# Patient Record
Sex: FEMALE | Race: BLACK OR AFRICAN AMERICAN | NOT HISPANIC OR LATINO | Employment: UNEMPLOYED | URBAN - METROPOLITAN AREA
[De-identification: names, ages, dates, MRNs, and addresses within clinical notes are randomized per-mention and may not be internally consistent; named-entity substitution may affect disease eponyms.]

---

## 2017-01-20 ENCOUNTER — HISTORICAL (OUTPATIENT)
Dept: INTERNAL MEDICINE | Facility: CLINIC | Age: 52
End: 2017-01-20

## 2017-01-27 ENCOUNTER — HISTORICAL (OUTPATIENT)
Dept: INTERNAL MEDICINE | Facility: CLINIC | Age: 52
End: 2017-01-27

## 2017-05-16 ENCOUNTER — HISTORICAL (OUTPATIENT)
Dept: WOUND CARE | Facility: HOSPITAL | Age: 52
End: 2017-05-16

## 2022-04-10 ENCOUNTER — HISTORICAL (OUTPATIENT)
Dept: ADMINISTRATIVE | Facility: HOSPITAL | Age: 57
End: 2022-04-10

## 2022-04-27 VITALS
HEIGHT: 65 IN | WEIGHT: 160.94 LBS | DIASTOLIC BLOOD PRESSURE: 87 MMHG | SYSTOLIC BLOOD PRESSURE: 135 MMHG | BODY MASS INDEX: 26.81 KG/M2

## 2022-08-25 DIAGNOSIS — M25.569 KNEE PAIN, UNSPECIFIED CHRONICITY, UNSPECIFIED LATERALITY: Primary | ICD-10-CM

## 2022-10-27 ENCOUNTER — OFFICE VISIT (OUTPATIENT)
Dept: ORTHOPEDICS | Facility: CLINIC | Age: 57
End: 2022-10-27
Payer: MEDICAID

## 2022-10-27 ENCOUNTER — HOSPITAL ENCOUNTER (OUTPATIENT)
Dept: RADIOLOGY | Facility: HOSPITAL | Age: 57
Discharge: HOME | End: 2022-10-27
Attending: NURSE PRACTITIONER
Payer: MEDICAID

## 2022-10-27 VITALS
RESPIRATION RATE: 20 BRPM | WEIGHT: 202 LBS | BODY MASS INDEX: 33.66 KG/M2 | HEIGHT: 65 IN | OXYGEN SATURATION: 100 % | SYSTOLIC BLOOD PRESSURE: 143 MMHG | DIASTOLIC BLOOD PRESSURE: 82 MMHG | HEART RATE: 91 BPM

## 2022-10-27 DIAGNOSIS — M17.0 PRIMARY OSTEOARTHRITIS OF BOTH KNEES: Primary | ICD-10-CM

## 2022-10-27 DIAGNOSIS — M25.562 PAIN IN BOTH KNEES, UNSPECIFIED CHRONICITY: ICD-10-CM

## 2022-10-27 DIAGNOSIS — M25.561 PAIN IN BOTH KNEES, UNSPECIFIED CHRONICITY: ICD-10-CM

## 2022-10-27 PROCEDURE — 99214 OFFICE O/P EST MOD 30 MIN: CPT | Mod: PBBFAC | Performed by: NURSE PRACTITIONER

## 2022-10-27 PROCEDURE — 3077F SYST BP >= 140 MM HG: CPT | Mod: CPTII,,, | Performed by: NURSE PRACTITIONER

## 2022-10-27 PROCEDURE — 1160F RVW MEDS BY RX/DR IN RCRD: CPT | Mod: CPTII,,, | Performed by: NURSE PRACTITIONER

## 2022-10-27 PROCEDURE — 3079F PR MOST RECENT DIASTOLIC BLOOD PRESSURE 80-89 MM HG: ICD-10-PCS | Mod: CPTII,,, | Performed by: NURSE PRACTITIONER

## 2022-10-27 PROCEDURE — 1159F PR MEDICATION LIST DOCUMENTED IN MEDICAL RECORD: ICD-10-PCS | Mod: CPTII,,, | Performed by: NURSE PRACTITIONER

## 2022-10-27 PROCEDURE — 99215 PR OFFICE/OUTPT VISIT, EST, LEVL V, 40-54 MIN: ICD-10-PCS | Mod: S$PBB,,, | Performed by: NURSE PRACTITIONER

## 2022-10-27 PROCEDURE — 99215 OFFICE O/P EST HI 40 MIN: CPT | Mod: S$PBB,,, | Performed by: NURSE PRACTITIONER

## 2022-10-27 PROCEDURE — 4010F ACE/ARB THERAPY RXD/TAKEN: CPT | Mod: CPTII,,, | Performed by: NURSE PRACTITIONER

## 2022-10-27 PROCEDURE — 3077F PR MOST RECENT SYSTOLIC BLOOD PRESSURE >= 140 MM HG: ICD-10-PCS | Mod: CPTII,,, | Performed by: NURSE PRACTITIONER

## 2022-10-27 PROCEDURE — 4010F PR ACE/ARB THEARPY RXD/TAKEN: ICD-10-PCS | Mod: CPTII,,, | Performed by: NURSE PRACTITIONER

## 2022-10-27 PROCEDURE — 3079F DIAST BP 80-89 MM HG: CPT | Mod: CPTII,,, | Performed by: NURSE PRACTITIONER

## 2022-10-27 PROCEDURE — 73564 X-RAY EXAM KNEE 4 OR MORE: CPT | Mod: TC,LT

## 2022-10-27 PROCEDURE — 73564 X-RAY EXAM KNEE 4 OR MORE: CPT | Mod: TC,RT

## 2022-10-27 PROCEDURE — 1159F MED LIST DOCD IN RCRD: CPT | Mod: CPTII,,, | Performed by: NURSE PRACTITIONER

## 2022-10-27 PROCEDURE — 1160F PR REVIEW ALL MEDS BY PRESCRIBER/CLIN PHARMACIST DOCUMENTED: ICD-10-PCS | Mod: CPTII,,, | Performed by: NURSE PRACTITIONER

## 2022-10-27 RX ORDER — FERROUS GLUCONATE 324(38)MG
TABLET ORAL
COMMUNITY
Start: 2022-05-04

## 2022-10-27 RX ORDER — HYDROCHLOROTHIAZIDE 12.5 MG/1
12.5 TABLET ORAL DAILY
COMMUNITY
Start: 2022-05-02 | End: 2023-09-26

## 2022-10-27 RX ORDER — VALSARTAN 160 MG/1
160 TABLET ORAL DAILY
COMMUNITY
Start: 2022-09-07 | End: 2023-09-26

## 2022-10-27 RX ORDER — MELOXICAM 15 MG/1
TABLET ORAL
COMMUNITY
Start: 2022-08-15

## 2022-10-27 RX ORDER — ERGOCALCIFEROL 1.25 MG/1
50000 CAPSULE ORAL
COMMUNITY
Start: 2022-09-05

## 2022-10-27 RX ORDER — GABAPENTIN 300 MG/1
300 CAPSULE ORAL 2 TIMES DAILY
COMMUNITY
Start: 2022-08-15

## 2022-10-27 RX ORDER — DICLOFENAC SODIUM 10 MG/G
GEL TOPICAL
COMMUNITY
Start: 2022-07-01

## 2022-10-27 RX ORDER — MEDROXYPROGESTERONE ACETATE 10 MG/1
10 TABLET ORAL DAILY
COMMUNITY
Start: 2022-09-05 | End: 2023-09-26

## 2022-10-27 NOTE — PROGRESS NOTES
"  Subjective:       Existing pt, new to me .  Patient ID: Eli Bangura is a 57 y.o. female. Non-smoker. 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    Seen [facility] ortho for same DX since n/a.  Seen in 2017 for carpal tunnel w/ Keshawn Jones NP.    Chief Complaint: Pain of the Left Knee and Pain of the Right Knee    HPI:    Bilateral L > R aching medial knee pain.   Injury: no known injury  Onset: several years ago fluctuates   Modifying Factors: worse with activity, improves with rest, stiffness after immobilization, and improves with less than 30 minutes activity  Associated Symptoms: crepitus, decreased ROM, swelling, and "giving out"   Activity: sedentary with light activity and pain mildly interferes with ADLs   Previous Treatments:  RX NSAIDs without symptom relief  PMH:  HX breast cancer 2002  Family History: + OA    Note: New referral for bilateral knee pain with no conservative treatments.     Current Outpatient Medications:     diclofenac sodium (VOLTAREN) 1 % Gel, APPLY 4 GRAMS TOPICALLY TO AFFECTED AREA THREE TO FOUR TIMES A DAY AS NEEDED FOR PAIN, Disp: , Rfl:     ergocalciferol (ERGOCALCIFEROL) 50,000 unit Cap, Take 50,000 Units by mouth every 7 days., Disp: , Rfl:     ferrous gluconate (FERGON) 324 MG tablet, TAKE 1 TABLET BY MOUTH ONCE DAILY FOR IRON, Disp: , Rfl:     gabapentin (NEURONTIN) 300 MG capsule, Take 300 mg by mouth 2 (two) times daily., Disp: , Rfl:     hydroCHLOROthiazide (HYDRODIURIL) 12.5 MG Tab, Take 12.5 mg by mouth once daily., Disp: , Rfl:     medroxyPROGESTERone (PROVERA) 10 MG tablet, Take 10 mg by mouth once daily. For 30 days, Disp: , Rfl:     meloxicam (MOBIC) 15 MG tablet, TAKE 1 TABLET BY MOUTH ONCE DAILY (STOP IBUPROFEN), Disp: , Rfl:     valsartan (DIOVAN) 160 MG tablet, Take 160 mg by mouth once daily., Disp: , Rfl:   Review of patient's allergies indicates:  No Known Allergies  No results found for: HGBA1C   Body mass index is 33.61 kg/m².   Vitals:    " "10/27/22 0901   BP: (!) 143/82   Pulse: 91   Resp: 20   SpO2: 100%   Weight: 91.6 kg (202 lb)   Height: 5' 5" (1.651 m)   PainSc:   2      Review of Systems:  A ten-point review of systems was performed and is negative, except as mentioned above   Objective:   MSK Bilateral Knee  General:  No apparent distress, no pain indicators, obesity   Inspection: limping gait, mild effusion, full weight bearing, no erythema, no contusion, mild quad deconditioning, varus deformity   Palpation: BILATERAL knees tenderness with palpation at medial joint line, peripatellar soft tissue, non-tender patellar tendon, tibial plateau  ROM:    Active Flexion / Extension (0-140):  0 / 119 painful (R)  1 / 116 painful (L)  Strength:   Flexion  4 / 5 (R)   4 / 5 (L)  Extension  4 / 5 (R)   4 / 5 (L)  Special Testing:  IT Band Testing  Dyan's Test:    + (R)  + (L)  Effusion Testing  Ballotable Effusion:   -- (R)  -- (L)  Fluid Wave:    -- (R)  -- (L)  Patellar Testing  Patellar Grind:    + (R)  + (L)  Patellar Facet Pain:   + (R)  + (L)  Apprehension:    -- (R)  -- (L)  Meniscal Testing  Elina's test:     + (R)  + (L)  Thessaly's Test:      Not tested (R)  not tested (L)  Ligament Testing  ACL Anterior Drawer:   -- (R) -- (L)  PCL Posterior Drawer:   -- (R) -- (L)  LCL Varus Test:    -- (R) -- (L)  MCL Valgus Test:   -- (R) -- (L)  Hip Exam normal  Ankle Exam normal  Neurovascular: Intact to light touch  Neuro/ Psych: Awake, alert, oriented, normal mood and affect  Lymphatic: No LAD  Skin/ Soft Tissue: no rash, skin intact  Assessment:       Imaging: X-ray 4 views of left and right knee ordered, reviewed and independently interpreted by me. Discussed with patient. Noted no acute findings, DJD noted, awaiting radiologist findings    EMR Review: completed with noted Referral documentation reviewed    1. Primary osteoarthritis of both knees    2. BMI 33.0-33.9,adult      Plan:       Orders Placed This Encounter    X-Ray Knee Complete 4 Or " More Views Right    X-Ray Knee Complete 4 or More Views Left     Ongoing education about DX and treatment recommendations including conservative treatments of daily HEP with TheraBand, BMI reduction goal 5-10% of body weight (160# overall goal), muscle strengthening with use of stationary bike (RPM set at 80 or > with slow progression to goal of 40 minutes 3-4 times per week as tolerated), adequate vit D/C, glucosamine 1500 mg/day and daily acetaminophen 1000 mg 3 times/ day if able to tolerate.    Treatment plan: Moderate knee arthritis Bilateral L > R Recommend starting initial conservative treatments including: RX topical NSAID  and HEP with TheraBand > 6 weeks.  If inadequate at f/u will consider formal RX PT, instructed to contact insurance for provider.   Procedure: CSI v. VS injections discussed as treatment options in future if conservative treatments fail.   RX Medications: continue medications as RX per PCP  RTC: PRN if symptoms worsen or return  NOTE:  Patient currently having minimal pain and would like to try HEP and will call in future if desires further treatments.     Shilpa JAQUEZ    Timed Billing Note  Total Time Spent with Patient: 40 minutes .  Visit Start Time: 0900  10 minutes spent prior to visit reviewing EMR, prior labs and x-rays.  20 minutes spent in visit with patient completing exam, obtaining history, educating on DX and treatment plan.  10 minutes spent after visit completing EMR documentation.   Visit End Time: 0940

## 2023-07-18 ENCOUNTER — TELEPHONE (OUTPATIENT)
Dept: ENDOCRINOLOGY | Facility: CLINIC | Age: 58
End: 2023-07-18
Payer: MEDICAID

## 2023-07-20 ENCOUNTER — TELEPHONE (OUTPATIENT)
Dept: ENDOCRINOLOGY | Facility: CLINIC | Age: 58
End: 2023-07-20
Payer: MEDICAID

## 2023-08-15 DIAGNOSIS — E06.9 THYROIDITIS: Primary | ICD-10-CM

## 2023-09-26 ENCOUNTER — LAB VISIT (OUTPATIENT)
Dept: LAB | Facility: HOSPITAL | Age: 58
End: 2023-09-26
Attending: STUDENT IN AN ORGANIZED HEALTH CARE EDUCATION/TRAINING PROGRAM
Payer: MEDICAID

## 2023-09-26 ENCOUNTER — OFFICE VISIT (OUTPATIENT)
Dept: ENDOCRINOLOGY | Facility: CLINIC | Age: 58
End: 2023-09-26
Payer: MEDICAID

## 2023-09-26 VITALS
DIASTOLIC BLOOD PRESSURE: 80 MMHG | SYSTOLIC BLOOD PRESSURE: 142 MMHG | WEIGHT: 185.19 LBS | HEART RATE: 77 BPM | BODY MASS INDEX: 30.85 KG/M2 | HEIGHT: 65 IN | RESPIRATION RATE: 18 BRPM

## 2023-09-26 DIAGNOSIS — E05.90 THYROTOXICOSIS WITHOUT THYROID STORM, UNSPECIFIED THYROTOXICOSIS TYPE: ICD-10-CM

## 2023-09-26 DIAGNOSIS — E05.00 GRAVES DISEASE: Primary | ICD-10-CM

## 2023-09-26 LAB
ESTIMATED AVG GLUCOSE: 97 MG/DL (ref 68–131)
HBA1C MFR BLD: 5 % (ref 4–5.6)
T4 FREE SERPL-MCNC: 0.63 NG/DL (ref 0.71–1.51)
TSH SERPL DL<=0.005 MIU/L-ACNC: 10.4 UIU/ML (ref 0.4–4)

## 2023-09-26 PROCEDURE — 99999 PR PBB SHADOW E&M-EST. PATIENT-LVL III: ICD-10-PCS | Mod: PBBFAC,,, | Performed by: STUDENT IN AN ORGANIZED HEALTH CARE EDUCATION/TRAINING PROGRAM

## 2023-09-26 PROCEDURE — 83520 IMMUNOASSAY QUANT NOS NONAB: CPT | Performed by: STUDENT IN AN ORGANIZED HEALTH CARE EDUCATION/TRAINING PROGRAM

## 2023-09-26 PROCEDURE — 3079F PR MOST RECENT DIASTOLIC BLOOD PRESSURE 80-89 MM HG: ICD-10-PCS | Mod: CPTII,,, | Performed by: STUDENT IN AN ORGANIZED HEALTH CARE EDUCATION/TRAINING PROGRAM

## 2023-09-26 PROCEDURE — 84445 ASSAY OF TSI GLOBULIN: CPT | Performed by: STUDENT IN AN ORGANIZED HEALTH CARE EDUCATION/TRAINING PROGRAM

## 2023-09-26 PROCEDURE — 99204 OFFICE O/P NEW MOD 45 MIN: CPT | Mod: S$PBB,,, | Performed by: STUDENT IN AN ORGANIZED HEALTH CARE EDUCATION/TRAINING PROGRAM

## 2023-09-26 PROCEDURE — 3008F BODY MASS INDEX DOCD: CPT | Mod: CPTII,,, | Performed by: STUDENT IN AN ORGANIZED HEALTH CARE EDUCATION/TRAINING PROGRAM

## 2023-09-26 PROCEDURE — 1159F PR MEDICATION LIST DOCUMENTED IN MEDICAL RECORD: ICD-10-PCS | Mod: CPTII,,, | Performed by: STUDENT IN AN ORGANIZED HEALTH CARE EDUCATION/TRAINING PROGRAM

## 2023-09-26 PROCEDURE — 83036 HEMOGLOBIN GLYCOSYLATED A1C: CPT | Performed by: STUDENT IN AN ORGANIZED HEALTH CARE EDUCATION/TRAINING PROGRAM

## 2023-09-26 PROCEDURE — 3077F PR MOST RECENT SYSTOLIC BLOOD PRESSURE >= 140 MM HG: ICD-10-PCS | Mod: CPTII,,, | Performed by: STUDENT IN AN ORGANIZED HEALTH CARE EDUCATION/TRAINING PROGRAM

## 2023-09-26 PROCEDURE — 3008F PR BODY MASS INDEX (BMI) DOCUMENTED: ICD-10-PCS | Mod: CPTII,,, | Performed by: STUDENT IN AN ORGANIZED HEALTH CARE EDUCATION/TRAINING PROGRAM

## 2023-09-26 PROCEDURE — 84480 ASSAY TRIIODOTHYRONINE (T3): CPT | Performed by: STUDENT IN AN ORGANIZED HEALTH CARE EDUCATION/TRAINING PROGRAM

## 2023-09-26 PROCEDURE — 99999 PR PBB SHADOW E&M-EST. PATIENT-LVL III: CPT | Mod: PBBFAC,,, | Performed by: STUDENT IN AN ORGANIZED HEALTH CARE EDUCATION/TRAINING PROGRAM

## 2023-09-26 PROCEDURE — 4010F ACE/ARB THERAPY RXD/TAKEN: CPT | Mod: CPTII,,, | Performed by: STUDENT IN AN ORGANIZED HEALTH CARE EDUCATION/TRAINING PROGRAM

## 2023-09-26 PROCEDURE — 99204 PR OFFICE/OUTPT VISIT, NEW, LEVL IV, 45-59 MIN: ICD-10-PCS | Mod: S$PBB,,, | Performed by: STUDENT IN AN ORGANIZED HEALTH CARE EDUCATION/TRAINING PROGRAM

## 2023-09-26 PROCEDURE — 36415 COLL VENOUS BLD VENIPUNCTURE: CPT | Performed by: STUDENT IN AN ORGANIZED HEALTH CARE EDUCATION/TRAINING PROGRAM

## 2023-09-26 PROCEDURE — 3079F DIAST BP 80-89 MM HG: CPT | Mod: CPTII,,, | Performed by: STUDENT IN AN ORGANIZED HEALTH CARE EDUCATION/TRAINING PROGRAM

## 2023-09-26 PROCEDURE — 1159F MED LIST DOCD IN RCRD: CPT | Mod: CPTII,,, | Performed by: STUDENT IN AN ORGANIZED HEALTH CARE EDUCATION/TRAINING PROGRAM

## 2023-09-26 PROCEDURE — 99213 OFFICE O/P EST LOW 20 MIN: CPT | Mod: PBBFAC,PN | Performed by: STUDENT IN AN ORGANIZED HEALTH CARE EDUCATION/TRAINING PROGRAM

## 2023-09-26 PROCEDURE — 3077F SYST BP >= 140 MM HG: CPT | Mod: CPTII,,, | Performed by: STUDENT IN AN ORGANIZED HEALTH CARE EDUCATION/TRAINING PROGRAM

## 2023-09-26 PROCEDURE — 84443 ASSAY THYROID STIM HORMONE: CPT | Performed by: STUDENT IN AN ORGANIZED HEALTH CARE EDUCATION/TRAINING PROGRAM

## 2023-09-26 PROCEDURE — 4010F PR ACE/ARB THEARPY RXD/TAKEN: ICD-10-PCS | Mod: CPTII,,, | Performed by: STUDENT IN AN ORGANIZED HEALTH CARE EDUCATION/TRAINING PROGRAM

## 2023-09-26 PROCEDURE — 84439 ASSAY OF FREE THYROXINE: CPT | Performed by: STUDENT IN AN ORGANIZED HEALTH CARE EDUCATION/TRAINING PROGRAM

## 2023-09-26 RX ORDER — METHIMAZOLE 5 MG/1
5 TABLET ORAL 3 TIMES DAILY
COMMUNITY
Start: 2023-09-06 | End: 2023-09-27 | Stop reason: SDUPTHER

## 2023-09-26 RX ORDER — ATENOLOL 50 MG/1
50 TABLET ORAL EVERY MORNING
COMMUNITY
Start: 2023-09-05

## 2023-09-26 NOTE — PROGRESS NOTES
"Subjective:      Patient ID: Eli Bangura is a 58 y.o. female.    Chief Complaint:  Thyrotoxicosis    History of Present Illness  This is a 58 y.o. female. with a past medical history of HTN here for evaluation of thyrotoxicosis.    Thyrotoxicosis  Diagnosed in July 2023  Currently on methimazole 5 mg TID      Lab Results   Component Value Date    TSH <0.003 (L) 07/13/2023 07/11/23 11:47 07/13/23 05:24   TSH <0.003 (L) <0.003 (L)   T4, Free 4.66 (H) 2.59 (H)       Biotin use: No  Amiodarone use: No    Weight:   Wt Readings from Last 6 Encounters:   09/26/23 84 kg (185 lb 3 oz)   10/27/22 91.6 kg (202 lb)   06/14/17 73 kg (160 lb 15 oz)       Energy level: Decreased  Appetite: Normal  Heat intolerance: Hot flashes  Bowel movements: Regular  Tremor: Sometimes  Maintenance insomnia +  Palpitations: Yes, better  Nervousness/mood changes: Yes    LE edema in both legs    LMP age 55      Exposure to iodine/contrast: 07/11/23 for CT neck with contraste    Eye symptoms:   Some pain - not often  Dryness    Denied neck enlargement  Some hoarseness, better  Some dysphagia, better      Thyroid US (07/12/23)  Findings:  Right lower lobe: 5.4 cm   Left lower lobe: 6.2 cm   Isthmus: 3 mm.   Recommend diffusely heterogeneous and hyperemic. Potential 6 mm mid pole right thyroid nodule with spongiform components. No additional convincing thyroid nodule.    Review of Systems  As above    Social and family history reviewed  Current medications and allergies reviewed    Objective:   BP (!) 142/80 (BP Location: Right arm, Patient Position: Sitting, BP Method: Medium (Manual))   Pulse 77   Resp 18   Ht 5' 5" (1.651 m)   Wt 84 kg (185 lb 3 oz)   BMI 30.82 kg/m²   Physical Exam  Alert, oriented  Mild thyromegaly    BP Readings from Last 1 Encounters:   09/26/23 (!) 142/80      Wt Readings from Last 1 Encounters:   09/26/23 0915 84 kg (185 lb 3 oz)     Body mass index is 30.82 kg/m².    Lab Review:   No results found for: " ""HGBA1C"  No results found for: "CHOL", "HDL", "LDLCALC", "TRIG", "CHOLHDL"  Lab Results   Component Value Date     07/12/2023    K 3.2 (L) 07/12/2023     (H) 07/12/2023    CO2 23 07/12/2023    BUN 19 07/12/2023    CREATININE 0.63 07/12/2023    CALCIUM 9.2 07/12/2023    ANIONGAP 8 07/12/2023    ESTGFRAFRICA 104 07/12/2023    TSH <0.003 (L) 07/13/2023       All pertinent labs reviewed    Assessment and Plan     Graves disease  Suspicion is high for Graves based on TFTs and thyroid US  Will confirm with antibodies  Will recheck TFTs and adjust methimazole as needed  Will try to dose methimazole daily as she has been missing doses    Thyrotoxicosis without thyroid storm  See Graves    BMI 30.0-30.9,adult  Optimize thyroid function as above      Follow-up in 6 months    Roland Ross MD  Endocrinology    "

## 2023-09-26 NOTE — ASSESSMENT & PLAN NOTE
Suspicion is high for Graves based on TFTs and thyroid US  Will confirm with antibodies  Will recheck TFTs and adjust methimazole as needed  Will try to dose methimazole daily as she has been missing doses

## 2023-09-27 ENCOUNTER — TELEPHONE (OUTPATIENT)
Dept: ENDOCRINOLOGY | Facility: CLINIC | Age: 58
End: 2023-09-27
Payer: MEDICAID

## 2023-09-27 DIAGNOSIS — E05.00 GRAVES DISEASE: Primary | ICD-10-CM

## 2023-09-27 LAB — T3 SERPL-MCNC: 91 NG/DL (ref 60–180)

## 2023-09-27 RX ORDER — METHIMAZOLE 5 MG/1
5 TABLET ORAL 3 TIMES DAILY
Qty: 30 TABLET | Refills: 11 | Status: SHIPPED | OUTPATIENT
Start: 2023-09-27 | End: 2023-10-26 | Stop reason: SDUPTHER

## 2023-09-27 NOTE — TELEPHONE ENCOUNTER
Patient notified of results, verbalized understanding.          ----- Message from Roland Ross MD sent at 9/27/2023 11:42 AM CDT -----  Please inform that I reviewed results.    Thyroid levels indicate she is getting too much methimazole at the time. Please reduce the dose to only 5 mg taken once daily only.     Recheck levels in 4 weeks.

## 2023-09-28 LAB — TSH RECEP AB SER-ACNC: 4.05 IU/L (ref 0–1.75)

## 2023-09-29 LAB — TSI SER-ACNC: 1.82 IU/L

## 2023-10-25 ENCOUNTER — LAB VISIT (OUTPATIENT)
Dept: LAB | Facility: HOSPITAL | Age: 58
End: 2023-10-25
Attending: STUDENT IN AN ORGANIZED HEALTH CARE EDUCATION/TRAINING PROGRAM
Payer: MEDICAID

## 2023-10-25 DIAGNOSIS — E05.00 GRAVES DISEASE: ICD-10-CM

## 2023-10-25 LAB
T4 FREE SERPL-MCNC: 1.01 NG/DL (ref 0.7–1.48)
TSH SERPL-ACNC: 1.54 UIU/ML (ref 0.35–4.94)

## 2023-10-25 PROCEDURE — 36415 COLL VENOUS BLD VENIPUNCTURE: CPT

## 2023-10-25 PROCEDURE — 84480 ASSAY TRIIODOTHYRONINE (T3): CPT

## 2023-10-25 PROCEDURE — 84439 ASSAY OF FREE THYROXINE: CPT

## 2023-10-25 PROCEDURE — 84443 ASSAY THYROID STIM HORMONE: CPT

## 2023-10-26 ENCOUNTER — TELEPHONE (OUTPATIENT)
Dept: ENDOCRINOLOGY | Facility: CLINIC | Age: 58
End: 2023-10-26
Payer: MEDICAID

## 2023-10-26 DIAGNOSIS — E05.00 GRAVES DISEASE: ICD-10-CM

## 2023-10-26 LAB — T3 SERPL IA-MCNC: 113 NG/DL (ref 80–200)

## 2023-10-26 RX ORDER — METHIMAZOLE 5 MG/1
5 TABLET ORAL DAILY
Qty: 30 TABLET | Refills: 11 | Status: SHIPPED | OUTPATIENT
Start: 2023-10-26

## 2023-10-26 NOTE — TELEPHONE ENCOUNTER
Patient notified of results, verbalized understanding.        ----- Message from Roland Ross MD sent at 10/26/2023  6:47 AM CDT -----  Please inform that I reviewed results.    Thyroid levels look good, continue methimazole 5 mg once a day.    Labs in 3 months

## 2024-01-26 ENCOUNTER — LAB VISIT (OUTPATIENT)
Dept: LAB | Facility: HOSPITAL | Age: 59
End: 2024-01-26
Attending: STUDENT IN AN ORGANIZED HEALTH CARE EDUCATION/TRAINING PROGRAM
Payer: MEDICAID

## 2024-01-26 DIAGNOSIS — E05.00 GRAVES DISEASE: ICD-10-CM

## 2024-01-26 LAB
T4 FREE SERPL-MCNC: 0.98 NG/DL (ref 0.7–1.48)
TSH SERPL-ACNC: 2.22 UIU/ML (ref 0.35–4.94)

## 2024-01-26 PROCEDURE — 84439 ASSAY OF FREE THYROXINE: CPT

## 2024-01-26 PROCEDURE — 84443 ASSAY THYROID STIM HORMONE: CPT

## 2024-01-26 PROCEDURE — 84480 ASSAY TRIIODOTHYRONINE (T3): CPT

## 2024-01-26 PROCEDURE — 36415 COLL VENOUS BLD VENIPUNCTURE: CPT

## 2024-01-26 PROCEDURE — 83520 IMMUNOASSAY QUANT NOS NONAB: CPT

## 2024-01-27 LAB
T3 SERPL IA-MCNC: 113 NG/DL (ref 80–200)
TSH RECEP AB SER-ACNC: 1.13 IU/L (ref 0–1.75)

## 2024-01-29 DIAGNOSIS — E05.00 GRAVES DISEASE: Primary | ICD-10-CM

## 2024-01-30 ENCOUNTER — TELEPHONE (OUTPATIENT)
Dept: ENDOCRINOLOGY | Facility: CLINIC | Age: 59
End: 2024-01-30
Payer: MEDICAID

## 2024-01-30 NOTE — TELEPHONE ENCOUNTER
Left message on voicemail to contact office.         ----- Message from Roland Ross MD sent at 1/29/2024  5:01 PM CST -----  Please inform that I reviewed results. Thyroid levels all in range. Continue methimazole 5 mg daily.    Labs 2-3 days before next appointment.    Thanks

## 2024-05-17 ENCOUNTER — LAB VISIT (OUTPATIENT)
Dept: LAB | Facility: HOSPITAL | Age: 59
End: 2024-05-17
Attending: STUDENT IN AN ORGANIZED HEALTH CARE EDUCATION/TRAINING PROGRAM
Payer: MEDICAID

## 2024-05-17 DIAGNOSIS — E05.00 GRAVES DISEASE: ICD-10-CM

## 2024-05-17 LAB
T3 SERPL-MCNC: 90.08 NG/DL (ref 60–180)
T4 FREE SERPL-MCNC: 0.97 NG/DL (ref 0.7–1.48)
TSH SERPL-ACNC: 1.48 UIU/ML (ref 0.35–4.94)

## 2024-05-17 PROCEDURE — 84443 ASSAY THYROID STIM HORMONE: CPT

## 2024-05-17 PROCEDURE — 84480 ASSAY TRIIODOTHYRONINE (T3): CPT

## 2024-05-17 PROCEDURE — 36415 COLL VENOUS BLD VENIPUNCTURE: CPT

## 2024-05-17 PROCEDURE — 83520 IMMUNOASSAY QUANT NOS NONAB: CPT

## 2024-05-17 PROCEDURE — 84439 ASSAY OF FREE THYROXINE: CPT

## 2024-05-18 LAB — TSH RECEP AB SER-ACNC: 1.17 IU/L (ref 0–1.75)

## 2024-05-21 ENCOUNTER — TELEPHONE (OUTPATIENT)
Dept: ENDOCRINOLOGY | Facility: CLINIC | Age: 59
End: 2024-05-21

## 2024-05-21 ENCOUNTER — OFFICE VISIT (OUTPATIENT)
Dept: ENDOCRINOLOGY | Facility: CLINIC | Age: 59
End: 2024-05-21
Payer: MEDICAID

## 2024-05-21 VITALS — HEART RATE: 100 BPM | RESPIRATION RATE: 18 BRPM | WEIGHT: 201 LBS | HEIGHT: 65 IN | BODY MASS INDEX: 33.49 KG/M2

## 2024-05-21 DIAGNOSIS — R22.1 SUBCUTANEOUS NODULE OF NECK: Primary | ICD-10-CM

## 2024-05-21 DIAGNOSIS — E05.00 GRAVES DISEASE: ICD-10-CM

## 2024-05-21 PROBLEM — E05.90 THYROTOXICOSIS WITHOUT THYROID STORM: Status: RESOLVED | Noted: 2023-09-26 | Resolved: 2024-05-21

## 2024-05-21 PROCEDURE — 99999 PR PBB SHADOW E&M-EST. PATIENT-LVL III: CPT | Mod: PBBFAC,,, | Performed by: STUDENT IN AN ORGANIZED HEALTH CARE EDUCATION/TRAINING PROGRAM

## 2024-05-21 PROCEDURE — 1159F MED LIST DOCD IN RCRD: CPT | Mod: CPTII,,, | Performed by: STUDENT IN AN ORGANIZED HEALTH CARE EDUCATION/TRAINING PROGRAM

## 2024-05-21 PROCEDURE — 99214 OFFICE O/P EST MOD 30 MIN: CPT | Mod: S$PBB,,, | Performed by: STUDENT IN AN ORGANIZED HEALTH CARE EDUCATION/TRAINING PROGRAM

## 2024-05-21 PROCEDURE — 99213 OFFICE O/P EST LOW 20 MIN: CPT | Mod: PBBFAC | Performed by: STUDENT IN AN ORGANIZED HEALTH CARE EDUCATION/TRAINING PROGRAM

## 2024-05-21 PROCEDURE — G2211 COMPLEX E/M VISIT ADD ON: HCPCS | Mod: S$PBB,,, | Performed by: STUDENT IN AN ORGANIZED HEALTH CARE EDUCATION/TRAINING PROGRAM

## 2024-05-21 PROCEDURE — 1160F RVW MEDS BY RX/DR IN RCRD: CPT | Mod: CPTII,,, | Performed by: STUDENT IN AN ORGANIZED HEALTH CARE EDUCATION/TRAINING PROGRAM

## 2024-05-21 PROCEDURE — 3008F BODY MASS INDEX DOCD: CPT | Mod: CPTII,,, | Performed by: STUDENT IN AN ORGANIZED HEALTH CARE EDUCATION/TRAINING PROGRAM

## 2024-05-21 RX ORDER — VALSARTAN AND HYDROCHLOROTHIAZIDE 160; 12.5 MG/1; MG/1
1 TABLET, FILM COATED ORAL
COMMUNITY
Start: 2024-05-15

## 2024-05-21 RX ORDER — PREGABALIN 50 MG/1
50 CAPSULE ORAL 3 TIMES DAILY
COMMUNITY
Start: 2024-05-07

## 2024-05-21 NOTE — PROGRESS NOTES
"Subjective:      Patient ID: Eli Bangura is a 59 y.o. female.    Chief Complaint:  Graves disease    History of Present Illness  This is a 59 y.o. female. with a past medical history of HTN, Graves here for follow up    Graves disease  Diagnosed in July 2023  Currently on methimazole 5 mg daily      Lab Results   Component Value Date    TSH 1.476 05/17/2024          Latest Reference Range & Units 07/11/23 11:47 07/13/23 05:24 09/26/23 10:29 10/25/23 09:24 01/26/24 10:08 05/17/24 09:51   TSH 0.350 - 4.940 uIU/mL <0.003 (L) <0.003 (L) 10.400 (H) 1.538 2.216 1.476   T3, Total 60 - 180 ng/dL   91 113 113 90   Free T4 0.70 - 1.48 ng/dL 4.66 (H)  2.59 (H)  0.63 (L) 1.01 0.98 0.97   Thyrotropin Receptor Ab 0.00 - 1.75 IU/L   4.05 (H)  1.13 1.17   Thyroid-Stim IG Quantitative <0.10 IU/L   1.82 (H)            Biotin use: No  Amiodarone use: No    Weight:   Wt Readings from Last 6 Encounters:   05/21/24 91.2 kg (201 lb)   09/26/23 84 kg (185 lb 3 oz)   10/27/22 91.6 kg (202 lb)   06/14/17 73 kg (160 lb 15 oz)       Energy level: Decreased  Appetite: Normal  Heat intolerance: Hot flashes  Bowel movements: Regular  Tremor: Sometimes  Maintenance insomnia +  Palpitations: Yes, better  Nervousness/mood changes: Yes    LE edema in both legs    LMP age 55      Exposure to iodine/contrast: 07/11/23 for CT neck with contrast    Eye symptoms:   Some pain - not often  Dryness    Denied neck enlargement  Some hoarseness, better  Some dysphagia, better      Thyroid US (07/12/23)  Findings:  Right lower lobe: 5.4 cm   Left lower lobe: 6.2 cm   Isthmus: 3 mm.   Recommend diffusely heterogeneous and hyperemic. Potential 6 mm mid pole right thyroid nodule with spongiform components. No additional convincing thyroid nodule.    Review of Systems  As above    Social and family history reviewed  Current medications and allergies reviewed    Objective:   BP (P) 128/73   Pulse 100   Resp 18   Ht 5' 5" (1.651 m)   Wt 91.2 kg (201 lb)   BMI " "33.45 kg/m²   Physical Exam  Alert, oriented  No thyromegaly  She has a 2-3 cm R hard, non mobile supraclavicular nodule which is suspicious for a pathological lymph node    BP Readings from Last 1 Encounters:   05/21/24 (P) 128/73      Wt Readings from Last 1 Encounters:   05/21/24 1001 91.2 kg (201 lb)     Body mass index is 33.45 kg/m².    Lab Review:   Lab Results   Component Value Date    HGBA1C 5.0 09/26/2023     No results found for: "CHOL", "HDL", "LDLCALC", "TRIG", "CHOLHDL"  Lab Results   Component Value Date     07/12/2023    K 3.2 (L) 07/12/2023     (H) 07/12/2023    CO2 23 07/12/2023    BUN 19 07/12/2023    CREATININE 0.63 07/12/2023    CALCIUM 9.2 07/12/2023    ANIONGAP 8 07/12/2023    ESTGFRAFRICA 104 07/12/2023    TSH 1.476 05/17/2024       All pertinent labs reviewed    Assessment and Plan     Graves disease  Graves confirmed with TRAb/TSI  She has responded well to methimazole  TFTs at goal, TRAb negative now  Will plan to complete 18 months of therapy before attempting to taper off    Plan  - Recheck TSH, FT4, T3, TRAb in 4 and 8 months  - Continue methimazole 5 mg daily    F/u 8 months    BMI 33.0-33.9,adult  Monitor weight while on methimazole  She had some expected weight gain after normalizing TFTs    Subcutaneous nodule of neck  She has a 2-3 cm R hard, non mobile supraclavicular nodule which is suspicious for a pathological lymph node  Will perform a neck soft tissue US - further work up/plan depending on findings        Roland Ross MD  Endocrinology      "

## 2024-05-21 NOTE — TELEPHONE ENCOUNTER
----- Message from Gwendolyn Interiano sent at 2024  1:30 PM CDT -----  Contact: PATIENT  Eli Bangura  MRN: [id number]  : 1965  PCP: Britney, Primary Doctor  Home Phone      [phone]  Work Phone      Not on file.  Mobile          [phone]      MESSAGE: Patient has an appointment scheduled for 25 at 8:00 but is needing something later that day.        Phone: [phone]

## 2024-05-21 NOTE — ASSESSMENT & PLAN NOTE
Graves confirmed with TRAb/TSI  She has responded well to methimazole  TFTs at goal, TRAb negative now  Will plan to complete 18 months of therapy before attempting to taper off    Plan  - Recheck TSH, FT4, T3, TRAb in 4 and 8 months  - Continue methimazole 5 mg daily    F/u 8 months

## 2024-05-21 NOTE — ASSESSMENT & PLAN NOTE
She has a 2-3 cm R hard, non mobile supraclavicular nodule which is suspicious for a pathological lymph node  Will perform a neck soft tissue US - further work up/plan depending on findings

## 2024-05-30 ENCOUNTER — HOSPITAL ENCOUNTER (OUTPATIENT)
Dept: RADIOLOGY | Facility: HOSPITAL | Age: 59
Discharge: HOME | End: 2024-05-30
Attending: STUDENT IN AN ORGANIZED HEALTH CARE EDUCATION/TRAINING PROGRAM
Payer: MEDICAID

## 2024-05-30 DIAGNOSIS — R22.1 SUBCUTANEOUS NODULE OF NECK: ICD-10-CM

## 2024-05-30 PROCEDURE — 76536 US EXAM OF HEAD AND NECK: CPT | Mod: TC

## 2024-05-31 DIAGNOSIS — R22.1 SUBCUTANEOUS NODULE OF NECK: Primary | ICD-10-CM

## 2024-06-17 DIAGNOSIS — E05.00 GRAVES DISEASE: ICD-10-CM

## 2024-06-17 RX ORDER — METHIMAZOLE 5 MG/1
5 TABLET ORAL DAILY
Qty: 30 TABLET | Refills: 11 | Status: SHIPPED | OUTPATIENT
Start: 2024-06-17

## 2024-06-27 ENCOUNTER — OFFICE VISIT (OUTPATIENT)
Dept: SURGERY | Facility: CLINIC | Age: 59
End: 2024-06-27
Payer: MEDICAID

## 2024-06-27 VITALS
BODY MASS INDEX: 34.2 KG/M2 | OXYGEN SATURATION: 100 % | WEIGHT: 205.25 LBS | HEIGHT: 65 IN | SYSTOLIC BLOOD PRESSURE: 142 MMHG | DIASTOLIC BLOOD PRESSURE: 80 MMHG | HEART RATE: 87 BPM

## 2024-06-27 DIAGNOSIS — C50.911 RECURRENT MALIGNANT NEOPLASM OF RIGHT BREAST: ICD-10-CM

## 2024-06-27 DIAGNOSIS — R22.1 SUBCUTANEOUS NODULE OF NECK: Primary | ICD-10-CM

## 2024-06-27 PROCEDURE — 3079F DIAST BP 80-89 MM HG: CPT | Mod: CPTII,,, | Performed by: STUDENT IN AN ORGANIZED HEALTH CARE EDUCATION/TRAINING PROGRAM

## 2024-06-27 PROCEDURE — 99205 OFFICE O/P NEW HI 60 MIN: CPT | Mod: 25,S$PBB,, | Performed by: STUDENT IN AN ORGANIZED HEALTH CARE EDUCATION/TRAINING PROGRAM

## 2024-06-27 PROCEDURE — 21550 BIOPSY OF NECK/CHEST: CPT | Mod: S$PBB,,, | Performed by: STUDENT IN AN ORGANIZED HEALTH CARE EDUCATION/TRAINING PROGRAM

## 2024-06-27 PROCEDURE — 99999 PR PBB SHADOW E&M-EST. PATIENT-LVL III: CPT | Mod: PBBFAC,,, | Performed by: STUDENT IN AN ORGANIZED HEALTH CARE EDUCATION/TRAINING PROGRAM

## 2024-06-27 PROCEDURE — 3077F SYST BP >= 140 MM HG: CPT | Mod: CPTII,,, | Performed by: STUDENT IN AN ORGANIZED HEALTH CARE EDUCATION/TRAINING PROGRAM

## 2024-06-27 PROCEDURE — 1159F MED LIST DOCD IN RCRD: CPT | Mod: CPTII,,, | Performed by: STUDENT IN AN ORGANIZED HEALTH CARE EDUCATION/TRAINING PROGRAM

## 2024-06-27 PROCEDURE — 3008F BODY MASS INDEX DOCD: CPT | Mod: CPTII,,, | Performed by: STUDENT IN AN ORGANIZED HEALTH CARE EDUCATION/TRAINING PROGRAM

## 2024-06-27 PROCEDURE — 99213 OFFICE O/P EST LOW 20 MIN: CPT | Mod: PBBFAC | Performed by: STUDENT IN AN ORGANIZED HEALTH CARE EDUCATION/TRAINING PROGRAM

## 2024-06-27 RX ORDER — DULOXETIN HYDROCHLORIDE 30 MG/1
30 CAPSULE, DELAYED RELEASE ORAL DAILY
COMMUNITY

## 2024-07-07 NOTE — PROCEDURES
[facility]   General Surgery Department  Procedure Note     SUMMARY     Date of Procedure: 6/27/2024    Procedure: Procedure(s) (LRB):  Core needle biopsy of right supraclavicular mass    Surgeon(s) and Role:  Rere Jernigan MD     Pre-Operative Diagnosis:  Supraclavicular mass, right    Post-Operative Diagnosis: Same         Anesthesia:  Local infiltration    Findings:  For core needle biopsies of supraclavicular mass obtained with 14 gauge biopsy gun    Indications for the Procedure:   This is a 59 y.o. female with history of right breast cancer s/p BCT who presents with firm 6 x 6 cm exophytic right subclavicular mass.    Procedure in Detail:   The risks and benefits of the procedure were discussed with the patient aInformed consent was obtained from the patient. A time out was called.The patient was positioned supine.       After antiseptic preparation, the skin puncture site was draped and infiltrated with 1% lidocaine without epinephrine. A 14-gauge spring loaded automated core biopsy needle was advanced to the target. 4 tissue cores were obtained through the target.  Hemostasis was achieved were appropriate and sterile dressing applied.     Hemostasis was achieved and appropriate post-procedural dressing was applied. Blood loss was minimal.  The patient tolerated the procedure well, and there was no evidence of immediate complication. The patient was given verbal and written post procedural instructions prior to release from the department        Impression:      Ultrasound-guided biopsy of 4 right supraclavicular mass was performed without clip placement.  Patient will be contacted with pathology results.    Complications: No    Estimated Blood Loss (EBL):  Minimal             Specimens:    Right supraclavicular mass             Condition: Discharged to home in stable condition        Rere Jernigan MD  General Surgery   [phone]

## 2024-07-07 NOTE — H&P
[facility]  General Surgery Clinic H&P      Consult:  Right supraclavicular mass  Consulting Service:  Dr. Ross   Chief Complaint:  Nodule    HPI: Pt is a 59 y.o. female with history of right breast cancer s/p BCT in 2002 who presents with painless large supraclavicular mass.  First noticed it 4 months ago and has steadily incised past month.  Last mammogram 1 year ago and new [facility].  Also reports progressive skin changes of the right breast not associated with lumpectomy incision.  Receptor status unknown.    PMH:   Past Medical History:   Diagnosis Date    Anemia     Arthritis     Breast cancer     Graves disease     Hypertension     Hyperthyroidism      PSH:   Past Surgical History:   Procedure Laterality Date    BREAST LUMPECTOMY       Meds: See medication list;  No anticoagulation  ALL: Patient has no known allergies.  FHX: non contributory   SOC:   Social History     Socioeconomic History    Marital status: Single   Tobacco Use    Smoking status: Never    Smokeless tobacco: Never   Substance and Sexual Activity    Alcohol use: Never    Drug use: Never     Social Determinants of Health     Financial Resource Strain: Patient Declined (7/11/2023)    [facility] [employer] [facility], [employer] [facility] and [facility]ates    Overall Financial Resource Strain (CARDIA)     Difficulty of Paying Living Expenses: Patient declined   Food Insecurity: Patient Declined (7/11/2023)    Received from [employer] [facility] and [facility], [employer] [facility] and [facility]    Hunger Vital Sign     Worried About Running Out of Food in the Last Year: Patient declined     Ran Out of Food in the Last Year: Patient declined   Transportation Needs: Patient Declined (7/11/2023)    Received from [employer]  [facility], [facility]    PRAPARE - Transportation     Lack of Transportation (Medical): Patient declined     Lack of Transportation (Non-Medical): Patient declined   Stress: Patient Declined (7/11/2023)    [facility], [facility]    [facility] [place] [facility] - Occupational Stress Questionnaire     Feeling of Stress : Patient declined   Housing Stability: Unknown (7/11/2023)    [facility], [facility]    Housing Stability Vital Sign     Unable to Pay for Housing in the Last Year: Patient refused     Number of Places Lived in the Last Year: 1     ROS: Review of Systems   Constitutional:  Negative for chills, fever, malaise/fatigue and weight loss.   Respiratory:  Negative for cough.    Cardiovascular:  Negative for chest pain.   Gastrointestinal:  Negative for abdominal pain, blood in stool, constipation, diarrhea, heartburn, melena, nausea and vomiting.   Skin:         Right supraclavicular mass   All other systems reviewed and are negative.      Physical Exam:  BP (!) 142/80   Pulse 87   Ht 5' 5" (1.651 m)   Wt 93.1 kg (205 lb 4 oz)   SpO2 100%   BMI 34.16 kg/m²   Physical Exam  Vitals reviewed.   Constitutional:       General: She is not in acute distress.     Appearance: She is obese. She is not ill-appearing or toxic-appearing.   HENT:      Head:      Comments: Large exophytic right supraclavicular nodule.  Non mobile and fixed.  Overlying skin erythematous without ulceration  Cardiovascular:      Rate and Rhythm: Normal rate and " regular rhythm.   Pulmonary:      Effort: Pulmonary effort is normal. No respiratory distress.   Abdominal:      General: There is no distension.      Palpations: Abdomen is soft.      Tenderness: There is no abdominal tenderness. There is no guarding or rebound.   Musculoskeletal:      Right lower leg: No edema.      Left lower leg: No edema.   Skin:     General: Skin is warm and dry.      Capillary Refill: Capillary refill takes less than 2 seconds.      Comments: Right - upper outer quadrant lumpectomy incision.  Lower inner quadrant periareolar ecchymosis with subcutaneous firmness yet no distinctive mass palpated.  Slight nipple inversion.  No drainage appreciated.  No palpable axillary adenopathy  Left - no palpable masses, skin changes, nipple discharge or inversion or axillary lymphadenopathy           Neurological:      Mental Status: She is alert. Mental status is at baseline.       Wt Readings from Last 2 Encounters:   06/27/24 93.1 kg (205 lb 4 oz)   05/21/24 91.2 kg (201 lb)     Imaging:  US ST Head/Neck:  Impression:     Pathologic lymphadenopathy at the base of the neck on the right.  Findings highly suspicious for malignancy particularly in this patient with a known history of prior right breast cancer.  Dominant mass lesion corresponds with palpable complaint.  Recommend biopsy/tissue diagnosis.    A/P: Pt is a 59 y.o. female with history of right breast cancer who presents with right supraclavicular nodule  -mass highly suspicious for metastatic focus  -core needle biopsy performed in clinic today  -requested records from [facility] from previous cancer treatment  -repeat mammogram bilateral   -will call patient once prelim results available    Rere Jernigan MD  [phone]

## 2024-07-09 DIAGNOSIS — C50.911 RECURRENT MALIGNANT NEOPLASM OF RIGHT BREAST: ICD-10-CM

## 2024-07-09 DIAGNOSIS — C50.919 TRIPLE NEGATIVE BREAST CANCER: Primary | ICD-10-CM

## 2024-07-12 ENCOUNTER — TELEPHONE (OUTPATIENT)
Dept: HEMATOLOGY/ONCOLOGY | Facility: CLINIC | Age: 59
End: 2024-07-12
Payer: MEDICAID

## 2024-07-12 NOTE — TELEPHONE ENCOUNTER
I contacted pt to be seen [facility] in [place] for recurrent breast cancer. Pt states she would rather be seen in [place].    [facility] referral assigned to [facility] to be scheduled.    JACQUELIN [place]

## 2024-08-04 PROBLEM — R59.0 LAD (LYMPHADENOPATHY) OF RIGHT CERVICAL REGION: Status: ACTIVE | Noted: 2024-08-04

## 2024-08-04 PROBLEM — C50.919 TRIPLE NEGATIVE BREAST CARCINOMA: Status: ACTIVE | Noted: 2024-08-04

## 2024-08-04 PROBLEM — Z17.421 TRIPLE NEGATIVE BREAST CARCINOMA: Status: ACTIVE | Noted: 2024-08-04

## 2024-08-04 PROBLEM — C50.911: Status: ACTIVE | Noted: 2024-08-04

## 2024-08-04 PROBLEM — C77.0: Status: ACTIVE | Noted: 2024-08-04

## 2024-08-04 PROBLEM — C50.911 RECURRENT BREAST CANCER, RIGHT: Status: ACTIVE | Noted: 2024-08-04

## 2024-08-04 PROBLEM — Z85.3 HISTORY OF RIGHT BREAST CANCER: Status: ACTIVE | Noted: 2024-08-04

## 2024-08-04 PROBLEM — Z17.1 TRIPLE NEGATIVE BREAST CARCINOMA: Status: ACTIVE | Noted: 2024-08-04

## 2024-08-04 PROBLEM — Z98.890 HISTORY OF LUMPECTOMY OF RIGHT BREAST: Status: ACTIVE | Noted: 2024-08-04

## 2024-08-04 NOTE — PROGRESS NOTES
History:  Past Medical History:   Diagnosis Date    Anemia     Arthritis     Breast cancer     Graves disease     Hypertension     Hyperthyroidism       Past Surgical History:   Procedure Laterality Date    BREAST LUMPECTOMY        Social History     Socioeconomic History    Marital status: Single   Tobacco Use    Smoking status: Never    Smokeless tobacco: Never   Substance and Sexual Activity    Alcohol use: Never    Drug use: Never     Social Determinants of Health     Financial Resource Strain: Patient Declined (7/11/2023)    [facility] [place] [facility], [facility] Its [facility] and [facility]    Overall Financial Resource Strain (CARDIA)     Difficulty of Paying Living Expenses: Patient declined   Food Insecurity: Patient Declined (7/11/2023)    [facility], [facility]    Hunger Vital Sign     Worried About Running Out of Food in the Last Year: Patient declined     Ran Out of Food in the Last Year: Patient declined   Transportation Needs: Patient Declined (7/11/2023)    Received [facility] [place] [facility], [facility]    PRAPARE - Transportation     Lack of Transportation (Medical): Patient declined     Lack of Transportation (Non-Medical): Patient declined   Stress: Patient Declined (7/11/2023)    Received from [facility] Its [facility] and Affiliates, [facility]    [employer] [place] [employer] - Occupational Stress Questionnaire     Feeling of Stress : Patient declined    Housing Stability: Unknown (7/11/2023)    [facility] from [facility] Its [facility]idi[facility] and Affiliates, [facility] and Its Subsidiaries and Affiliates    Housing Stability Vital Sign     Unable to Pay for Housing in the Last Year: Patient refused     Number of Places Lived in the Last Year: 1      No family history on file.     Reason for Follow-up:  -history of right breast cancer, ER negative, CT, HER2 positive, T2 N1 M0, S/P neoadjuvant chemotherapy, lumpectomy, axillary lymph dissection, adjuvant radiotherapy (completed 10/24/2022); diagnosed 2002  -local and regional recurrence, triple negative, diagnosed on biopsy of supraclavicular lymph node 06/27/2024; on mammogram, right breast mass; supraclavicular lymphadenopathy; right cervical lymphadenopathy  -history of Graves disease    History of Present Illness:   Fatigue and Pain (Patient stated pain in the neck are to breast area on right side. Patient also stated swelling in left breat and arm this pass weekend.)        Oncologic/Hematologic History:  Oncology History    No history exists.   Past medical history: Anemia; arthritis; breast cancer; Graves disease (diagnosed 2023; started on methimazole by endocrinologist in [place], in 2024); hypertension; peripheral neuropathy  -07/06/2023:  Venous Doppler bilateral lower extremities (swelling):  No DVT  -07/12/2023: TTE:  LVEF 55-60%  -08/01/2023:  Limited abdominal ultrasound (elevated liver enzymes):  6.6 cm cyst within the liver near the gallbladder; cholelithiasis with minimal gallbladder wall thickening  -12/12/2014:  Pelvic ultrasound: Markedly enlarged uterus with multiple large fibroids; endometrial stripe is thickened, 1.4 cm  Procedure/surgical history: Breast lumpectomy   Social history:  .  Lives in [place].  No children.  Never wanted to have children.  Never became pregnant.  No history of tobacco,  alcohol, or illicit drug abuse.  Does not work.  Family history:  Sister experienced hyperthyroidism, treated with radioiodine.  She does not know much about her family history but says that there are cancers in folks on both parents sides of family.  A female cousin on mother's side of family experienced breast cancer in her 30s and  from it.  Health maintenance:  Primary care provider in [facility].  Last mammogram 2023 at [facility].  No screening colonoscopy ever.  Menstrual/Ob gyn history:  Menarche at age 13.  Last menstrual cycle 2023.  Never became pregnant.  Never wanted to become pregnant.  Took birth control pills for 2 years several years ago.  No hormone replacement therapy after menopause.  ====================================      59-year-old lady, referred by Rere Jernigan MD, surgery, with recurrent breast cancer.      07/10/2024:  Office note: Rere Jernigan MD (surgery):  Pt is a 59 y.o. female with history of right breast cancer s/p BCT in  who presents with painless large supraclavicular mass.    First noticed it 4 months ago and has steadily increased in size past month.  Last mammogram 1 year ago and new [place].  Also reports progressive skin changes of the right breast not associated with lumpectomy incision.  Receptor status unknown.       Investigations reviewed:  -no old records available   -according to her, she was diagnosed with right breast cancer in 2022  -mammogram showed a large lobulated mass, 3.7 x 2.7 cm  -biopsy:  Infiltrative ductal carcinoma, possibly invasive lobular  -right breast mass was palpable in the upper outer quadrant; axillary lymphadenopathy was noted (3-4 palpable lymph nodes right axilla)  -T2 N1 M0 IDC right breast (she had palpable axillary lymph nodes in the right and a large palpable mass in the right breast)  -ER negative; MT negative; HER2 positive  -S/P neoadjuvant chemotherapy (according to her, she  received 4 cycles of neoadjuvant chemotherapy with Adriamycin/Taxotere every 3 weeks apart x4 cycles)  -followed by lumpectomy and axillary dissection  -no residual tumor post chemotherapy; 17 axillary lymph nodes negative for tumor  -S/P adjuvant radiotherapy, 6600 cGy/35 fractions, completed 10/24/2022  -07/28/2011:  DEXA scan:  BMD normal  -12/19/2022:  Screening mammogram (comparison: 11/08/2019, etc.):  BI-RADS: 2-benign  -07/06/2023:  Echocardiogram:  LVEF 60%  -11/27/2023:  Bilateral diagnostic mammogram and limited ultrasound right breast (comparison: 12/19/2022, 12/16/2001, etc.) (history of right breast cancer with worsening right breast pain and thickening) large elongated heterogeneous mass with irregular borders outer aspect of the right breast (extending from the nipple outward towards the lateral chest wall at the 8-9 o'clock position, 5.8 x 3 x 1.8 cm although margins are difficult to accurately define), highly suspicious for recurrent malignancy; there is a separate elongated hypoechoic lesion in the upper outer quadrant right breast 11 o'clock position, 5 cm from nipple, 3 x 2.6 x 0.7 cm, near the site of previous surgery), perhaps benign scar tissue related to previous lumpectomy:  BI-RADS: 5-highly suggestive of malignancy  -07/10/2024:  Surgery Office note:  Painless large supraclavicular mass, noted 4 months ago, steadily enlarging; also reported progressive skin changes right breast not associated with lumpectomy incision; on physical exam, large exophytic right supraclavicular nodule, nonmobile and fixed, overlying skin erythematous without ulceration; right upper outer quadrant lumpectomy incision; right lower inner quadrant periareolar ecchymosis with subcutaneous firmness without distinct mass; slight nipple inversion; no drainage; no palpable axillary lymphadenopathy  -05/30/2024:  Ultrasound soft tissues of the head and neck (lymphadenopathy): Pathologic lymphadenopathy (4.5 x 3.7 x 3.2  cm) at the base of the neck on the right; multiple smaller morphologically abnormal cervical chain lymph nodes on the right which demonstrate heterogeneous echogenicity similar to the dominant mass. Findings highly suspicious for malignancy particularly in this patient with a known history of prior right breast cancer. Dominant mass lesion corresponds with palpable complaint. Recommend biopsy/tissue diagnosis.   -06/27/2024:  Right supraclavicular mass, core needle biopsy (firm 6 x 6 cm exophytic right supraclavicular mass): Metastatic carcinoma in fibroadipose tissue, moderately to poorly-differentiated, consistent with breast origin (metastatic breast carcinoma in fibroadipose tissue  -ER negative (0%); OH negative (0%); HER2 negative (0); Ki-67 high proliferation  (93.6%)    08/08/2024:   Pleasant, healthy-appearing lady who presents for initial medical oncology consultation.  In no acute discomfort.    Says that right supra clavicular mass started April 2024; it has been enlarging and fluctuating in size.  It is painful.  7/10 severity pain.  Also, pain was right breast area.  Right breast is enlarging.  There is a small area of ulceration in the right breast.  No bleeding or discharge.  She denies fevers or chills.  Has been taking Lyrica meloxicam without the relief of pain.  We will start Norco.  Some fatigue.  However, ECOG 1.  Pain from neck down to breast area, especially at night.  No unusual headaches, focal neurological symptoms, vision impairment, gait impairment, hemoptysis, fevers, chills, any bleeding or discharge from right breast superficial ulceration, abdominal pain, nausea, vomiting, GI bleeding, etc..  No bone pains.  Appetite is fair.      Interval History:  [No matching plan found]   [No matching plan found]       Medications:  Current Outpatient Medications on File Prior to Visit   Medication Sig Dispense Refill    diclofenac sodium (VOLTAREN) 1 % Gel APPLY 4 GRAMS TOPICALLY TO AFFECTED  AREA THREE TO FOUR TIMES A DAY AS NEEDED FOR PAIN      ergocalciferol (ERGOCALCIFEROL) 50,000 unit Cap Take 50,000 Units by mouth every 7 days.      ferrous gluconate (FERGON) 324 MG tablet TAKE 1 TABLET BY MOUTH ONCE DAILY FOR IRON      meloxicam (MOBIC) 15 MG tablet TAKE 1 TABLET BY MOUTH ONCE DAILY (STOP IBUPROFEN)      methIMAzole (TAPAZOLE) 5 MG Tab Take 1 tablet (5 mg total) by mouth once daily. 30 tablet 11    pregabalin (LYRICA) 50 MG capsule Take 50 mg by mouth 3 (three) times daily.      valsartan-hydrochlorothiazide (DIOVAN-HCT) 160-12.5 mg per tablet Take 1 tablet by mouth.      DULoxetine (CYMBALTA) 30 MG capsule Take 30 mg by mouth once daily. (Patient not taking: Reported on 8/8/2024)       No current facility-administered medications on file prior to visit.       Review of Systems:   All systems reviewed and found to be negative except for the symptoms detailed above    Physical Examination:   VITAL SIGNS:   Vitals:    08/08/24 1326   BP: 132/77   Pulse: 86   Temp: 98.1 °F (36.7 °C)     GENERAL:  In no apparent distress.    HEAD:  No signs of head trauma.  EYES:  Pupils are equal.  Extraocular motions intact.    EARS:  Hearing grossly intact.  MOUTH:  Oropharynx is normal.   NECK:  No adenopathy, no JVD.     CHEST:  Chest with clear breath sounds bilaterally.  No wheezes, rales, rhonchi.    CARDIAC:  Regular rate and rhythm.  S1 and S2, without murmurs, gallops, rubs.  VASCULAR:  No Edema.  Peripheral pulses normal and equal in all extremities.  ABDOMEN:  Soft, without detectable tenderness.  No sign of distention.  No   rebound or guarding, and no masses palpated.   Bowel Sounds normal.  MUSCULOSKELETAL:  Good range of motion of all major joints. Extremities without clubbing, cyanosis or edema.    NEUROLOGIC EXAM:  Alert and oriented x 3.  No focal sensory or strength deficits.   Speech normal.  Follows commands.  PSYCHIATRIC:  Mood normal.  -08/08/2024: Breast examination was performed with a  verbal consent, in the presence of Jani Heck LPN; entire right breast is involved with tumor; entire right breast is indurated with tumor, with conglomeration of nodules around the central area; a small superficial ulceration is noted along the inferior medial aspect of right areolar area; a large slightly tender 6 supraclavicular masses noted; diffuse edema is noted in the right supraclavicular area and right breast area as well as infraclavicular area; left breast is normal    No results found for this or any previous visit.  No results found for this or any previous visit.    Assessment:  Problem List Items Addressed This Visit          Oncology    Recurrent breast cancer, right - Primary    Triple negative breast carcinoma    Neoplasm of right breast, regional lymph node staging category N3c: metastasis in ipsilateral supraclavicular lymph node    History of right breast cancer    History of lumpectomy of right breast       Endocrine    Graves disease       Other    LAD (lymphadenopathy) of right cervical region     Other Visit Diagnoses       Triple negative breast cancer        Recurrent malignant neoplasm of right breast              History of right breast IDC, ER negative, MD negative, HER2 positive::  -mammogram: 3.7 x 2.7 cm right breast mass  -pathology: Infiltrative ductal carcinoma, possibly invasive lobular  -ER negative, MD negative, HER2 positive   -T2 N1 M0; palpable axillary lymphadenopathy; large palpable mass right breast)  -S/P neoadjuvant chemotherapy  -followed by lumpectomy and axillary lymph node dissection   -no residual tumor; 17 lymph nodes negative   -S/P adjuvant radiotherapy, 6600 cGy/35 fractions, completed 10/24/ 2002  >>>  -screening mammogram 12/19/2022: BI-RADS: 2-benign  >>>  Regional and local recurrence, triple negative:  -echocardiogram 07/06/2023: LVEF 60%  -mammogram and ultrasound 11/27/2023:  Right breast mass 5.8 x 3 x 1.8 cm: BI-RADS: 5  -surgery  consultation/follow-up 07/10/2024: Painless large supraclavicular mass, noted 4 months ago, steadily enlarging; progressive skin changes right breast not associated with lumpectomy incision, large exophytic right supraclavicular nodule/lymphadenopathy) nonmobile and fixed; overlying skin erythematous without ulceration), right lower inner quadrant periareolar ecchymosis and subcutaneous firmness without distinct mass; no palpable axillary lymphadenopathy  -ultrasound neck 05/30/2024:  Pathologic lymphadenopathy 4.5 x 3.7 x 3.2 cm at the base of the neck of the right; multiple smaller morphologically abnormal cervical chain lymph nodes on the right  -core needle biopsy right supraglottic mass 06/27/2024:  Firm, 6 x 6 cm: Moderately to poorly-differentiated metastatic breast carcinoma  -ER negative (0%); IA negative (0%); HER2 negative (0); Ki-67 high proliferation (93.6%)  -08/08/2024: Breast examination was performed with a verbal consent, in the presence of Jani Heck LPN; entire right breast is involved with tumor; entire right breast is indurated with tumor, with conglomeration of nodules around the central area; a small superficial ulceration is noted along the inferior medial aspect of right areolar area; a large slightly tender 6 supraclavicular masses noted; diffuse edema is noted in the right supraclavicular area and right breast area as well as infraclavicular area; left breast is normal      Graves' disease:   -diagnosed 07/2023  -07/11/2023: CT soft tissues of the neck with contrast) dysphagia, acute thyrotoxicosis, goiter): Mild diffuse enlargement of the thyroid gland without displacement or mass effect of the aerodigestive tract; no suspicious cervical lymphadenopathy  -07/12/2023: Ultrasound thyroid:  Hoarseness, thyrotoxicosis:  Heterogeneous hyperemic thyroid typical of thyroiditis; small 6 mm mid pole right thyroid nodule is not suspicious and does not warrant surveillance per TI-RADS  criteria  -confirmed with TSH receptor antibodies (TRAb/TSI, i.e., thyroid-stimulating immunoglobulins)  -as of 05/21/2024, on methimazole 5 mg daily; has responded well  -endocrinologist: Roland Ross MD   -05/21/2024:  Endocrinologist plan:  Plan to complete 18 months of therapy before attempting to taper of methimazole         Plan:  Check CBC and CMP   FSH and estradiol level  Genetic testing ASAP  Stage with contrast-enhanced CT scans of C/A/P/soft tissues of the neck and whole-body nuclear medicine bone scan  -FDG PET-CT  Brain MRI with and without contrast  NGS testing on recent biopsy of supraclavicular lymph node  Liquid biopsy  Echocardiogram  Bilateral breast MRI with contrast  Refer to surgery for MediPort placement  Refer to surgery for them to take a look at the patient enter document before we start her on neoadjuvant chemotherapy  Refer to wound care for management of breast wound   Start Norco 5 mg every 4 hours PRN for pain  Follow-up visit with me in 2 weeks, with results of requested testing   ------------------------------------------------------    -no old records available   -according to her, she was diagnosed with right breast cancer in January 2022  -mammogram showed a large lobulated mass, 3.7 x 2.7 cm  -biopsy:  Infiltrative ductal carcinoma, possibly invasive lobular  -right breast mass was palpable in the upper outer quadrant; axillary lymphadenopathy was noted (3-4 palpable lymph nodes right axilla)  -T2 N1 M0 IDC right breast (she had palpable axillary lymph nodes in the right and a large palpable mass in the right breast)  -ER negative; NH negative; HER2 positive  -S/P neoadjuvant chemotherapy (according to her, she received 4 cycles of neoadjuvant chemotherapy with Adriamycin/Taxotere every 3 weeks apart x4 cycles)  -followed by lumpectomy and axillary dissection  -no residual tumor post chemotherapy; 17 axillary lymph nodes negative for tumor  -S/P adjuvant radiotherapy,  6600 cGy/35 fractions, completed 10/24/2022  >>>  -now, triple negative regional and local recurrence with 4.5 cm right supraclavicular lymph node and 5.8 cm right breast mass right lower inner quadrant with periareolar ecchymosis and subcutaneous firmness without distinct mass on physical examination    -check CBC and CMP  -check FSH and estradiol level to assess ovarian function  -genetic counseling and testing (triple negative recurrence; recurrent breast cancer)  -stage with contrast-enhanced CT scans of C/A/P/soft tissues of the neck with contrast and whole-body nuclear medicine bone scan  -FDG PET-CT  -to be on the side of caution, brain MRI scan with and without contrast to rule out brain metastases as well  -comprehensive germline and somatic profiling to identify candidacy for targeted therapies (NGS testing on recent supraclavicular lymph node biopsy; plasma based ctDNA assay as well; comprehensive germline testing) (somatic testing to include PD-L1 testing, NTRK fusion, MSI/MMR, TMB, RET fusion)  Echocardiogram  Bilateral breast MRI with contrast  Refer to surgery for MediPort placement  Refer to surgery for them to take a look at the patient enter document before we start her on neoadjuvant chemotherapy  Follow-up visit with me in 2 weeks, with results of requested testing   -08/08/2024: Refer to wound care for management of breast wound   -08/08/2024:  Start Norco 5 mg every 4 hours PRN for pain    Plan:   1. If no metastases, and if recurrence deemed resectable, then:  Neoadjuvant chemotherapy; followed by mastectomy +/- axillary lymphadenectomy; followed by adjuvant radiotherapy to right supraclavicular lymph node; followed by adjuvant chemotherapy  2. If no metastases, and if recurrence deemed unresectable, then: Palliative systemic therapy  3. If metastatic disease, then:  Palliative systemic therapy       If no metastases, and if recurrence deemed resectable, then:  Neoadjuvant chemotherapy;  followed by mastectomy +/- axillary lymphadenectomy; followed by adjuvant radiotherapy to right supraclavicular lymph node; followed by adjuvant chemotherapy  >>>  -contrast-enhanced CT scans of C/A/P/soft tissues of the neck with contrast and whole-body nuclear medicine bone scan to rule out metastases   -FDG PET-CT  -bilateral breast MRI which is more accurate than mammography for assessing tumor response to neoadjuvant chemotherapy  -echocardiogram to assess LV ejection fraction  -we will avoid Adriamycin  -for triple negative recurrence, for neoadjuvant chemotherapy, we will treat her with carboplatin/Taxol/Keytruda x4 cycles, followed by cyclophosphamide/Keytruda x4 cycles (minus Adriamycin)  -we will assess response with serial physical examination, contrast-enhanced CT scans of C/A/P/soft tissues of the neck with contrast, and bilateral breast MRI: After 4 cycles of chemotherapy, then, after completion of 8 cycles of chemotherapy  -after neoadjuvant chemotherapy:  Right-sided mastectomy and axillary lymph node dissection, followed by adjuvant radiotherapy, including adjuvant radiotherapy to right supraclavicular lymph node  -followed by adjuvant systemic therapy:  Adjuvant Keytruda every 3 weeks x9 cycles  -if no response to neoadjuvant chemotherapy antibody remains inoperable, then, consider additional systemic therapy and/or preoperative radiation therapy  -if residual disease after neoadjuvant chemotherapy and if germline BRCA1/2 mutation positive, then, adjuvant olaparib for 1 year as well (to be given after completion of adjuvant radiotherapy)    History of Graves disease   -follow-up with endocrinology    Follow-up visit with me in 2-3 weeks.      Above discussed at length with the patient.  All questions answered.    Discussed labs, scans, and pathology report, and gave her copies of relevant records.  Plan of management discussed in detail.    Guarded prognosis discussed.  She understands and agrees  with this plan.      Follow-up:  No follow-ups on file.

## 2024-08-08 ENCOUNTER — OFFICE VISIT (OUTPATIENT)
Dept: HEMATOLOGY/ONCOLOGY | Facility: CLINIC | Age: 59
End: 2024-08-08
Attending: INTERNAL MEDICINE
Payer: MEDICAID

## 2024-08-08 ENCOUNTER — DOCUMENTATION ONLY (OUTPATIENT)
Dept: HEMATOLOGY/ONCOLOGY | Facility: CLINIC | Age: 59
End: 2024-08-08
Payer: MEDICAID

## 2024-08-08 VITALS
SYSTOLIC BLOOD PRESSURE: 132 MMHG | HEART RATE: 86 BPM | OXYGEN SATURATION: 100 % | WEIGHT: 206.63 LBS | BODY MASS INDEX: 34.43 KG/M2 | HEIGHT: 65 IN | TEMPERATURE: 98 F | DIASTOLIC BLOOD PRESSURE: 77 MMHG

## 2024-08-08 DIAGNOSIS — Z98.890 HISTORY OF LUMPECTOMY OF RIGHT BREAST: ICD-10-CM

## 2024-08-08 DIAGNOSIS — C50.919 TRIPLE NEGATIVE BREAST CARCINOMA: ICD-10-CM

## 2024-08-08 DIAGNOSIS — Z85.3 HISTORY OF RIGHT BREAST CANCER: ICD-10-CM

## 2024-08-08 DIAGNOSIS — C50.911 RECURRENT BREAST CANCER, RIGHT: Primary | ICD-10-CM

## 2024-08-08 DIAGNOSIS — R59.0 LAD (LYMPHADENOPATHY) OF RIGHT CERVICAL REGION: ICD-10-CM

## 2024-08-08 DIAGNOSIS — E05.00 GRAVES DISEASE: ICD-10-CM

## 2024-08-08 DIAGNOSIS — Z17.1 TRIPLE NEGATIVE BREAST CARCINOMA: ICD-10-CM

## 2024-08-08 DIAGNOSIS — C50.919 TRIPLE NEGATIVE BREAST CANCER: ICD-10-CM

## 2024-08-08 DIAGNOSIS — C50.911 NEOPLASM OF RIGHT BREAST, REGIONAL LYMPH NODE STAGING CATEGORY N3C: METASTASIS IN IPSILATERAL SUPRACLAVICULAR LYMPH NODE: ICD-10-CM

## 2024-08-08 DIAGNOSIS — C77.0 NEOPLASM OF RIGHT BREAST, REGIONAL LYMPH NODE STAGING CATEGORY N3C: METASTASIS IN IPSILATERAL SUPRACLAVICULAR LYMPH NODE: ICD-10-CM

## 2024-08-08 DIAGNOSIS — C50.911 RECURRENT MALIGNANT NEOPLASM OF RIGHT BREAST: ICD-10-CM

## 2024-08-08 LAB
ALBUMIN SERPL-MCNC: 3.9 G/DL (ref 3.5–5)
ALBUMIN/GLOB SERPL: 1 RATIO (ref 1.1–2)
ALP SERPL-CCNC: 134 UNIT/L (ref 40–150)
ALT SERPL-CCNC: 20 UNIT/L (ref 0–55)
ANION GAP SERPL CALC-SCNC: 13 MEQ/L
AST SERPL-CCNC: 24 UNIT/L (ref 5–34)
BASOPHILS # BLD AUTO: 0.01 X10(3)/MCL
BASOPHILS NFR BLD AUTO: 0.2 %
BILIRUB SERPL-MCNC: 0.6 MG/DL
BUN SERPL-MCNC: 17.7 MG/DL (ref 9.8–20.1)
CALCIUM SERPL-MCNC: 10.6 MG/DL (ref 8.4–10.2)
CHLORIDE SERPL-SCNC: 107 MMOL/L (ref 98–107)
CO2 SERPL-SCNC: 23 MMOL/L (ref 22–29)
CREAT SERPL-MCNC: 0.83 MG/DL (ref 0.55–1.02)
CREAT/UREA NIT SERPL: 21
EOSINOPHIL # BLD AUTO: 0.01 X10(3)/MCL (ref 0–0.9)
EOSINOPHIL NFR BLD AUTO: 0.2 %
ERYTHROCYTE [DISTWIDTH] IN BLOOD BY AUTOMATED COUNT: 12.8 % (ref 11.5–17)
ESTRADIOL SERPL HS-MCNC: <24 PG/ML
FSH SERPL-ACNC: 79.87 MIU/ML
GFR SERPLBLD CREATININE-BSD FMLA CKD-EPI: >60 ML/MIN/1.73/M2
GLOBULIN SER-MCNC: 4.1 GM/DL (ref 2.4–3.5)
GLUCOSE SERPL-MCNC: 87 MG/DL (ref 74–100)
HCT VFR BLD AUTO: 35.3 % (ref 37–47)
HGB BLD-MCNC: 11.9 G/DL (ref 12–16)
IMM GRANULOCYTES # BLD AUTO: 0 X10(3)/MCL (ref 0–0.04)
IMM GRANULOCYTES NFR BLD AUTO: 0 %
LYMPHOCYTES # BLD AUTO: 1.2 X10(3)/MCL (ref 0.6–4.6)
LYMPHOCYTES NFR BLD AUTO: 24.8 %
MCH RBC QN AUTO: 33.9 PG (ref 27–31)
MCHC RBC AUTO-ENTMCNC: 33.7 G/DL (ref 33–36)
MCV RBC AUTO: 100.6 FL (ref 80–94)
MONOCYTES # BLD AUTO: 0.3 X10(3)/MCL (ref 0.1–1.3)
MONOCYTES NFR BLD AUTO: 6.2 %
NEUTROPHILS # BLD AUTO: 3.32 X10(3)/MCL (ref 2.1–9.2)
NEUTROPHILS NFR BLD AUTO: 68.6 %
NRBC BLD AUTO-RTO: 0 %
PLATELET # BLD AUTO: 208 X10(3)/MCL (ref 130–400)
PMV BLD AUTO: 11 FL (ref 7.4–10.4)
POTASSIUM SERPL-SCNC: 3.1 MMOL/L (ref 3.5–5.1)
PROT SERPL-MCNC: 8 GM/DL (ref 6.4–8.3)
RBC # BLD AUTO: 3.51 X10(6)/MCL (ref 4.2–5.4)
SODIUM SERPL-SCNC: 143 MMOL/L (ref 136–145)
WBC # BLD AUTO: 4.84 X10(3)/MCL (ref 4.5–11.5)

## 2024-08-08 PROCEDURE — 1159F MED LIST DOCD IN RCRD: CPT | Mod: CPTII,,, | Performed by: INTERNAL MEDICINE

## 2024-08-08 PROCEDURE — 82670 ASSAY OF TOTAL ESTRADIOL: CPT | Performed by: INTERNAL MEDICINE

## 2024-08-08 PROCEDURE — 36415 COLL VENOUS BLD VENIPUNCTURE: CPT | Performed by: INTERNAL MEDICINE

## 2024-08-08 PROCEDURE — 3075F SYST BP GE 130 - 139MM HG: CPT | Mod: CPTII,,, | Performed by: INTERNAL MEDICINE

## 2024-08-08 PROCEDURE — 83735 ASSAY OF MAGNESIUM: CPT | Performed by: INTERNAL MEDICINE

## 2024-08-08 PROCEDURE — 82306 VITAMIN D 25 HYDROXY: CPT | Performed by: INTERNAL MEDICINE

## 2024-08-08 PROCEDURE — 83001 ASSAY OF GONADOTROPIN (FSH): CPT | Performed by: INTERNAL MEDICINE

## 2024-08-08 PROCEDURE — 83970 ASSAY OF PARATHORMONE: CPT | Performed by: INTERNAL MEDICINE

## 2024-08-08 PROCEDURE — 85025 COMPLETE CBC W/AUTO DIFF WBC: CPT | Performed by: INTERNAL MEDICINE

## 2024-08-08 PROCEDURE — 1160F RVW MEDS BY RX/DR IN RCRD: CPT | Mod: CPTII,,, | Performed by: INTERNAL MEDICINE

## 2024-08-08 PROCEDURE — 99205 OFFICE O/P NEW HI 60 MIN: CPT | Mod: S$PBB,,, | Performed by: INTERNAL MEDICINE

## 2024-08-08 PROCEDURE — 3008F BODY MASS INDEX DOCD: CPT | Mod: CPTII,,, | Performed by: INTERNAL MEDICINE

## 2024-08-08 PROCEDURE — 3078F DIAST BP <80 MM HG: CPT | Mod: CPTII,,, | Performed by: INTERNAL MEDICINE

## 2024-08-08 PROCEDURE — 80053 COMPREHEN METABOLIC PANEL: CPT | Performed by: INTERNAL MEDICINE

## 2024-08-08 PROCEDURE — 99214 OFFICE O/P EST MOD 30 MIN: CPT | Mod: PBBFAC | Performed by: INTERNAL MEDICINE

## 2024-08-08 RX ORDER — HYDROCODONE BITARTRATE AND ACETAMINOPHEN 5; 325 MG/1; MG/1
1 TABLET ORAL EVERY 4 HOURS PRN
Qty: 84 TABLET | Refills: 0 | Status: SHIPPED | OUTPATIENT
Start: 2024-08-08

## 2024-08-08 NOTE — Clinical Note
Check CBC and CMP  FSH and estradiol level Genetic counseling Stage with contrast-enhanced CT scans of C/A/P/soft tissues of the neck and whole-body nuclear medicine bone scan -FDG PET-CT Brain MRI with and without contrast NGS testing on recent biopsy of supraclavicular lymph node Liquid biopsy Echocardiogram Bilateral breast MRI with contrast Refer to surgery for MediPort placement Refer to surgery for them to take a look at the patient enter document before we start her on neoadjuvant chemotherapy Follow-up visit with me in 2 weeks, with results of requested testing

## 2024-08-09 ENCOUNTER — LAB VISIT (OUTPATIENT)
Dept: HEMATOLOGY/ONCOLOGY | Facility: CLINIC | Age: 59
End: 2024-08-09
Payer: MEDICAID

## 2024-08-09 ENCOUNTER — INFUSION (OUTPATIENT)
Dept: INFUSION THERAPY | Facility: HOSPITAL | Age: 59
End: 2024-08-09
Attending: INTERNAL MEDICINE
Payer: MEDICAID

## 2024-08-09 ENCOUNTER — DOCUMENTATION ONLY (OUTPATIENT)
Dept: HEMATOLOGY/ONCOLOGY | Facility: CLINIC | Age: 59
End: 2024-08-09
Payer: MEDICAID

## 2024-08-09 VITALS
WEIGHT: 207 LBS | SYSTOLIC BLOOD PRESSURE: 137 MMHG | OXYGEN SATURATION: 99 % | HEART RATE: 85 BPM | BODY MASS INDEX: 34.49 KG/M2 | DIASTOLIC BLOOD PRESSURE: 83 MMHG | TEMPERATURE: 98 F | RESPIRATION RATE: 20 BRPM | HEIGHT: 65 IN

## 2024-08-09 DIAGNOSIS — E83.52 HYPERCALCEMIA: ICD-10-CM

## 2024-08-09 DIAGNOSIS — C50.911 RECURRENT BREAST CANCER, RIGHT: Primary | ICD-10-CM

## 2024-08-09 DIAGNOSIS — C50.911 RECURRENT BREAST CANCER, RIGHT: ICD-10-CM

## 2024-08-09 DIAGNOSIS — E87.6 HYPOKALEMIA: ICD-10-CM

## 2024-08-09 LAB
25(OH)D3+25(OH)D2 SERPL-MCNC: 57 NG/ML (ref 30–80)
MAGNESIUM SERPL-MCNC: 2.2 MG/DL (ref 1.6–2.6)
PTH-INTACT SERPL-MCNC: 103.2 PG/ML (ref 8.7–77)

## 2024-08-09 PROCEDURE — 36415 COLL VENOUS BLD VENIPUNCTURE: CPT

## 2024-08-09 PROCEDURE — 82397 CHEMILUMINESCENT ASSAY: CPT

## 2024-08-09 PROCEDURE — 82330 ASSAY OF CALCIUM: CPT

## 2024-08-09 RX ORDER — POTASSIUM CHLORIDE 1500 MG/1
40 TABLET, EXTENDED RELEASE ORAL DAILY
Qty: 28 TABLET | Refills: 0 | Status: SHIPPED | OUTPATIENT
Start: 2024-08-09 | End: 2024-08-23

## 2024-08-09 NOTE — PROGRESS NOTES
Potassium 3.1, low  Calcium 10.6.  Albumin 3.9.  Thus, mild hypercalcemia   -----------------------------------    Plan:  -check magnesium level   -start potassium chloride 40 mEq p.o. q.day x2 weeks; no refills   -in 2 weeks, recheck CMP and magnesium level  -check ionized calcium level, intact PTH level, PTH related peptide, 25 hydroxy vitamin-D level  -hydrate with 1000 cc of saline    -recheck CMP Monday

## 2024-08-11 LAB — CA-I ADJ PH7.4 SERPL ISE-MCNC: 5.03 MG/DL (ref 4.57–5.43)

## 2024-08-12 NOTE — NURSING
"Met with patient today after her consult appointment with Dr. Farah. Patient attended appointment alone. Provided patient with RN Salvador contact and explained role in patient's care. NCCN Distress Screen completed with a reported distress score of 5. Patient indicates her distress is related to recurrence of breast cancer. Reports he/she has resources of Medicaid and SSDI benefits. Social support reported as adequate. Provided information on in-house and community support/counseling services. Answered all patient questions regarding treatment and expectations. Provided cancer center packet with clinic team, contact numbers, and information on cancer treatment. Patient verbalized understanding and agrees to contact ADELA Franco if any needs or concerns arise.   Oncology Navigation   Intake  Cancer Type: Breast  Appointment Date: 24     Treatment        Medical Oncologist: Israel Farah MD  Consult Date: 24  Chemotherapy: Planned       Procedures: Bone scan; CT; MRI; PET scan  Bone Scan Schedule Date: 24  CT Schedule Date: 24  MRI Schedule Date: 24  PET Scan Schedule Date: 24       ER: Negative  MT: Negative  Her2: Negative       Support Systems: Parent; Family members; Friends / neighbors  Barriers of Care: Barriers to Care "Assessment completed-no barriers noted"     Acuity  Treatment Tolerability: Has not started treatment yet/treatment fully completed and side effects resolved  ECO  Comorbidities in Medical History: 1  Hospitalization Within the Past Month: 0   Needed: 0  Support: 0  Verbalizes Financial Concerns: 0  Transportation: 0  Mental Health: PHQ Score: 0  History of noncompliance/frequent no shows and cancellations: 0  Verbalizes the need for more education: 0  Navigation Acuity: 3     Follow Up  No follow-ups on file.         "

## 2024-08-13 ENCOUNTER — OFFICE VISIT (OUTPATIENT)
Dept: SURGERY | Facility: CLINIC | Age: 59
End: 2024-08-13
Attending: INTERNAL MEDICINE
Payer: MEDICAID

## 2024-08-13 VITALS
HEART RATE: 97 BPM | BODY MASS INDEX: 35.16 KG/M2 | WEIGHT: 211 LBS | DIASTOLIC BLOOD PRESSURE: 82 MMHG | TEMPERATURE: 98 F | RESPIRATION RATE: 18 BRPM | HEIGHT: 65 IN | OXYGEN SATURATION: 100 % | SYSTOLIC BLOOD PRESSURE: 143 MMHG

## 2024-08-13 DIAGNOSIS — C77.0 NEOPLASM OF RIGHT BREAST, REGIONAL LYMPH NODE STAGING CATEGORY N3C: METASTASIS IN IPSILATERAL SUPRACLAVICULAR LYMPH NODE: ICD-10-CM

## 2024-08-13 DIAGNOSIS — C50.911 NEOPLASM OF RIGHT BREAST, REGIONAL LYMPH NODE STAGING CATEGORY N3C: METASTASIS IN IPSILATERAL SUPRACLAVICULAR LYMPH NODE: ICD-10-CM

## 2024-08-13 DIAGNOSIS — C50.911 RECURRENT BREAST CANCER, RIGHT: ICD-10-CM

## 2024-08-13 DIAGNOSIS — Z17.1 TRIPLE NEGATIVE BREAST CARCINOMA: ICD-10-CM

## 2024-08-13 DIAGNOSIS — C50.919 TRIPLE NEGATIVE BREAST CARCINOMA: ICD-10-CM

## 2024-08-13 LAB — PTH RELATED PROT SERPL-SCNC: 0.5 PMOL/L

## 2024-08-13 PROCEDURE — 99215 OFFICE O/P EST HI 40 MIN: CPT | Mod: PBBFAC

## 2024-08-13 RX ORDER — CEFAZOLIN SODIUM 2 G/50ML
2 SOLUTION INTRAVENOUS
OUTPATIENT
Start: 2024-08-13

## 2024-08-13 RX ORDER — HEPARIN SODIUM 5000 [USP'U]/ML
5000 INJECTION, SOLUTION INTRAVENOUS; SUBCUTANEOUS EVERY 8 HOURS
OUTPATIENT
Start: 2024-08-13

## 2024-08-13 RX ORDER — SODIUM CHLORIDE 9 MG/ML
INJECTION, SOLUTION INTRAVENOUS CONTINUOUS
OUTPATIENT
Start: 2024-08-13

## 2024-08-13 NOTE — PROGRESS NOTES
[facility] General Surgery Clinic Note    HPI: 59 year-old female with a PMHx of HTN, Graves disease, and right breast cancer IDC s/p breast conservative therapy in 2002 with newly appeared large supraclavicular mass found to be metastatic carcinoma. Followed by Dr. Faulkner with imaging pending to work patient up for further metastases. Referred her to general surgery clinic for mediport placement. Previously had a L-sided IJ port placed for chemotherapy, which was removed years ago. Not currently on blood thinners or anticoagulants.     States that a few months ago, she began to notice ulceration to her right breast with some drainage superficially, but without nipple discharge. However, she reports that it appears to have improved in appearance since she first noticed it. Last mammogram ~1 year ago in [place] which she states was reportedly unremarkable.     PMH:   Past Medical History:   Diagnosis Date    Anemia     Arthritis     Breast cancer     Graves disease     Hypertension     Hyperthyroidism       Meds:   Current Outpatient Medications:     diclofenac sodium (VOLTAREN) 1 % Gel, APPLY 4 GRAMS TOPICALLY TO AFFECTED AREA THREE TO FOUR TIMES A DAY AS NEEDED FOR PAIN, Disp: , Rfl:     ergocalciferol (ERGOCALCIFEROL) 50,000 unit Cap, Take 50,000 Units by mouth every 7 days., Disp: , Rfl:     ferrous gluconate (FERGON) 324 MG tablet, TAKE 1 TABLET BY MOUTH ONCE DAILY FOR IRON, Disp: , Rfl:     HYDROcodone-acetaminophen (NORCO) 5-325 mg per tablet, Take 1 tablet by mouth every 4 (four) hours as needed for Pain., Disp: 84 tablet, Rfl: 0    meloxicam (MOBIC) 15 MG tablet, TAKE 1 TABLET BY MOUTH ONCE DAILY (STOP IBUPROFEN), Disp: , Rfl:     methIMAzole (TAPAZOLE) 5 MG Tab, Take 1 tablet (5 mg total) by mouth once daily., Disp: 30 tablet, Rfl: 11    potassium chloride (K-TAB) 20 mEq, Take 2 tablets (40 mEq total) by mouth once daily. for 14 days, Disp: 28 tablet, Rfl: 0    pregabalin (LYRICA) 50 MG capsule, Take 50 mg by  mouth 3 (three) times daily., Disp: , Rfl:     valsartan-hydrochlorothiazide (DIOVAN-HCT) 160-12.5 mg per tablet, Take 1 tablet by mouth., Disp: , Rfl:     DULoxetine (CYMBALTA) 30 MG capsule, Take 30 mg by mouth once daily. (Patient not taking: Reported on 8/8/2024), Disp: , Rfl:   Allergies: Review of patient's allergies indicates:  No Known Allergies  Social History:   Social History     Tobacco Use    Smoking status: Never    Smokeless tobacco: Never   Substance Use Topics    Alcohol use: Never    Drug use: Never     Family History: No family history on file.  Surgical History:   Past Surgical History:   Procedure Laterality Date    BREAST LUMPECTOMY       Review of Systems:  Negative unless as stated above in HPI     Objective:    Vitals:  Vitals:    08/13/24 1036   BP: (!) 143/82   Pulse: 97   Resp: 18   Temp: 97.9 °F (36.6 °C)      Physical Exam:  Gen: NAD  Neuro: awake, alert, answering questions appropriately  CV: RRR  Resp: non-labored breathing, JANE  Abd: soft, ND, NT  Ext: moves all 4 spontaneously and purposefully  Skin: warm, well perfused  Breast: R supraclavicular node; R breast appears ulcerated         L axilla with palpable nodes     Pertinent Labs:  N/A    Imaging:  N/A    Micro/Path/Other:  N/A     Assessment/Plan:  59 year-old female with a PMHx of right breast cancer IDC s/p breast conservative therapy in 2002 with newly appeared large supraclavicular mass found to be metastatic carcinoma    - Mediport Insertion 8/19   - Consented in clinic; risks, benefits, and alternatives dicussed with patient & questions/concerns were addressed   - Will discuss patient with Dr. Herndon in breast clinic     Shea English MD   [facility] General Surgery, PGY-1  08/13/2024 10:42 AM

## 2024-08-13 NOTE — PROGRESS NOTES
Patient seen in gen surg clinic by Dr Browning.  Mediport placement scheduled for 8/19/24.  Pre op information given and explained.

## 2024-08-13 NOTE — H&P (VIEW-ONLY)
[facility] General Surgery Clinic Note    HPI: 59 year-old female with a PMHx of HTN, Graves disease, and right breast cancer IDC s/p breast conservative therapy in 2002 with newly appeared large supraclavicular mass found to be metastatic carcinoma. Followed by Dr. Faulkner with imaging pending to work patient up for further metastases. Referred her to general surgery clinic for mediport placement. Previously had a L-sided IJ port placed for chemotherapy, which was removed years ago. Not currently on blood thinners or anticoagulants.     States that a few months ago, she began to notice ulceration to her right breast with some drainage superficially, but without nipple discharge. However, she reports that it appears to have improved in appearance since she first noticed it. Last mammogram ~1 year ago in [place] which she states was reportedly unremarkable.     PMH:   Past Medical History:   Diagnosis Date    Anemia     Arthritis     Breast cancer     Graves disease     Hypertension     Hyperthyroidism       Meds:   Current Outpatient Medications:     diclofenac sodium (VOLTAREN) 1 % Gel, APPLY 4 GRAMS TOPICALLY TO AFFECTED AREA THREE TO FOUR TIMES A DAY AS NEEDED FOR PAIN, Disp: , Rfl:     ergocalciferol (ERGOCALCIFEROL) 50,000 unit Cap, Take 50,000 Units by mouth every 7 days., Disp: , Rfl:     ferrous gluconate (FERGON) 324 MG tablet, TAKE 1 TABLET BY MOUTH ONCE DAILY FOR IRON, Disp: , Rfl:     HYDROcodone-acetaminophen (NORCO) 5-325 mg per tablet, Take 1 tablet by mouth every 4 (four) hours as needed for Pain., Disp: 84 tablet, Rfl: 0    meloxicam (MOBIC) 15 MG tablet, TAKE 1 TABLET BY MOUTH ONCE DAILY (STOP IBUPROFEN), Disp: , Rfl:     methIMAzole (TAPAZOLE) 5 MG Tab, Take 1 tablet (5 mg total) by mouth once daily., Disp: 30 tablet, Rfl: 11    potassium chloride (K-TAB) 20 mEq, Take 2 tablets (40 mEq total) by mouth once daily. for 14 days, Disp: 28 tablet, Rfl: 0    pregabalin (LYRICA) 50 MG capsule, Take 50 mg by  mouth 3 (three) times daily., Disp: , Rfl:     valsartan-hydrochlorothiazide (DIOVAN-HCT) 160-12.5 mg per tablet, Take 1 tablet by mouth., Disp: , Rfl:     DULoxetine (CYMBALTA) 30 MG capsule, Take 30 mg by mouth once daily. (Patient not taking: Reported on 8/8/2024), Disp: , Rfl:   Allergies: Review of patient's allergies indicates:  No Known Allergies  Social History:   Social History     Tobacco Use    Smoking status: Never    Smokeless tobacco: Never   Substance Use Topics    Alcohol use: Never    Drug use: Never     Family History: No family history on file.  Surgical History:   Past Surgical History:   Procedure Laterality Date    BREAST LUMPECTOMY       Review of Systems:  Negative unless as stated above in HPI     Objective:    Vitals:  Vitals:    08/13/24 1036   BP: (!) 143/82   Pulse: 97   Resp: 18   Temp: 97.9 °F (36.6 °C)      Physical Exam:  Gen: NAD  Neuro: awake, alert, answering questions appropriately  CV: RRR  Resp: non-labored breathing, JANE  Abd: soft, ND, NT  Ext: moves all 4 spontaneously and purposefully  Skin: warm, well perfused  Breast: R supraclavicular node; R breast appears ulcerated         L axilla with palpable nodes     Pertinent Labs:  N/A    Imaging:  N/A    Micro/Path/Other:  N/A     Assessment/Plan:  59 year-old female with a PMHx of right breast cancer IDC s/p breast conservative therapy in 2002 with newly appeared large supraclavicular mass found to be metastatic carcinoma    - Mediport Insertion 8/19   - Consented in clinic; risks, benefits, and alternatives dicussed with patient & questions/concerns were addressed   - Will discuss patient with Dr. Herndon in breast clinic     Shea English MD   [facility] General Surgery, PGY-1  08/13/2024 10:42 AM

## 2024-08-14 ENCOUNTER — ANESTHESIA EVENT (OUTPATIENT)
Dept: SURGERY | Facility: HOSPITAL | Age: 59
End: 2024-08-14
Payer: MEDICAID

## 2024-08-14 ENCOUNTER — HOSPITAL ENCOUNTER (OUTPATIENT)
Dept: CARDIOLOGY | Facility: HOSPITAL | Age: 59
Discharge: HOME | End: 2024-08-14
Attending: INTERNAL MEDICINE
Payer: MEDICAID

## 2024-08-14 VITALS — WEIGHT: 211 LBS | HEIGHT: 65 IN | BODY MASS INDEX: 35.16 KG/M2

## 2024-08-14 DIAGNOSIS — C77.0 NEOPLASM OF RIGHT BREAST, REGIONAL LYMPH NODE STAGING CATEGORY N3C: METASTASIS IN IPSILATERAL SUPRACLAVICULAR LYMPH NODE: ICD-10-CM

## 2024-08-14 DIAGNOSIS — C50.911 NEOPLASM OF RIGHT BREAST, REGIONAL LYMPH NODE STAGING CATEGORY N3C: METASTASIS IN IPSILATERAL SUPRACLAVICULAR LYMPH NODE: ICD-10-CM

## 2024-08-14 DIAGNOSIS — C50.919 TRIPLE NEGATIVE BREAST CARCINOMA: ICD-10-CM

## 2024-08-14 DIAGNOSIS — C50.911 RECURRENT BREAST CANCER, RIGHT: ICD-10-CM

## 2024-08-14 DIAGNOSIS — Z17.1 TRIPLE NEGATIVE BREAST CARCINOMA: ICD-10-CM

## 2024-08-14 LAB
APICAL FOUR CHAMBER EJECTION FRACTION: 55 %
APICAL TWO CHAMBER EJECTION FRACTION: 61 %
AV INDEX (PROSTH): 0.73
AV MEAN GRADIENT: 4 MMHG
AV PEAK GRADIENT: 7 MMHG
AV VALVE AREA BY VELOCITY RATIO: 2.44 CM²
AV VALVE AREA: 2.32 CM²
AV VELOCITY RATIO: 0.77
BSA FOR ECHO PROCEDURE: 2.09 M2
CV ECHO LV RWT: 0.35 CM
DOP CALC AO PEAK VEL: 1.35 M/S
DOP CALC AO VTI: 25.6 CM
DOP CALC LVOT AREA: 3.2 CM2
DOP CALC LVOT DIAMETER: 2.01 CM
DOP CALC LVOT PEAK VEL: 1.04 M/S
DOP CALC LVOT STROKE VOLUME: 59.31 CM3
DOP CALC MV VTI: 22 CM
DOP CALCLVOT PEAK VEL VTI: 18.7 CM
E WAVE DECELERATION TIME: 154.21 MSEC
E/A RATIO: 0.98
E/E' RATIO: 5.33 M/S
ECHO LV POSTERIOR WALL: 0.82 CM (ref 0.6–1.1)
FRACTIONAL SHORTENING: 31 % (ref 28–44)
HR MV ECHO: 86 BPM
INTERVENTRICULAR SEPTUM: 0.82 CM (ref 0.6–1.1)
LEFT ATRIUM SIZE: 2.83 CM
LEFT INTERNAL DIMENSION IN SYSTOLE: 3.18 CM (ref 2.1–4)
LEFT VENTRICLE DIASTOLIC VOLUME INDEX: 48.87 ML/M2
LEFT VENTRICLE DIASTOLIC VOLUME: 98.72 ML
LEFT VENTRICLE END DIASTOLIC VOLUME APICAL 2 CHAMBER: 74.71 ML
LEFT VENTRICLE END DIASTOLIC VOLUME APICAL 4 CHAMBER: 80.94 ML
LEFT VENTRICLE MASS INDEX: 61 G/M2
LEFT VENTRICLE SYSTOLIC VOLUME INDEX: 19.9 ML/M2
LEFT VENTRICLE SYSTOLIC VOLUME: 40.25 ML
LEFT VENTRICULAR INTERNAL DIMENSION IN DIASTOLE: 4.63 CM (ref 3.5–6)
LEFT VENTRICULAR MASS: 123.1 G
LV LATERAL E/E' RATIO: 5.09 M/S
LV SEPTAL E/E' RATIO: 5.6 M/S
LVED V (TEICH): 98.72 ML
LVES V (TEICH): 40.25 ML
LVOT MG: 2.42 MMHG
LVOT MV: 0.72 CM/S
MV MEAN GRADIENT: 1 MMHG
MV PEAK A VEL: 0.57 M/S
MV PEAK E VEL: 0.56 M/S
MV PEAK GRADIENT: 2 MMHG
MV STENOSIS PRESSURE HALF TIME: 44.72 MS
MV VALVE AREA BY CONTINUITY EQUATION: 2.7 CM2
MV VALVE AREA P 1/2 METHOD: 4.92 CM2
OHS CV RV/LV RATIO: 0.66 CM
OHS LV EJECTION FRACTION SIMPSONS BIPLANE MOD: 57 %
PISA TR MAX VEL: 1.98 M/S
RA PRESSURE ESTIMATED: 3 MMHG
RIGHT VENTRICULAR END-DIASTOLIC DIMENSION: 3.04 CM
RV TB RVSP: 5 MMHG
TDI LATERAL: 0.11 M/S
TDI SEPTAL: 0.1 M/S
TDI: 0.11 M/S
TR MAX PG: 16 MMHG
TRICUSPID ANNULAR PLANE SYSTOLIC EXCURSION: 2.63 CM
TV REST PULMONARY ARTERY PRESSURE: 19 MMHG
Z-SCORE OF LEFT VENTRICULAR DIMENSION IN END DIASTOLE: -2.51
Z-SCORE OF LEFT VENTRICULAR DIMENSION IN END SYSTOLE: -1.09

## 2024-08-14 PROCEDURE — 93306 TTE W/DOPPLER COMPLETE: CPT

## 2024-08-14 NOTE — PROGRESS NOTES
I have reviewed the notes, assessments, and/or procedures performed by Amador, I concur with her/his documentation of Eli Bangura.  Date of Service: 8/13/2024    [facility]

## 2024-08-14 NOTE — ANESTHESIA PREPROCEDURE EVALUATION
Eli Bangura is a 59 y.o. female PRESENTING FOR KEWKNLINP-QJSZ-S-CATH (Chest) with a history of   -RECURRENT BRCA/LAD; right breast cancer IDC (diagnosed 2022) S/P neoadjuvant chemotherapy, lumpectomy, axillary lymph dissection, adjuvant radiotherapy     -HTN  -MILD PULM HTN ON ECHO 7/2023  -GRAVE'S DISEASE; TSH=1.476 5/17/24  -OBESITY, BMI 35     BETA-BLOCKER: NONE    New Orders for Anesthesia: NONE    Patient Active Problem List   Diagnosis    Primary osteoarthritis of both knees    BMI 33.0-33.9,adult    Graves disease    Subcutaneous nodule of neck    Recurrent breast cancer, right    Triple negative breast carcinoma    Neoplasm of right breast, regional lymph node staging category N3c: metastasis in ipsilateral supraclavicular lymph node    LAD (lymphadenopathy) of right cervical region    History of right breast cancer    History of lumpectomy of right breast       Pre-op Assessment    I have reviewed the NPO Status.      Review of Systems  Anesthesia Hx:  No problems with previous Anesthesia                Social:  Non-Smoker       Hematology/Oncology:                      Current/Recent Cancer.  --  Cancer in past history:       Breast       chemotherapy, radiation and surgery       Cardiovascular:     Hypertension, poorly controlled                                        Pulmonary:  Pulmonary Normal                       Renal/:  Renal/ Normal                 Hepatic/GI:  Hepatic/GI Normal                 Neurological:  Neurology Normal                                      Endocrine:    Hyperthyroidism       Obesity / BMI > 30    Vitals:    08/19/24 0613 08/19/24 0620 08/19/24 0712 08/19/24 0800   BP:  (!) 150/82 (!) 150/82 (!) 156/92   BP Location:    Left arm   Patient Position:    Lying   Pulse:  91  94   Resp:    18   Temp:  36.8 °C (98.3 °F)  36.9 °C (98.4 °F)   TempSrc:  Oral  Temporal   SpO2:  100%  100%   Weight: 94.2 kg (207 lb 9.6 oz)            Physical Exam  General: Alert, Cooperative  and Well nourished    Airway:  Mallampati: II   Mouth Opening: Normal  TM Distance: Normal  Tongue: Normal  Neck ROM: Normal ROM    Dental:  Intact    Chest/Lungs:  Clear to auscultation, Normal Respiratory Rate    Heart:  Rate: Normal  Rhythm: Regular Rhythm  Sounds: Normal      Lab Results   Component Value Date    WBC 4.84 08/08/2024    HGB 11.9 (L) 08/08/2024    HCT 35.3 (L) 08/08/2024    .6 (H) 08/08/2024     08/08/2024       CMP  Sodium   Date Value Ref Range Status   08/08/2024 143 136 - 145 mmol/L Final   07/12/2023 142 136 - 145 mmol/L Final     Potassium   Date Value Ref Range Status   08/08/2024 3.1 (L) 3.5 - 5.1 mmol/L Final   07/12/2023 3.2 (L) 3.5 - 5.1 mmol/L Final     Chloride   Date Value Ref Range Status   08/08/2024 107 98 - 107 mmol/L Final   07/12/2023 111 (H) 100 - 109 mmol/L Final     CO2   Date Value Ref Range Status   08/08/2024 23 22 - 29 mmol/L Final     Carbon Dioxide   Date Value Ref Range Status   07/12/2023 23 22 - 33 mmol/L Final     Blood Urea Nitrogen   Date Value Ref Range Status   08/08/2024 17.7 9.8 - 20.1 mg/dL Final   07/12/2023 19 5 - 25 mg/dL Final     Creatinine   Date Value Ref Range Status   08/08/2024 0.83 0.55 - 1.02 mg/dL Final   07/12/2023 0.63 0.57 - 1.25 mg/dL Final     Calcium   Date Value Ref Range Status   08/08/2024 10.6 (H) 8.4 - 10.2 mg/dL Final   07/12/2023 9.2 8.8 - 10.6 mg/dL Final     Albumin   Date Value Ref Range Status   08/08/2024 3.9 3.5 - 5.0 g/dL Final     Bilirubin Total   Date Value Ref Range Status   08/08/2024 0.6 <=1.5 mg/dL Final     ALP   Date Value Ref Range Status   08/08/2024 134 40 - 150 unit/L Final     AST   Date Value Ref Range Status   08/08/2024 24 5 - 34 unit/L Final     ALT   Date Value Ref Range Status   08/08/2024 20 0 - 55 unit/L Final     Anion Gap   Date Value Ref Range Status   07/12/2023 8 8 - 16 mmol/L Final     eGFR   Date Value Ref Range Status   08/08/2024 >60 mL/min/1.73/m2 Final   ECHO  8/14/24          Anesthesia Plan  Type of Anesthesia, risks & benefits discussed:    Anesthesia Type: MAC  Intra-op Monitoring Plan: Standard ASA Monitors  Post Op Pain Control Plan: IV/PO Opioids PRN  Induction:  IV  Informed Consent: Informed consent signed with the Patient and all parties understand the risks and agree with anesthesia plan.  All questions answered.   ASA Score: 3  Day of Surgery Review of History & Physical: H&P Update referred to the surgeon/provider.    Ready For Surgery From Anesthesia Perspective.     .

## 2024-08-19 ENCOUNTER — HOSPITAL ENCOUNTER (OUTPATIENT)
Facility: HOSPITAL | Age: 59
Discharge: HOME | End: 2024-08-19
Attending: SURGERY | Admitting: SURGERY
Payer: MEDICAID

## 2024-08-19 ENCOUNTER — ANESTHESIA (OUTPATIENT)
Dept: SURGERY | Facility: HOSPITAL | Age: 59
End: 2024-08-19
Payer: MEDICAID

## 2024-08-19 DIAGNOSIS — C50.911 RECURRENT BREAST CANCER, RIGHT: ICD-10-CM

## 2024-08-19 PROCEDURE — 71000015 HC POSTOP RECOV 1ST HR: Performed by: SURGERY

## 2024-08-19 PROCEDURE — 71000016 HC POSTOP RECOV ADDL HR: Performed by: SURGERY

## 2024-08-19 PROCEDURE — 63600175 PHARM REV CODE 636 W HCPCS: Performed by: STUDENT IN AN ORGANIZED HEALTH CARE EDUCATION/TRAINING PROGRAM

## 2024-08-19 PROCEDURE — 36000706: Performed by: SURGERY

## 2024-08-19 PROCEDURE — 37000008 HC ANESTHESIA 1ST 15 MINUTES: Performed by: SURGERY

## 2024-08-19 PROCEDURE — 37000009 HC ANESTHESIA EA ADD 15 MINS: Performed by: SURGERY

## 2024-08-19 PROCEDURE — 36000707: Performed by: SURGERY

## 2024-08-19 PROCEDURE — 25000003 PHARM REV CODE 250: Performed by: NURSE ANESTHETIST, CERTIFIED REGISTERED

## 2024-08-19 PROCEDURE — 63600175 PHARM REV CODE 636 W HCPCS: Performed by: NURSE ANESTHETIST, CERTIFIED REGISTERED

## 2024-08-19 PROCEDURE — 63600175 PHARM REV CODE 636 W HCPCS: Performed by: SURGERY

## 2024-08-19 PROCEDURE — C1788 PORT, INDWELLING, IMP: HCPCS | Performed by: SURGERY

## 2024-08-19 PROCEDURE — D9220A PRA ANESTHESIA: Mod: ,,, | Performed by: NURSE ANESTHETIST, CERTIFIED REGISTERED

## 2024-08-19 PROCEDURE — 25000003 PHARM REV CODE 250: Performed by: SURGERY

## 2024-08-19 PROCEDURE — 63600175 PHARM REV CODE 636 W HCPCS: Performed by: ANESTHESIOLOGY

## 2024-08-19 DEVICE — PORT POWER CLEAR VIEW: Type: IMPLANTABLE DEVICE | Site: CHEST  WALL | Status: FUNCTIONAL

## 2024-08-19 RX ORDER — PROPOFOL 10 MG/ML
INJECTION, EMULSION INTRAVENOUS
Status: DISCONTINUED | OUTPATIENT
Start: 2024-08-19 | End: 2024-08-19

## 2024-08-19 RX ORDER — HEPARIN 100 UNIT/ML
SYRINGE INTRAVENOUS
Status: DISCONTINUED | OUTPATIENT
Start: 2024-08-19 | End: 2024-08-19 | Stop reason: HOSPADM

## 2024-08-19 RX ORDER — MIDAZOLAM HYDROCHLORIDE 2 MG/2ML
2 INJECTION, SOLUTION INTRAMUSCULAR; INTRAVENOUS ONCE AS NEEDED
Status: DISCONTINUED | OUTPATIENT
Start: 2024-08-19 | End: 2024-08-19 | Stop reason: HOSPADM

## 2024-08-19 RX ORDER — KETOROLAC TROMETHAMINE 30 MG/ML
INJECTION, SOLUTION INTRAMUSCULAR; INTRAVENOUS
Status: DISCONTINUED | OUTPATIENT
Start: 2024-08-19 | End: 2024-08-19

## 2024-08-19 RX ORDER — LIDOCAINE HYDROCHLORIDE 20 MG/ML
INJECTION INTRAVENOUS
Status: DISCONTINUED | OUTPATIENT
Start: 2024-08-19 | End: 2024-08-19

## 2024-08-19 RX ORDER — ONDANSETRON HYDROCHLORIDE 2 MG/ML
INJECTION, SOLUTION INTRAVENOUS
Status: DISCONTINUED | OUTPATIENT
Start: 2024-08-19 | End: 2024-08-19

## 2024-08-19 RX ORDER — HEPARIN SODIUM 5000 [USP'U]/ML
5000 INJECTION, SOLUTION INTRAVENOUS; SUBCUTANEOUS EVERY 8 HOURS
Status: DISCONTINUED | OUTPATIENT
Start: 2024-08-19 | End: 2024-08-19 | Stop reason: HOSPADM

## 2024-08-19 RX ORDER — SODIUM CHLORIDE, SODIUM LACTATE, POTASSIUM CHLORIDE, CALCIUM CHLORIDE 600; 310; 30; 20 MG/100ML; MG/100ML; MG/100ML; MG/100ML
INJECTION, SOLUTION INTRAVENOUS CONTINUOUS
Status: DISCONTINUED | OUTPATIENT
Start: 2024-08-19 | End: 2024-08-19 | Stop reason: HOSPADM

## 2024-08-19 RX ORDER — DEXAMETHASONE SODIUM PHOSPHATE 4 MG/ML
INJECTION, SOLUTION INTRA-ARTICULAR; INTRALESIONAL; INTRAMUSCULAR; INTRAVENOUS; SOFT TISSUE
Status: DISCONTINUED | OUTPATIENT
Start: 2024-08-19 | End: 2024-08-19

## 2024-08-19 RX ORDER — CEFAZOLIN SODIUM 2 G/50ML
2 SOLUTION INTRAVENOUS
Status: DISCONTINUED | OUTPATIENT
Start: 2024-08-19 | End: 2024-08-19 | Stop reason: HOSPADM

## 2024-08-19 RX ORDER — DEXMEDETOMIDINE HYDROCHLORIDE 100 UG/ML
INJECTION, SOLUTION INTRAVENOUS
Status: DISCONTINUED | OUTPATIENT
Start: 2024-08-19 | End: 2024-08-19

## 2024-08-19 RX ORDER — FENTANYL CITRATE 50 UG/ML
INJECTION, SOLUTION INTRAMUSCULAR; INTRAVENOUS
Status: DISCONTINUED | OUTPATIENT
Start: 2024-08-19 | End: 2024-08-19

## 2024-08-19 RX ORDER — HEPARIN SOD,PORCINE/0.9 % NACL 1000/500ML
INTRAVENOUS SOLUTION INTRAVENOUS
Status: DISCONTINUED | OUTPATIENT
Start: 2024-08-19 | End: 2024-08-19 | Stop reason: HOSPADM

## 2024-08-19 RX ORDER — SODIUM CHLORIDE 9 MG/ML
INJECTION, SOLUTION INTRAVENOUS CONTINUOUS
Status: DISCONTINUED | OUTPATIENT
Start: 2024-08-19 | End: 2024-08-19 | Stop reason: HOSPADM

## 2024-08-19 RX ORDER — LIDOCAINE HYDROCHLORIDE 10 MG/ML
INJECTION, SOLUTION EPIDURAL; INFILTRATION; INTRACAUDAL; PERINEURAL
Status: DISCONTINUED | OUTPATIENT
Start: 2024-08-19 | End: 2024-08-19 | Stop reason: HOSPADM

## 2024-08-19 RX ORDER — CEFAZOLIN SODIUM 1 G/3ML
INJECTION, POWDER, FOR SOLUTION INTRAMUSCULAR; INTRAVENOUS
Status: DISCONTINUED | OUTPATIENT
Start: 2024-08-19 | End: 2024-08-19

## 2024-08-19 RX ADMIN — CEFAZOLIN 2 G: 330 INJECTION, POWDER, FOR SOLUTION INTRAMUSCULAR; INTRAVENOUS at 08:08

## 2024-08-19 RX ADMIN — DEXMEDETOMIDINE 15 MCG: 200 INJECTION, SOLUTION INTRAVENOUS at 08:08

## 2024-08-19 RX ADMIN — DEXMEDETOMIDINE 5 MCG: 200 INJECTION, SOLUTION INTRAVENOUS at 08:08

## 2024-08-19 RX ADMIN — LIDOCAINE HYDROCHLORIDE 50 MG: 20 INJECTION INTRAVENOUS at 08:08

## 2024-08-19 RX ADMIN — DEXMEDETOMIDINE 5 MCG: 200 INJECTION, SOLUTION INTRAVENOUS at 09:08

## 2024-08-19 RX ADMIN — HEPARIN SODIUM 5000 UNITS: 5000 INJECTION, SOLUTION INTRAVENOUS; SUBCUTANEOUS at 06:08

## 2024-08-19 RX ADMIN — KETOROLAC TROMETHAMINE 30 MG: 30 INJECTION, SOLUTION INTRAMUSCULAR at 08:08

## 2024-08-19 RX ADMIN — FENTANYL CITRATE 100 MCG: 50 INJECTION INTRAMUSCULAR; INTRAVENOUS at 08:08

## 2024-08-19 RX ADMIN — DEXAMETHASONE SODIUM PHOSPHATE 4 MG: 4 INJECTION, SOLUTION INTRA-ARTICULAR; INTRALESIONAL; INTRAMUSCULAR; INTRAVENOUS; SOFT TISSUE at 08:08

## 2024-08-19 RX ADMIN — SODIUM CHLORIDE, POTASSIUM CHLORIDE, SODIUM LACTATE AND CALCIUM CHLORIDE: 600; 310; 30; 20 INJECTION, SOLUTION INTRAVENOUS at 08:08

## 2024-08-19 RX ADMIN — PROPOFOL 50 MG: 10 INJECTION, EMULSION INTRAVENOUS at 08:08

## 2024-08-19 RX ADMIN — ONDANSETRON 4 MG: 2 INJECTION INTRAMUSCULAR; INTRAVENOUS at 08:08

## 2024-08-19 RX ADMIN — DEXMEDETOMIDINE 10 MCG: 200 INJECTION, SOLUTION INTRAVENOUS at 08:08

## 2024-08-19 NOTE — DISCHARGE INSTRUCTIONS
· Keep all scheduled appointments. Your port may be used immediately. If you have any problems with the site (redness, swelling, drainage, increasing pain, site hot to touch, port not working) CALL THE SURGERY CLINIC at [phone].    · No heavy lifting or straining for 2 weeks.    · You may take a shower tomorrow. Wash gently at incision sites- do not scrub or peel skin glue.    · Do not soak your wound in water for two weeks. Do not take baths, swim, or use a hot tub until your doctor says it is okay.    · Alternate Tylenol and Ibuprofen as needed for pain.     · You may use an ice pack as needed for 20 minutes at a time over your incision site to minimize swelling and help relieve pain.    · Do not drink alcohol or drive today, or as long as you are on pain medication.    · Notify MD of any moderate to severe pain unrelieved by pain medicine, if your incision opens and/or bleeds, or for any signs of infection including fever above 100.4, excessive redness or swelling, yellow/green foul- smelling drainage, nausea or vomiting. Clinics number is [phone]. If it is after business hours or emergency call [phone] and state Im having post op complications and need to speak to the surgeon on call.    · Thanks for choosing [facility]! Have a smooth recovery!

## 2024-08-19 NOTE — DISCHARGE SUMMARY
[facility] - Periop Services  Discharge Note  Short Stay    Procedure(s) (LRB):  [id number]-CATH (N/A)      OUTCOME: Patient tolerated treatment/procedure well without complication and is now ready for discharge.    DISPOSITION: Home or Self Care    FINAL DIAGNOSIS:  <principal problem not specified>    FOLLOWUP:  PRN    DISCHARGE INSTRUCTIONS:    Discharge Procedure Orders   Diet Adult Regular     Notify your health care provider if you experience any of the following:  temperature >100.4     Notify your health care provider if you experience any of the following:  redness, tenderness, or signs of infection (pain, swelling, redness, odor or green/yellow discharge around incision site)     Notify your health care provider if you experience any of the following:  severe uncontrolled pain     No dressing needed   Order Comments: No swimming or bathing for 2 weeks. Can shower.     Activity as tolerated         Clinical Reference Documents Added to Patient Instructions         Document    PORTACAT DISCHARGE INSTRUCTIONS (ENGLISH)            TIME SPENT ON DISCHARGE: 10 minutes

## 2024-08-19 NOTE — ANESTHESIA POSTPROCEDURE EVALUATION
Anesthesia Post Evaluation    Patient: Eli Bangura    Procedure(s) Performed: Procedure(s) (LRB):  [id number]-J-CATH (N/A)    Final Anesthesia Type: general      Patient location during evaluation: OPS  Patient participation: Yes- Able to Participate  Level of consciousness: awake and alert  Post-procedure vital signs: reviewed and stable  Pain management: adequate  Airway patency: patent    PONV status at discharge: No PONV  Anesthetic complications: no      Cardiovascular status: blood pressure returned to baseline and stable  Respiratory status: unassisted and room air  Hydration status: euvolemic  Follow-up not needed.

## 2024-08-19 NOTE — TRANSFER OF CARE
Anesthesia Transfer of Care Note    Patient: Eli Bangura    Procedure(s) Performed: Procedure(s) (LRB):  [id number]-B-CATH (N/A)    Patient location: OPS    Anesthesia Type: general    Transport from OR: Transported from OR on room air with adequate spontaneous ventilation    Post pain: adequate analgesia    Post assessment: no apparent anesthetic complications and tolerated procedure well    Post vital signs: stable    Level of consciousness: awake    Nausea/Vomiting: no nausea/vomiting    Complications: none    Transfer of care protocol was followed

## 2024-08-19 NOTE — OP NOTE
[id number]-WLCK-Q-CATH Procedure Note  Eli Bangura  MRN: [id number]  : 1965  Procedure Date: 2024    Procedure: [id number]-A-CATH    Surgeon(s) and Role:  Staff: Dr. Verdin  Resident(s): Dr. Juarez and Dr. Maldonado    Pre-Operative Diagnosis: metastatic right breast IDC    Post-Operative Diagnosis: Same as pre-op    Anesthesia: MAC    Operative Findings: Properly placed catheter tip at the cavo-atrial junction visualized with fluoroscopy    Description of Technical Procedures:   The patient was identified in the pre-op holding area by name, , and MRN. After verifying that written informed consent had been obtained, the patient was taken to the OR and placed on the table in the supine position. MAC was administered by anesthesia w/o complications. The bilateral chest and neck were prepped and draped in the usual sterile fashion. A proper timeout was performed confirming correct patient, site, procedure, and administration of preoperative antibiotic and anticoagulation.     After identified the left internal jugular vein, a large bore needle was used to penetrate the skin and venous access was obtained under ultrasound guidance. A guidewire was inserted into the needle and confirmed to be in the superior vena cava by flouroscopy.  Next a 4 cm incision was made on the upper chest and deepened with electrocautery.  A 2 x 3 cm port pocket was created in the subcutaneous space overlying the pectoralis fascia inferior to the incision. The catheter was then tunneled through the subcutaneous tissue to exit the skin at the wire. A dilator and sheath were inserted over the guidewire into the central venous system under fluoroscopic guidance. The guidewire and dilator were removed from the sheath. The catheter was inserted into the sheath and then the sheath was torn away. The catheter was pulled back such that the tip was at the atriocaval junction. The catheter was attached to the port with a locking  washer and the the port was fixed to the pectoralis fascia with two 2-0 Prolene stitches. A Damon needle was used to aspirate then flush the port with heparinized saline. The incision was then reapproximated with 3-0 Vicryl, and the skin closed with 4-0 Monocryl and Dermabond. The incision at the site of venous access was closed with Dermabond. The patient tolerated the procedure without any immediate complications. They were awoken from anesthesia and transferred to the recovery room in stable condition.     All suture, needle, and instrument counts were correct x2 at the conclusion of the case.     Dr. Verdin was present for all critical portions of the case.     Estimated Blood Loss:  minimal    Complications:  none           Disposition: PACU - hemodynamically stable.           Condition: stable    Giovanny Juarez MD  [facility] General Surgery, PGY-1  08/19/2024    right breast

## 2024-08-19 NOTE — INTERVAL H&P NOTE
The patient has been examined and the H&P has been reviewed:    I concur with the findings and no changes have occurred since H&P was written.    Surgery risks, benefits and alternative options discussed and understood by patient/family.    H&P Update    Patient seen and examined this morning. There are no changes to the documented H&P.    Patient has been NPO since midnight.     Patient has held home blood thinners for appropriate amount of time: N/A    Positioning: Supine    Pre-operative Heparin Ordered: Yes    Pre-operative Antibiotics Ordered: Yes: ancef    Laterality Marked: N/A    All questions and concerns addressed. Patient confirms the procedure to be performed: [id number]-CATH.     Giovanny Juarez MD  [facility] General Surgery, PGY-1  08/19/2024 6:28 AM        There are no hospital problems to display for this patient.

## 2024-08-20 ENCOUNTER — HOSPITAL ENCOUNTER (OUTPATIENT)
Dept: RADIOLOGY | Facility: HOSPITAL | Age: 59
Discharge: HOME | End: 2024-08-20
Attending: INTERNAL MEDICINE
Payer: MEDICAID

## 2024-08-20 DIAGNOSIS — C50.919 TRIPLE NEGATIVE BREAST CARCINOMA: ICD-10-CM

## 2024-08-20 DIAGNOSIS — Z17.1 TRIPLE NEGATIVE BREAST CARCINOMA: ICD-10-CM

## 2024-08-20 DIAGNOSIS — C50.911 NEOPLASM OF RIGHT BREAST, REGIONAL LYMPH NODE STAGING CATEGORY N3C: METASTASIS IN IPSILATERAL SUPRACLAVICULAR LYMPH NODE: ICD-10-CM

## 2024-08-20 DIAGNOSIS — C77.0 NEOPLASM OF RIGHT BREAST, REGIONAL LYMPH NODE STAGING CATEGORY N3C: METASTASIS IN IPSILATERAL SUPRACLAVICULAR LYMPH NODE: ICD-10-CM

## 2024-08-20 DIAGNOSIS — C50.911 RECURRENT BREAST CANCER, RIGHT: ICD-10-CM

## 2024-08-20 PROCEDURE — 70491 CT SOFT TISSUE NECK W/DYE: CPT | Mod: TC

## 2024-08-20 PROCEDURE — 25500020 PHARM REV CODE 255

## 2024-08-20 PROCEDURE — 71260 CT THORAX DX C+: CPT | Mod: TC

## 2024-08-20 PROCEDURE — A9577 INJ MULTIHANCE: HCPCS

## 2024-08-20 PROCEDURE — 74177 CT ABD & PELVIS W/CONTRAST: CPT | Mod: TC

## 2024-08-20 PROCEDURE — 70553 MRI BRAIN STEM W/O & W/DYE: CPT | Mod: TC

## 2024-08-20 RX ADMIN — GADOBENATE DIMEGLUMINE 20 ML: 529 INJECTION, SOLUTION INTRAVENOUS at 08:08

## 2024-08-20 RX ADMIN — IOHEXOL 100 ML: 350 INJECTION, SOLUTION INTRAVENOUS at 08:08

## 2024-08-21 VITALS
TEMPERATURE: 98 F | RESPIRATION RATE: 18 BRPM | SYSTOLIC BLOOD PRESSURE: 143 MMHG | BODY MASS INDEX: 34.55 KG/M2 | WEIGHT: 207.63 LBS | DIASTOLIC BLOOD PRESSURE: 95 MMHG | HEART RATE: 60 BPM | OXYGEN SATURATION: 100 %

## 2024-08-23 ENCOUNTER — HOSPITAL ENCOUNTER (OUTPATIENT)
Dept: RADIOLOGY | Facility: HOSPITAL | Age: 59
Discharge: HOME | End: 2024-08-23
Attending: INTERNAL MEDICINE
Payer: MEDICAID

## 2024-08-23 DIAGNOSIS — C50.911 RECURRENT BREAST CANCER, RIGHT: ICD-10-CM

## 2024-08-23 DIAGNOSIS — C50.919 TRIPLE NEGATIVE BREAST CARCINOMA: ICD-10-CM

## 2024-08-23 DIAGNOSIS — Z17.1 TRIPLE NEGATIVE BREAST CARCINOMA: ICD-10-CM

## 2024-08-23 DIAGNOSIS — C77.0 NEOPLASM OF RIGHT BREAST, REGIONAL LYMPH NODE STAGING CATEGORY N3C: METASTASIS IN IPSILATERAL SUPRACLAVICULAR LYMPH NODE: ICD-10-CM

## 2024-08-23 DIAGNOSIS — C50.911 NEOPLASM OF RIGHT BREAST, REGIONAL LYMPH NODE STAGING CATEGORY N3C: METASTASIS IN IPSILATERAL SUPRACLAVICULAR LYMPH NODE: ICD-10-CM

## 2024-08-23 PROCEDURE — 78306 BONE IMAGING WHOLE BODY: CPT | Mod: TC

## 2024-08-23 PROCEDURE — A9503 TC99M MEDRONATE: HCPCS | Performed by: INTERNAL MEDICINE

## 2024-08-23 RX ORDER — TC 99M MEDRONATE 20 MG/10ML
25 INJECTION, POWDER, LYOPHILIZED, FOR SOLUTION INTRAVENOUS
Status: COMPLETED | OUTPATIENT
Start: 2024-08-23 | End: 2024-08-23

## 2024-08-23 RX ORDER — HEPARIN 100 UNIT/ML
SYRINGE INTRAVENOUS
Status: DISCONTINUED
Start: 2024-08-23 | End: 2024-08-23 | Stop reason: HOSPADM

## 2024-08-23 RX ADMIN — TECHNETIUM TC 99M MEDRONATE 24.5 MILLICURIE: 20 INJECTION, POWDER, LYOPHILIZED, FOR SOLUTION INTRAVENOUS at 10:08

## 2024-08-27 ENCOUNTER — TELEPHONE (OUTPATIENT)
Dept: HEMATOLOGY/ONCOLOGY | Facility: CLINIC | Age: 59
End: 2024-08-27
Payer: MEDICAID

## 2024-08-27 PROBLEM — C50.912 BREAST CANCER METASTASIZED TO AXILLARY LYMPH NODE, LEFT: Status: ACTIVE | Noted: 2024-08-27

## 2024-08-27 PROBLEM — R59.0 MEDIASTINAL LYMPHADENOPATHY: Status: ACTIVE | Noted: 2024-08-27

## 2024-08-27 PROBLEM — C77.3 BREAST CANCER METASTASIZED TO AXILLARY LYMPH NODE, LEFT: Status: ACTIVE | Noted: 2024-08-27

## 2024-08-27 PROBLEM — R59.0 CERVICAL LYMPHADENOPATHY: Status: ACTIVE | Noted: 2024-08-27

## 2024-08-27 PROBLEM — E83.52 HYPERCALCEMIA: Status: ACTIVE | Noted: 2024-08-27

## 2024-08-27 PROBLEM — R79.89 HIGH SERUM PARATHYROID HORMONE (PTH): Status: ACTIVE | Noted: 2024-08-27

## 2024-08-27 PROBLEM — R91.8 LUNG NODULES: Status: ACTIVE | Noted: 2024-08-27

## 2024-08-27 PROBLEM — C50.919: Status: ACTIVE | Noted: 2024-08-27

## 2024-08-27 PROBLEM — R16.0 LIVER MASS, RIGHT LOBE: Status: ACTIVE | Noted: 2024-08-27

## 2024-08-27 PROBLEM — Z78.0 POSTMENOPAUSAL: Status: ACTIVE | Noted: 2024-08-27

## 2024-08-28 ENCOUNTER — OFFICE VISIT (OUTPATIENT)
Dept: HEMATOLOGY/ONCOLOGY | Facility: CLINIC | Age: 59
End: 2024-08-28
Attending: INTERNAL MEDICINE
Payer: MEDICAID

## 2024-08-28 VITALS
HEIGHT: 65 IN | DIASTOLIC BLOOD PRESSURE: 82 MMHG | OXYGEN SATURATION: 100 % | TEMPERATURE: 98 F | RESPIRATION RATE: 18 BRPM | BODY MASS INDEX: 35.09 KG/M2 | SYSTOLIC BLOOD PRESSURE: 125 MMHG | HEART RATE: 91 BPM | WEIGHT: 210.63 LBS

## 2024-08-28 DIAGNOSIS — E05.00 GRAVES DISEASE: ICD-10-CM

## 2024-08-28 DIAGNOSIS — C50.911 RECURRENT BREAST CANCER, RIGHT: ICD-10-CM

## 2024-08-28 DIAGNOSIS — Z85.3 HISTORY OF RIGHT BREAST CANCER: ICD-10-CM

## 2024-08-28 DIAGNOSIS — R59.0 CERVICAL LYMPHADENOPATHY: ICD-10-CM

## 2024-08-28 DIAGNOSIS — C50.919 TRIPLE NEGATIVE BREAST CARCINOMA: ICD-10-CM

## 2024-08-28 DIAGNOSIS — C50.911 NEOPLASM OF RIGHT BREAST, REGIONAL LYMPH NODE STAGING CATEGORY N3C: METASTASIS IN IPSILATERAL SUPRACLAVICULAR LYMPH NODE: ICD-10-CM

## 2024-08-28 DIAGNOSIS — C77.3 BREAST CANCER METASTASIZED TO AXILLARY LYMPH NODE, LEFT: ICD-10-CM

## 2024-08-28 DIAGNOSIS — R16.0 LIVER MASS, RIGHT LOBE: ICD-10-CM

## 2024-08-28 DIAGNOSIS — C50.912 BREAST CANCER METASTASIZED TO AXILLARY LYMPH NODE, LEFT: ICD-10-CM

## 2024-08-28 DIAGNOSIS — R22.1 SUBCUTANEOUS NODULE OF NECK: Primary | ICD-10-CM

## 2024-08-28 DIAGNOSIS — Z78.0 POSTMENOPAUSAL: ICD-10-CM

## 2024-08-28 DIAGNOSIS — Z17.1 TRIPLE NEGATIVE BREAST CARCINOMA: ICD-10-CM

## 2024-08-28 DIAGNOSIS — R91.8 LUNG NODULES: ICD-10-CM

## 2024-08-28 DIAGNOSIS — E83.52 HYPERCALCEMIA: ICD-10-CM

## 2024-08-28 DIAGNOSIS — R59.0 LAD (LYMPHADENOPATHY) OF RIGHT CERVICAL REGION: ICD-10-CM

## 2024-08-28 DIAGNOSIS — C50.911 RECURRENT BREAST CANCER, RIGHT: Primary | ICD-10-CM

## 2024-08-28 DIAGNOSIS — Z98.890 HISTORY OF LUMPECTOMY OF RIGHT BREAST: ICD-10-CM

## 2024-08-28 DIAGNOSIS — R79.89 HIGH SERUM PARATHYROID HORMONE (PTH): ICD-10-CM

## 2024-08-28 DIAGNOSIS — R59.0 MEDIASTINAL LYMPHADENOPATHY: ICD-10-CM

## 2024-08-28 DIAGNOSIS — C50.911 CARCINOMA OF RIGHT BREAST, STAGE 4: ICD-10-CM

## 2024-08-28 DIAGNOSIS — C77.0 NEOPLASM OF RIGHT BREAST, REGIONAL LYMPH NODE STAGING CATEGORY N3C: METASTASIS IN IPSILATERAL SUPRACLAVICULAR LYMPH NODE: ICD-10-CM

## 2024-08-28 LAB
ALBUMIN SERPL-MCNC: 4 G/DL (ref 3.5–5)
ALBUMIN/GLOB SERPL: 0.9 RATIO (ref 1.1–2)
ALP SERPL-CCNC: 143 UNIT/L (ref 40–150)
ALT SERPL-CCNC: 17 UNIT/L (ref 0–55)
ANION GAP SERPL CALC-SCNC: 10 MEQ/L
AST SERPL-CCNC: 22 UNIT/L (ref 5–34)
BASOPHILS # BLD AUTO: 0.02 X10(3)/MCL
BASOPHILS NFR BLD AUTO: 0.4 %
BILIRUB SERPL-MCNC: 0.6 MG/DL
BUN SERPL-MCNC: 17.3 MG/DL (ref 9.8–20.1)
CALCIUM SERPL-MCNC: 10.3 MG/DL (ref 8.4–10.2)
CEA SERPL-MCNC: 4.64 NG/ML (ref 0–3)
CHLORIDE SERPL-SCNC: 106 MMOL/L (ref 98–107)
CO2 SERPL-SCNC: 26 MMOL/L (ref 22–29)
CREAT SERPL-MCNC: 0.84 MG/DL (ref 0.55–1.02)
CREAT/UREA NIT SERPL: 21
EOSINOPHIL # BLD AUTO: 0.05 X10(3)/MCL (ref 0–0.9)
EOSINOPHIL NFR BLD AUTO: 1.1 %
ERYTHROCYTE [DISTWIDTH] IN BLOOD BY AUTOMATED COUNT: 12.7 % (ref 11.5–17)
GFR SERPLBLD CREATININE-BSD FMLA CKD-EPI: >60 ML/MIN/1.73/M2
GLOBULIN SER-MCNC: 4.5 GM/DL (ref 2.4–3.5)
GLUCOSE SERPL-MCNC: 100 MG/DL (ref 74–100)
HCT VFR BLD AUTO: 38 % (ref 37–47)
HGB BLD-MCNC: 12.3 G/DL (ref 12–16)
IMM GRANULOCYTES # BLD AUTO: 0.01 X10(3)/MCL (ref 0–0.04)
IMM GRANULOCYTES NFR BLD AUTO: 0.2 %
LYMPHOCYTES # BLD AUTO: 1.08 X10(3)/MCL (ref 0.6–4.6)
LYMPHOCYTES NFR BLD AUTO: 23.4 %
MCH RBC QN AUTO: 33.8 PG (ref 27–31)
MCHC RBC AUTO-ENTMCNC: 32.4 G/DL (ref 33–36)
MCV RBC AUTO: 104.4 FL (ref 80–94)
MONOCYTES # BLD AUTO: 0.28 X10(3)/MCL (ref 0.1–1.3)
MONOCYTES NFR BLD AUTO: 6.1 %
NEUTROPHILS # BLD AUTO: 3.17 X10(3)/MCL (ref 2.1–9.2)
NEUTROPHILS NFR BLD AUTO: 68.8 %
NRBC BLD AUTO-RTO: 0 %
PLATELET # BLD AUTO: 151 X10(3)/MCL (ref 130–400)
PLATELETS.RETICULATED NFR BLD AUTO: 5.9 % (ref 0.9–11.2)
PMV BLD AUTO: 11 FL (ref 7.4–10.4)
POTASSIUM SERPL-SCNC: 3.5 MMOL/L (ref 3.5–5.1)
PROT SERPL-MCNC: 8.5 GM/DL (ref 6.4–8.3)
RBC # BLD AUTO: 3.64 X10(6)/MCL (ref 4.2–5.4)
SODIUM SERPL-SCNC: 142 MMOL/L (ref 136–145)
WBC # BLD AUTO: 4.61 X10(3)/MCL (ref 4.5–11.5)

## 2024-08-28 PROCEDURE — 36415 COLL VENOUS BLD VENIPUNCTURE: CPT | Performed by: INTERNAL MEDICINE

## 2024-08-28 PROCEDURE — 86300 IMMUNOASSAY TUMOR CA 15-3: CPT | Performed by: INTERNAL MEDICINE

## 2024-08-28 PROCEDURE — 82378 CARCINOEMBRYONIC ANTIGEN: CPT | Performed by: INTERNAL MEDICINE

## 2024-08-28 PROCEDURE — 80053 COMPREHEN METABOLIC PANEL: CPT | Performed by: INTERNAL MEDICINE

## 2024-08-28 PROCEDURE — 99214 OFFICE O/P EST MOD 30 MIN: CPT | Mod: PBBFAC | Performed by: INTERNAL MEDICINE

## 2024-08-28 PROCEDURE — 85025 COMPLETE CBC W/AUTO DIFF WBC: CPT | Performed by: INTERNAL MEDICINE

## 2024-08-28 RX ORDER — FAMOTIDINE 10 MG/ML
20 INJECTION INTRAVENOUS
OUTPATIENT
Start: 2024-09-02

## 2024-08-28 RX ORDER — EPINEPHRINE 0.3 MG/.3ML
0.3 INJECTION SUBCUTANEOUS ONCE AS NEEDED
OUTPATIENT
Start: 2024-09-02

## 2024-08-28 RX ORDER — DIPHENHYDRAMINE HYDROCHLORIDE 50 MG/ML
50 INJECTION INTRAMUSCULAR; INTRAVENOUS ONCE AS NEEDED
OUTPATIENT
Start: 2024-09-23

## 2024-08-28 RX ORDER — DIPHENHYDRAMINE HYDROCHLORIDE 50 MG/ML
50 INJECTION INTRAMUSCULAR; INTRAVENOUS ONCE AS NEEDED
OUTPATIENT
Start: 2024-09-02

## 2024-08-28 RX ORDER — HEPARIN 100 UNIT/ML
500 SYRINGE INTRAVENOUS
OUTPATIENT
Start: 2024-09-23

## 2024-08-28 RX ORDER — FAMOTIDINE 10 MG/ML
20 INJECTION INTRAVENOUS
OUTPATIENT
Start: 2024-09-23

## 2024-08-28 RX ORDER — EPINEPHRINE 0.3 MG/.3ML
0.3 INJECTION SUBCUTANEOUS ONCE AS NEEDED
OUTPATIENT
Start: 2024-09-23

## 2024-08-28 RX ORDER — SODIUM CHLORIDE 0.9 % (FLUSH) 0.9 %
10 SYRINGE (ML) INJECTION
OUTPATIENT
Start: 2024-09-02

## 2024-08-28 RX ORDER — SODIUM CHLORIDE 0.9 % (FLUSH) 0.9 %
10 SYRINGE (ML) INJECTION
OUTPATIENT
Start: 2024-09-23

## 2024-08-28 RX ORDER — HEPARIN 100 UNIT/ML
500 SYRINGE INTRAVENOUS
OUTPATIENT
Start: 2024-09-02

## 2024-08-28 NOTE — Clinical Note
Need genetic testing ASAP  Need FDG PET-CT asap  On recent biopsies, need PD-L1 testing ASAP Refer to IR for biopsy of liver lesion ASAP Today, check CBC, CMP, CEA level, CA 27.29 level, CA 15-3 level Start chemotherapy with Taxol ASAP  Chemotherapy teaching with nursing staff ASAP During chemotherapy, check CBC and CMP weekly Follow-up with endocrinology for Graves disease Follow-up with NP within a week for chemotherapy teaching  Follow-up with me in 1 month

## 2024-08-28 NOTE — PROGRESS NOTES
History:  Past Medical History:   Diagnosis Date    Anemia     Arthritis     Breast cancer     Graves disease     Hypertension     Hyperthyroidism       Past Surgical History:   Procedure Laterality Date    BREAST LUMPECTOMY      INSERTION OF TUNNELED CENTRAL VENOUS CATHETER (CVC) WITH SUBCUTANEOUS PORT N/A 8/19/2024    Procedure: [id number];  Surgeon: Thomas Verdin Jr., MD;  Location: [facility];  Service: General;  Laterality: N/A;      Social History     Socioeconomic History    Marital status: Single   Tobacco Use    Smoking status: Never     Passive exposure: Never    Smokeless tobacco: Never   Substance and Sexual Activity    Alcohol use: Never    Drug use: Never     Social Determinants of Health     Financial Resource Strain: Patient Declined (7/11/2023)    [email or web address], [facility]    Overall Financial Resource Strain (CARDIA)     Difficulty of Paying Living Expenses: Patient declined   Food Insecurity: Patient Declined (7/11/2023)    [facility], [facility] Subsidi[facility] and Affiliates    Hunger Vital Sign     Worried About Running Out of Food in the Last Year: Patient declined     Ran Out of Food in the Last Year: Patient declined   Transportation Needs: Patient Declined (7/11/2023)    [facility], [facility] Subsidi[facility] and Affiliates    PRAPARE - Transportation     Lack of Transportation (Medical): Patient declined     Lack of Transportation (Non-Medical): Patient declined   Stress: Patient Declined (7/11/2023)    [email or web address], [facility]    [employer] [place] [facility] [employer] - Occupational Stress Questionnaire     Feeling of Stress : Patient declined   Housing Stability: Unknown (7/11/2023)    [facility], [facility]    Housing Stability Vital Sign     Unable to Pay for Housing in the Last Year: Patient refused     Number of Places Lived in the Last Year: 1      No family history on file.     Reason for Follow-up:  -history of right breast cancer, diagnosed 2002, ER negative, AR negative, HER2 positive, T2 N1 M0, S/P neoadjuvant chemotherapy, lumpectomy, axillary lymph dissection, adjuvant radiotherapy (completed 10/24/2022)  -local and regional recurrence, triple negative, diagnosed on biopsy of supraclavicular lymph node 06/27/2024; on mammogram, right breast mass; supraclavicular lymphadenopathy; right cervical lymphadenopathy  -Postmenopausal   -Hypercalcemia   -High serum parathyroid hormone (PTH)   -Liver mass, right lobe   -Mediastinal lymphadenopathy   -Lung nodules   -Breast cancer metastasized to axillary lymph node, left   -Cervical lymphadenopathy   -history of Graves disease    History of Present Illness:   Recurrent breast cancer, right        Oncologic/Hematologic History:  Oncology History   Stage IV carcinoma of breast   8/27/2024 Initial Diagnosis    Stage IV carcinoma of breast     9/2/2024 -  Chemotherapy    Treatment Summary   Plan Name: OP BREAST PACLITAXEL Q3W  Treatment Goal: Palliative  Status: Active  Start Date: 9/2/2024 (Planned)  End Date: 12/16/2024 (Planned)  Provider: Israel Farah MD  Chemotherapy: PACLitaxeL (TAXOL) 175 mg/m2 = 366 mg in 0.9% NaCl 500 mL chemo infusion, 175 mg/m2 = 366 mg, Intravenous, Clinic/HOD  1 time, 0 of 6 cycles     Past medical history: Anemia; arthritis; breast cancer; Graves disease (diagnosed ; started on methimazole by endocrinologist in [place], in ); hypertension; peripheral neuropathy  -2023:  Venous Doppler bilateral lower extremities (swelling):  No DVT  -2023: TTE:  LVEF 55-60%  -2023:  Limited abdominal ultrasound (elevated liver enzymes):  6.6 cm cyst within the liver near the gallbladder; cholelithiasis with minimal gallbladder wall thickening  -2014:  Pelvic ultrasound: Markedly enlarged uterus with multiple large fibroids; endometrial stripe is thickened, 1.4 cm  Procedure/surgical history: Breast lumpectomy   Social history:  .  Lives in [place].  No children.  Never wanted to have children.  Never became pregnant.  No history of tobacco, alcohol, or illicit drug abuse.  Does not work.  Family history:  Sister experienced hyperthyroidism, treated with radioiodine.  She does not know much about her family history but says that there are cancers in folks on both parents sides of family.  A female cousin on mother's side of family experienced breast cancer in her 30s and  from it.  Health maintenance:  Primary care provider in [facility].  Last mammogram 2023 at [facility].  No screening colonoscopy ever.  Menstrual/Ob gyn history:  Menarche at age 13.  Last menstrual cycle 2023.  Never became pregnant.  Never wanted to become pregnant.  Took birth control pills for 2 years several years ago.  No hormone replacement therapy after menopause.  ====================================      59-year-old lady, referred by Rere Jernigan MD, surgery, with recurrent breast cancer.      07/10/2024:  Office note: Rere Jernigan MD (surgery):  Pt is a 59 y.o. female with history of right breast cancer s/p BCT in  who presents with painless large supraclavicular mass.    First noticed it 4 months ago and has steadily  increased in size past month.  Last mammogram 1 year ago and new [place].  Also reports progressive skin changes of the right breast not associated with lumpectomy incision.  Receptor status unknown.       Investigations reviewed:  -no old records available   -according to her, she was diagnosed with right breast cancer in January 2022  -mammogram showed a large lobulated mass, 3.7 x 2.7 cm  -biopsy:  Infiltrative ductal carcinoma, possibly invasive lobular  -right breast mass was palpable in the upper outer quadrant; axillary lymphadenopathy was noted (3-4 palpable lymph nodes right axilla)  -T2 N1 M0 IDC right breast (she had palpable axillary lymph nodes in the right and a large palpable mass in the right breast)  -ER negative; NV negative; HER2 positive  -S/P neoadjuvant chemotherapy (according to her, she received 4 cycles of neoadjuvant chemotherapy with Adriamycin/Taxotere every 3 weeks apart x4 cycles)  -followed by lumpectomy and axillary dissection  -no residual tumor post chemotherapy; 17 axillary lymph nodes negative for tumor  -S/P adjuvant radiotherapy, 6600 cGy/35 fractions, completed 10/24/2022  -07/28/2011:  DEXA scan:  BMD normal  -12/19/2022:  Screening mammogram (comparison: 11/08/2019, etc.):  BI-RADS: 2-benign  -07/06/2023:  Echocardiogram:  LVEF 60%  -11/27/2023:  Bilateral diagnostic mammogram and limited ultrasound right breast (comparison: 12/19/2022, 12/16/2001, etc.) (history of right breast cancer with worsening right breast pain and thickening) large elongated heterogeneous mass with irregular borders outer aspect of the right breast (extending from the nipple outward towards the lateral chest wall at the 8-9 o'clock position, 5.8 x 3 x 1.8 cm although margins are difficult to accurately define), highly suspicious for recurrent malignancy; there is a separate elongated hypoechoic lesion in the upper outer quadrant right breast 11 o'clock position, 5 cm from nipple, 3 x 2.6 x 0.7 cm, near  the site of previous surgery), perhaps benign scar tissue related to previous lumpectomy:  BI-RADS: 5-highly suggestive of malignancy  -07/10/2024:  Surgery Office note:  Painless large supraclavicular mass, noted 4 months ago, steadily enlarging; also reported progressive skin changes right breast not associated with lumpectomy incision; on physical exam, large exophytic right supraclavicular nodule, nonmobile and fixed, overlying skin erythematous without ulceration; right upper outer quadrant lumpectomy incision; right lower inner quadrant periareolar ecchymosis with subcutaneous firmness without distinct mass; slight nipple inversion; no drainage; no palpable axillary lymphadenopathy  -05/30/2024:  Ultrasound soft tissues of the head and neck (lymphadenopathy): Pathologic lymphadenopathy (4.5 x 3.7 x 3.2 cm) at the base of the neck on the right; multiple smaller morphologically abnormal cervical chain lymph nodes on the right which demonstrate heterogeneous echogenicity similar to the dominant mass. Findings highly suspicious for malignancy particularly in this patient with a known history of prior right breast cancer. Dominant mass lesion corresponds with palpable complaint. Recommend biopsy/tissue diagnosis.   -06/27/2024:  Right supraclavicular mass, core needle biopsy (firm 6 x 6 cm exophytic right supraclavicular mass): Metastatic carcinoma in fibroadipose tissue, moderately to poorly-differentiated, consistent with breast origin (metastatic breast carcinoma in fibroadipose tissue  -ER negative (0%); ND negative (0%); HER2 negative (0); Ki-67 high proliferation  (93.6%)    08/08/2024:   Pleasant, healthy-appearing lady who presents for initial medical oncology consultation.  In no acute discomfort.    Says that right supra clavicular mass started April 2024; it has been enlarging and fluctuating in size.  It is painful.  7/10 severity pain.  Also, pain was right breast area.  Right breast is enlarging.   There is a small area of ulceration in the right breast.  No bleeding or discharge.  She denies fevers or chills.  Has been taking Lyrica meloxicam without the relief of pain.  We will start Norco.  Some fatigue.  However, ECOG 1.  Pain from neck down to breast area, especially at night.  No unusual headaches, focal neurological symptoms, vision impairment, gait impairment, hemoptysis, fevers, chills, any bleeding or discharge from right breast superficial ulceration, abdominal pain, nausea, vomiting, GI bleeding, etc..  No bone pains.  Appetite is fair.    Interval History:  [No matching plan found]   OP BREAST PACLITAXEL Q3W     08/28/2024:   -tissue NGS testing (performed on right supraclavicular mass biopsy 06/27/2024):  HRD positive (CELINE-high); negative for AKT 1, BRAF, BRCA1, BRCA2, ESR 1, etc.  -08/09/2024: Liquid biopsy:  Borderline TMB  -08/08/2024: Hemoglobin 11.9; .6; rest of CBC unremarkable; calcium 10.6; albumin 3.9 (mild hypercalcemia); vitamin-D level normal; PTH level 100.3, elevated; FSH 79.87, postmenopausal; estradiol level <24; ionized level 5.03 (normal, on 08/09/2024); PTH related peptide normal on 08/09/2024  -08/14/2024: Echocardiogram:  LVEF 55-60%  -08/19/2024: MediPort placed  -08/20/2024:  Staging brain MRI with and without contrast: No brain metastases  -08/20/2024: Staging CTs C/A/P with IV contrast:   1. Irregular mixed cystic and solid right breast lesion with extensive bilateral breast skin thickening, low left axillary adenopathy, and a few enlarged mediastinal/hilar lymph nodes.   2. Changes of right axillary node dissection without definite pathologic adenopathy through the region.  3. Hypodense left hepatic mass raises concern for metastatic involvement.  No other definite findings suspicious for metastasis through the abdomen and pelvis.  4. Subcentimeter right lung nodules are indeterminate given absence of comparison images to establish chronicity.  Close attention on  surveillance imaging is needed.  5. Cholelithiasis without findings of acute cholecystitis or biliary obstruction.  6. Simple left upper renal cortex cyst.  7. Massively enlarged multifocal uterine leiomyoma.  (scattered noncalcified lung nodules; anterior inferior right upper lobe nodule 0.6 x 0.6 mm; superior right lower lobe nodule 0.7 x 0.7 cm; right lower lobe nodule 0.9 x 0.9 cm; inferior left hepatic lobe hypodense circumscribed lesion 2.2 x 2.8 cm; additional mm hypodense foci scattered throughout the liver, too small for more detailed characterization; scattered mediastinal lymph nodes, for example, pretracheal lymph node 1.2 cm, right hilar lymph node 1.2 cm, enlarged left axillary lymph nodes, representative left axillary lymph node 1.9 cm; scattered nonenlarged retroperitoneal lymph nodes; no pathologic abdominopelvic nicholas enlargement; extensive bilateral skin thickening of the breasts; right breast heterogeneous mixed cystic and solid mass with irregular/lobulated margins upper outer and lateral subareolar region 6.2 x 5.9 x 4.1 cm)  -08/20/2024: CT soft tissues of the neck with contrast: Large pathologic lymph node right supraclavicular fossa with central necrosis up to 5.4 cm; 3 additional subcentimeter pathologic appearing lymph nodes right level 3; indeterminate soft tissue nodule medial right breast 1.5 x 1.2 cm  -08/23/2024:  Whole-body nuclear medicine bone scan: No bone metastases  Presents for a follow-up visit.  We discussed all labs and scans in detail.  Some fatigue.  ECOG 1.  Minor headaches.  No unusual headaches, seizures, or focal neurological symptoms.  Some nausea.  Great appetite.  No chest pain, cough, or dyspnea.  No abdominal pain, nausea, vomiting.      Medications:  Current Outpatient Medications on File Prior to Visit   Medication Sig Dispense Refill    diclofenac sodium (VOLTAREN) 1 % Gel APPLY 4 GRAMS TOPICALLY TO AFFECTED AREA THREE TO FOUR TIMES A DAY AS NEEDED FOR PAIN       ergocalciferol (ERGOCALCIFEROL) 50,000 unit Cap Take 50,000 Units by mouth every 7 days.      ferrous gluconate (FERGON) 324 MG tablet TAKE 1 TABLET BY MOUTH ONCE DAILY FOR IRON      HYDROcodone-acetaminophen (NORCO) 5-325 mg per tablet Take 1 tablet by mouth every 4 (four) hours as needed for Pain. 84 tablet 0    meloxicam (MOBIC) 15 MG tablet TAKE 1 TABLET BY MOUTH ONCE DAILY (STOP IBUPROFEN)      methIMAzole (TAPAZOLE) 5 MG Tab Take 1 tablet (5 mg total) by mouth once daily. 30 tablet 11    pregabalin (LYRICA) 50 MG capsule Take 50 mg by mouth 3 (three) times daily.      valsartan-hydrochlorothiazide (DIOVAN-HCT) 160-12.5 mg per tablet Take 1 tablet by mouth.       No current facility-administered medications on file prior to visit.       Review of Systems:   All systems reviewed and found to be negative except for the symptoms detailed above    Physical Examination:   VITAL SIGNS:   Vitals:    08/28/24 1128   BP: 125/82   Pulse: 91   Resp: 18   Temp: 97.7 °F (36.5 °C)       GENERAL:  In no apparent distress.    HEAD:  No signs of head trauma.  EYES:  Pupils are equal.  Extraocular motions intact.    EARS:  Hearing grossly intact.  MOUTH:  Oropharynx is normal.   NECK:  No adenopathy, no JVD.     CHEST:  Chest with clear breath sounds bilaterally.  No wheezes, rales, rhonchi.    CARDIAC:  Regular rate and rhythm.  S1 and S2, without murmurs, gallops, rubs.  VASCULAR:  No Edema.  Peripheral pulses normal and equal in all extremities.  ABDOMEN:  Soft, without detectable tenderness.  No sign of distention.  No   rebound or guarding, and no masses palpated.   Bowel Sounds normal.  MUSCULOSKELETAL:  Good range of motion of all major joints. Extremities without clubbing, cyanosis or edema.    NEUROLOGIC EXAM:  Alert and oriented x 3.  No focal sensory or strength deficits.   Speech normal.  Follows commands.  PSYCHIATRIC:  Mood normal.  -08/08/2024: Breast examination was performed with a verbal consent, in the  presence of Jani Heck LPN; entire right breast is involved with tumor; entire right breast is indurated with tumor, with conglomeration of nodules around the central area; a small superficial ulceration is noted along the inferior medial aspect of right areolar area; a large slightly tender 6 supraclavicular masses noted; diffuse edema is noted in the right supraclavicular area and right breast area as well as infraclavicular area; left breast is normal    No results found for this or any previous visit.  No results found for this or any previous visit.    Assessment:  Problem List Items Addressed This Visit          ENT    Subcutaneous nodule of neck - Primary       Pulmonary    Lung nodules       Renal/    Postmenopausal    Hypercalcemia       Oncology    Recurrent breast cancer, right    Triple negative breast carcinoma    Neoplasm of right breast, regional lymph node staging category N3c: metastasis in ipsilateral supraclavicular lymph node    History of right breast cancer    History of lumpectomy of right breast    Breast cancer metastasized to axillary lymph node, left    Stage IV carcinoma of breast       Endocrine    Graves disease    High serum parathyroid hormone (PTH)       GI    Liver mass, right lobe       Other    LAD (lymphadenopathy) of right cervical region    Mediastinal lymphadenopathy    Cervical lymphadenopathy       History of right breast IDC, ER negative, [place] negative, HER2 positive::  -mammogram: 3.7 x 2.7 cm right breast mass  -pathology: Infiltrative ductal carcinoma, possibly invasive lobular  -ER negative, [place] negative, HER2 positive   -T2 N1 M0; palpable axillary lymphadenopathy; large palpable mass right breast)  -S/P neoadjuvant chemotherapy (according to her, she received 4 cycles of neoadjuvant chemotherapy with Adriamycin/Taxotere every 3 weeks apart x4 cycles)  -followed by lumpectomy and axillary dissection  -no residual tumor post chemotherapy; 17 axillary lymph nodes  negative for tumor  -S/P adjuvant radiotherapy, 6600 cGy/35 fractions, completed 10/24/2022  >>>  -screening mammogram 12/19/2022: BI-RADS: 2-benign  >>>  Regional, local, and metastatic recurrence, triple negative:  -echocardiogram 07/06/2023: LVEF 60%  -mammogram and ultrasound 11/27/2023:  Right breast mass 5.8 x 3 x 1.8 cm: BI-RADS: 5  -surgery consultation/follow-up 07/10/2024: Painless large supraclavicular mass, noted 4 months ago, steadily enlarging; progressive skin changes right breast not associated with lumpectomy incision, large exophytic right supraclavicular nodule/lymphadenopathy) nonmobile and fixed; overlying skin erythematous without ulceration), right lower inner quadrant periareolar ecchymosis and subcutaneous firmness without distinct mass; no palpable axillary lymphadenopathy  -ultrasound neck 05/30/2024:  Pathologic lymphadenopathy 4.5 x 3.7 x 3.2 cm at the base of the neck of the right; multiple smaller morphologically abnormal cervical chain lymph nodes on the right  -core needle biopsy right supraclavicular mass 06/27/2024:  Firm, 6 x 6 cm: Moderately to poorly-differentiated metastatic breast carcinoma  -ER negative (0%); [place] negative (0%); HER2 negative (0); Ki-67 high proliferation (93.6%)  -08/08/2024: Breast examination was performed with her a verbal consent, in the presence of Jani Heck LPN; entire right breast is involved with tumor; entire right breast is indurated with tumor, with conglomeration of nodules around the central area; a small superficial ulceration is noted along the inferior medial aspect of right areolar area; a large slightly tender 6 supraclavicular masses noted; diffuse edema is noted in the right supraclavicular area and right breast area as well as infraclavicular area; left breast is normal  -tissue NGS testing (performed on right supraclavicular mass biopsy 06/27/2024):  HRD positive (CELINE-high); negative for AKT 1, BRAF, BRCA1, BRCA2, ESR 1,  etc.  -08/09/2024: Liquid biopsy:  Borderline TMB  -08/08/2024:  Mild hypercalcemia: Calcium 10.6, albumin 3.9; intact PTH level 100.3, elevated; PTH related peptide normal; vitamin-D level normal  -08/08/2024:  Labs: Postmenopausal  Hypercalcemia-08/14/2024: Echocardiogram:  LVEF 55-60%  -08/19/2024: MediPort placed  -brain MRI 08/20/2024: No brain metastases  -CTs C/A/P 0 08/20/2024: Sites of disease:  6.2 cm right breast mass; left axillary lymphadenopathy; mediastinal and hilar lymphadenopathy; hepatic mass left liver lobe 2.8 cm; right lung nodules  -CT soft tissues of the neck 08/20/2024:  Right supraclavicular pathologic lymphadenopathy up to 5.4 cm; 3 additional subcentimeter pathologic lymph nodes right level 3; left breast nodule 1.5 cm  -bone scan 08/23/2024: No bone metastases      Sites of disease/recurrence:   Right supraclavicular lymphadenopathy; right breast mass, right cervical lymphadenopathy, left axillary lymphadenopathy, mediastinal and hilar lymphadenopathy, hepatic mass (metastases), left breast nodule      Molecular markers:  -ER negative (0%); TN negative (0%); HER2 negative (0); Ki-67 high proliferation (93.6%)  -tissue NGS testing (performed on right supraclavicular mass biopsy 06/27/2024):  HRD positive (CELINE-high); negative for AKT 1, BRAF, BRCA1, BRCA2, ESR 1, etc.  -08/09/2024: Liquid biopsy:  Borderline TMB      Graves' disease:   -diagnosed 07/2023  -07/11/2023: CT soft tissues of the neck with contrast) dysphagia, acute thyrotoxicosis, goiter): Mild diffuse enlargement of the thyroid gland without displacement or mass effect of the aerodigestive tract; no suspicious cervical lymphadenopathy  -07/12/2023: Ultrasound thyroid:  Hoarseness, thyrotoxicosis:  Heterogeneous hyperemic thyroid typical of thyroiditis; small 6 mm mid pole right thyroid nodule is not suspicious and does not warrant surveillance per TI-RADS criteria  -confirmed with TSH receptor antibodies (TRAb/TSI, i.e.,  thyroid-stimulating immunoglobulins)  -as of 05/21/2024, on methimazole 5 mg daily; has responded well  -endocrinologist: Roland Ross MD   -05/21/2024:  Endocrinologist plan:  Plan to complete 18 months of therapy before attempting to taper of methimazole         Plan:  Need genetic testing ASAP   Need FDG PET-CT asap   On recent biopsies, need PD-L1 testing ASAP  Refer to IR for biopsy of liver lesion ASAP  Today, check CBC, CMP, CEA level, CA 27.29 level, CA 15-3 level  Start chemotherapy with Taxol ASAP   Chemotherapy teaching with nursing staff ASAP  During chemotherapy, check CBC and CMP weekly  Follow-up with endocrinology for Graves disease  Follow-up with NP within a week for chemotherapy teaching   Follow-up with me in 1 month  ----------------------------------------      -according to her, she was diagnosed with right breast cancer in January 2022  -mammogram showed a large lobulated mass, 3.7 x 2.7 cm  -biopsy:  Infiltrative ductal carcinoma, possibly invasive lobular  -right breast mass was palpable in the upper outer quadrant; axillary lymphadenopathy was noted (3-4 palpable lymph nodes right axilla)  -T2 N1 M0 IDC right breast (she had palpable axillary lymph nodes in the right and a large palpable mass in the right breast)  -ER negative; MD negative; HER2 positive  -S/P neoadjuvant chemotherapy (according to her, she received 4 cycles of neoadjuvant chemotherapy with Adriamycin/Taxotere every 3 weeks apart x4 cycles)  -followed by lumpectomy and axillary dissection  -no residual tumor post chemotherapy; 17 axillary lymph nodes negative for tumor  -S/P adjuvant radiotherapy, 6600 cGy/35 fractions, completed 10/24/2022  >>>  Regional, local, and metastatic recurrence, triple negative:  -now, triple negative regional and local recurrence with 4.5 cm right supraclavicular lymph node and 5.8 cm right breast mass right lower inner quadrant with periareolar ecchymosis and subcutaneous firmness  without distinct mass on physical examination  -ER negative (0%); [place] negative (0%); HER2 negative (0); Ki-67 high proliferation (93.6%)  -tissue NGS testing (performed on right supraclavicular mass biopsy 06/27/2024):  HRD positive (CELINE-high); negative for AKT 1, BRAF, BRCA1, BRCA2, ESR 1, etc.  -08/09/2024: Liquid biopsy:  Borderline TMB  -08/08/2024:  Mild hypercalcemia: Calcium 10.6, albumin 3.9; intact PTH level 100.3, elevated; PTH related peptide normal; vitamin-D level normal  -08/08/2024:  Labs: Postmenopausal  Hypercalcemia  -08/14/2024: Echocardiogram:  LVEF 55-60%  -08/19/2024: MediPort placed  -brain MRI 08/20/2024: No brain metastases  -CTs C/A/P 0 08/20/2024: Sites of disease:  6.2 cm right breast mass; left axillary lymphadenopathy; mediastinal and hilar lymphadenopathy; hepatic mass left liver lobe 2.8 cm; right lung nodules  -CT soft tissues of the neck 08/20/2024:  Right supraclavicular pathologic lymphadenopathy up to 5.4 cm; 3 additional subcentimeter pathologic lymph nodes right level 3; left breast nodule 1.5 cm  -bone scan 08/23/2024: No bone metastases  >>>  -triple negative recurrent, metastatic disease   -awaiting genetic testing, FDG PET-CT, PD-L1 testing  -obviously, unresectable  -ER 0, [place] 0, HER2 0  -PD-L1 testing is pending   -genetic testing is pending  -extensive metastatic disease   -need to start palliative systemic therapy pending PD-L1 testing and pending genetic testing  -will start chemotherapy with Taxol 175 mg per m2 IV every 3 weeks, to continue until progression or until unacceptable toxicity  -patient has been previously treated with Adriamycin/Taxotere; therefore, we will avoid Adriamycin to the extent possible  -at some point, olaparib maybe considered because of HRD positive status  -re-stage with contrast-enhanced CT scans of C/A/P/soft tissues of the neck a couple of months after starting Taxol  -check baseline tumor markers including CEA, CA 27.29, and CA 15-3 level,  and follow every 3-4 weeks, if elevated  -we will refer to IR for biopsy of liver lesion (however, we will not wait for biopsy to start palliative systemic therapy)  -She understands that we need to start chemotherapy ASAP for extensive metastatic disease from an aggressive breast cancer recurrence, without waiting for PET-CT and biopsies which may take a few to several more weeks.    Chemotherapy:  -Taxol 175 mg per m2 every 3 weeks until disease progression or unacceptable toxicity    Side effects/monitoring with Taxol:  -watch for cardiovascular effects including infusion related hypotension, bradycardia, hypertension, etc.  -watch out for extravasation: Taxol is an irritant with vesicle like properties  -peripheral neuropathy:  Primarily distal sensory neuropathy; a mixture of paresthesias and dysesthesias including burning, numbness, tingling, and shooting pains, typically in a stocking-glove distribution; most mild-to-moderate cases resolve several months after discontinuation but maybe longer in patients with diabetes.  Severe neuropathy maybe irreversible in some patients  -side effects in> 10% patients:  Edema, hypotension, alopecia, nausea, vomiting, diarrhea, stomatitis, cytopenias, LFT abnormalities, peripheral neuropathy, arthralgias, myalgias, etc.  -Boxed warnings: Hypersensitivity reactions; bone marrow suppression  -premedicate with dexamethasone (20 mg orally at 12 and 6 hours prior to paclitaxel, diphenhydramine (50 mg IV 30 to 60 minutes prior to paclitaxel), and cimetidine or famotidine (IV 30 to 60 minutes prior to paclitaxel).  -do not repeat course until neutrophil count is =/> 1500 mm3 and the platelet count is =/> 100,000 mm3; reduced doses by 20% if patient experiences severe peripheral neuropathy or severe neutropenia <500 mm3 for a week or longer     Pending:    Genetic testing;   FDG PET-CT  -PD-L1 testing  -bilateral breast MRI    -08/08/2024: Refer to wound care for management of  breast wound   -08/08/2024:  Start Norco 5 mg every 4 hours PRN for pain    Plan:   1. If no metastases, and if recurrence deemed resectable, then:  Neoadjuvant chemotherapy; followed by mastectomy +/- axillary lymphadenectomy; followed by adjuvant radiotherapy to right supraclavicular lymph node; followed by adjuvant chemotherapy  2. If no metastases, and if recurrence deemed unresectable, then: Palliative systemic therapy  3. If metastatic disease, then:  Palliative systemic therapy    >>>  -08/28/2024: On imaging studies, found to have stage IV disease; therefore, we are going to treat her with palliative systemic therapy    History of Graves disease   -follow-up with endocrinology    Follow-up with me in 1 month     Above discussed at length with the patient.  All questions answered.    Discussed labs, scans, and pathology report, and gave her copies of relevant records.  Plan of management discussed in detail.    Explained the following: That based upon imaging studies, she has stage IV, incurable disease; palliation can be attempted with systemic therapy but response can not be guaranteed; prognosis guarded.  Potential side effects of Taxol discussed.  Gave her educational materials from [employer].  She understands that we need to start chemotherapy ASAP for extensive metastatic disease from an aggressive breast cancer recurrence, without waiting for PET-CT and biopsies which may take a few to several more weeks.  She understands and agrees with this plan.      Follow-up:  No follow-ups on file.

## 2024-08-29 ENCOUNTER — OFFICE VISIT (OUTPATIENT)
Dept: SURGERY | Facility: CLINIC | Age: 59
End: 2024-08-29
Attending: INTERNAL MEDICINE
Payer: MEDICAID

## 2024-08-29 VITALS
WEIGHT: 207 LBS | DIASTOLIC BLOOD PRESSURE: 82 MMHG | TEMPERATURE: 98 F | OXYGEN SATURATION: 99 % | HEIGHT: 65 IN | BODY MASS INDEX: 34.49 KG/M2 | HEART RATE: 98 BPM | SYSTOLIC BLOOD PRESSURE: 139 MMHG

## 2024-08-29 DIAGNOSIS — C50.919 TRIPLE NEGATIVE BREAST CARCINOMA: ICD-10-CM

## 2024-08-29 DIAGNOSIS — C77.0 NEOPLASM OF RIGHT BREAST, REGIONAL LYMPH NODE STAGING CATEGORY N3C: METASTASIS IN IPSILATERAL SUPRACLAVICULAR LYMPH NODE: ICD-10-CM

## 2024-08-29 DIAGNOSIS — C50.911 RECURRENT BREAST CANCER, RIGHT: ICD-10-CM

## 2024-08-29 DIAGNOSIS — Z17.1 TRIPLE NEGATIVE BREAST CARCINOMA: ICD-10-CM

## 2024-08-29 DIAGNOSIS — C50.911 NEOPLASM OF RIGHT BREAST, REGIONAL LYMPH NODE STAGING CATEGORY N3C: METASTASIS IN IPSILATERAL SUPRACLAVICULAR LYMPH NODE: ICD-10-CM

## 2024-08-29 LAB — CANCER AG15-3 SERPL IA-ACNC: 22 U/ML

## 2024-08-29 PROCEDURE — 3079F DIAST BP 80-89 MM HG: CPT | Mod: CPTII,,, | Performed by: SURGERY

## 2024-08-29 PROCEDURE — 3008F BODY MASS INDEX DOCD: CPT | Mod: CPTII,,, | Performed by: SURGERY

## 2024-08-29 PROCEDURE — 3075F SYST BP GE 130 - 139MM HG: CPT | Mod: CPTII,,, | Performed by: SURGERY

## 2024-08-29 PROCEDURE — 99214 OFFICE O/P EST MOD 30 MIN: CPT | Mod: S$PBB,,, | Performed by: SURGERY

## 2024-08-29 PROCEDURE — 99215 OFFICE O/P EST HI 40 MIN: CPT | Mod: PBBFAC

## 2024-08-29 PROCEDURE — 1159F MED LIST DOCD IN RCRD: CPT | Mod: CPTII,,, | Performed by: SURGERY

## 2024-08-29 NOTE — PROGRESS NOTES
[facility] BREAST SURGERY   Clinic Note       CC: Recurrent triple negative invasive carcinoma of the right breast       HPI: Eli Bangura is a 59 y.o. female with PMHx of Graves disease, HTN, right breast invasive ductal carcinoma (T2 N1 M0) s/p neoadjuvant chemotherapy, lumpectomy + axillary lymph dissection + adjuvant radiotherapy (2022) who presents to breast clinic today for local and regional recurrence of triple negative invasive carcinoma of the right breast. Initially seen to have enlarging supraclavicular nodule found to be metastatic carcinoma. Followed by Dr. Farah; stage IV carcinoma of the breast and Mediport placed 8/2024 and is now receiving chemotherapy.      Since her last clinic visit, patient states that both the right breast & supraclavicular node have been draining. She reports tolerable pain to both, but has noticed they appear with more ulceration that previously. Recommending wound care.      PMH:       Past Medical History:   Diagnosis Date    Anemia      Arthritis      Breast cancer      Graves disease      Hypertension      Hyperthyroidism           PSH:        Past Surgical History:   Procedure Laterality Date    BREAST LUMPECTOMY        INSERTION OF TUNNELED CENTRAL VENOUS CATHETER (CVC) WITH SUBCUTANEOUS PORT N/A 8/19/2024     Procedure: [id number];  Surgeon: Thomas Verdin Jr., MD;  Location: [facility];  Service: General;  Laterality: N/A;         Medications:  Medications Ordered Prior to Encounter          Current Outpatient Medications on File Prior to Visit   Medication Sig Dispense Refill    diclofenac sodium (VOLTAREN) 1 % Gel APPLY 4 GRAMS TOPICALLY TO AFFECTED AREA THREE TO FOUR TIMES A DAY AS NEEDED FOR PAIN        ergocalciferol (ERGOCALCIFEROL) 50,000 unit Cap Take 50,000 Units by mouth every 7 days.        ferrous gluconate (FERGON) 324 MG tablet TAKE 1 TABLET BY MOUTH ONCE DAILY FOR IRON        HYDROcodone-acetaminophen (NORCO) 5-325 mg per tablet Take 1  tablet by mouth every 4 (four) hours as needed for Pain. 84 tablet 0    meloxicam (MOBIC) 15 MG tablet TAKE 1 TABLET BY MOUTH ONCE DAILY (STOP IBUPROFEN)        methIMAzole (TAPAZOLE) 5 MG Tab Take 1 tablet (5 mg total) by mouth once daily. 30 tablet 11    pregabalin (LYRICA) 50 MG capsule Take 50 mg by mouth 3 (three) times daily.        valsartan-hydrochlorothiazide (DIOVAN-HCT) 160-12.5 mg per tablet Take 1 tablet by mouth.          No current facility-administered medications on file prior to visit.         Allergies:  Review of patient's allergies indicates:  No Known Allergies      Social Hx:  Tobacco Use History   Social History           Tobacco Use   Smoking Status Never    Passive exposure: Never   Smokeless Tobacco Never         Social History          Substance and Sexual Activity   Alcohol Use Never         Relevant Family Hx:  No family history on file.       Physical Exam:       Vitals:     08/29/24 1304   BP: 139/82   Pulse:     Temp:        Gen: NAD, AAOx3   Eye: EOMI   CV: RR  Pulm: NWOB on RA  Abd: soft, non-tender, non-distended  Breast: R breast with lateral ulceration        Ext:  Move all 4 extremities.          Interval Imaging:    CT SOFT TISSUE NECK WITH CONTRAST  Large pathologic lymph node in the right supraclavicular fossa demonstrating central necrosis and measuring up to 5.4 cm.  There are 3 additional subcentimeter pathologic appearing lymph nodes identified in the right level III.  Indeterminate soft tissue nodule of the medial right breast measuring 1.5 cm x 1.2 cm.  Recommend further evaluation with PET-CT.     CT CHEST ABDOMEN PELVIS WITH IV CONTRAST (XPD)  1. Irregular mixed cystic and solid right breast lesion with extensive bilateral breast skin thickening, low left axillary adenopathy, and a few enlarged mediastinal/hilar lymph nodes.  2. Changes of right axillary node dissection without definite pathologic adenopathy through the region.  3. Hypodense left hepatic mass raises  concern for metastatic involvement.  No other definite findings suspicious for metastasis through the abdomen and pelvis.  4. Subcentimeter right lung nodules are indeterminate given absence of comparison images to establish chronicity.  Close attention on surveillance imaging is needed.  5. Cholelithiasis without findings of acute cholecystitis or biliary obstruction.  6. Simple left upper renal cortex cyst.  7. Massively enlarged multifocal uterine leiomyoma.  8. Additional chronic secondary findings discussed above.         A/P: Eli Bangura is a 59 y.o. female with PMHx of Graves disease, HTN, invasive ductal carcinoma of the right breast s/p breast conservative therapy (2022), and now with triple negative disease recurrence with right supraclavicular lymphadenopathy; right breast mass, right cervical lymphadenopathy, left axillary lymphadenopathy, mediastinal and hilar lymphadenopathy, hepatic mass (metastases), and left breast nodule. Considering the extensiveness of the disease is, no surgical intervention recommended.       - Referral to wound care for breast and supraclavicular nodule ulceration  - Return to clinic PRN     Dave Flores, MS3     Patient seen and examined alongside medical student. Note reviewed and edited as appropriate. Agree with plan as written above.      Shea English MD   [facility] Surgery PGY-1

## 2024-08-29 NOTE — PROGRESS NOTES
I have reviewed the notes, assessments, and/or procedures performed by the resident, I concur with her/his documentation of Eli Bangura.     Shelby Diaz MD

## 2024-08-29 NOTE — PROGRESS NOTES
[facility] BREAST SURGERY   Clinic Note       CC: Recurrent triple negative invasive carcinoma of the right breast       HPI: Eli Bangura is a 59 y.o. female with PMHx of Graves disease, HTN, right breast invasive ductal carcinoma (T2 N1 M0) s/p neoadjuvant chemotherapy, lumpectomy + axillary lymph dissection + adjuvant radiotherapy (2022) who presents to breast clinic today for local and regional recurrence of triple negative invasive carcinoma of the right breast. Initially seen to have enlarging supraclavicular nodule found to be metastatic carcinoma. Followed by Dr. Farah; stage IV carcinoma of the breast and Mediport placed 8/2024 and is now receiving chemotherapy.     Since her last clinic visit, patient states that both the right breast & supraclavicular node have been draining. She reports tolerable pain to both, but has noticed they appear with more ulceration that previously. Recommending wound care.     PMH:  Past Medical History:   Diagnosis Date    Anemia     Arthritis     Breast cancer     Graves disease     Hypertension     Hyperthyroidism          PSH:  Past Surgical History:   Procedure Laterality Date    BREAST LUMPECTOMY      INSERTION OF TUNNELED CENTRAL VENOUS CATHETER (CVC) WITH SUBCUTANEOUS PORT N/A 8/19/2024    Procedure: [id number];  Surgeon: Thomas Verdin Jr., MD;  Location: [facility];  Service: General;  Laterality: N/A;         Medications:  Current Outpatient Medications on File Prior to Visit   Medication Sig Dispense Refill    diclofenac sodium (VOLTAREN) 1 % Gel APPLY 4 GRAMS TOPICALLY TO AFFECTED AREA THREE TO FOUR TIMES A DAY AS NEEDED FOR PAIN      ergocalciferol (ERGOCALCIFEROL) 50,000 unit Cap Take 50,000 Units by mouth every 7 days.      ferrous gluconate (FERGON) 324 MG tablet TAKE 1 TABLET BY MOUTH ONCE DAILY FOR IRON      HYDROcodone-acetaminophen (NORCO) 5-325 mg per tablet Take 1 tablet by mouth every 4 (four) hours as needed for Pain. 84 tablet 0     meloxicam (MOBIC) 15 MG tablet TAKE 1 TABLET BY MOUTH ONCE DAILY (STOP IBUPROFEN)      methIMAzole (TAPAZOLE) 5 MG Tab Take 1 tablet (5 mg total) by mouth once daily. 30 tablet 11    pregabalin (LYRICA) 50 MG capsule Take 50 mg by mouth 3 (three) times daily.      valsartan-hydrochlorothiazide (DIOVAN-HCT) 160-12.5 mg per tablet Take 1 tablet by mouth.       No current facility-administered medications on file prior to visit.      Allergies:  Review of patient's allergies indicates:  No Known Allergies      Social Hx:  Social History     Tobacco Use   Smoking Status Never    Passive exposure: Never   Smokeless Tobacco Never      Social History     Substance and Sexual Activity   Alcohol Use Never         Relevant Family Hx:  No family history on file.       Physical Exam:   Vitals:    08/29/24 1304   BP: 139/82   Pulse:    Temp:       Gen: NAD, AAOx3   Eye: EOMI   CV: RR  Pulm: NWOB on RA  Abd: soft, non-tender, non-distended  Breast: R breast with lateral ulceration       Ext:  Move all 4 extremities.         Interval Imaging:    CT SOFT TISSUE NECK WITH CONTRAST  Large pathologic lymph node in the right supraclavicular fossa demonstrating central necrosis and measuring up to 5.4 cm.  There are 3 additional subcentimeter pathologic appearing lymph nodes identified in the right level III.  Indeterminate soft tissue nodule of the medial right breast measuring 1.5 cm x 1.2 cm.  Recommend further evaluation with PET-CT.     CT CHEST ABDOMEN PELVIS WITH IV CONTRAST (XPD)  1. Irregular mixed cystic and solid right breast lesion with extensive bilateral breast skin thickening, low left axillary adenopathy, and a few enlarged mediastinal/hilar lymph nodes.  2. Changes of right axillary node dissection without definite pathologic adenopathy through the region.  3. Hypodense left hepatic mass raises concern for metastatic involvement.  No other definite findings suspicious for metastasis through the abdomen and pelvis.  4.  Subcentimeter right lung nodules are indeterminate given absence of comparison images to establish chronicity.  Close attention on surveillance imaging is needed.  5. Cholelithiasis without findings of acute cholecystitis or biliary obstruction.  6. Simple left upper renal cortex cyst.  7. Massively enlarged multifocal uterine leiomyoma.  8. Additional chronic secondary findings discussed above.        A/P: Eli Bangura is a 59 y.o. female with PMHx of Graves disease, HTN, invasive ductal carcinoma of the right breast s/p breast conservative therapy (2022), and now with triple negative disease recurrence with right supraclavicular lymphadenopathy; right breast mass, right cervical lymphadenopathy, left axillary lymphadenopathy, mediastinal and hilar lymphadenopathy, hepatic mass (metastases), and left breast nodule. Considering the extensiveness of the disease is, no surgical intervention recommended.       - Referral to wound care for breast and supraclavicular nodule ulceration  - Return to clinic GERMANIA Flores MS3    Patient seen and examined alongside medical student. Note reviewed and edited as appropriate. Agree with plan as written above.     Shea English MD   [facility] Surgery PGY-1

## 2024-08-30 LAB — CANCER AG27-29 SERPL-ACNC: 37.2 U/ML

## 2024-09-05 ENCOUNTER — OFFICE VISIT (OUTPATIENT)
Dept: HEMATOLOGY/ONCOLOGY | Facility: CLINIC | Age: 59
End: 2024-09-05
Payer: MEDICAID

## 2024-09-05 ENCOUNTER — HOSPITAL ENCOUNTER (OUTPATIENT)
Dept: WOUND CARE | Facility: HOSPITAL | Age: 59
Discharge: HOME | End: 2024-09-05
Attending: NURSE PRACTITIONER
Payer: MEDICAID

## 2024-09-05 VITALS
WEIGHT: 213 LBS | TEMPERATURE: 98 F | BODY MASS INDEX: 35.49 KG/M2 | HEIGHT: 65 IN | DIASTOLIC BLOOD PRESSURE: 81 MMHG | HEART RATE: 101 BPM | OXYGEN SATURATION: 100 % | SYSTOLIC BLOOD PRESSURE: 150 MMHG

## 2024-09-05 VITALS
TEMPERATURE: 99 F | DIASTOLIC BLOOD PRESSURE: 78 MMHG | RESPIRATION RATE: 20 BRPM | SYSTOLIC BLOOD PRESSURE: 127 MMHG | HEART RATE: 103 BPM | OXYGEN SATURATION: 98 %

## 2024-09-05 DIAGNOSIS — S21.009A BREAST WOUND, INITIAL ENCOUNTER: Primary | ICD-10-CM

## 2024-09-05 DIAGNOSIS — S21.101A OPEN CHEST WOUND, RIGHT, INITIAL ENCOUNTER: ICD-10-CM

## 2024-09-05 DIAGNOSIS — C50.911 RECURRENT BREAST CANCER, RIGHT: ICD-10-CM

## 2024-09-05 DIAGNOSIS — Z17.1 TRIPLE NEGATIVE BREAST CARCINOMA: ICD-10-CM

## 2024-09-05 DIAGNOSIS — C50.919 TRIPLE NEGATIVE BREAST CARCINOMA: ICD-10-CM

## 2024-09-05 DIAGNOSIS — C50.911 RECURRENT BREAST CANCER, RIGHT: Primary | ICD-10-CM

## 2024-09-05 PROCEDURE — 99211 OFF/OP EST MAY X REQ PHY/QHP: CPT

## 2024-09-05 PROCEDURE — 99215 OFFICE O/P EST HI 40 MIN: CPT | Mod: S$PBB,,,

## 2024-09-05 PROCEDURE — 3079F DIAST BP 80-89 MM HG: CPT | Mod: CPTII,,,

## 2024-09-05 PROCEDURE — 27000999 HC MEDICAL RECORD PHOTO DOCUMENTATION

## 2024-09-05 PROCEDURE — 99203 OFFICE O/P NEW LOW 30 MIN: CPT | Mod: ,,, | Performed by: NURSE PRACTITIONER

## 2024-09-05 PROCEDURE — 3008F BODY MASS INDEX DOCD: CPT | Mod: CPTII,,,

## 2024-09-05 PROCEDURE — 1159F MED LIST DOCD IN RCRD: CPT | Mod: CPTII,,,

## 2024-09-05 PROCEDURE — 99213 OFFICE O/P EST LOW 20 MIN: CPT | Mod: PBBFAC,27

## 2024-09-05 PROCEDURE — 3077F SYST BP >= 140 MM HG: CPT | Mod: CPTII,,,

## 2024-09-05 RX ORDER — ONDANSETRON 4 MG/1
8 TABLET, ORALLY DISINTEGRATING ORAL EVERY 8 HOURS PRN
Qty: 30 TABLET | Refills: 1 | Status: SHIPPED | OUTPATIENT
Start: 2024-09-05

## 2024-09-05 NOTE — PATIENT INSTRUCTIONS
Pt seen today by: Perri Estes NP    Self care DRESSING INSTRUCTIONS:        Wound location: Rt side of neck, Rt breast    Dressings to be changed daily and as needed if soiled or not intact.    Patient and/or family may be asked to assist on other days.      Wound to Rt side of neck: Cleanse wound with Dakins 0.25%, apply Xeroform to wound, cover with foam border dressing every other day and/or as needed if soiled or not intact.    Wound to Rt breast: Cleanse with Dakins 0.25%, apply Xeroform, abd pad secure with tape or may just apply bra over bandage, change dressing every other day and/or as needed if soiled or not intact.    Return visit: 1 week    Nutrition:  The current daily value (%DV) for protein is 50 grams per day and is meant as a general goal for most people. Further increasing your dietary protein intake is very important for wound healing. Typically one needs over 100g of protein per day to help with wound healing needs.  If you are a dialysis patient or have problems with your kidneys, talk to your Nephrologist about how much protein you can take in with your condition.  Examples of high protein items that can be added to your diet include: eggs, chicken, red meats, almonds, cottage cheese, Greek yogurt, beans, and peanut butter.  Fortified protein bars, shakes and drinks can add 15-30 additional grams of protein per serving.  Also add:   1 daily general multivitamin   Vitamin C : 500mg twice daily   Zinc 220 mg daily  Vit D : once daily    Contact [facility] wound care team at [phone] or go to the nearest Emergency department if:    You have a fever greater than 101 taken by mouth.  Your pain gets worse or does not go away, even after taking your regular pain medicine.  Your skin around your wound is red, hot, swollen, or draining pus.  You have bleeding that continues to come through the dressing after holding pressure for 10 minutes       Call our [facility] wound clinic for questions/concerns a [phone]  [phone] .

## 2024-09-05 NOTE — PROGRESS NOTES
THERAPY EDUCATION: PACLITAXEL     Chief Complaint: Therapy Education     Diagnosis:  Recurrent breast cancer, right     Current Treatment: Paclitaxel Q3W to start on 9/9/24    Interval History     9/5/24: Ms. Bangura presents to the clinic by herself for therapy education. Patient reports no major complaints at today's visit. Denies fever, chills, SOB, N/V/D, constipation, recent infection, chest pain or unexplained bleeding or bruising.     PLAN   -Mediport placement completed on 8/19/24. Lidocaine cream given with instructions to apply on port 30 minutes before treatment.   -Therapy education completed on 9/5/24  -Plans to start Paclitaxel Q3W on 9/9/24. Patient understood that she will receive premedications given 30 minute prior to each treatment to help prevent nausea.   -PET CT scheduled for 9/13/24.  -Zofran PRN prescribed for at home with instructions to be taken by mouth every 8 hours as needed for nausea. If not working, Compazine can be prescribed as 2nd choice.    -OTC Imodium AD recommended for diarrhea (4-5 BMs a day). Take 2 tablets after the first loose bowel movement, and 1 tablet after each loose bowel movement after the first dose has been taken. No more than 4 tablets should be taken in any 24-hour period. If not working, Lomotil can be prescribed as 2nd choice.    -Mouth sore prevention with 1-quart warm water with 1 tsp of baking soda and salt and alcohol-free mouthwash.   -Emphasized adequate hand-hygiene and limited contact with people who are sick.   -Monitor and notify any bleeding in urine, stool, or sputum.  As well as unusual bleeding or bruising and stomach pain.   - Emphasized hydration with 4 16 oz bottles of water a day.    -Importance of moisturizing with fragrance free lotion to prevent skin rash.  -Call clinic if fever >100.4, shakes or chills, shortness of breath, chest pain, uncontrolled vomiting or diarrhea, pain and tingling in the chest or arm, or just not feeling well.    -  RTC on 9/16/24 for same day labs and toxicity check with NP.    DISCUSSION:    1.  A total of 60 minutes were spent in counseling today, in which 100% were face-to-face.  At today's therapy teaching session, we discussed the patient's cancer diagnosis as well as planned therapy regimen, protocol, side effects and toxicities.  A handout of each therapeutic agent in the regimen was provided and reviewed in detail.    2.  The following side effects were discussed but not limited to:    Paclitaxel Side Effects:  Allergic Reactions  Breathing Problems  Bruising  Chills  Cough  Diarrhea  Feeling Faint  Fever  Hair Loss  Infection  Injury  Itching  Joint Pain  Low Blood Pressure  Mouth Sores  Nausea  Numbness  Pain  Rash  Swelling  Tingling  Vomiting                a.  Discussed the risk of infection while on therapy related to pancytopenia, specifically a decrease in their white blood cell count.  Instructed to contact our office for temperature >100.4 F, chills, sudden onset cough or shortness of breath, symptoms of a urinary tract infection.                b.  Discussed the risk of anemia. Instructed to contact our office for dizziness, heart palpitations, or extreme or sudden changes in weakness.                c.  Discussed the risk of thrombocytopenia, which increases the risk of bruising or bleeding.  Instructed the patient to contact our office for spontaneous signs of bleeding, including nose bleeds, bleeding from the gums or mouth, blood in sputum, urine or stool and unusual or excessive bruising or rash.                d.  Discussed GI side effects including weight changes, changes in appetite, altered sense of taste, stomatitis, nausea, vomiting, diarrhea, constipation, and heartburn.                e.  Discussed  side effects including painful urination, changes in the amount of urination, possible urine color changes.  Discussed fertility issues and to prevent  pregnancy if of child bearing age.                 f.  Discussed neurological side effects including the risk of peripheral neuropathy, either temporary or permanent.                g.  Discussed the potential for skin, hair, and nail changes.       3.  Instructed to contact our office for discussion of medication changes, the addition of vitamin and/or herbal supplementation as they may interact with some chemotherapy agents.    4.  Discussed dietary modifications and the need to maintain adequate caloric intake and proper oral hydration.  Recommended 64 ounces of fluid per day.    5.  Discussed anti-emetic protocol and bowel regimen protocol.    6.  Office contact information given including after hours number.  Discussed there is an oncologist on call 24/7, 365 days including weekends.  Provided primary nurse's information .    7.  In summary, the patient is in agreement with the plan of care.  Questions appeared to be answered to their satisfaction. Consented the patient to the treatment plan and the patient was educated on the planned duration of the treatment and schedule of the treatment administration. Copy to be scanned into the chart.    All questions answered to the satisfaction of the patient and family.     Follow up appointments given to patient.     HOMAR LE  PATIENT EDUCATOR  [facility]

## 2024-09-06 PROBLEM — S21.101A OPEN CHEST WOUND, RIGHT, INITIAL ENCOUNTER: Status: ACTIVE | Noted: 2024-09-06

## 2024-09-06 PROBLEM — S21.009A BREAST WOUND, INITIAL ENCOUNTER: Status: ACTIVE | Noted: 2024-09-06

## 2024-09-06 NOTE — PROGRESS NOTES
[facility]   Outpatient Wound Care     Subjective:   Patient ID: Eli Bangura is a 59 y.o. female.    Chief Complaint: Wound Care      History of Present Illness:   59 y.o. Black or  female presents to wound care clinic today for right upper chest and right breast fungating tumors.  Babar was cancer free for breast cancer for 22 years.  Under the care of oncology clinic.  Followed by Lambert Mantilla APRN for PCP.     Today's visit 9/5/24:  Presents to clinic alone.  Right breast fungating tumor red granulating wound bed with slough noted moderate serosanguineous drainage.  Right supplies given wound red granulating wound bed with slough monitor serosanguineous drainage.  Discussion with the patient today will assist her with keeping fungating tumors clean and assist with smell by using Dakin's solution.  Instructed once started chemotherapy areas should decrease in size but may experienced more drainage.  Will have her just cleansed area with Dakin's solution, apply Xeroform gauze to both areas right subclavian cover foam border dressing and right breast ABD pad secure with bra.  May perform every-other-day. Instructions and supplies given.  All wound care performed per nursing staff at bedside.  Will follow up with a clinic in 10 days.  Instructed to call the office with any questions, concerns, or new skin issues.  Verbalized understanding of all instructions.      History includes:      Past Medical History:   Diagnosis Date    Anemia     Arthritis     Breast cancer     Graves disease     Hypertension     Hyperthyroidism       Past Surgical History:   Procedure Laterality Date    BREAST LUMPECTOMY      INSERTION OF TUNNELED CENTRAL VENOUS CATHETER (CVC) WITH SUBCUTANEOUS PORT N/A 8/19/2024    Procedure: [id number];  Surgeon:  Thomas Verdin Jr., MD;  Location: [facility];  Service: General;  Laterality: N/A;      Social History     Socioeconomic History    Marital status: Single   Tobacco Use    Smoking status: Never     Passive exposure: Never    Smokeless tobacco: Never   Substance and Sexual Activity    Alcohol use: Never    Drug use: Never     Social Determinants of Health     Financial Resource Strain: Patient Declined (7/11/2023)    [facility]    Overall Financial Resource Strain (CARDIA)     Difficulty of Paying Living Expenses: Patient declined   Food Insecurity: Patient Declined (7/11/2023)    [facility], [facility] and Affiliates    Hunger Vital Sign     Worried About Running Out of Food in the Last Year: Patient declined     Ran Out of Food in the Last Year: Patient declined   Transportation Needs: Patient Declined (7/11/2023)    [facility], [facility] Subsidiaries and Affiliates    PRAPARE - Transportation     Lack of Transportation (Medical): Patient declined     Lack of Transportation (Non-Medical): Patient declined   Stress: Patient Declined (7/11/2023)    Received from [facility], [facility] Its Subsidiaries and Affiliates    [employer] - Occupational Stress Questionnaire     Feeling of Stress : Patient declined   Housing Stability: Unknown (7/11/2023)    [facility], [facility] and Affiliates    Housing Stability Vital Sign     Unable to Pay for Housing in the Last Year: Patient refused     Number of Places Lived in the Last Year: 1   .      Current Outpatient Medications   Medication Sig Dispense Refill    diclofenac sodium (VOLTAREN) 1 % Gel APPLY 4 GRAMS TOPICALLY TO AFFECTED AREA THREE TO FOUR TIMES A DAY AS NEEDED FOR PAIN      ergocalciferol (ERGOCALCIFEROL) 50,000 unit Cap Take 50,000 Units by mouth every 7 days.      ferrous gluconate (FERGON) 324 MG tablet TAKE 1 TABLET BY MOUTH ONCE DAILY FOR IRON      HYDROcodone-acetaminophen (NORCO) 5-325 mg per tablet Take 1 tablet by mouth every 4 (four) hours as needed for Pain. 84 tablet 0    meloxicam (MOBIC) 15 MG tablet TAKE 1 TABLET BY MOUTH ONCE DAILY (STOP IBUPROFEN)      methIMAzole (TAPAZOLE) 5 MG Tab Take 1 tablet (5 mg total) by mouth once daily. 30 tablet 11    pregabalin (LYRICA) 50 MG capsule Take 50 mg by mouth 3 (three) times daily.      valsartan-hydrochlorothiazide (DIOVAN-HCT) 160-12.5 mg per tablet Take 1 tablet by mouth.      ondansetron (ZOFRAN-ODT) 4 MG TbDL Take 2 tablets (8 mg total) by mouth every 8 (eight) hours as needed (nausea). 30 tablet 1     No current facility-administered medications for this encounter.       Review of Systems   Skin:  Positive for wound.   All other systems reviewed and are negative.         Labs Reviewed:   Chemistry:  Lab Results   Component Value Date    BUN 17.3 08/28/2024    BUN 17.7 08/08/2024    CREATININE 0.84 08/28/2024    CREATININE 0.83 08/08/2024    EGFRNORACEVR >60 08/28/2024    EGFRNORACEVR >60 08/08/2024    GLUCOSE 100 08/28/2024    AST 22 08/28/2024    AST 24 08/08/2024    ALT 17 08/28/2024    ALT 20 08/08/2024    HGBA1C 5.0 09/26/2023        Hematology:  Lab Results   Component Value Date    WBC 4.61 08/28/2024    WBC 4.84 08/08/2024    HGB 12.3 08/28/2024    HGB 11.9  "(L) 08/08/2024    HCT 38.0 08/28/2024    HCT 35.3 (L) 08/08/2024     08/28/2024     08/08/2024       Inflammatory Markers:  No results found for: "HSCRP", "SEDRATE"     Objective:        Physical Exam  Vitals reviewed.   Skin:     General: Skin is warm and dry.      Capillary Refill: Capillary refill takes less than 2 seconds.      Findings: Wound present.             Comments: Right breast and subclavian open wounds (fungating tumors)   Neurological:      Mental Status: She is alert.            Wound 09/05/24 1427 Other (comment) Right medial Breast (Active)   09/05/24 1427   Present on Original Admission: Y   Primary Wound Type: Other   Side: Right   Orientation: medial   Location: Breast   Wound Approximate Age at First Assessment (Weeks):    Wound Number:    Is this injury device related?:    Incision Type:    Closure Method:    Wound Description (Comments):    Type:    Additional Comments:    Ankle-Brachial Index:    Pulses:    Removal Indication and Assessment:    Wound Outcome:    Wound Image   09/05/24 1426   Dressing Appearance Moist drainage 09/05/24 1426   Drainage Amount Moderate 09/05/24 1426   Drainage Characteristics/Odor Serosanguineous 09/05/24 1426   Appearance Red;Yellow 09/05/24 1426   Tissue loss description Full thickness 09/05/24 1426   Wound Length (cm) 5 cm 09/05/24 1426   Wound Width (cm) 5 cm 09/05/24 1426   Wound Depth (cm) 0.5 cm 09/05/24 1426   Wound Volume (cm^3) 12.5 cm^3 09/05/24 1426   Wound Surface Area (cm^2) 25 cm^2 09/05/24 1426            Wound 09/05/24 1439 Other (comment) Right other (see comments) (Active)   09/05/24 1439   Present on Original Admission: Y   Primary Wound Type: Other   Side: Right   Orientation:    Location: other (see comments)   Wound Approximate Age at First Assessment (Weeks):    Wound Number:    Is this injury device related?:    Incision Type:    Closure Method:    Wound Description (Comments):    Type:    Additional Comments:  "   Ankle-Brachial Index:    Pulses:    Removal Indication and Assessment:    Wound Outcome:    Wound Image   09/05/24 1438   Dressing Appearance Moist drainage 09/05/24 1438   Drainage Amount Moderate 09/05/24 1438   Drainage Characteristics/Odor Serosanguineous 09/05/24 1438   Appearance Red;Pink;Yellow 09/05/24 1438   Tissue loss description Full thickness 09/05/24 1438   Wound Length (cm) 3.5 cm 09/05/24 1438   Wound Width (cm) 1 cm 09/05/24 1438   Wound Depth (cm) 0.5 cm 09/05/24 1438   Wound Volume (cm^3) 1.75 cm^3 09/05/24 1438   Wound Surface Area (cm^2) 3.5 cm^2 09/05/24 1438         Assessment:         ICD-10-CM ICD-9-CM   1. Breast wound, initial encounter  S21.009A 879.0   2. Open chest wound, right, initial encounter  S21.101A 875.0   3. Recurrent breast cancer, right  C50.911 174.9   4. Triple negative breast carcinoma  C50.919 174.9    Z17.1 V86.1         Plan:   Tissue pathology and/or culture taken:  [] Yes [x] No   Sharp debridement performed:   [] Yes [x] No   Labs ordered this visit:   [] Yes [x] No   Imaging ordered this visit:   [] Yes [x] No         1. Breast wound, initial encounter     Will cleanse with Dakin's every-other-day apply Xeroform gauze, ABD pad and secure with bra.     Monitor for any signs of infection: Watch for increased drainage or pain, fevers, chills, swelling and report this to clinic.   2. Open chest wound, right, initial encounter     Will cleanse with Dakin's every-other-day apply, and cover with foam border dressing.    Monitor for any signs of infection: Watch for increased drainage or pain, fevers, chills, swelling and report this to clinic.   3. Recurrent breast cancer, right     Under the care of  oncology.     Resulting in open wound to right breast.   4. Triple negative breast carcinoma     Under the care of oncology.     Resulting in open wound to right.          The time spent including preparing to see the patient, obtaining patient history and assessment,  evaluation of the plan of care, patient/caregiver counseling and education, orders, documentation, coordination of care, and other professional medical management activities for today's encounter was 25 minute.    Time spent performing procedures during today's encounter was 20 minute.    Follow up in about 11 days (around 9/16/2024). Teaching provided on s/s to call wound clinic for promptly.  ER precautions taught for after hours and weekends.       LEONID Perez

## 2024-09-09 ENCOUNTER — CLINICAL SUPPORT (OUTPATIENT)
Dept: HEMATOLOGY/ONCOLOGY | Facility: CLINIC | Age: 59
End: 2024-09-09
Payer: MEDICAID

## 2024-09-09 ENCOUNTER — LAB VISIT (OUTPATIENT)
Dept: HEMATOLOGY/ONCOLOGY | Facility: CLINIC | Age: 59
End: 2024-09-09
Payer: MEDICAID

## 2024-09-09 ENCOUNTER — DOCUMENTATION ONLY (OUTPATIENT)
Dept: HEMATOLOGY/ONCOLOGY | Facility: CLINIC | Age: 59
End: 2024-09-09
Payer: MEDICAID

## 2024-09-09 ENCOUNTER — INFUSION (OUTPATIENT)
Dept: INFUSION THERAPY | Facility: HOSPITAL | Age: 59
End: 2024-09-09
Attending: INTERNAL MEDICINE
Payer: MEDICAID

## 2024-09-09 VITALS
SYSTOLIC BLOOD PRESSURE: 134 MMHG | TEMPERATURE: 98 F | WEIGHT: 212.06 LBS | DIASTOLIC BLOOD PRESSURE: 80 MMHG | RESPIRATION RATE: 20 BRPM | HEART RATE: 83 BPM | HEIGHT: 65 IN | OXYGEN SATURATION: 100 % | BODY MASS INDEX: 35.33 KG/M2

## 2024-09-09 DIAGNOSIS — C50.911 RECURRENT BREAST CANCER, RIGHT: ICD-10-CM

## 2024-09-09 DIAGNOSIS — Z17.1 TRIPLE NEGATIVE BREAST CARCINOMA: ICD-10-CM

## 2024-09-09 DIAGNOSIS — C50.919 TRIPLE NEGATIVE BREAST CARCINOMA: ICD-10-CM

## 2024-09-09 DIAGNOSIS — E87.6 HYPOKALEMIA: Primary | ICD-10-CM

## 2024-09-09 DIAGNOSIS — C77.3 BREAST CANCER METASTASIZED TO AXILLARY LYMPH NODE, LEFT: ICD-10-CM

## 2024-09-09 DIAGNOSIS — C50.912 BREAST CANCER METASTASIZED TO AXILLARY LYMPH NODE, LEFT: ICD-10-CM

## 2024-09-09 DIAGNOSIS — C50.911 RECURRENT BREAST CANCER, RIGHT: Primary | ICD-10-CM

## 2024-09-09 DIAGNOSIS — R59.0 LAD (LYMPHADENOPATHY) OF RIGHT CERVICAL REGION: ICD-10-CM

## 2024-09-09 DIAGNOSIS — C50.911 CARCINOMA OF RIGHT BREAST, STAGE 4: Primary | ICD-10-CM

## 2024-09-09 LAB
ALBUMIN SERPL-MCNC: 3.4 G/DL (ref 3.5–5)
ALBUMIN/GLOB SERPL: 0.9 RATIO (ref 1.1–2)
ALP SERPL-CCNC: 128 UNIT/L (ref 40–150)
ALT SERPL-CCNC: 15 UNIT/L (ref 0–55)
ANION GAP SERPL CALC-SCNC: 12 MEQ/L
AST SERPL-CCNC: 20 UNIT/L (ref 5–34)
BASOPHILS # BLD AUTO: 0.02 X10(3)/MCL
BASOPHILS NFR BLD AUTO: 0.4 %
BILIRUB SERPL-MCNC: 0.7 MG/DL
BUN SERPL-MCNC: 15.8 MG/DL (ref 9.8–20.1)
CALCIUM SERPL-MCNC: 9.7 MG/DL (ref 8.4–10.2)
CHLORIDE SERPL-SCNC: 104 MMOL/L (ref 98–107)
CO2 SERPL-SCNC: 23 MMOL/L (ref 22–29)
CREAT SERPL-MCNC: 0.86 MG/DL (ref 0.55–1.02)
CREAT/UREA NIT SERPL: 18
EOSINOPHIL # BLD AUTO: 0.04 X10(3)/MCL (ref 0–0.9)
EOSINOPHIL NFR BLD AUTO: 0.8 %
ERYTHROCYTE [DISTWIDTH] IN BLOOD BY AUTOMATED COUNT: 12 % (ref 11.5–17)
GFR SERPLBLD CREATININE-BSD FMLA CKD-EPI: >60 ML/MIN/1.73/M2
GLOBULIN SER-MCNC: 4 GM/DL (ref 2.4–3.5)
GLUCOSE SERPL-MCNC: 105 MG/DL (ref 74–100)
HCT VFR BLD AUTO: 31.8 % (ref 37–47)
HGB BLD-MCNC: 10.8 G/DL (ref 12–16)
IMM GRANULOCYTES # BLD AUTO: 0.01 X10(3)/MCL (ref 0–0.04)
IMM GRANULOCYTES NFR BLD AUTO: 0.2 %
LYMPHOCYTES # BLD AUTO: 1.04 X10(3)/MCL (ref 0.6–4.6)
LYMPHOCYTES NFR BLD AUTO: 20.5 %
MCH RBC QN AUTO: 33.3 PG (ref 27–31)
MCHC RBC AUTO-ENTMCNC: 34 G/DL (ref 33–36)
MCV RBC AUTO: 98.1 FL (ref 80–94)
MONOCYTES # BLD AUTO: 0.49 X10(3)/MCL (ref 0.1–1.3)
MONOCYTES NFR BLD AUTO: 9.6 %
NEUTROPHILS # BLD AUTO: 3.48 X10(3)/MCL (ref 2.1–9.2)
NEUTROPHILS NFR BLD AUTO: 68.5 %
NRBC BLD AUTO-RTO: 0 %
PLATELET # BLD AUTO: 207 X10(3)/MCL (ref 130–400)
PMV BLD AUTO: 9.8 FL (ref 7.4–10.4)
POTASSIUM SERPL-SCNC: 3 MMOL/L (ref 3.5–5.1)
PROT SERPL-MCNC: 7.4 GM/DL (ref 6.4–8.3)
RBC # BLD AUTO: 3.24 X10(6)/MCL (ref 4.2–5.4)
SODIUM SERPL-SCNC: 139 MMOL/L (ref 136–145)
WBC # BLD AUTO: 5.08 X10(3)/MCL (ref 4.5–11.5)

## 2024-09-09 PROCEDURE — 36415 COLL VENOUS BLD VENIPUNCTURE: CPT

## 2024-09-09 PROCEDURE — 80053 COMPREHEN METABOLIC PANEL: CPT

## 2024-09-09 PROCEDURE — 25000003 PHARM REV CODE 250: Performed by: INTERNAL MEDICINE

## 2024-09-09 PROCEDURE — 96375 TX/PRO/DX INJ NEW DRUG ADDON: CPT

## 2024-09-09 PROCEDURE — 96415 CHEMO IV INFUSION ADDL HR: CPT

## 2024-09-09 PROCEDURE — 96413 CHEMO IV INFUSION 1 HR: CPT

## 2024-09-09 PROCEDURE — 96367 TX/PROPH/DG ADDL SEQ IV INF: CPT

## 2024-09-09 PROCEDURE — 85025 COMPLETE CBC W/AUTO DIFF WBC: CPT

## 2024-09-09 PROCEDURE — 63600175 PHARM REV CODE 636 W HCPCS: Performed by: INTERNAL MEDICINE

## 2024-09-09 RX ORDER — POTASSIUM CHLORIDE 20 MEQ/1
40 TABLET, EXTENDED RELEASE ORAL DAILY
Qty: 28 TABLET | Refills: 0 | Status: SHIPPED | OUTPATIENT
Start: 2024-09-09 | End: 2024-09-23

## 2024-09-09 RX ORDER — SODIUM CHLORIDE 0.9 % (FLUSH) 0.9 %
10 SYRINGE (ML) INJECTION
Status: DISCONTINUED | OUTPATIENT
Start: 2024-09-09 | End: 2024-09-09 | Stop reason: HOSPADM

## 2024-09-09 RX ORDER — DEXAMETHASONE 4 MG/1
TABLET ORAL
Qty: 40 TABLET | Refills: 1 | Status: SHIPPED | OUTPATIENT
Start: 2024-09-09

## 2024-09-09 RX ORDER — DIPHENHYDRAMINE HYDROCHLORIDE 50 MG/ML
50 INJECTION INTRAMUSCULAR; INTRAVENOUS
Status: COMPLETED | OUTPATIENT
Start: 2024-09-09 | End: 2024-09-09

## 2024-09-09 RX ORDER — DIPHENHYDRAMINE HYDROCHLORIDE 50 MG/ML
50 INJECTION INTRAMUSCULAR; INTRAVENOUS ONCE AS NEEDED
Status: DISCONTINUED | OUTPATIENT
Start: 2024-09-09 | End: 2024-09-09 | Stop reason: HOSPADM

## 2024-09-09 RX ORDER — FAMOTIDINE 10 MG/ML
20 INJECTION INTRAVENOUS
Status: COMPLETED | OUTPATIENT
Start: 2024-09-09 | End: 2024-09-09

## 2024-09-09 RX ORDER — EPINEPHRINE 0.3 MG/.3ML
0.3 INJECTION SUBCUTANEOUS ONCE AS NEEDED
Status: DISCONTINUED | OUTPATIENT
Start: 2024-09-09 | End: 2024-09-09 | Stop reason: HOSPADM

## 2024-09-09 RX ORDER — HEPARIN 100 UNIT/ML
500 SYRINGE INTRAVENOUS
Status: DISCONTINUED | OUTPATIENT
Start: 2024-09-09 | End: 2024-09-09 | Stop reason: HOSPADM

## 2024-09-09 RX ADMIN — SODIUM CHLORIDE: 9 INJECTION, SOLUTION INTRAVENOUS at 08:09

## 2024-09-09 RX ADMIN — SODIUM CHLORIDE 366 MG: 9 INJECTION, SOLUTION INTRAVENOUS at 09:09

## 2024-09-09 RX ADMIN — HEPARIN 500 UNITS: 100 SYRINGE at 01:09

## 2024-09-09 RX ADMIN — DEXAMETHASONE SODIUM PHOSPHATE 20 MG: 4 INJECTION, SOLUTION INTRA-ARTICULAR; INTRALESIONAL; INTRAMUSCULAR; INTRAVENOUS; SOFT TISSUE at 08:09

## 2024-09-09 RX ADMIN — FAMOTIDINE 20 MG: 10 INJECTION, SOLUTION INTRAVENOUS at 08:09

## 2024-09-09 RX ADMIN — DIPHENHYDRAMINE HYDROCHLORIDE 50 MG: 50 INJECTION INTRAMUSCULAR; INTRAVENOUS at 08:09

## 2024-09-09 NOTE — PROGRESS NOTES
Home premedication to be taking as directed before each chemotherapy treatment    Dexamethasone 20 mg (5 tablets) p.o. 12 hours before each chemotherapy treatment, then repeat 6 hours before treatment-Rx sent to pharmacy

## 2024-09-09 NOTE — NURSING
"ADELA Franco met with patient for resource information in Infusion Clinic.Reviewed RN Salvador contact information and role in patient's care. Reports he/she has resources of Medicaid benefits and receives SSDI benefits. Social support reported as very good. Provided information on in-house and community support/counseling services. Resource folder with written information of community and cancer resources, disability benefits, nutritional hints during cancer treatment, and chemo side effect guide given to patient. Spent additional time on signs of infection, infection prevention through good hand hygiene and wearing mask, adequate nutrition/hydration, skin care along with sunscreen, and oral care. Discussed communication process for some common scenarios in which patient should call provider for guidance vs. immediately report to the emergency room. Informed of [facility] and [employer] referral that she may need to utilize during the course of her treatment. Patient verbalized understanding and agreed to be referred. Patient agrees to contact ADELA Franco if any needs or concerns arise.          Oncology Navigation   Intake  Cancer Type: Breast  Appointment Date: 08/08/24  Start of Treatment: 09/09/24     Treatment  Current Status: Active       Medical Oncologist: Israel Farah MD  Consult Date: 08/08/24  Chemotherapy: Initiated  Chemotherapy Regimen: Taxol 175 mg per m2 IV every 3 weeks, to continue until progression or until unacceptable toxicity       Procedures: Bone scan; CT; MRI; PET scan  Bone Scan Schedule Date: 08/23/24  CT Schedule Date: 08/23/24  MRI Schedule Date: 08/20/24  PET Scan Schedule Date: 08/22/24    General Referrals: [employer]; [facility]    ER: Negative  [place]: Negative  Her2: Negative       Support Systems: Parent; Family members; Friends / neighbors  Barriers of Care: Barriers to Care "Assessment completed-no barriers noted"     Acuity  Stage: 2  Systemic " Treatment - predicted or initiated: Chemotherapy Regimen with Multiple drugs (+1)  Treatment Tolerability: Minimal symptoms  ECO  Comorbidities in Medical History: 1  Hospitalization Within the Past Month: 0   Needed: 0  Support: 0  Verbalizes Financial Concerns: 0  Transportation: 0  Mental Health: PHQ Score: 0  History of noncompliance/frequent no shows and cancellations: 0  Verbalizes the need for more education: 0  Navigation Acuity: 2     Follow Up  No follow-ups on file.

## 2024-09-09 NOTE — NURSING
Infusion Note:  Pt has an appointment today for: Labs and Infusion    Treatment Regimen: Paclitaxel   Cycle: 1 Day: 1 every Q3 weeks;      Given via Left Mediport    UPT done: Not done  UPT exception: Age >55 y.o.    Treatment parameters: were met    Nursing note:  Pt ambulated to Infusion Clinic for port access & lab draw, blood sent to lab, pt's mom escorted to pt's room; pt without complaints, noted dressings to right neck (lymph node) and right breast, both dry & intact.    Future appts scheduled: Labs and Renetta JAQUEZ 9/16/24, 9/30/24 labs, MD & C2 Taxol.

## 2024-09-09 NOTE — PROGRESS NOTES
"                                                                                                                  Initial Nutrition Assessment    Eli Bangura is a 59 y.o. female.    DATE: 09/09/2024     Referral from: Oncology    Diagnosis: Recurrent Breast Cancer    PAST MEDICAL HISTORY  Past Medical History:   Diagnosis Date    Anemia     Arthritis     Breast cancer     Graves disease     Hypertension     Hyperthyroidism      Past Surgical History:   Procedure Laterality Date    BREAST LUMPECTOMY      INSERTION OF TUNNELED CENTRAL VENOUS CATHETER (CVC) WITH SUBCUTANEOUS PORT N/A 8/19/2024    Procedure: [id number];  Surgeon: Thomas Verdin Jr., MD;  Location: [facility];  Service: General;  Laterality: N/A;     No family history on file.  Social History     Tobacco Use    Smoking status: Never     Passive exposure: Never    Smokeless tobacco: Never   Substance and Sexual Activity    Alcohol use: Never    Drug use: Never    Sexual activity: Not on file       TREATMENT PLAN:  Chemo: [x] Yes, OP BREAST PACLITAXEL Q3W       [] No  Radiation: [] Yes      [x] No    Review of patient's allergies indicates:  No Known Allergies    ANTHROPOMETRICS:  Height:  65"  Weight:   Wt Readings from Last 10 Encounters:   09/09/24 96.2 kg (212 lb 1.3 oz)   09/05/24 96.6 kg (213 lb)   08/29/24 93.9 kg (207 lb)   08/28/24 95.5 kg (210 lb 9.6 oz)   08/19/24 94.2 kg (207 lb 9.6 oz)   08/14/24 95.7 kg (211 lb)   08/13/24 95.7 kg (211 lb)   08/09/24 93.9 kg (207 lb 0.2 oz)   08/08/24 93.7 kg (206 lb 9.6 oz)   06/27/24 93.1 kg (205 lb 4 oz)     Weight Changes: has increased 27 pounds over last year  BMI: 35.29 (overweight)   lb    INTAKE:  Current Diet: regular diet  Diet Texture: Regular  % PO consumed at meals: %  Dietary Patterns: Patient eats []0  []1  []2  []3  [x] 4 meals/day  Skips [x]0  []1  []2  []3 meals  Cooks [] some meals  [x] most meals  Eats out [] Frequently  [x] occasionally  Drinks Mostly " water    Current ONS Intake: []  Yes  [x] No    Enteral Nutrition     Patient not receiving enteral nutrition at this time.    Food Security  Is patient able to sufficiently able to prepare meals at home? [x] Yes  [] No []N/A  If no, does patient have help cooking, preparing, and serving meals at home? [] Yes  [] No [x] N/A    SYMPTOMS/COMPLAINTS:  none identified    Current Medications:   Current Outpatient Medications:     dexAMETHasone (DECADRON) 4 MG Tab, Take 20mg (5 tablets) by mouth 12 hours and then again at 6 hours prior to each chemotherapy treatment, Disp: 40 tablet, Rfl: 1    diclofenac sodium (VOLTAREN) 1 % Gel, APPLY 4 GRAMS TOPICALLY TO AFFECTED AREA THREE TO FOUR TIMES A DAY AS NEEDED FOR PAIN, Disp: , Rfl:     ergocalciferol (ERGOCALCIFEROL) 50,000 unit Cap, Take 50,000 Units by mouth every 7 days., Disp: , Rfl:     ferrous gluconate (FERGON) 324 MG tablet, TAKE 1 TABLET BY MOUTH ONCE DAILY FOR IRON, Disp: , Rfl:     HYDROcodone-acetaminophen (NORCO) 5-325 mg per tablet, Take 1 tablet by mouth every 4 (four) hours as needed for Pain., Disp: 84 tablet, Rfl: 0    meloxicam (MOBIC) 15 MG tablet, TAKE 1 TABLET BY MOUTH ONCE DAILY (STOP IBUPROFEN), Disp: , Rfl:     methIMAzole (TAPAZOLE) 5 MG Tab, Take 1 tablet (5 mg total) by mouth once daily., Disp: 30 tablet, Rfl: 11    ondansetron (ZOFRAN-ODT) 4 MG TbDL, Take 2 tablets (8 mg total) by mouth every 8 (eight) hours as needed (nausea)., Disp: 30 tablet, Rfl: 1    potassium chloride SA (K-DUR,KLOR-CON) 20 MEQ tablet, Take 2 tablets (40 mEq total) by mouth once daily. for 14 days, Disp: 28 tablet, Rfl: 0    pregabalin (LYRICA) 50 MG capsule, Take 50 mg by mouth 3 (three) times daily., Disp: , Rfl:     valsartan-hydrochlorothiazide (DIOVAN-HCT) 160-12.5 mg per tablet, Take 1 tablet by mouth., Disp: , Rfl:   No current facility-administered medications for this visit.    Facility-Administered Medications Ordered in Other Visits:     alteplase injection 2 mg,  2 mg, Intra-Catheter, PRN, Israel Farah MD    diphenhydrAMINE injection 50 mg, 50 mg, Intravenous, Once PRN, Israel Farah MD    EPINEPHrine (EPIPEN) 0.3 mg/0.3 mL pen injection 0.3 mg, 0.3 mg, Intramuscular, Once PRN, Israel Farah MD    heparin, porcine (PF) 100 unit/mL injection flush 500 Units, 500 Units, Intravenous, PRN, Israel Farah MD    hydrocortisone sodium succinate injection 100 mg, 100 mg, Intravenous, Once PRN, Israel Farah MD    sodium chloride 0.9% flush 10 mL, 10 mL, Intravenous, PRYVONNE, Israel Farah MD       Current labs reviewed:  9/9/24 -- H/H 10.8/31.8 L, Glu 105, K 3 L, BUN 15.8, Cr 0.86, Alb 3.4    RD Note:  9/9/24 -- Pt receiving chemo this am for breast cancer accompanied by mother; reports normal eating habits of 4-5 meals daily, mainly cooking for self; good appetite, no n/v/d/c; wound to right breast & right side neck healing -- education on protein rich foods provided, pt also reports taking MVI, Vit C, Zinc; K (L) - list of K content of foods provided; pt reports weight gain with UBW ~175 lb; no visible fat or muscle wasting noted    Education Provided:  Maximizing Nutrition during cancer treatment, K content of foods  Teaching Method: explanation and printed materials  Comprehension: verbalizes understanding  Barriers to Learning: none evident  Expected Compliance: good  Comments: All questions were answered and dietitian's contact information was provided.     Estimated Nutrition Needs:  Calories : 2200 - 2400 kcal (23 - 25 kcal/kg)  Protein : 96 gm (1 gm/kg), wound healing  Fluid: 2271-1374 ml (1ml/kcal)    Nutrition Problem (PES):   PES: Inadequate protein intake related to increased protein energy demand for wound healing as evidenced by wound to right breast & right side of neck. (new)    RD Plan/Goals:   [] Weight stable [] Weight gain  [] Weight Loss [] Increase Kcal/protein [] Biochemical data improved  [] Adjust Tube-feeding Rx  [] Tolerate Tube  Feedings   [] Increase tube feedings to goal   [] Tolerate Supplements   [] Symptoms Improved  [] Understand nutrition Education  [] offer supportive visits [x] other, please specify maintain adequate po intake to meet nutrition needs        Interventions:  General Healthy, high protein diet  Monitor Weight Weekly     Follow up:   []  Will continue to closely monitor throughout chemotherapy treatment regimen.  []  Will continue to monitor with MD follow up appointments or as needed per patient  [x]  Consult SUZETTE Melissa, SUZETTEN, LDN

## 2024-09-12 DIAGNOSIS — E87.6 HYPOKALEMIA: Primary | ICD-10-CM

## 2024-09-13 ENCOUNTER — TELEPHONE (OUTPATIENT)
Dept: HEMATOLOGY/ONCOLOGY | Facility: CLINIC | Age: 59
End: 2024-09-13
Payer: MEDICAID

## 2024-09-13 ENCOUNTER — HOSPITAL ENCOUNTER (OUTPATIENT)
Dept: RADIOLOGY | Facility: HOSPITAL | Age: 59
Discharge: HOME | End: 2024-09-13
Attending: INTERNAL MEDICINE
Payer: MEDICAID

## 2024-09-13 DIAGNOSIS — C50.911 RECURRENT BREAST CANCER, RIGHT: ICD-10-CM

## 2024-09-13 DIAGNOSIS — Z17.1 TRIPLE NEGATIVE BREAST CARCINOMA: ICD-10-CM

## 2024-09-13 DIAGNOSIS — C50.919 TRIPLE NEGATIVE BREAST CARCINOMA: ICD-10-CM

## 2024-09-13 DIAGNOSIS — C77.0 NEOPLASM OF RIGHT BREAST, REGIONAL LYMPH NODE STAGING CATEGORY N3C: METASTASIS IN IPSILATERAL SUPRACLAVICULAR LYMPH NODE: ICD-10-CM

## 2024-09-13 DIAGNOSIS — C50.911 NEOPLASM OF RIGHT BREAST, REGIONAL LYMPH NODE STAGING CATEGORY N3C: METASTASIS IN IPSILATERAL SUPRACLAVICULAR LYMPH NODE: ICD-10-CM

## 2024-09-13 PROCEDURE — 78815 PET IMAGE W/CT SKULL-THIGH: CPT | Mod: TC

## 2024-09-13 PROCEDURE — A9552 F18 FDG: HCPCS | Performed by: INTERNAL MEDICINE

## 2024-09-13 RX ORDER — FLUDEOXYGLUCOSE F18 500 MCI/ML
10 INJECTION INTRAVENOUS
Status: COMPLETED | OUTPATIENT
Start: 2024-09-13 | End: 2024-09-13

## 2024-09-13 RX ADMIN — FLUDEOXYGLUCOSE F-18 10.1 MILLICURIE: 500 INJECTION INTRAVENOUS at 01:09

## 2024-09-16 ENCOUNTER — HOSPITAL ENCOUNTER (OUTPATIENT)
Dept: WOUND CARE | Facility: HOSPITAL | Age: 59
Discharge: HOME | End: 2024-09-16
Attending: NURSE PRACTITIONER
Payer: MEDICAID

## 2024-09-16 ENCOUNTER — TELEPHONE (OUTPATIENT)
Dept: HEMATOLOGY/ONCOLOGY | Facility: CLINIC | Age: 59
End: 2024-09-16
Payer: MEDICAID

## 2024-09-16 ENCOUNTER — TELEPHONE (OUTPATIENT)
Dept: INTERVENTIONAL RADIOLOGY/VASCULAR | Facility: HOSPITAL | Age: 59
End: 2024-09-16

## 2024-09-16 ENCOUNTER — OFFICE VISIT (OUTPATIENT)
Dept: HEMATOLOGY/ONCOLOGY | Facility: CLINIC | Age: 59
End: 2024-09-16
Payer: MEDICAID

## 2024-09-16 VITALS
DIASTOLIC BLOOD PRESSURE: 81 MMHG | SYSTOLIC BLOOD PRESSURE: 145 MMHG | BODY MASS INDEX: 34.85 KG/M2 | OXYGEN SATURATION: 100 % | HEART RATE: 81 BPM | WEIGHT: 209.19 LBS | TEMPERATURE: 98 F | HEIGHT: 65 IN

## 2024-09-16 DIAGNOSIS — C50.911 NEOPLASM OF RIGHT BREAST, REGIONAL LYMPH NODE STAGING CATEGORY N3C: METASTASIS IN IPSILATERAL SUPRACLAVICULAR LYMPH NODE: ICD-10-CM

## 2024-09-16 DIAGNOSIS — S21.101D OPEN CHEST WOUND, RIGHT, SUBSEQUENT ENCOUNTER: ICD-10-CM

## 2024-09-16 DIAGNOSIS — C50.919 TRIPLE NEGATIVE BREAST CARCINOMA: ICD-10-CM

## 2024-09-16 DIAGNOSIS — Z17.1 TRIPLE NEGATIVE BREAST CARCINOMA: ICD-10-CM

## 2024-09-16 DIAGNOSIS — S21.001D OPEN WOUND OF RIGHT BREAST, SUBSEQUENT ENCOUNTER: Primary | ICD-10-CM

## 2024-09-16 DIAGNOSIS — R11.0 NAUSEA: ICD-10-CM

## 2024-09-16 DIAGNOSIS — C50.911 NEOPLASM OF RIGHT BREAST, REGIONAL LYMPH NODE STAGING CATEGORY N3C: METASTASIS IN IPSILATERAL SUPRACLAVICULAR LYMPH NODE: Primary | ICD-10-CM

## 2024-09-16 DIAGNOSIS — Z09 CHEMOTHERAPY FOLLOW-UP EXAMINATION: ICD-10-CM

## 2024-09-16 DIAGNOSIS — C50.919 TRIPLE NEGATIVE BREAST CARCINOMA: Primary | ICD-10-CM

## 2024-09-16 DIAGNOSIS — C50.911 RECURRENT BREAST CANCER, RIGHT: ICD-10-CM

## 2024-09-16 DIAGNOSIS — Z17.1 TRIPLE NEGATIVE BREAST CARCINOMA: Primary | ICD-10-CM

## 2024-09-16 DIAGNOSIS — C77.0 NEOPLASM OF RIGHT BREAST, REGIONAL LYMPH NODE STAGING CATEGORY N3C: METASTASIS IN IPSILATERAL SUPRACLAVICULAR LYMPH NODE: ICD-10-CM

## 2024-09-16 DIAGNOSIS — C77.0 NEOPLASM OF RIGHT BREAST, REGIONAL LYMPH NODE STAGING CATEGORY N3C: METASTASIS IN IPSILATERAL SUPRACLAVICULAR LYMPH NODE: Primary | ICD-10-CM

## 2024-09-16 PROCEDURE — 99211 OFF/OP EST MAY X REQ PHY/QHP: CPT

## 2024-09-16 PROCEDURE — 99213 OFFICE O/P EST LOW 20 MIN: CPT | Mod: ,,, | Performed by: NURSE PRACTITIONER

## 2024-09-16 PROCEDURE — 3077F SYST BP >= 140 MM HG: CPT | Mod: CPTII,,,

## 2024-09-16 PROCEDURE — 99215 OFFICE O/P EST HI 40 MIN: CPT | Mod: S$PBB,,,

## 2024-09-16 PROCEDURE — 1159F MED LIST DOCD IN RCRD: CPT | Mod: CPTII,,,

## 2024-09-16 PROCEDURE — 1159F MED LIST DOCD IN RCRD: CPT | Mod: CPTII,95,, | Performed by: NURSE PRACTITIONER

## 2024-09-16 PROCEDURE — 27000999 HC MEDICAL RECORD PHOTO DOCUMENTATION

## 2024-09-16 PROCEDURE — 1160F RVW MEDS BY RX/DR IN RCRD: CPT | Mod: CPTII,,,

## 2024-09-16 PROCEDURE — 3008F BODY MASS INDEX DOCD: CPT | Mod: CPTII,,,

## 2024-09-16 PROCEDURE — 99214 OFFICE O/P EST MOD 30 MIN: CPT | Mod: PBBFAC,27

## 2024-09-16 PROCEDURE — 3079F DIAST BP 80-89 MM HG: CPT | Mod: CPTII,,,

## 2024-09-16 PROCEDURE — 99213 OFFICE O/P EST LOW 20 MIN: CPT | Mod: 95,,, | Performed by: NURSE PRACTITIONER

## 2024-09-16 RX ORDER — ONDANSETRON 4 MG/1
4 TABLET, FILM COATED ORAL EVERY 6 HOURS PRN
Qty: 30 TABLET | Refills: 1 | Status: SHIPPED | OUTPATIENT
Start: 2024-09-16 | End: 2025-09-16

## 2024-09-16 NOTE — PROGRESS NOTES
Reason for Follow-up:  -history of right breast cancer, diagnosed , ER negative, [place] negative, HER2 positive, T2 N1 M0, S/P neoadjuvant chemotherapy, lumpectomy, axillary lymph dissection, adjuvant radiotherapy (completed 10/24/2022)  -local and regional recurrence, triple negative, diagnosed on biopsy of supraclavicular lymph node 2024; on mammogram, right breast mass; supraclavicular lymphadenopathy; right cervical lymphadenopathy  -Postmenopausal   -Hypercalcemia   -High serum parathyroid hormone (PTH)   -Liver mass, right lobe   -Mediastinal lymphadenopathy   -Lung nodules   -Breast cancer metastasized to axillary lymph node, left   -Cervical lymphadenopathy   -history of Graves disease    History:  Past medical history: Anemia; arthritis; breast cancer; Graves disease (diagnosed ; started on methimazole by endocrinologist in [place], in ); hypertension; peripheral neuropathy  -2023:  Venous Doppler bilateral lower extremities (swelling):  No DVT  -2023: TTE:  LVEF 55-60%  -2023:  Limited abdominal ultrasound (elevated liver enzymes):  6.6 cm cyst within the liver near the gallbladder; cholelithiasis with minimal gallbladder wall thickening  -2014:  Pelvic ultrasound: Markedly enlarged uterus with multiple large fibroids; endometrial stripe is thickened, 1.4 cm  Procedure/surgical history: Breast lumpectomy   Social history:  .  Lives in [place].  No children.  Never wanted to have children.  Never became pregnant.  No history of tobacco, alcohol, or illicit drug abuse.  Does not work.  Family history:  Sister experienced hyperthyroidism, treated with radioiodine.  She does not know much about her family history but says that there are cancers in folks on both parents sides of family.  A female cousin on mother's side of family experienced breast cancer in her 30s and  from it.  Health maintenance:  Primary care provider in [facility].  Last  mammogram November 2023 at [facility].  No screening colonoscopy ever.  Menstrual/Ob gyn history:  Menarche at age 13.  Last menstrual cycle July 2023.  Never became pregnant.  Never wanted to become pregnant.  Took birth control pills for 2 years several years ago.  No hormone replacement therapy after menopause.          No family history on file.     History of Present Illness:   Breast Cancer        Oncologic/Hematologic History:  Oncology History   Stage IV carcinoma of breast   8/27/2024 Initial Diagnosis    Stage IV carcinoma of breast     9/9/2024 -  Chemotherapy    Treatment Summary   Plan Name: OP BREAST PACLITAXEL Q3W  Treatment Goal: Palliative  Status: Active  Start Date: 9/9/2024  End Date: 12/16/2024 (Planned)  Provider: Israel Farah MD  Chemotherapy: PACLitaxeL (TAXOL) 175 mg/m2 = 366 mg in 0.9% NaCl 500 mL chemo infusion, 175 mg/m2 = 366 mg, Intravenous, Clinic/HOD 1 time, 1 of 6 cycles     ====================================      59-year-old lady, referred by Rere Jernigan MD, surgery, with recurrent breast cancer.      07/10/2024:  Office note: Rere Jernigan MD (surgery):  Pt is a 59 y.o. female with history of right breast cancer s/p BCT in 2002 who presents with painless large supraclavicular mass.    First noticed it 4 months ago and has steadily increased in size past month.  Last mammogram 1 year ago and new [place].  Also reports progressive skin changes of the right breast not associated with lumpectomy incision.  Receptor status unknown.       Investigations reviewed:  -no old records available   -according to her, she was diagnosed with right breast cancer in January 2022  -mammogram showed a large lobulated mass, 3.7 x 2.7 cm  -biopsy:  Infiltrative ductal carcinoma, possibly invasive lobular  -right breast mass was palpable in the upper outer quadrant; axillary lymphadenopathy was noted (3-4 palpable lymph nodes right axilla)  -T2 N1 M0 IDC right breast (she had  palpable axillary lymph nodes in the right and a large palpable mass in the right breast)  -ER negative; CT negative; HER2 positive  -S/P neoadjuvant chemotherapy (according to her, she received 4 cycles of neoadjuvant chemotherapy with Adriamycin/Taxotere every 3 weeks apart x4 cycles)  -followed by lumpectomy and axillary dissection  -no residual tumor post chemotherapy; 17 axillary lymph nodes negative for tumor  -S/P adjuvant radiotherapy, 6600 cGy/35 fractions, completed 10/24/2022  -07/28/2011:  DEXA scan:  BMD normal  -12/19/2022:  Screening mammogram (comparison: 11/08/2019, etc.):  BI-RADS: 2-benign  -07/06/2023:  Echocardiogram:  LVEF 60%  -11/27/2023:  Bilateral diagnostic mammogram and limited ultrasound right breast (comparison: 12/19/2022, 12/16/2001, etc.) (history of right breast cancer with worsening right breast pain and thickening) large elongated heterogeneous mass with irregular borders outer aspect of the right breast (extending from the nipple outward towards the lateral chest wall at the 8-9 o'clock position, 5.8 x 3 x 1.8 cm although margins are difficult to accurately define), highly suspicious for recurrent malignancy; there is a separate elongated hypoechoic lesion in the upper outer quadrant right breast 11 o'clock position, 5 cm from nipple, 3 x 2.6 x 0.7 cm, near the site of previous surgery), perhaps benign scar tissue related to previous lumpectomy:  BI-RADS: 5-highly suggestive of malignancy  -07/10/2024:  Surgery Office note:  Painless large supraclavicular mass, noted 4 months ago, steadily enlarging; also reported progressive skin changes right breast not associated with lumpectomy incision; on physical exam, large exophytic right supraclavicular nodule, nonmobile and fixed, overlying skin erythematous without ulceration; right upper outer quadrant lumpectomy incision; right lower inner quadrant periareolar ecchymosis with subcutaneous firmness without distinct mass; slight  nipple inversion; no drainage; no palpable axillary lymphadenopathy  -05/30/2024:  Ultrasound soft tissues of the head and neck (lymphadenopathy): Pathologic lymphadenopathy (4.5 x 3.7 x 3.2 cm) at the base of the neck on the right; multiple smaller morphologically abnormal cervical chain lymph nodes on the right which demonstrate heterogeneous echogenicity similar to the dominant mass. Findings highly suspicious for malignancy particularly in this patient with a known history of prior right breast cancer. Dominant mass lesion corresponds with palpable complaint. Recommend biopsy/tissue diagnosis.   -06/27/2024:  Right supraclavicular mass, core needle biopsy (firm 6 x 6 cm exophytic right supraclavicular mass): Metastatic carcinoma in fibroadipose tissue, moderately to poorly-differentiated, consistent with breast origin (metastatic breast carcinoma in fibroadipose tissue  -ER negative (0%); OR negative (0%); HER2 negative (0); Ki-67 high proliferation  (93.6%)    08/08/2024:   Pleasant, healthy-appearing lady who presents for initial medical oncology consultation.  In no acute discomfort.    Says that right supra clavicular mass started April 2024; it has been enlarging and fluctuating in size.  It is painful.  7/10 severity pain.  Also, pain was right breast area.  Right breast is enlarging.  There is a small area of ulceration in the right breast.  No bleeding or discharge.  She denies fevers or chills.  Has been taking Lyrica meloxicam without the relief of pain.  We will start Norco.  Some fatigue.  However, ECOG 1.  Pain from neck down to breast area, especially at night.  No unusual headaches, focal neurological symptoms, vision impairment, gait impairment, hemoptysis, fevers, chills, any bleeding or discharge from right breast superficial ulceration, abdominal pain, nausea, vomiting, GI bleeding, etc..  No bone pains.  Appetite is fair.    Interval History 9/16/24:  Patient presented to the clinic today for  a scheduled chemotherapy visit after starting chemotherapy on 9/9 in infusion. She has no acute complaints. She reports being mildly nauseous after her first treatment. She states that she was given nausea medication that dissolves under the tongue but she finds that it makes her more nauseous. She denies any fever, chills, SOB or s/s associated with infection. Denies any abdominal pain, diarrhea, constipation or changes with appetite. Labwork was reviewed with the patient. All future appointments were discussed with the patient.     08/28/2024:   -tissue NGS testing (performed on right supraclavicular mass biopsy 06/27/2024):  HRD positive (CELINE-high); negative for AKT 1, BRAF, BRCA1, BRCA2, ESR 1, etc.  -08/09/2024: Liquid biopsy:  Borderline TMB  -08/08/2024: Hemoglobin 11.9; .6; rest of CBC unremarkable; calcium 10.6; albumin 3.9 (mild hypercalcemia); vitamin-D level normal; PTH level 100.3, elevated; FSH 79.87, postmenopausal; estradiol level <24; ionized level 5.03 (normal, on 08/09/2024); PTH related peptide normal on 08/09/2024  -08/14/2024: Echocardiogram:  LVEF 55-60%  -08/19/2024: MediPort placed  -08/20/2024:  Staging brain MRI with and without contrast: No brain metastases  -08/20/2024: Staging CTs C/A/P with IV contrast:   1. Irregular mixed cystic and solid right breast lesion with extensive bilateral breast skin thickening, low left axillary adenopathy, and a few enlarged mediastinal/hilar lymph nodes.   2. Changes of right axillary node dissection without definite pathologic adenopathy through the region.  3. Hypodense left hepatic mass raises concern for metastatic involvement.  No other definite findings suspicious for metastasis through the abdomen and pelvis.  4. Subcentimeter right lung nodules are indeterminate given absence of comparison images to establish chronicity.  Close attention on surveillance imaging is needed.  5. Cholelithiasis without findings of acute cholecystitis or  biliary obstruction.  6. Simple left upper renal cortex cyst.  7. Massively enlarged multifocal uterine leiomyoma.  (scattered noncalcified lung nodules; anterior inferior right upper lobe nodule 0.6 x 0.6 mm; superior right lower lobe nodule 0.7 x 0.7 cm; right lower lobe nodule 0.9 x 0.9 cm; inferior left hepatic lobe hypodense circumscribed lesion 2.2 x 2.8 cm; additional mm hypodense foci scattered throughout the liver, too small for more detailed characterization; scattered mediastinal lymph nodes, for example, pretracheal lymph node 1.2 cm, right hilar lymph node 1.2 cm, enlarged left axillary lymph nodes, representative left axillary lymph node 1.9 cm; scattered nonenlarged retroperitoneal lymph nodes; no pathologic abdominopelvic nicholas enlargement; extensive bilateral skin thickening of the breasts; right breast heterogeneous mixed cystic and solid mass with irregular/lobulated margins upper outer and lateral subareolar region 6.2 x 5.9 x 4.1 cm)  -08/20/2024: CT soft tissues of the neck with contrast: Large pathologic lymph node right supraclavicular fossa with central necrosis up to 5.4 cm; 3 additional subcentimeter pathologic appearing lymph nodes right level 3; indeterminate soft tissue nodule medial right breast 1.5 x 1.2 cm  -08/23/2024:  Whole-body nuclear medicine bone scan: No bone metastases  Presents for a follow-up visit.  We discussed all labs and scans in detail.  Some fatigue.  ECOG 1.  Minor headaches.  No unusual headaches, seizures, or focal neurological symptoms.  Some nausea.  Great appetite.  No chest pain, cough, or dyspnea.  No abdominal pain, nausea, vomiting.        Review of Systems:   All systems reviewed and found to be negative except for the symptoms detailed above    Physical Examination:   VITAL SIGNS:   Vitals:    09/16/24 1112   BP: (!) 145/81   Pulse: 81   Temp: 97.8 °F (36.6 °C)     Physical Exam  Vitals reviewed.   Constitutional:       Appearance: Normal appearance.    HENT:      Head: Normocephalic and atraumatic.      Mouth/Throat:      Mouth: Mucous membranes are moist.   Cardiovascular:      Rate and Rhythm: Normal rate and regular rhythm.      Pulses: Normal pulses.      Heart sounds: Normal heart sounds.   Pulmonary:      Effort: Pulmonary effort is normal.      Breath sounds: Normal breath sounds.   Abdominal:      General: Bowel sounds are normal.      Palpations: Abdomen is soft.   Skin:     General: Skin is warm and dry.   Neurological:      Mental Status: She is alert and oriented to person, place, and time.   Psychiatric:         Mood and Affect: Mood normal.         Behavior: Behavior normal.         Thought Content: Thought content normal.         Judgment: Judgment normal.          Assessment:  History of right breast IDC, ER negative, WI negative, HER2 positive::  -mammogram: 3.7 x 2.7 cm right breast mass  -pathology: Infiltrative ductal carcinoma, possibly invasive lobular  -ER negative, WI negative, HER2 positive   -T2 N1 M0; palpable axillary lymphadenopathy; large palpable mass right breast)  -S/P neoadjuvant chemotherapy (according to her, she received 4 cycles of neoadjuvant chemotherapy with Adriamycin/Taxotere every 3 weeks apart x4 cycles)  -followed by lumpectomy and axillary dissection  -no residual tumor post chemotherapy; 17 axillary lymph nodes negative for tumor  -S/P adjuvant radiotherapy, 6600 cGy/35 fractions, completed 10/24/2022  >>>  -screening mammogram 12/19/2022: BI-RADS: 2-benign  >>>  Regional, local, and metastatic recurrence, triple negative:  -echocardiogram 07/06/2023: LVEF 60%  -mammogram and ultrasound 11/27/2023:  Right breast mass 5.8 x 3 x 1.8 cm: BI-RADS: 5  -surgery consultation/follow-up 07/10/2024: Painless large supraclavicular mass, noted 4 months ago, steadily enlarging; progressive skin changes right breast not associated with lumpectomy incision, large exophytic right supraclavicular nodule/lymphadenopathy) nonmobile  and fixed; overlying skin erythematous without ulceration), right lower inner quadrant periareolar ecchymosis and subcutaneous firmness without distinct mass; no palpable axillary lymphadenopathy  -ultrasound neck 05/30/2024:  Pathologic lymphadenopathy 4.5 x 3.7 x 3.2 cm at the base of the neck of the right; multiple smaller morphologically abnormal cervical chain lymph nodes on the right  -core needle biopsy right supraclavicular mass 06/27/2024:  Firm, 6 x 6 cm: Moderately to poorly-differentiated metastatic breast carcinoma  -ER negative (0%); FL negative (0%); HER2 negative (0); Ki-67 high proliferation (93.6%)  -08/08/2024: Breast examination was performed with her a verbal consent, in the presence of Jani Heck LPN; entire right breast is involved with tumor; entire right breast is indurated with tumor, with conglomeration of nodules around the central area; a small superficial ulceration is noted along the inferior medial aspect of right areolar area; a large slightly tender 6 supraclavicular masses noted; diffuse edema is noted in the right supraclavicular area and right breast area as well as infraclavicular area; left breast is normal  -tissue NGS testing (performed on right supraclavicular mass biopsy 06/27/2024):  HRD positive (CELINE-high); negative for AKT 1, BRAF, BRCA1, BRCA2, ESR 1, etc.  -08/09/2024: Liquid biopsy:  Borderline TMB  -08/08/2024:  Mild hypercalcemia: Calcium 10.6, albumin 3.9; intact PTH level 100.3, elevated; PTH related peptide normal; vitamin-D level normal  -08/08/2024:  Labs: Postmenopausal  Hypercalcemia-08/14/2024: Echocardiogram:  LVEF 55-60%  -08/19/2024: [facility] placed  -brain MRI 08/20/2024: No brain metastases  -CTs C/A/P 0 08/20/2024: Sites of disease:  6.2 cm right breast mass; left axillary lymphadenopathy; mediastinal and hilar lymphadenopathy; hepatic mass left liver lobe 2.8 cm; right lung nodules  -CT soft tissues of the neck 08/20/2024:  Right supraclavicular  pathologic lymphadenopathy up to 5.4 cm; 3 additional subcentimeter pathologic lymph nodes right level 3; left breast nodule 1.5 cm  -bone scan 08/23/2024: No bone metastases      Sites of disease/recurrence:   Right supraclavicular lymphadenopathy; right breast mass, right cervical lymphadenopathy, left axillary lymphadenopathy, mediastinal and hilar lymphadenopathy, hepatic mass (metastases), left breast nodule      Molecular markers:  -ER negative (0%); CO negative (0%); HER2 negative (0); Ki-67 high proliferation (93.6%)  -tissue NGS testing (performed on right supraclavicular mass biopsy 06/27/2024):  HRD positive (CELINE-high); negative for AKT 1, BRAF, BRCA1, BRCA2, ESR 1, etc.  -08/09/2024: Liquid biopsy:  Borderline TMB      Graves' disease:   -diagnosed 07/2023  -07/11/2023: CT soft tissues of the neck with contrast) dysphagia, acute thyrotoxicosis, goiter): Mild diffuse enlargement of the thyroid gland without displacement or mass effect of the aerodigestive tract; no suspicious cervical lymphadenopathy  -07/12/2023: Ultrasound thyroid:  Hoarseness, thyrotoxicosis:  Heterogeneous hyperemic thyroid typical of thyroiditis; small 6 mm mid pole right thyroid nodule is not suspicious and does not warrant surveillance per TI-RADS criteria  -confirmed with TSH receptor antibodies (TRAb/TSI, i.e., thyroid-stimulating immunoglobulins)  -as of 05/21/2024, on methimazole 5 mg daily; has responded well  -endocrinologist: Roland Ross MD   -05/21/2024:  Endocrinologist plan:  Plan to complete 18 months of therapy before attempting to taper of methimazole         Plan:  Triple Negative Breast Cancer:   Need genetic testing ASAP   Start chemotherapy with Taxol ASAP   RTC with MD on 9/30 with labs prior followed by infusion for C2D1 of Taxol   During chemotherapy, check CBC and CMP weekly    Nausea:   Start Zofran 4mg PO Q6 hours PRN Nausea  RX sent to pharmacy on 9/16/24    Graves Disease:   Follow-up with  endocrinology for Graves disease        -according to her, she was diagnosed with right breast cancer in January 2022  -mammogram showed a large lobulated mass, 3.7 x 2.7 cm  -biopsy:  Infiltrative ductal carcinoma, possibly invasive lobular  -right breast mass was palpable in the upper outer quadrant; axillary lymphadenopathy was noted (3-4 palpable lymph nodes right axilla)  -T2 N1 M0 IDC right breast (she had palpable axillary lymph nodes in the right and a large palpable mass in the right breast)  -ER negative; WI negative; HER2 positive  -S/P neoadjuvant chemotherapy (according to her, she received 4 cycles of neoadjuvant chemotherapy with Adriamycin/Taxotere every 3 weeks apart x4 cycles)  -followed by lumpectomy and axillary dissection  -no residual tumor post chemotherapy; 17 axillary lymph nodes negative for tumor  -S/P adjuvant radiotherapy, 6600 cGy/35 fractions, completed 10/24/2022  >>>  Regional, local, and metastatic recurrence, triple negative:  -now, triple negative regional and local recurrence with 4.5 cm right supraclavicular lymph node and 5.8 cm right breast mass right lower inner quadrant with periareolar ecchymosis and subcutaneous firmness without distinct mass on physical examination  -ER negative (0%); WI negative (0%); HER2 negative (0); Ki-67 high proliferation (93.6%)  -tissue NGS testing (performed on right supraclavicular mass biopsy 06/27/2024):  HRD positive (CELINE-high); negative for AKT 1, BRAF, BRCA1, BRCA2, ESR 1, etc.  -08/09/2024: Liquid biopsy:  Borderline TMB  -08/08/2024:  Mild hypercalcemia: Calcium 10.6, albumin 3.9; intact PTH level 100.3, elevated; PTH related peptide normal; vitamin-D level normal  -08/08/2024:  Labs: Postmenopausal  Hypercalcemia  -08/14/2024: Echocardiogram:  LVEF 55-60%  -08/19/2024: MediPort placed  -brain MRI 08/20/2024: No brain metastases  -CTs C/A/P 0 08/20/2024: Sites of disease:  6.2 cm right breast mass; left axillary lymphadenopathy;  mediastinal and hilar lymphadenopathy; hepatic mass left liver lobe 2.8 cm; right lung nodules  -CT soft tissues of the neck 08/20/2024:  Right supraclavicular pathologic lymphadenopathy up to 5.4 cm; 3 additional subcentimeter pathologic lymph nodes right level 3; left breast nodule 1.5 cm  -bone scan 08/23/2024: No bone metastases  >>>  -triple negative recurrent, metastatic disease   -awaiting genetic testing, FDG PET-CT, PD-L1 testing  -obviously, unresectable  -ER 0, MD 0, HER2 0  -PD-L1 testing is pending   -genetic testing is pending  -extensive metastatic disease   -need to start palliative systemic therapy pending PD-L1 testing and pending genetic testing  -will start chemotherapy with Taxol 175 mg per m2 IV every 3 weeks, to continue until progression or until unacceptable toxicity  -patient has been previously treated with Adriamycin/Taxotere; therefore, we will avoid Adriamycin to the extent possible  -at some point, olaparib maybe considered because of HRD positive status  -re-stage with contrast-enhanced CT scans of C/A/P/soft tissues of the neck a couple of months after starting Taxol  -check baseline tumor markers including CEA, CA 27.29, and CA 15-3 level, and follow every 3-4 weeks, if elevated  -we will refer to IR for biopsy of liver lesion (however, we will not wait for biopsy to start palliative systemic therapy)  -She understands that we need to start chemotherapy ASAP for extensive metastatic disease from an aggressive breast cancer recurrence, without waiting for PET-CT and biopsies which may take a few to several more weeks.    Chemotherapy:  -Taxol 175 mg per m2 every 3 weeks until disease progression or unacceptable toxicity    Side effects/monitoring with Taxol:  -watch for cardiovascular effects including infusion related hypotension, bradycardia, hypertension, etc.  -watch out for extravasation: Taxol is an irritant with vesicle like properties  -peripheral neuropathy:  Primarily  distal sensory neuropathy; a mixture of paresthesias and dysesthesias including burning, numbness, tingling, and shooting pains, typically in a stocking-glove distribution; most mild-to-moderate cases resolve several months after discontinuation but maybe longer in patients with diabetes.  Severe neuropathy maybe irreversible in some patients  -side effects in> 10% patients:  Edema, hypotension, alopecia, nausea, vomiting, diarrhea, stomatitis, cytopenias, LFT abnormalities, peripheral neuropathy, arthralgias, myalgias, etc.  -Boxed warnings: Hypersensitivity reactions; bone marrow suppression  -premedicate with dexamethasone (20 mg orally at 12 and 6 hours prior to paclitaxel, diphenhydramine (50 mg IV 30 to 60 minutes prior to paclitaxel), and cimetidine or famotidine (IV 30 to 60 minutes prior to paclitaxel).  -do not repeat course until neutrophil count is =/> 1500 mm3 and the platelet count is =/> 100,000 mm3; reduced doses by 20% if patient experiences severe peripheral neuropathy or severe neutropenia <500 mm3 for a week or longer     Pending:    Genetic testing;   FDG PET-CT  -PD-L1 testing  -bilateral breast MRI    -08/08/2024: Refer to wound care for management of breast wound   -08/08/2024:  Start Norco 5 mg every 4 hours PRN for pain    Plan:   1. If no metastases, and if recurrence deemed resectable, then:  Neoadjuvant chemotherapy; followed by mastectomy +/- axillary lymphadenectomy; followed by adjuvant radiotherapy to right supraclavicular lymph node; followed by adjuvant chemotherapy  2. If no metastases, and if recurrence deemed unresectable, then: Palliative systemic therapy  3. If metastatic disease, then:  Palliative systemic therapy    >>>  -08/28/2024: On imaging studies, found to have stage IV disease; therefore, we are going to treat her with palliative systemic therapy    History of Graves disease   -follow-up with endocrinology    Above discussed at length with the patient.  All questions  answered.    Discussed labs, scans, and pathology report, and gave her copies of relevant records.  Plan of management discussed in detail.    Explained the following: That based upon imaging studies, she has stage IV, incurable disease; palliation can be attempted with systemic therapy but response can not be guaranteed; prognosis guarded.  Potential side effects of Taxol discussed.  Gave her educational materials from [employer]  She understands that we need to start chemotherapy ASAP for extensive metastatic disease from an aggressive breast cancer recurrence, without waiting for PET-CT and biopsies which may take a few to several more weeks.  She understands and agrees with this plan.      Follow-up:  No follow-ups on file.

## 2024-09-16 NOTE — PROGRESS NOTES
REFERRING PROVIDER: Dr. Israel Farah    Subjective:       Patient ID: Eli Bangura is a 59 y.o. female.    Chief Complaint: Personal history of breast cancer initially diagnosed 37y, now with metastatic disease and a family history of cancer. Patient was found appropriate for genetic testing based on the National Comprehensive Cancer Network (NCCN) due to personal diagnosis of breast cancer diagnosed under 48y (dx37y), with additional evidence due to metastatic disease whereas genetic test result could influence treatment. Patient signed consent, lab was drawn and sent to [facility] for BRCA1/2 Analyses with CancerNext-Expanded+RNAinsight panel testing. Patient presented via Telemedicine Visit (audio only) today for results disclosure.     HPI  Past Medical History:   Diagnosis Date    Anemia     Arthritis     Breast cancer     Graves disease     Hypertension     Hyperthyroidism         Past Surgical History:   Procedure Laterality Date    BREAST LUMPECTOMY      INSERTION OF TUNNELED CENTRAL VENOUS CATHETER (CVC) WITH SUBCUTANEOUS PORT N/A 8/19/2024    Procedure: [id number]-CATH;  Surgeon: Thomas Verdin Jr., MD;  Location: [facility];  Service: General;  Laterality: N/A;        Review of patient's allergies indicates:  No Known Allergies     Review of Systems            Problem List Items Addressed This Visit    None    Oncology History   Stage IV carcinoma of breast   8/27/2024 Initial Diagnosis    Stage IV carcinoma of breast     9/9/2024 -  Chemotherapy    Treatment Summary   Plan Name: OP BREAST PACLITAXEL Q3W  Treatment Goal: Palliative  Status: Active  Start Date: 9/9/2024  End Date: 12/16/2024 (Planned)  Provider: Israel Farah MD  Chemotherapy: PACLitaxeL (TAXOL) 175 mg/m2 = 366 mg in 0.9% NaCl 500 mL chemo infusion, 175 mg/m2 = 366 mg, Intravenous, Clinic/HOD 1 time, 1 of 6 cycles              Family History   Problem Relation Name Age of Onset    Breast cancer Maternal Cousin           Assessment:   Genetic testing was appropriate for this patient because of personal and family history. This comprehensive [facility] Genetics CancerNext-Expanded analysis indicated that there was no deleterious mutation and no variants of uncertain significance found in (71 total): AIP, ALK, APC, DARRICK, BAP1, BARD1, BMPR1A, BRCA1, BRCA2, BRIP1, CDC73, CDH1, CDK4, CDKN1B, CDKN2A, CHEK2, DICER1, FH, FLCN, KIF1B, LZTR1, MAX, MEN1, MET, MLH1, MSH2, MSH6, MUTYH, NF1, NF2, NTHL1, PALB2, PHOX2B, PMS2, POT1, VRTRL9I, PTCH1, PTEN, RAD51C, RAD51D, RB1, RET, SDHA, SDHAF2, SDHB, SDHC, SDHD, SMAD4, SMARCA4, SMARCB1, SMARCE1, STK11, SUFU, GSKU276, TP53, TSC1, TSC2 and VHL (sequencing and deletion/duplication); AXIN2, CTNNA1, EGFR, EGLN1, HOXB13, KIT, MITF, MSH3, PDGFRA, POLD1 and POLE (sequencing only); EPCAM and GREM1 (deletion/duplication only). RNA data is routinely analyzed for use in variant interpretation for all genes.     Although this analysis indicated a negative result, there are other, rare genetic abnormalities that this test will not detect. This result, however, rules out the majority of abnormalities believed to be responsible for hereditary susceptibility to cancer.    A copy of her result will be mailed to the patient.    The patient location is: home    Visit type: audio only    Time with patient: 10 minutes  20 minutes of total time spent on the encounter, which includes face to face time and non-face to face time preparing to see the patient (eg, review of tests), Obtaining and/or reviewing separately obtained history, Documenting clinical information in the electronic or other health record, Independently interpreting results (not separately reported) and communicating results to the patient/family/caregiver, or Care coordination (not separately reported).         Each patient to whom he or she provides medical services by telemedicine is:  (1) informed of the relationship between the physician and patient  and the respective role of any other health care provider with respect to management of the patient; and (2) notified that he or she may decline to receive medical services by telemedicine and may withdraw from such care at any time.    FRANK JOSE, PhD

## 2024-09-16 NOTE — PATIENT INSTRUCTIONS
Pt seen today by: Perri Estes NP    Self care DRESSING INSTRUCTIONS:        Wound location: Rt side of neck, Rt breast    Dressings to be changed daily and as needed if soiled or not intact.    Patient and/or family may be asked to assist on other days.      Wound to Rt side of neck: Cleanse wound with Dakins 0.25%, apply Xeroform to wound, cover with foam border dressing every other day and/or as needed if soiled or not intact.    Wound to Rt breast: Cleanse with Dakins 0.25%, apply Xeroform, abd pad secure with tape or may just apply bra over bandage, change dressing every other day and/or as needed if soiled or not intact.    Return visit: 2 week    Nutrition:  The current daily value (%DV) for protein is 50 grams per day and is meant as a general goal for most people. Further increasing your dietary protein intake is very important for wound healing. Typically one needs over 100g of protein per day to help with wound healing needs.  If you are a dialysis patient or have problems with your kidneys, talk to your Nephrologist about how much protein you can take in with your condition.  Examples of high protein items that can be added to your diet include: eggs, chicken, red meats, almonds, cottage cheese, Greek yogurt, beans, and peanut butter.  Fortified protein bars, shakes and drinks can add 15-30 additional grams of protein per serving.  Also add:   1 daily general multivitamin   Vitamin C : 500mg twice daily   Zinc 220 mg daily  Vit D : once daily    Contact [facility] wound care team at [phone] or go to the nearest Emergency department if:    You have a fever greater than 101 taken by mouth.  Your pain gets worse or does not go away, even after taking your regular pain medicine.  Your skin around your wound is red, hot, swollen, or draining pus.  You have bleeding that continues to come through the dressing after holding pressure for 10 minutes       Call our [facility] wound clinic for questions/concerns a [phone] .

## 2024-09-17 NOTE — PROGRESS NOTES
[facility]   Outpatient Wound Care     Subjective:   Patient ID: Eli Bangura is a 59 y.o. female.    Chief Complaint: Wound Care      History of Present Illness:   59 y.o. Black or  female presents to wound care clinic today for right upper chest and right breast fungating tumors.  Voices was cancer free for breast cancer for 22 years.  Under the care of oncology clinic.  Followed by Lambert Mantilla APRN for PCP.     Today's visit 09/16/2024:  Reviewed previous progress notes from 09/05/2024.  Presents to clinic alone.  Dressings to right breast and upper chest removed per nursing staff at bedside.  Patient has a right double aluminum PICC line currently receiving chemotherapy.  Fungating tumor to the right breast in upper chest area red granulating wound bed with moderate slough.  Instructed we will continue Dakin's solution applied Xeroform gauze to both areas and cover with foam border dressing to upper chest right subclavian area, and ABD pad secure with bra for right breast.  Reinforced that fungating tumor are to keep clean and free from infection.  Instructions and supplies given.  Will have her follow up in 2 weeks to clinic.  Instructed to call the office with any questions, concerns, or new skin issues.  Verbalized understanding of all instructions.    9/5/24:  Presents to clinic alone.  Right breast fungating tumor red granulating wound bed with slough noted moderate serosanguineous drainage.  Right supplies given wound red granulating wound bed with slough monitor serosanguineous drainage.  Discussion with the patient today will assist her with keeping fungating tumors clean and assist with smell by using Dakin's solution.  Instructed once started chemotherapy areas should decrease in size but may experienced more  drainage.  Will have her just cleansed area with Dakin's solution, apply Xeroform gauze to both areas right subclavian cover foam border dressing and right breast ABD pad secure with bra.  May perform every-other-day. Instructions and supplies given.  All wound care performed per nursing staff at bedside.  Will follow up with a clinic in 10 days.  Instructed to call the office with any questions, concerns, or new skin issues.  Verbalized understanding of all instructions.      History includes:      Past Medical History:   Diagnosis Date    Anemia     Arthritis     Breast cancer     Graves disease     Hypertension     Hyperthyroidism       Past Surgical History:   Procedure Laterality Date    BREAST LUMPECTOMY      INSERTION OF TUNNELED CENTRAL VENOUS CATHETER (CVC) WITH SUBCUTANEOUS PORT N/A 8/19/2024    Procedure: [id number]-CATH;  Surgeon: Thomas Verdin Jr., MD;  Location: [facility];  Service: General;  Laterality: N/A;      Social History     Socioeconomic History    Marital status: Single   Tobacco Use    Smoking status: Never     Passive exposure: Never    Smokeless tobacco: Never   Substance and Sexual Activity    Alcohol use: Never    Drug use: Never     Social Determinants of Health     Financial Resource Strain: Patient Declined (7/11/2023)    [facility] and Its Subsidiaries and Affiliates, [facility]    Overall Financial Resource Strain (CARDIA)     Difficulty of Paying Living Expenses: Patient declined   Food Insecurity: Patient Declined (7/11/2023)    Received from [facility] and Its Subsidiaries and Affiliates, [facility] and Its Subsidiaries and Affiliates    Hunger Vital Sign     Worried About Running Out of Food in the Last Year: Patient declined     Ran Out of Food in the Last Year:  Patient declined   Transportation Needs: Patient Declined (7/11/2023)    [facility], [facility] and Affiliates    PRAPARE - Transportation     Lack of Transportation (Medical): Patient declined     Lack of Transportation (Non-Medical): Patient declined   Stress: Patient Declined (7/11/2023)    [facility], [facility]    [facility] of Occupational Health - Occupational Stress Questionnaire     Feeling of Stress : Patient declined   Housing Stability: Unknown (7/11/2023)    [facility], [facility] and [facility]    Housing Stability Vital Sign     Unable to Pay for Housing in the Last Year: Patient refused     Number of Places Lived in the Last Year: 1   .      Current Outpatient Medications   Medication Sig Dispense Refill    dexAMETHasone (DECADRON) 4 MG Tab Take 20mg (5 tablets) by mouth 12 hours and then again at 6 hours prior to each chemotherapy treatment 40 tablet 1    diclofenac sodium (VOLTAREN) 1 % Gel APPLY 4 GRAMS TOPICALLY TO AFFECTED AREA THREE TO FOUR TIMES A DAY AS NEEDED FOR PAIN      ergocalciferol (ERGOCALCIFEROL) 50,000 unit Cap Take 50,000 Units by mouth every 7 days.      ferrous gluconate (FERGON) 324 MG tablet TAKE 1 TABLET BY MOUTH ONCE DAILY FOR IRON      HYDROcodone-acetaminophen (NORCO) 5-325 mg per tablet Take 1 tablet by mouth every 4 (four) hours as needed for Pain. 84 tablet 0    meloxicam (MOBIC) 15 MG tablet TAKE 1 TABLET BY MOUTH ONCE DAILY (STOP IBUPROFEN)      methIMAzole (TAPAZOLE) 5 MG Tab Take 1 tablet (5 mg total) by  "mouth once daily. 30 tablet 11    potassium chloride SA (K-DUR,KLOR-CON) 20 MEQ tablet Take 2 tablets (40 mEq total) by mouth once daily. for 14 days 28 tablet 0    pregabalin (LYRICA) 50 MG capsule Take 50 mg by mouth 3 (three) times daily.      valsartan-hydrochlorothiazide (DIOVAN-HCT) 160-12.5 mg per tablet Take 1 tablet by mouth.      ondansetron (ZOFRAN) 4 MG tablet Take 1 tablet (4 mg total) by mouth every 6 (six) hours as needed for Nausea. 30 tablet 1     No current facility-administered medications for this encounter.       Review of Systems   Skin:  Positive for wound.   All other systems reviewed and are negative.         Labs Reviewed:   Chemistry:  Lab Results   Component Value Date    BUN 18.4 09/16/2024    BUN 15.8 09/09/2024    CREATININE 1.02 09/16/2024    CREATININE 0.86 09/09/2024    EGFRNORACEVR >60 09/16/2024    EGFRNORACEVR >60 09/09/2024    GLUCOSE 113 (H) 09/16/2024    AST 22 09/16/2024    AST 20 09/09/2024    ALT 23 09/16/2024    ALT 15 09/09/2024    HGBA1C 5.0 09/26/2023        Hematology:  Lab Results   Component Value Date    WBC 3.91 (L) 09/16/2024    WBC 5.08 09/09/2024    HGB 10.7 (L) 09/16/2024    HGB 10.8 (L) 09/09/2024    HCT 32.6 (L) 09/16/2024    HCT 31.8 (L) 09/09/2024     09/16/2024     09/09/2024       Inflammatory Markers:  No results found for: "HSCRP", "SEDRATE"     Objective:        Physical Exam  Vitals reviewed.   Chest:          Comments: Right breast fungating tumor    Right upper chest fungating tumor  Skin:     General: Skin is warm and dry.      Capillary Refill: Capillary refill takes less than 2 seconds.      Findings: Wound present.             Comments: Right breast and subclavian open wounds (fungating tumors)   Neurological:      Mental Status: She is alert.            Wound 09/05/24 1427 Other (comment) Right medial Breast (Active)   09/05/24 1427   Present on Original Admission: Y   Primary Wound Type: Other   Side: Right   Orientation: medial "   Location: Breast   Wound Approximate Age at First Assessment (Weeks):    Wound Number:    Is this injury device related?:    Incision Type:    Closure Method:    Wound Description (Comments):    Type:    Additional Comments:    Ankle-Brachial Index:    Pulses:    Removal Indication and Assessment:    Wound Outcome:    Wound Image   09/16/24 1106   Dressing Appearance Moist drainage 09/16/24 1106   Drainage Amount Moderate 09/16/24 1106   Drainage Characteristics/Odor Serosanguineous 09/16/24 1106   Appearance Pink;White 09/16/24 1106   Tissue loss description Full thickness 09/16/24 1106   Periwound Area Intact 09/16/24 1106   Wound Edges Undefined 09/16/24 1106   Wound Length (cm) 4.2 cm 09/16/24 1106   Wound Width (cm) 5 cm 09/16/24 1106   Wound Depth (cm) 0.2 cm 09/16/24 1106   Wound Volume (cm^3) 4.2 cm^3 09/16/24 1106   Wound Surface Area (cm^2) 21 cm^2 09/16/24 1106   Care Cleansed with:;Antimicrobial agent 09/16/24 1106            Wound 09/05/24 1439 Other (comment) Right Neck (Active)   09/05/24 1439   Present on Original Admission: Y   Primary Wound Type: Other   Side: Right   Orientation:    Location: Neck   Wound Approximate Age at First Assessment (Weeks):    Wound Number:    Is this injury device related?:    Incision Type:    Closure Method:    Wound Description (Comments):    Type:    Additional Comments:    Ankle-Brachial Index:    Pulses:    Removal Indication and Assessment:    Wound Outcome:    Wound Image   09/16/24 1106   Dressing Appearance Moist drainage 09/16/24 1106   Drainage Amount Small 09/16/24 1106   Drainage Characteristics/Odor Serosanguineous 09/16/24 1106   Appearance Slough 09/16/24 1106   Tissue loss description Full thickness 09/16/24 1106   Yellow (%), Wound Tissue Color 100 % 09/16/24 1106   Periwound Area Intact 09/16/24 1106   Wound Edges Undefined 09/16/24 1106   Wound Length (cm) 3 cm 09/16/24 1106   Wound Width (cm) 1 cm 09/16/24 1106   Wound Depth (cm) 0.3 cm 09/16/24  1106   Wound Volume (cm^3) 0.9 cm^3 09/16/24 1106   Wound Surface Area (cm^2) 3 cm^2 09/16/24 1106   Care Cleansed with:;Antimicrobial agent 09/16/24 1106   Dressing Removed;Applied;Changed 09/16/24 1106         Assessment:         ICD-10-CM ICD-9-CM   1. Open wound of right breast, subsequent encounter  S21.001D V58.89     879.0   2. Open chest wound, right, subsequent encounter  S21.101D V58.89     875.0   3. Recurrent breast cancer, right  C50.911 174.9   4. Triple negative breast carcinoma  C50.919 174.9    Z17.1 V86.1         Plan:   Tissue pathology and/or culture taken:  [] Yes [x] No   Sharp debridement performed:   [] Yes [x] No   Labs ordered this visit:   [] Yes [x] No   Imaging ordered this visit:   [] Yes [x] No         1. Open wound of right breast, subsequent encounter     Wound care:    Will cleanse with Dakin's every-other-day applied Xeroform gauze, ABD pad and secure with bra.    Will continue to monitor for signs of infection, watch for increased drainage, pain, fevers, chills, and swelling.      2. Open chest wound, right, subsequent encounter     Wound care:  Will cleanse  every-other-day Dakin's, Xeroform gauze and cover with foam border dressing.    Will continue to monitor for signs of infection, watch for increased drainage, pain, fever, chills, and swelling      3. Recurrent breast cancer, right     Under the care of oncology.    Currently receiving chemotherapy.   4. Triple negative breast carcinoma     Currently receiving chemotherapy.    Under the care of oncology.         The time spent including preparing to see the patient, obtaining patient history and assessment, evaluation of the plan of care, patient/caregiver counseling and education, orders, documentation, coordination of care, and other professional medical management activities for today's encounter was 20 minute.    Time spent performing procedures during today's encounter was 20 minute.    Follow up in about 2 weeks (around  9/30/2024). Teaching provided on s/s to call wound clinic for promptly.  ER precautions taught for after hours and weekends.       LEONID Perez

## 2024-09-19 ENCOUNTER — TELEPHONE (OUTPATIENT)
Dept: HEMATOLOGY/ONCOLOGY | Facility: CLINIC | Age: 59
End: 2024-09-19
Payer: MEDICAID

## 2024-09-19 NOTE — TELEPHONE ENCOUNTER
"Called patient per Dr. Farah to see if patient was planning to complete liver biopsy. Explained to patient that according to last scans, lesions on liver was concerning for metastases. Patient stated that lesion is "just a cyst" and as of right now she does not want to have liver biopsy. Dr. Farah aware.   "

## 2024-09-27 ENCOUNTER — TELEPHONE (OUTPATIENT)
Dept: HEMATOLOGY/ONCOLOGY | Facility: CLINIC | Age: 59
End: 2024-09-27
Payer: MEDICAID

## 2024-09-27 NOTE — TELEPHONE ENCOUNTER
Patient dents today for follow-up after a [facility] visit in April.  She was seen at that time with chronic blood loss anemia and underwent an EGD by Dr. Blank.  This revealed a rather large hiatal hernia and Demetrio lesions.  This was felt to be the most likely reason for symptomatic iron deficiency anemia.  She did have a colonoscopy in 2019 which showed diverticulosis.  She has had no red blood per rectum that she endorses.  No melena.  She is in a wheelchair today.  She tells me that she can ambulate some.  She does have Parkinson's.  She has been on a PPI.  She denies any swallowing difficulty. Patient returned clinic phone call. Patient confirmed appointment.

## 2024-09-27 NOTE — TELEPHONE ENCOUNTER
Called patient to confirm appointment for 9/30/24. Patient did not answer the phone left a voice message.

## 2024-09-29 PROBLEM — R59.0 SUPRACLAVICULAR LYMPHADENOPATHY: Status: ACTIVE | Noted: 2024-09-29

## 2024-09-29 PROBLEM — N63.10 MASSES OF BOTH BREASTS: Status: ACTIVE | Noted: 2024-09-29

## 2024-09-29 PROBLEM — N63.20 MASSES OF BOTH BREASTS: Status: ACTIVE | Noted: 2024-09-29

## 2024-09-29 PROBLEM — R59.0 AXILLARY LYMPHADENOPATHY: Status: ACTIVE | Noted: 2024-09-29

## 2024-09-29 RX ORDER — FAMOTIDINE 10 MG/ML
20 INJECTION INTRAVENOUS
OUTPATIENT
Start: 2024-10-14

## 2024-09-29 RX ORDER — DIPHENHYDRAMINE HYDROCHLORIDE 50 MG/ML
50 INJECTION INTRAMUSCULAR; INTRAVENOUS
Status: CANCELLED
Start: 2024-09-29

## 2024-09-29 RX ORDER — EPINEPHRINE 0.3 MG/.3ML
0.3 INJECTION SUBCUTANEOUS ONCE AS NEEDED
OUTPATIENT
Start: 2024-10-14

## 2024-09-29 RX ORDER — DIPHENHYDRAMINE HYDROCHLORIDE 50 MG/ML
50 INJECTION INTRAMUSCULAR; INTRAVENOUS ONCE AS NEEDED
OUTPATIENT
Start: 2024-10-14

## 2024-09-29 RX ORDER — HEPARIN 100 UNIT/ML
500 SYRINGE INTRAVENOUS
OUTPATIENT
Start: 2024-10-14

## 2024-09-29 RX ORDER — DIPHENHYDRAMINE HYDROCHLORIDE 50 MG/ML
50 INJECTION INTRAMUSCULAR; INTRAVENOUS
Start: 2024-10-14

## 2024-09-29 RX ORDER — SODIUM CHLORIDE 0.9 % (FLUSH) 0.9 %
10 SYRINGE (ML) INJECTION
OUTPATIENT
Start: 2024-10-14

## 2024-09-29 NOTE — PROGRESS NOTES
History:  Past Medical History:   Diagnosis Date    Anemia     Arthritis     Breast cancer     Graves disease     Hypertension     Hyperthyroidism       Past Surgical History:   Procedure Laterality Date    BREAST LUMPECTOMY      INSERTION OF TUNNELED CENTRAL VENOUS CATHETER (CVC) WITH SUBCUTANEOUS PORT N/A 8/19/2024    Procedure: [id number]-X-CATH;  Surgeon: Thomas Verdin Jr., MD;  Location: [facility];  Service: General;  Laterality: N/A;      Social History     Socioeconomic History    Marital status: Single   Tobacco Use    Smoking status: Never     Passive exposure: Never    Smokeless tobacco: Never   Substance and Sexual Activity    Alcohol use: Never    Drug use: Never     Social Drivers of Health     Financial Resource Strain: Patient Declined (7/11/2023)    [facility], [facility]    Overall Financial Resource Strain (CARDIA)     Difficulty of Paying Living Expenses: Patient declined   Food Insecurity: Patient Declined (7/11/2023)    [facility], [facility] and Aff[facility]    Hunger Vital Sign     Worried About Running Out of Food in the Last Year: Patient declined     Ran Out of Food in the Last Year: Patient declined   Transportation Needs: Patient Declined (7/11/2023)    [facility], [facility]    PRAPARE - Transportation     Lack of Transportation (Medical): Patient declined     Lack of Transportation (Non-Medical): Patient declined   Stress: Patient Declined (7/11/2023)    [facility], [facility] and Affiliates    [employer] - Occupational Stress Questionnaire     Feeling of Stress : Patient declined   Housing Stability: Unknown (7/11/2023)    [facility], [facility] and Affiliates    Housing Stability Vital Sign     Unable to Pay for Housing in the Last Year: Patient refused     Number of Places Lived in the Last Year: 1      Family History   Problem Relation Name Age of Onset    Breast cancer Maternal Cousin          Reason for Follow-up:  -history of right breast cancer, diagnosed 2002, ER negative, MS negative, HER2 positive, T2 N1 M0, S/P neoadjuvant chemotherapy, lumpectomy, axillary lymph dissection, adjuvant radiotherapy (completed 10/24/2022)  -local and regional recurrence, triple negative, diagnosed on biopsy of supraclavicular lymph node 06/27/2024; on mammogram, right breast mass; supraclavicular lymphadenopathy; right cervical lymphadenopathy  -Postmenopausal   -Hypercalcemia   -High serum parathyroid hormone (PTH)   -Liver mass, right lobe   -Mediastinal lymphadenopathy   -Lung nodules   -Masses of both breasts   -B/L axillary lymphadenopathy   -Breast cancer metastasized to axillary lymph node, left   -B/L cervical lymphadenopathy   -B/L supraclavicular lymphadenopathy   -history of Graves disease    History of Present Illness:   No chief complaint on file.        Oncologic/Hematologic History:  Oncology History   Stage IV carcinoma of breast   8/27/2024 Initial Diagnosis    Stage IV carcinoma of breast     9/9/2024 -  Chemotherapy    Treatment Summary   Plan Name: OP BREAST PACLITAXEL Q3W  Treatment Goal: Palliative  Status: Active  Start Date: 9/9/2024  End Date: 12/16/2024 (Planned)  Provider: [place]  MD Gagan  Chemotherapy: PACLitaxeL (TAXOL) 175 mg/m2 = 366 mg in 0.9% NaCl 500 mL chemo infusion, 175 mg/m2 = 366 mg, Intravenous, Clinic/HOD 1 time, 1 of 6 cycles     Past medical history: Anemia; arthritis; breast cancer; Graves disease (diagnosed ; started on methimazole by endocrinologist in [place], in ); hypertension; peripheral neuropathy  -2023:  Venous Doppler bilateral lower extremities (swelling):  No DVT  -2023: TTE:  LVEF 55-60%  -2023:  Limited abdominal ultrasound (elevated liver enzymes):  6.6 cm cyst within the liver near the gallbladder; cholelithiasis with minimal gallbladder wall thickening  -2014:  Pelvic ultrasound: Markedly enlarged uterus with multiple large fibroids; endometrial stripe is thickened, 1.4 cm  Procedure/surgical history: Breast lumpectomy   Social history:  .  Lives in [place].  No children.  Never wanted to have children.  Never became pregnant.  No history of tobacco, alcohol, or illicit drug abuse.  Does not work.  Family history:  Sister experienced hyperthyroidism, treated with radioiodine.  She does not know much about her family history but says that there are cancers in folks on both parents sides of family.  A female cousin on mother's side of family experienced breast cancer in her 30s and  from it.  Health maintenance:  Primary care provider in [facility].  Last mammogram 2023 at [facility].  No screening colonoscopy ever.  Menstrual/Ob gyn history:  Menarche at age 13.  Last menstrual cycle 2023.  Never became pregnant.  Never wanted to become pregnant.  Took birth control pills for 2 years several years ago.  No hormone replacement therapy after menopause.  ====================================      59-year-old lady, referred by Rere Jernigan MD, surgery, with recurrent breast cancer.      07/10/2024:  Office note: Rere Jernigan MD (surgery):  Pt is a 59 y.o. female with history  of right breast cancer s/p BCT in 2002 who presents with painless large supraclavicular mass.    First noticed it 4 months ago and has steadily increased in size past month.  Last mammogram 1 year ago and new [place].  Also reports progressive skin changes of the right breast not associated with lumpectomy incision.  Receptor status unknown.       Investigations reviewed:  -no old records available   -according to her, she was diagnosed with right breast cancer in January 2022  -mammogram showed a large lobulated mass, 3.7 x 2.7 cm  -biopsy:  Infiltrative ductal carcinoma, possibly invasive lobular  -right breast mass was palpable in the upper outer quadrant; axillary lymphadenopathy was noted (3-4 palpable lymph nodes right axilla)  -T2 N1 M0 IDC right breast (she had palpable axillary lymph nodes in the right and a large palpable mass in the right breast)  -ER negative; MI negative; HER2 positive  -S/P neoadjuvant chemotherapy (according to her, she received 4 cycles of neoadjuvant chemotherapy with Adriamycin/Taxotere every 3 weeks apart x4 cycles)  -followed by lumpectomy and axillary dissection  -no residual tumor post chemotherapy; 17 axillary lymph nodes negative for tumor  -S/P adjuvant radiotherapy, 6600 cGy/35 fractions, completed 10/24/2022  -07/28/2011:  DEXA scan:  BMD normal  -12/19/2022:  Screening mammogram (comparison: 11/08/2019, etc.):  BI-RADS: 2-benign  -07/06/2023:  Echocardiogram:  LVEF 60%  -11/27/2023:  Bilateral diagnostic mammogram and limited ultrasound right breast (comparison: 12/19/2022, 12/16/2001, etc.) (history of right breast cancer with worsening right breast pain and thickening) large elongated heterogeneous mass with irregular borders outer aspect of the right breast (extending from the nipple outward towards the lateral chest wall at the 8-9 o'clock position, 5.8 x 3 x 1.8 cm although margins are difficult to accurately define), highly suspicious for recurrent malignancy; there  is a separate elongated hypoechoic lesion in the upper outer quadrant right breast 11 o'clock position, 5 cm from nipple, 3 x 2.6 x 0.7 cm, near the site of previous surgery), perhaps benign scar tissue related to previous lumpectomy:  BI-RADS: 5-highly suggestive of malignancy  -07/10/2024:  Surgery Office note:  Painless large supraclavicular mass, noted 4 months ago, steadily enlarging; also reported progressive skin changes right breast not associated with lumpectomy incision; on physical exam, large exophytic right supraclavicular nodule, nonmobile and fixed, overlying skin erythematous without ulceration; right upper outer quadrant lumpectomy incision; right lower inner quadrant periareolar ecchymosis with subcutaneous firmness without distinct mass; slight nipple inversion; no drainage; no palpable axillary lymphadenopathy  -05/30/2024:  Ultrasound soft tissues of the head and neck (lymphadenopathy): Pathologic lymphadenopathy (4.5 x 3.7 x 3.2 cm) at the base of the neck on the right; multiple smaller morphologically abnormal cervical chain lymph nodes on the right which demonstrate heterogeneous echogenicity similar to the dominant mass. Findings highly suspicious for malignancy particularly in this patient with a known history of prior right breast cancer. Dominant mass lesion corresponds with palpable complaint. Recommend biopsy/tissue diagnosis.   -06/27/2024:  Right supraclavicular mass, core needle biopsy (firm 6 x 6 cm exophytic right supraclavicular mass): Metastatic carcinoma in fibroadipose tissue, moderately to poorly-differentiated, consistent with breast origin (metastatic breast carcinoma in fibroadipose tissue  -ER negative (0%); AR negative (0%); HER2 negative (0); Ki-67 high proliferation  (93.6%)    08/08/2024:   Pleasant, healthy-appearing lady who presents for initial medical oncology consultation.  In no acute discomfort.    Says that right supra clavicular mass started April 2024; it has  been enlarging and fluctuating in size.  It is painful.  7/10 severity pain.  Also, pain was right breast area.  Right breast is enlarging.  There is a small area of ulceration in the right breast.  No bleeding or discharge.  She denies fevers or chills.  Has been taking Lyrica meloxicam without the relief of pain.  We will start Norco.  Some fatigue.  However, ECOG 1.  Pain from neck down to breast area, especially at night.  No unusual headaches, focal neurological symptoms, vision impairment, gait impairment, hemoptysis, fevers, chills, any bleeding or discharge from right breast superficial ulceration, abdominal pain, nausea, vomiting, GI bleeding, etc..  No bone pains.  Appetite is fair.    Interval History:  [No matching plan found]   OP BREAST PACLITAXEL Q3W     09/30/2024:   -biomarker testing on right supraclavicular mass biopsy, performed 06/27/2024: Positive for PD-L1 expression (CPS 15)  -08/09/2024:  BRCA1/2 analysis with [employer]-expanded +RNA insight:  Negative for pathogenic mutations, variants of unknown significance, end gross deletion/duplications (negative: No clinically significant variants detected)  -08/28/2024: Baseline tumor markers: CEA 4.64, elevated; CA 27.29 level normal; CA 15-3 level normal  -palliative Taxol, every 3 weeks, started 09/09/2024  -09/13/2024:  Staging FDG PET-CT (was supposed to be performed before start of chemotherapy; however, could only be performed 4 days after starting palliative chemotherapy with Taxol):    Bilateral breast masses with skin thickening and hypermetabolic activity in the right breast consistent with patient's known primary malignancy.  There is metastatic bilateral axillary, bilateral cervical, bilateral supraclavicular, right internal mammary, and mediastinal lymphadenopathy.  Additionally there is a metastatic mass in the liver and metastatic retrocrural lymphadenopathy.  -09/19/2024: Patient refused liver biopsy (apparently, in denies;  according to her, liver lesion is just a cyst)  -09/30/2024:  WBC 4.06, hemoglobin 13.6, platelets 192, ANC 3.43; potassium 3.4, low; calcium 10.7, albumin 3.8 (mild hypercalcemia)  >>>  -check magnesium level   -start potassium chloride 20 mEq p.o. q.d. x2 weeks; no refills; in 2 weeks, recheck CMP and magnesium level  -check ionized calcium level, intact PTH level, PTH side, vitamin-D level  -hydrate with 1 L of normal saline in our office today.  Presents for a follow-up visit.  Overall, stable.  Says that appetite is great.  ECOG 1.  Did not experience any side effects with Taxol.  Denies undue weakness, fatigue, malaise, unusual headaches, focal neurological symptoms, neuropathy, chest pain, cough, dyspnea, hemoptysis, abdominal pain, nausea, vomiting, GI bleeding, anorexia, unintentional weight loss, any new lumps or lymphadenopathy, etc..  Has widely metastatic disease.  She is aware of extremely guarded/poor prognosis.  However, given her good performance status, reasonable to continue chemotherapy.  So far, she has received only 1 cycle of Taxol.  No severe cytopenias.  Denies muscle cramps.  Today, hypokalemic and hypocalcemic; electrolyte abnormalities will be corrected.      Medications:  Current Outpatient Medications on File Prior to Visit   Medication Sig Dispense Refill    dexAMETHasone (DECADRON) 4 MG Tab Take 20mg (5 tablets) by mouth 12 hours and then again at 6 hours prior to each chemotherapy treatment 40 tablet 1    diclofenac sodium (VOLTAREN) 1 % Gel APPLY 4 GRAMS TOPICALLY TO AFFECTED AREA THREE TO FOUR TIMES A DAY AS NEEDED FOR PAIN      ergocalciferol (ERGOCALCIFEROL) 50,000 unit Cap Take 50,000 Units by mouth every 7 days.      ferrous gluconate (FERGON) 324 MG tablet TAKE 1 TABLET BY MOUTH ONCE DAILY FOR IRON      HYDROcodone-acetaminophen (NORCO) 5-325 mg per tablet Take 1 tablet by mouth every 4 (four) hours as needed for Pain. 84 tablet 0    meloxicam (MOBIC) 15 MG tablet TAKE 1  TABLET BY MOUTH ONCE DAILY (STOP IBUPROFEN)      methIMAzole (TAPAZOLE) 5 MG Tab Take 1 tablet (5 mg total) by mouth once daily. 30 tablet 11    ondansetron (ZOFRAN) 4 MG tablet Take 1 tablet (4 mg total) by mouth every 6 (six) hours as needed for Nausea. 30 tablet 1    pregabalin (LYRICA) 50 MG capsule Take 50 mg by mouth 3 (three) times daily.      valsartan-hydrochlorothiazide (DIOVAN-HCT) 160-12.5 mg per tablet Take 1 tablet by mouth.       No current facility-administered medications on file prior to visit.       Review of Systems:   All systems reviewed and found to be negative except for the symptoms detailed above    Physical Examination:   VITAL SIGNS:   There were no vitals filed for this visit.      GENERAL:  In no apparent distress.    HEAD:  No signs of head trauma.  EYES:  Pupils are equal.  Extraocular motions intact.    EARS:  Hearing grossly intact.  MOUTH:  Oropharynx is normal.   NECK:  No adenopathy, no JVD.     CHEST:  Chest with clear breath sounds bilaterally.  No wheezes, rales, rhonchi.    CARDIAC:  Regular rate and rhythm.  S1 and S2, without murmurs, gallops, rubs.  VASCULAR:  No Edema.  Peripheral pulses normal and equal in all extremities.  ABDOMEN:  Soft, without detectable tenderness.  No sign of distention.  No   rebound or guarding, and no masses palpated.   Bowel Sounds normal.  MUSCULOSKELETAL:  Good range of motion of all major joints. Extremities without clubbing, cyanosis or edema.    NEUROLOGIC EXAM:  Alert and oriented x 3.  No focal sensory or strength deficits.   Speech normal.  Follows commands.  PSYCHIATRIC:  Mood normal.  -08/08/2024: Breast examination was performed with a verbal consent, in the presence of Jani Heck LPN; entire right breast is involved with tumor; entire right breast is indurated with tumor, with conglomeration of nodules around the central area; a small superficial ulceration is noted along the inferior medial aspect of right areolar area; a large  slightly tender 6 supraclavicular masses noted; diffuse edema is noted in the right supraclavicular area and right breast area as well as infraclavicular area; left breast is normal    No results found for this or any previous visit.  No results found for this or any previous visit.    Assessment:  Problem List Items Addressed This Visit    None    Orders for 09/30/2024:  -check magnesium level   -start potassium chloride 20 mEq p.o. q.d. x2 weeks; no refills; in 2 weeks, recheck CMP and magnesium level  -check ionized calcium level, intact PTH level, PTH side, vitamin-D level  -hydrate with 1 L of normal saline in our office today  -continue Taxol every 3 weeks  Check CBC and CMP weekly  Re-stage with contrast-enhanced CT scans of C/A/P/soft tissues of the neck with contrast mid November (not before)   Wound care follow-up for management of breast wound   Continue Norco 5 mg every 4 hours PRN for pain  Follow-up with endocrinology for Graves disease     Follow-up with NP in 3 weeks     Above discussed at length with the patient.  All questions answered.  Discussed labs and scans and gave her copies of relevant records.  Guarded prognosis discussed.  She understands and agrees with this plan.  =================================      History of right breast IDC, ER negative, MO negative, HER2 positive:  -mammogram: 3.7 x 2.7 cm right breast mass  -pathology: Infiltrative ductal carcinoma, possibly invasive lobular  -ER negative, MO negative, HER2 positive   -T2 N1 M0; palpable axillary lymphadenopathy; large palpable mass right breast)  -S/P neoadjuvant chemotherapy (according to her, she received 4 cycles of neoadjuvant chemotherapy with Adriamycin/Taxotere every 3 weeks apart x4 cycles)  -followed by lumpectomy and axillary dissection  -no residual tumor post chemotherapy; 17 axillary lymph nodes negative for tumor  -S/P adjuvant radiotherapy, 6600 cGy/35 fractions, completed 10/24/2022  -details of adjuvant  chemotherapy, if any, not known  >>>  -screening mammogram 12/19/2022: BI-RADS: 2-benign  >>>  Regional, local, and metastatic recurrence, triple negative:  -echocardiogram 07/06/2023: LVEF 60%  -mammogram and ultrasound 11/27/2023:  Right breast mass 5.8 x 3 x 1.8 cm: BI-RADS: 5  -surgery consultation/follow-up 07/10/2024: Painless large supraclavicular mass, noted 4 months ago, steadily enlarging; progressive skin changes right breast not associated with lumpectomy incision, large exophytic right supraclavicular nodule/lymphadenopathy) nonmobile and fixed; overlying skin erythematous without ulceration), right lower inner quadrant periareolar ecchymosis and subcutaneous firmness without distinct mass; no palpable axillary lymphadenopathy  -ultrasound neck 05/30/2024:  Pathologic lymphadenopathy 4.5 x 3.7 x 3.2 cm at the base of the neck of the right; multiple smaller morphologically abnormal cervical chain lymph nodes on the right  -core needle biopsy right supraclavicular mass 06/27/2024:  Firm, 6 x 6 cm: Moderately to poorly-differentiated metastatic breast carcinoma  -ER negative (0%); MA negative (0%); HER2 negative (0); Ki-67 high proliferation (93.6%)  -08/08/2024: Breast examination was performed with her verbal consent, in the presence of Jani Heck LPN; entire right breast is involved with tumor; entire right breast is indurated with tumor, with conglomeration of nodules around the central area; a small superficial ulceration is noted along the inferior medial aspect of right areolar area; a large slightly tender 6 supraclavicular masses noted; diffuse edema is noted in the right supraclavicular area and right breast area as well as infraclavicular area; left breast is normal  -tissue NGS testing (performed on right supraclavicular mass biopsy 06/27/2024):  HRD positive (CELINE-high); negative for AKT 1, BRAF, BRCA1, BRCA2, ESR 1, etc.  -08/09/2024: Liquid biopsy:  Borderline TMB  -08/08/2024:  Mild  hypercalcemia: Calcium 10.6, albumin 3.9; intact PTH level 100.3, elevated; PTH related peptide normal; vitamin-D level normal  -08/08/2024:  Labs: Postmenopausal  -Hypercalcemia  -08/14/2024: Echocardiogram:  LVEF 55-60%  -08/19/2024: MediPort placed  -brain MRI 08/20/2024: No brain metastases  -staging CTs C/A/P/neck 08/20/2024: Extensive metastases  -biomarker testing on right supraclavicular mass biopsy, performed 06/27/2024: Positive for PD-L1 expression (CPS 15); rest, as prior  -genetic testing 08/09/2024:  Negative  -baseline tumor markers 08/28/2024:  Only CEA level slightly elevated, 4.64  -palliative Taxol, every 3 weeks, started 09/09/2024  -staging PET-CT 09/13/2024:  (was supposed to be performed before starting chemotherapy; could only be performed 4 days after starting palliative chemotherapy with Taxol):  Bilateral breast masses; bilateral axillary, bilateral cervical, bilateral supraclavicular, right internal mammary, and mediastinal lymphadenopathy; metastatic mass in liver; metastatic retrocrural lymphadenopathy  -09/19/2024: Patient refused liver biopsy (apparently, in denies; according to her, liver lesion is just a cyst)  >>>  Plan:  -triple negative recurrent, metastatic disease   -genetic testing negative  -extensive metastatic disease  -ER 0, DC 0, HER2 0  -PD-L1 positive, CPS 15; therefore, first-line chemotherapy recommended: Pembrolizumab +chemotherapy (albumin bound paclitaxel, paclitaxel, or gemcitabine and carboplatin (category 1, Preferred)  -before PD-L1 testing results was available, we started her on Taxol;  -therefore, in 2nd line, we will use pembrolizumab +chemotherapy  -palliative Taxol every 3 weeks, started 09/09/2024; continue every 3 weeks (Taxol 175 mg per m2 IV every 3 weeks, to continue until disease progression or until unacceptable toxicity)  -with the Taxol, monitor for potential cardiovascular effects including infusion related hypotension/bradycardia/hypotension,  peripheral neuropathy, edema, nausea, vomiting, diarrhea, stomatitis, cytopenias, etc.   -check CBC and CMP weekly  -re-stage with contrast-enhanced CT scans of C/A/P/soft tissues of the neck 2 months (mid November) after starting chemotherapy  -patient has been previously treated with Adriamycin/Taxotere; therefore, we will avoid Adriamycin to the extent possible  -at some point, olaparib may be considered because of HRD positive status  -she declined liver biopsy  -follow-up with NP in 3 weeks    Chemotherapy:  -Taxol 175 mg per m2 every 3 weeks until disease progression or unacceptable toxicity    Side effects/monitoring with Taxol:  -watch for cardiovascular effects including infusion related hypotension, bradycardia, hypertension, etc.  -watch out for extravasation: Taxol is an irritant with vesicle like properties  -peripheral neuropathy:  Primarily distal sensory neuropathy; a mixture of paresthesias and dysesthesias including burning, numbness, tingling, and shooting pains, typically in a stocking-glove distribution; most mild-to-moderate cases resolve several months after discontinuation but maybe longer in patients with diabetes.  Severe neuropathy maybe irreversible in some patients  -side effects in> 10% patients:  Edema, hypotension, alopecia, nausea, vomiting, diarrhea, stomatitis, cytopenias, LFT abnormalities, peripheral neuropathy, arthralgias, myalgias, etc.  -Boxed warnings: Hypersensitivity reactions; bone marrow suppression  -premedicate with dexamethasone (20 mg orally at 12 and 6 hours prior to paclitaxel, diphenhydramine (50 mg IV 30 to 60 minutes prior to paclitaxel), and cimetidine or famotidine (IV 30 to 60 minutes prior to paclitaxel).  -do not repeat course until neutrophil count is =/> 1500 mm3 and the platelet count is =/> 100,000 mm3; reduced doses by 20% if patient experiences severe peripheral neuropathy or severe neutropenia <500 mm3 for a week or longer     -08/08/2024: Refer to  wound care for management of breast wound   -08/08/2024:  Start Norco 5 mg every 4 hours PRN for pain      -09/30/2024:  WBC 4.06, hemoglobin 13.6, platelets 192, ANC 3.43; potassium 3.4, low; calcium 10.7, albumin 3.8 (mild hypercalcemia)  >>>  -check magnesium level   -start potassium chloride 20 mEq p.o. q.d. x2 weeks; no refills; in 2 weeks, recheck CMP and magnesium level  -check ionized calcium level, intact PTH level, PTH side, vitamin-D level  -hydrate with 1 L of normal saline in our office today.      Sites of disease/recurrence/metastases:  Right supraclavicular lymphadenopathy; right breast mass, right cervical lymphadenopathy, left axillary lymphadenopathy, mediastinal and hilar lymphadenopathy, hepatic mass (metastases), left breast nodule  -B/L axillary lymphadenopathy   -Masses of both breasts   -B/L cervical lymphadenopathy   -B/L supraclavicular lymphadenopathy   -staging PET-CT 09/13/2024:  (was supposed to be performed before starting chemotherapy; could only be performed 4 days after starting palliative chemotherapy with Taxol):  Bilateral breast masses; bilateral axillary, bilateral cervical, bilateral supraclavicular, right internal mammary, and mediastinal lymphadenopathy; metastatic mass in liver; metastatic retrocrural lymphadenopathy  -09/19/2024: Patient refused liver biopsy (apparently, in denies; according to her, liver lesion is just a cyst)      Molecular markers:  -ER negative (0%); WY negative (0%); HER2 negative (0); Ki-67 high proliferation (93.6%)  -tissue NGS testing (performed on right supraclavicular mass biopsy 06/27/2024):    Positive for PD-L1 expression (CPS 15)  HRD positive (CLEINE-high); negative for AKT 1, BRAF, BRCA1, BRCA2, ESR 1, etc.  -08/09/2024: Liquid biopsy:  Borderline TMB  -genetic testing 08/09/2024:  Negative      Graves' disease:   -diagnosed 07/2023  -07/11/2023: CT soft tissues of the neck with contrast) dysphagia, acute thyrotoxicosis, goiter): Mild  diffuse enlargement of the thyroid gland without displacement or mass effect of the aerodigestive tract; no suspicious cervical lymphadenopathy  -07/12/2023: Ultrasound thyroid:  Hoarseness, thyrotoxicosis:  Heterogeneous hyperemic thyroid typical of thyroiditis; small 6 mm mid pole right thyroid nodule is not suspicious and does not warrant surveillance per TI-RADS criteria  -confirmed with TSH receptor antibodies (TRAb/TSI, i.e., thyroid-stimulating immunoglobulins)  -as of 05/21/2024, on methimazole 5 mg daily; has responded well  -endocrinologist: Roland Ross MD   -05/21/2024:  Endocrinologist plan:  Plan to complete 18 months of therapy before attempting to taper of methimazole   >>>  -follow-up with endocrinology          Follow-up:  No follow-ups on file.

## 2024-09-30 ENCOUNTER — HOSPITAL ENCOUNTER (OUTPATIENT)
Dept: WOUND CARE | Facility: HOSPITAL | Age: 59
Discharge: HOME | End: 2024-09-30
Attending: NURSE PRACTITIONER
Payer: MEDICAID

## 2024-09-30 ENCOUNTER — OFFICE VISIT (OUTPATIENT)
Dept: HEMATOLOGY/ONCOLOGY | Facility: CLINIC | Age: 59
End: 2024-09-30
Attending: INTERNAL MEDICINE
Payer: MEDICAID

## 2024-09-30 ENCOUNTER — INFUSION (OUTPATIENT)
Dept: INFUSION THERAPY | Facility: HOSPITAL | Age: 59
End: 2024-09-30
Attending: INTERNAL MEDICINE
Payer: MEDICAID

## 2024-09-30 VITALS
RESPIRATION RATE: 18 BRPM | DIASTOLIC BLOOD PRESSURE: 79 MMHG | OXYGEN SATURATION: 100 % | HEIGHT: 65 IN | HEART RATE: 100 BPM | SYSTOLIC BLOOD PRESSURE: 136 MMHG | TEMPERATURE: 98 F | BODY MASS INDEX: 34.82 KG/M2 | WEIGHT: 209 LBS

## 2024-09-30 VITALS
HEART RATE: 90 BPM | OXYGEN SATURATION: 100 % | TEMPERATURE: 98 F | DIASTOLIC BLOOD PRESSURE: 85 MMHG | SYSTOLIC BLOOD PRESSURE: 143 MMHG

## 2024-09-30 VITALS — SYSTOLIC BLOOD PRESSURE: 145 MMHG | DIASTOLIC BLOOD PRESSURE: 87 MMHG | HEART RATE: 81 BPM

## 2024-09-30 DIAGNOSIS — C50.919 TRIPLE NEGATIVE BREAST CARCINOMA: ICD-10-CM

## 2024-09-30 DIAGNOSIS — C77.0 NEOPLASM OF RIGHT BREAST, REGIONAL LYMPH NODE STAGING CATEGORY N3C: METASTASIS IN IPSILATERAL SUPRACLAVICULAR LYMPH NODE: ICD-10-CM

## 2024-09-30 DIAGNOSIS — C50.911 NEOPLASM OF RIGHT BREAST, REGIONAL LYMPH NODE STAGING CATEGORY N3C: METASTASIS IN IPSILATERAL SUPRACLAVICULAR LYMPH NODE: ICD-10-CM

## 2024-09-30 DIAGNOSIS — C50.911 RECURRENT BREAST CANCER, RIGHT: ICD-10-CM

## 2024-09-30 DIAGNOSIS — E87.6 HYPOKALEMIA: ICD-10-CM

## 2024-09-30 DIAGNOSIS — Z17.421 TRIPLE NEGATIVE BREAST CARCINOMA: ICD-10-CM

## 2024-09-30 DIAGNOSIS — R59.0 AXILLARY LYMPHADENOPATHY: ICD-10-CM

## 2024-09-30 DIAGNOSIS — Z78.0 POSTMENOPAUSAL: ICD-10-CM

## 2024-09-30 DIAGNOSIS — S21.101A OPEN CHEST WOUND, RIGHT, INITIAL ENCOUNTER: ICD-10-CM

## 2024-09-30 DIAGNOSIS — R59.0 SUPRACLAVICULAR LYMPHADENOPATHY: ICD-10-CM

## 2024-09-30 DIAGNOSIS — N63.10 MASSES OF BOTH BREASTS: ICD-10-CM

## 2024-09-30 DIAGNOSIS — Z98.890 HISTORY OF LUMPECTOMY OF RIGHT BREAST: ICD-10-CM

## 2024-09-30 DIAGNOSIS — R59.0 LAD (LYMPHADENOPATHY) OF RIGHT CERVICAL REGION: ICD-10-CM

## 2024-09-30 DIAGNOSIS — Z51.11 CHEMOTHERAPY MANAGEMENT, ENCOUNTER FOR: ICD-10-CM

## 2024-09-30 DIAGNOSIS — C77.3 BREAST CANCER METASTASIZED TO AXILLARY LYMPH NODE, LEFT: ICD-10-CM

## 2024-09-30 DIAGNOSIS — S21.101D OPEN CHEST WOUND, RIGHT, SUBSEQUENT ENCOUNTER: ICD-10-CM

## 2024-09-30 DIAGNOSIS — C50.911 CARCINOMA OF RIGHT BREAST, STAGE 4: Primary | ICD-10-CM

## 2024-09-30 DIAGNOSIS — R59.0 MEDIASTINAL LYMPHADENOPATHY: ICD-10-CM

## 2024-09-30 DIAGNOSIS — N63.20 MASSES OF BOTH BREASTS: ICD-10-CM

## 2024-09-30 DIAGNOSIS — C50.911 RECURRENT BREAST CANCER, RIGHT: Primary | ICD-10-CM

## 2024-09-30 DIAGNOSIS — R79.89 HIGH SERUM PARATHYROID HORMONE (PTH): ICD-10-CM

## 2024-09-30 DIAGNOSIS — S21.001D OPEN WOUND OF RIGHT BREAST, SUBSEQUENT ENCOUNTER: Primary | ICD-10-CM

## 2024-09-30 DIAGNOSIS — E83.52 HYPERCALCEMIA: ICD-10-CM

## 2024-09-30 DIAGNOSIS — Z85.3 HISTORY OF RIGHT BREAST CANCER: ICD-10-CM

## 2024-09-30 DIAGNOSIS — C50.911 CARCINOMA OF RIGHT BREAST, STAGE 4: ICD-10-CM

## 2024-09-30 DIAGNOSIS — R59.0 CERVICAL LYMPHADENOPATHY: ICD-10-CM

## 2024-09-30 DIAGNOSIS — E05.00 GRAVES DISEASE: ICD-10-CM

## 2024-09-30 DIAGNOSIS — R22.1 SUBCUTANEOUS NODULE OF NECK: Primary | ICD-10-CM

## 2024-09-30 DIAGNOSIS — C50.912 BREAST CANCER METASTASIZED TO AXILLARY LYMPH NODE, LEFT: ICD-10-CM

## 2024-09-30 LAB
25(OH)D3+25(OH)D2 SERPL-MCNC: 60 NG/ML (ref 30–80)
MAGNESIUM SERPL-MCNC: 2.1 MG/DL (ref 1.6–2.6)
PTH-INTACT SERPL-MCNC: 64.9 PG/ML (ref 8.7–77)

## 2024-09-30 PROCEDURE — 82397 CHEMILUMINESCENT ASSAY: CPT | Performed by: INTERNAL MEDICINE

## 2024-09-30 PROCEDURE — 96415 CHEMO IV INFUSION ADDL HR: CPT

## 2024-09-30 PROCEDURE — 99215 OFFICE O/P EST HI 40 MIN: CPT | Mod: S$PBB,,, | Performed by: INTERNAL MEDICINE

## 2024-09-30 PROCEDURE — 63600175 PHARM REV CODE 636 W HCPCS: Performed by: INTERNAL MEDICINE

## 2024-09-30 PROCEDURE — 25000003 PHARM REV CODE 250: Performed by: INTERNAL MEDICINE

## 2024-09-30 PROCEDURE — 3008F BODY MASS INDEX DOCD: CPT | Mod: CPTII,,, | Performed by: INTERNAL MEDICINE

## 2024-09-30 PROCEDURE — 99214 OFFICE O/P EST MOD 30 MIN: CPT | Mod: PBBFAC,25 | Performed by: INTERNAL MEDICINE

## 2024-09-30 PROCEDURE — 83970 ASSAY OF PARATHORMONE: CPT | Performed by: INTERNAL MEDICINE

## 2024-09-30 PROCEDURE — 82306 VITAMIN D 25 HYDROXY: CPT | Performed by: INTERNAL MEDICINE

## 2024-09-30 PROCEDURE — 3078F DIAST BP <80 MM HG: CPT | Mod: CPTII,,, | Performed by: INTERNAL MEDICINE

## 2024-09-30 PROCEDURE — 1159F MED LIST DOCD IN RCRD: CPT | Mod: CPTII,,, | Performed by: INTERNAL MEDICINE

## 2024-09-30 PROCEDURE — 96413 CHEMO IV INFUSION 1 HR: CPT

## 2024-09-30 PROCEDURE — 99211 OFF/OP EST MAY X REQ PHY/QHP: CPT | Mod: 27

## 2024-09-30 PROCEDURE — 27000999 HC MEDICAL RECORD PHOTO DOCUMENTATION

## 2024-09-30 PROCEDURE — 3075F SYST BP GE 130 - 139MM HG: CPT | Mod: CPTII,,, | Performed by: INTERNAL MEDICINE

## 2024-09-30 PROCEDURE — 96361 HYDRATE IV INFUSION ADD-ON: CPT

## 2024-09-30 PROCEDURE — 99214 OFFICE O/P EST MOD 30 MIN: CPT | Mod: ,,, | Performed by: NURSE PRACTITIONER

## 2024-09-30 PROCEDURE — 1160F RVW MEDS BY RX/DR IN RCRD: CPT | Mod: CPTII,,, | Performed by: INTERNAL MEDICINE

## 2024-09-30 PROCEDURE — 83735 ASSAY OF MAGNESIUM: CPT | Performed by: INTERNAL MEDICINE

## 2024-09-30 PROCEDURE — 96375 TX/PRO/DX INJ NEW DRUG ADDON: CPT

## 2024-09-30 PROCEDURE — 96367 TX/PROPH/DG ADDL SEQ IV INF: CPT

## 2024-09-30 RX ORDER — DIPHENHYDRAMINE HYDROCHLORIDE 50 MG/ML
50 INJECTION INTRAMUSCULAR; INTRAVENOUS
Status: COMPLETED | OUTPATIENT
Start: 2024-09-30 | End: 2024-09-30

## 2024-09-30 RX ORDER — DIPHENHYDRAMINE HYDROCHLORIDE 50 MG/ML
50 INJECTION INTRAMUSCULAR; INTRAVENOUS ONCE AS NEEDED
Status: DISCONTINUED | OUTPATIENT
Start: 2024-09-30 | End: 2024-09-30 | Stop reason: HOSPADM

## 2024-09-30 RX ORDER — HEPARIN 100 UNIT/ML
500 SYRINGE INTRAVENOUS
OUTPATIENT
Start: 2024-09-30

## 2024-09-30 RX ORDER — HEPARIN 100 UNIT/ML
500 SYRINGE INTRAVENOUS
Status: DISCONTINUED | OUTPATIENT
Start: 2024-09-30 | End: 2024-09-30 | Stop reason: HOSPADM

## 2024-09-30 RX ORDER — HEPARIN 100 UNIT/ML
500 SYRINGE INTRAVENOUS
Status: CANCELLED | OUTPATIENT
Start: 2024-09-30

## 2024-09-30 RX ORDER — FAMOTIDINE 10 MG/ML
20 INJECTION INTRAVENOUS
Status: COMPLETED | OUTPATIENT
Start: 2024-09-30 | End: 2024-09-30

## 2024-09-30 RX ORDER — SODIUM CHLORIDE 0.9 % (FLUSH) 0.9 %
10 SYRINGE (ML) INJECTION
OUTPATIENT
Start: 2024-09-30

## 2024-09-30 RX ORDER — EPINEPHRINE 0.3 MG/.3ML
0.3 INJECTION SUBCUTANEOUS ONCE AS NEEDED
Status: DISCONTINUED | OUTPATIENT
Start: 2024-09-30 | End: 2024-09-30 | Stop reason: HOSPADM

## 2024-09-30 RX ORDER — SODIUM CHLORIDE 0.9 % (FLUSH) 0.9 %
10 SYRINGE (ML) INJECTION
Status: CANCELLED | OUTPATIENT
Start: 2024-09-30

## 2024-09-30 RX ORDER — SODIUM CHLORIDE 0.9 % (FLUSH) 0.9 %
10 SYRINGE (ML) INJECTION
Status: DISCONTINUED | OUTPATIENT
Start: 2024-09-30 | End: 2024-09-30 | Stop reason: HOSPADM

## 2024-09-30 RX ORDER — POTASSIUM CHLORIDE 1500 MG/1
20 TABLET, EXTENDED RELEASE ORAL DAILY
Qty: 14 TABLET | Refills: 0 | Status: SHIPPED | OUTPATIENT
Start: 2024-09-30 | End: 2024-10-14

## 2024-09-30 RX ADMIN — SODIUM CHLORIDE 366 MG: 9 INJECTION, SOLUTION INTRAVENOUS at 11:09

## 2024-09-30 RX ADMIN — FAMOTIDINE 20 MG: 10 INJECTION, SOLUTION INTRAVENOUS at 10:09

## 2024-09-30 RX ADMIN — HEPARIN 500 UNITS: 100 SYRINGE at 03:09

## 2024-09-30 RX ADMIN — SODIUM CHLORIDE 1000 ML: 9 INJECTION, SOLUTION INTRAVENOUS at 09:09

## 2024-09-30 RX ADMIN — DEXAMETHASONE SODIUM PHOSPHATE 20 MG: 4 INJECTION, SOLUTION INTRA-ARTICULAR; INTRALESIONAL; INTRAMUSCULAR; INTRAVENOUS; SOFT TISSUE at 10:09

## 2024-09-30 RX ADMIN — SODIUM CHLORIDE: 9 INJECTION, SOLUTION INTRAVENOUS at 09:09

## 2024-09-30 RX ADMIN — DIPHENHYDRAMINE HYDROCHLORIDE 50 MG: 50 INJECTION INTRAMUSCULAR; INTRAVENOUS at 10:09

## 2024-09-30 NOTE — PATIENT INSTRUCTIONS
Pt seen today by: Perri Estes NP    Self care DRESSING INSTRUCTIONS:        Wound location: Rt side of neck, Rt breast    Dressings to neck to be changed done twice daily.  Dressings to right breast to be changed every other day.          Wound to Rt side of neck: Cleanse wound with Dakins full strength. Apply Dakins half strength moistened gauze into wound. Cover with gauze and medfix tape.   Change dressing twice daily or as needed if soiled or not intact.    Wound to Rt breast: Cleanse with Dakins 0.25%, apply Xeroform, abd pad secure with tape or may just apply bra over bandage, change dressing every other day and/or as needed if soiled or not intact.    Return visit: 2 week    Nutrition:  The current daily value (%DV) for protein is 50 grams per day and is meant as a general goal for most people. Further increasing your dietary protein intake is very important for wound healing. Typically one needs over 100g of protein per day to help with wound healing needs.  If you are a dialysis patient or have problems with your kidneys, talk to your Nephrologist about how much protein you can take in with your condition.  Examples of high protein items that can be added to your diet include: eggs, chicken, red meats, almonds, cottage cheese, Greek yogurt, beans, and peanut butter.  Fortified protein bars, shakes and drinks can add 15-30 additional grams of protein per serving.  Also add:   1 daily general multivitamin   Vitamin C : 500mg twice daily   Zinc 220 mg daily  Vit D : once daily    Contact [facility] wound care team at [phone] or go to the nearest Emergency department if:    You have a fever greater than 101 taken by mouth.  Your pain gets worse or does not go away, even after taking your regular pain medicine.  Your skin around your wound is red, hot, swollen, or draining pus.  You have bleeding that continues to come through the dressing after holding pressure for 10 minutes       Call our [facility] wound clinic  for questions/concerns  [phone] .

## 2024-09-30 NOTE — Clinical Note
-check magnesium level  -start potassium chloride 20 mEq p.o. q.d. x2 weeks; no refills; in 2 weeks, recheck CMP and magnesium level -check ionized calcium level, intact PTH level, PTH side, vitamin-D level -hydrate with 1 L of normal saline in our office today -continue Taxol every 3 weeks Check CBC and CMP weekly Re-stage with contrast-enhanced CT scans of C/A/P/soft tissues of the neck with contrast mid November (not before)  Wound care follow-up for management of breast wound  Continue Norco 5 mg every 4 hours PRN for pain Follow-up with endocrinology for Graves disease   Follow-up with NP in 3 weeks

## 2024-10-01 NOTE — PROGRESS NOTES
[facility]   Outpatient Wound Care     Subjective:   Patient ID: Eli Bangura is a 59 y.o. female.    Chief Complaint: Wound Care (Right breast and shoulder wound)      History of Present Illness:   59 y.o. 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  Presents to clinic after chemotherapy.  Reviewed all previous medical history since last visit of 09/16/2024.  Past medical history:  [place] was cancer free for breast cancer for 22 years.  Under the care of oncology clinic.  Followed by Lambert Mantilla APRN for PCP.     Today's visit 09/30/2024:  Reviewed previous progress notes since last visit of 09/16/2024.  Patient arrived after chemotherapy today.  Right upper sub cleansed in wound bed 100% slough with slight odor draining moderate serosanguineous drainage.  Right breast dimpling with orange peel appearance., deformity, fungating tumor noted red granulating wound bed with minimal serosanguineous drainage.  Discussion with the patient today these or fungating tumors cancer and will change wound care to right supplies to assist with odor.  During examination in discussion with the patient today patient questions whether I can heel her wounds.  Reinforced these are fungating tumors in wound not be able to fully granulate wounds will provide Wound care to assist with smell and comfort.  Patient is refusing Home Health at this time.  Discussion with the patient today regarding current chemotherapy treatments if they were holiday for curative patient voices doctor never explained anything to her and wanted to know with chart said whether it was palliative or curative informed that it was palliative chemotherapy.  Patient voices only  want to hear good new.  Reinforced not my intention to deliver bad news.  Demonstrated New wound care at bedside to right upper cleansing twice daily with full strength Dakin's wet-to-dry dressing moistened with full strength Dakin's and cover with gauze and tape.  Right breast cleanse every-other-day with full strength Dakin's applied Xeroform gauze cover with ABD pad, and secure with tape or bra.   Instructions and supplies given.  Left chest wall Mediport.  Instructed to call the office with any questions, concerns, or new skin issues.  Verbalized understanding of all instructions.    09/16/2024:  Reviewed previous progress notes from 09/05/2024.  Presents to clinic alone.  Dressings to right breast and upper chest removed per nursing staff at bedside.  Patient has a right double aluminum PICC line currently receiving chemotherapy.  Fungating tumor to the right breast in upper chest area red granulating wound bed with moderate slough.  Instructed we will continue Dakin's solution applied Xeroform gauze to both areas and cover with foam border dressing to upper chest right subclavian area, and ABD pad secure with bra for right breast.  Reinforced that fungating tumor are to keep clean and free from infection.  Instructions and supplies given.  Will have her follow up in 2 weeks to clinic.  Instructed to call the office with any questions, concerns, or new skin issues.  Verbalized understanding of all instructions.    9/5/24:  Presents to clinic alone.  Right breast fungating tumor red granulating wound bed with slough noted moderate serosanguineous drainage.  Right supplies given wound red granulating wound bed with slough monitor serosanguineous drainage.  Discussion with the patient today will assist her with keeping fungating tumors clean and assist with smell by using Dakin's solution.  Instructed once started chemotherapy areas should decrease in size but may experienced more drainage.  Will have her just  cleansed area with Dakin's solution, apply Xeroform gauze to both areas right subclavian cover foam border dressing and right breast ABD pad secure with bra.  May perform every-other-day. Instructions and supplies given.  All wound care performed per nursing staff at bedside.  Will follow up with a clinic in 10 days.  Instructed to call the office with any questions, concerns, or new skin issues.  Verbalized understanding of all instructions.      History includes:      Past Medical History:   Diagnosis Date    Anemia     Arthritis     Breast cancer     Graves disease     Hypertension     Hyperthyroidism       Past Surgical History:   Procedure Laterality Date    BREAST LUMPECTOMY      INSERTION OF TUNNELED CENTRAL VENOUS CATHETER (CVC) WITH SUBCUTANEOUS PORT N/A 8/19/2024    Procedure: [id number];  Surgeon: Thomas Verdin Jr., MD;  Location: [facility];  Service: General;  Laterality: N/A;      Social History     Socioeconomic History    Marital status: Single   Tobacco Use    Smoking status: Never     Passive exposure: Never    Smokeless tobacco: Never   Substance and Sexual Activity    Alcohol use: Never    Drug use: Never     Social Drivers of Health     Financial Resource Strain: Patient Declined (7/11/2023)    Received from [facility]sidiaries and Affiliates, [facility]    Overall Financial Resource Strain (CARDIA)     Difficulty of Paying Living Expenses: Patient declined   Food Insecurity: Patient Declined (7/11/2023)    Received from [facility] and Its Subsidiaries and Affiliates, [facility] and Its Subsidiaries and Affiliates    Hunger Vital Sign     Worried About Running Out of Food in the Last Year: Patient declined     Ran Out of Food in the Last Year: Patient declined   Transportation  Needs: Patient Declined (7/11/2023)    [facility], [facility] and [facility]    PRAPARE - Transportation     Lack of Transportation (Medical): Patient declined     Lack of Transportation (Non-Medical): Patient declined   Stress: Patient Declined (7/11/2023)    [facility], [facility]    [facility] Occupational [facility] - Occupational Stress Questionnaire     Feeling of Stress : Patient declined   Housing Stability: Unknown (7/11/2023)    [facility]    Housing Stability Vital Sign     Unable to Pay for Housing in the Last Year: Patient refused     Number of Places Lived in the Last Year: 1   .      Current Outpatient Medications   Medication Sig Dispense Refill    dexAMETHasone (DECADRON) 4 MG Tab Take 20mg (5 tablets) by mouth 12 hours and then again at 6 hours prior to each chemotherapy treatment 40 tablet 1    diclofenac sodium (VOLTAREN) 1 % Gel APPLY 4 GRAMS TOPICALLY TO AFFECTED AREA THREE TO FOUR TIMES A DAY AS NEEDED FOR PAIN      ergocalciferol (ERGOCALCIFEROL) 50,000 unit Cap Take 50,000 Units by mouth every 7 days.      ferrous gluconate (FERGON) 324 MG tablet TAKE 1 TABLET BY MOUTH ONCE DAILY FOR IRON      HYDROcodone-acetaminophen (NORCO) 5-325 mg per tablet Take 1 tablet by mouth every 4 (four) hours as needed for Pain. 84 tablet 0    meloxicam (MOBIC) 15 MG tablet TAKE 1 TABLET BY MOUTH ONCE DAILY (STOP IBUPROFEN)      methIMAzole (TAPAZOLE) 5 MG Tab Take 1 tablet (5 mg total) by mouth once daily. 30 tablet 11     "ondansetron (ZOFRAN) 4 MG tablet Take 1 tablet (4 mg total) by mouth every 6 (six) hours as needed for Nausea. 30 tablet 1    potassium chloride (K-TAB) 20 mEq Take 1 tablet (20 mEq total) by mouth once daily. for 14 days 14 tablet 0    pregabalin (LYRICA) 50 MG capsule Take 50 mg by mouth 3 (three) times daily.      valsartan-hydrochlorothiazide (DIOVAN-HCT) 160-12.5 mg per tablet Take 1 tablet by mouth.       No current facility-administered medications for this encounter.       Review of Systems   Skin:  Positive for wound.   All other systems reviewed and are negative.         Labs Reviewed:   Chemistry:  Lab Results   Component Value Date    BUN 13.4 09/30/2024    BUN 18.4 09/16/2024    CREATININE 1.04 (H) 09/30/2024    CREATININE 1.02 09/16/2024    EGFRNORACEVR >60 09/30/2024    EGFRNORACEVR >60 09/16/2024    GLUCOSE 172 (H) 09/30/2024    AST 23 09/30/2024    AST 22 09/16/2024    ALT 20 09/30/2024    ALT 23 09/16/2024    HGBA1C 5.0 09/26/2023        Hematology:  Lab Results   Component Value Date    WBC 4.06 (L) 09/30/2024    WBC 3.91 (L) 09/16/2024    HGB 13.6 09/30/2024    HGB 10.7 (L) 09/16/2024    HCT 41.2 09/30/2024    HCT 32.6 (L) 09/16/2024     09/30/2024     09/16/2024       Inflammatory Markers:  No results found for: "HSCRP", "SEDRATE"     Objective:        Physical Exam  Vitals reviewed.   Chest:          Comments: Right breast fungating tumor    Right upper chest fungating tumor  Skin:     General: Skin is warm and dry.      Capillary Refill: Capillary refill takes less than 2 seconds.      Findings: Wound present.             Comments: Right breast and subclavian open wounds (fungating tumors)   Neurological:      Mental Status: She is alert.            Wound 09/05/24 1427 Other (comment) Right medial Breast (Active)   09/05/24 1427   Present on Original Admission: Y   Primary Wound Type: Other   Side: Right   Orientation: medial   Location: Breast   Wound Approximate Age at First " Assessment (Weeks):    Wound Number:    Is this injury device related?:    Incision Type:    Closure Method:    Wound Description (Comments):    Type:    Additional Comments:    Ankle-Brachial Index:    Pulses:    Removal Indication and Assessment:    Wound Outcome:    Wound Image   09/30/24 1513   Dressing Appearance Moist drainage 09/30/24 1513   Drainage Amount Moderate 09/30/24 1513   Drainage Characteristics/Odor Serosanguineous 09/30/24 1513   Appearance Pink 09/30/24 1513   Wound Length (cm) 4 cm 09/30/24 1513   Wound Width (cm) 5 cm 09/30/24 1513   Wound Depth (cm) 0.1 cm 09/30/24 1513   Wound Volume (cm^3) 2 cm^3 09/30/24 1513   Wound Surface Area (cm^2) 20 cm^2 09/30/24 1513            Wound 09/05/24 1439 Other (comment) Right Neck (Active)   09/05/24 1439   Present on Original Admission: Y   Primary Wound Type: Other   Side: Right   Orientation:    Location: Neck   Wound Approximate Age at First Assessment (Weeks):    Wound Number:    Is this injury device related?:    Incision Type:    Closure Method:    Wound Description (Comments):    Type:    Additional Comments:    Ankle-Brachial Index:    Pulses:    Removal Indication and Assessment:    Wound Outcome:    Wound Image   09/30/24 1511   Dressing Appearance Moist drainage 09/30/24 1511   Drainage Amount Copious 09/30/24 1511   Drainage Characteristics/Odor Serosanguineous;Bleeding controlled;Malodorous 09/30/24 1511   Appearance Slough 09/30/24 1511   Wound Length (cm) 3.2 cm 09/30/24 1511   Wound Width (cm) 1.3 cm 09/30/24 1511   Wound Depth (cm) 0.8 cm 09/30/24 1511   Wound Volume (cm^3) 3.328 cm^3 09/30/24 1511   Wound Surface Area (cm^2) 4.16 cm^2 09/30/24 1511         Assessment:         ICD-10-CM ICD-9-CM   1. Open wound of right breast, subsequent encounter  S21.001D V58.89     879.0   2. Open chest wound, right, subsequent encounter  S21.101D V58.89     875.0   3. Recurrent breast cancer, right  C50.911 174.9   4. Triple negative breast  carcinoma  C50.919 174.9    Z17.1 V86.1         Plan:   Tissue pathology and/or culture taken:  [] Yes [x] No   Sharp debridement performed:   [] Yes [x] No   Labs ordered this visit:   [] Yes [x] No   Imaging ordered this visit:   [] Yes [x] No         1. Open wound of right breast, subsequent encounter     Continued wound care  Will cleanse with full strength Dakin's every-other-day applied Xeroform gauze, ABD pad and secure with bra.    Will continue to monitor for signs of infection, watch for increased drainage, pain, fevers, chills, and swelling.      2. Open chest wound, right, subsequent encounter     Right subclavian open wound:  Will cleanse  twice daily wet-to-dry with full strength Dakin's with gauze and tape.    Will continue to monitor for signs of infection, watch for increased drainage, pain, fever, chills, and swelling      3. Recurrent breast cancer, right     Under the care of oncology.    Currently receiving chemotherapy.   4. Triple negative breast carcinoma     Currently receiving chemotherapy.    Under the care of oncology.         The time spent including preparing to see the patient, obtaining patient history and assessment, evaluation of the plan of care, patient/caregiver counseling and education, orders, documentation, coordination of care, and other professional medical management activities for today's encounter was 20 minute.    Time spent performing procedures during today's encounter was 20 minute.    Follow up in about 1 week (around 10/7/2024). Teaching provided on s/s to call wound clinic for promptly.  ER precautions taught for after hours and weekends.       LEONID Perez

## 2024-10-03 LAB — PTH RELATED PROT SERPL-SCNC: 0.7 PMOL/L

## 2024-10-07 ENCOUNTER — APPOINTMENT (OUTPATIENT)
Dept: HEMATOLOGY/ONCOLOGY | Facility: CLINIC | Age: 59
End: 2024-10-07
Payer: MEDICAID

## 2024-10-07 ENCOUNTER — HOSPITAL ENCOUNTER (OUTPATIENT)
Dept: WOUND CARE | Facility: HOSPITAL | Age: 59
Discharge: HOME | End: 2024-10-07
Attending: NURSE PRACTITIONER
Payer: MEDICAID

## 2024-10-07 VITALS
SYSTOLIC BLOOD PRESSURE: 124 MMHG | DIASTOLIC BLOOD PRESSURE: 78 MMHG | TEMPERATURE: 98 F | HEART RATE: 99 BPM | OXYGEN SATURATION: 100 %

## 2024-10-07 DIAGNOSIS — C50.919 TRIPLE NEGATIVE BREAST CARCINOMA: ICD-10-CM

## 2024-10-07 DIAGNOSIS — S21.001D OPEN WOUND OF RIGHT BREAST, SUBSEQUENT ENCOUNTER: Primary | ICD-10-CM

## 2024-10-07 DIAGNOSIS — C50.911 RECURRENT BREAST CANCER, RIGHT: ICD-10-CM

## 2024-10-07 DIAGNOSIS — S21.101D OPEN CHEST WOUND, RIGHT, SUBSEQUENT ENCOUNTER: ICD-10-CM

## 2024-10-07 DIAGNOSIS — Z17.421 TRIPLE NEGATIVE BREAST CARCINOMA: ICD-10-CM

## 2024-10-07 DIAGNOSIS — E87.6 HYPOKALEMIA: ICD-10-CM

## 2024-10-07 PROBLEM — S21.001A OPEN WOUND OF RIGHT BREAST: Status: ACTIVE | Noted: 2024-10-07

## 2024-10-07 LAB
ABS NEUT CALC (OHS): 1.42 X10(3)/MCL (ref 2.1–9.2)
ALBUMIN SERPL-MCNC: 3.8 G/DL (ref 3.5–5)
ALBUMIN/GLOB SERPL: 1.1 RATIO (ref 1.1–2)
ALP SERPL-CCNC: 113 UNIT/L (ref 40–150)
ALT SERPL-CCNC: 26 UNIT/L (ref 0–55)
ANION GAP SERPL CALC-SCNC: 9 MEQ/L
ANISOCYTOSIS BLD QL SMEAR: ABNORMAL
AST SERPL-CCNC: 21 UNIT/L (ref 5–34)
BILIRUB SERPL-MCNC: 0.5 MG/DL
BUN SERPL-MCNC: 14.3 MG/DL (ref 9.8–20.1)
CALCIUM SERPL-MCNC: 9.8 MG/DL (ref 8.4–10.2)
CHLORIDE SERPL-SCNC: 107 MMOL/L (ref 98–107)
CO2 SERPL-SCNC: 26 MMOL/L (ref 22–29)
CREAT SERPL-MCNC: 0.83 MG/DL (ref 0.55–1.02)
CREAT/UREA NIT SERPL: 17
ERYTHROCYTE [DISTWIDTH] IN BLOOD BY AUTOMATED COUNT: 12.5 % (ref 11.5–17)
GFR SERPLBLD CREATININE-BSD FMLA CKD-EPI: >60 ML/MIN/1.73/M2
GLOBULIN SER-MCNC: 3.6 GM/DL (ref 2.4–3.5)
GLUCOSE SERPL-MCNC: 109 MG/DL (ref 74–100)
HCT VFR BLD AUTO: 31.6 % (ref 37–47)
HGB BLD-MCNC: 10.5 G/DL (ref 12–16)
LYMPHOCYTES NFR BLD MANUAL: 0.52 X10(3)/MCL
LYMPHOCYTES NFR BLD MANUAL: 26 % (ref 13–40)
MAGNESIUM SERPL-MCNC: 2.1 MG/DL (ref 1.6–2.6)
MCH RBC QN AUTO: 33.1 PG (ref 27–31)
MCHC RBC AUTO-ENTMCNC: 33.2 G/DL (ref 33–36)
MCV RBC AUTO: 99.7 FL (ref 80–94)
MICROCYTES BLD QL SMEAR: ABNORMAL
MONOCYTES NFR BLD MANUAL: 0.06 X10(3)/MCL (ref 0.1–1.3)
MONOCYTES NFR BLD MANUAL: 3 % (ref 2–11)
NEUTROPHILS NFR BLD MANUAL: 71 % (ref 47–80)
NRBC BLD AUTO-RTO: 0 %
PLATELET # BLD AUTO: 195 X10(3)/MCL (ref 130–400)
PLATELET # BLD EST: ADEQUATE 10*3/UL
PMV BLD AUTO: 10.6 FL (ref 7.4–10.4)
POIKILOCYTOSIS BLD QL SMEAR: ABNORMAL
POTASSIUM SERPL-SCNC: 3.2 MMOL/L (ref 3.5–5.1)
PROT SERPL-MCNC: 7.4 GM/DL (ref 6.4–8.3)
RBC # BLD AUTO: 3.17 X10(6)/MCL (ref 4.2–5.4)
RBC MORPH BLD: ABNORMAL
SODIUM SERPL-SCNC: 142 MMOL/L (ref 136–145)
TEAR DROP CELL (OLG): ABNORMAL
WBC # BLD AUTO: 2 X10(3)/MCL (ref 4.5–11.5)

## 2024-10-07 PROCEDURE — 27000999 HC MEDICAL RECORD PHOTO DOCUMENTATION

## 2024-10-07 PROCEDURE — 99211 OFF/OP EST MAY X REQ PHY/QHP: CPT

## 2024-10-07 PROCEDURE — 36415 COLL VENOUS BLD VENIPUNCTURE: CPT

## 2024-10-07 PROCEDURE — 83735 ASSAY OF MAGNESIUM: CPT

## 2024-10-07 PROCEDURE — 80053 COMPREHEN METABOLIC PANEL: CPT

## 2024-10-07 PROCEDURE — 85027 COMPLETE CBC AUTOMATED: CPT

## 2024-10-07 PROCEDURE — 99213 OFFICE O/P EST LOW 20 MIN: CPT | Mod: ,,, | Performed by: NURSE PRACTITIONER

## 2024-10-07 NOTE — PATIENT INSTRUCTIONS
Pt seen today by: Perri Estes NP    Self care DRESSING INSTRUCTIONS:        Wound location: Rt side of neck, Rt breast    Dressings to neck to be changed done twice daily.  Dressings to right breast to be changed every other day.          Wound to Rt side of neck: Cleanse wound with Dakins half strength. Apply Dakins half strength moistened gauze onto wound. Cover with foam border dressing.  Change dressing twice daily or as needed if soiled or not intact.    Wound to Rt breast: Cleanse with Dakins 0.25%, apply Xeroform, abd pad secure with tape or may just apply bra over bandage, change dressing every other day and/or as needed if soiled or not intact.    Return visit: 10/22/24 at 1pm   Nutrition:  The current daily value (%DV) for protein is 50 grams per day and is meant as a general goal for most people. Further increasing your dietary protein intake is very important for wound healing. Typically one needs over 100g of protein per day to help with wound healing needs.  If you are a dialysis patient or have problems with your kidneys, talk to your Nephrologist about how much protein you can take in with your condition.  Examples of high protein items that can be added to your diet include: eggs, chicken, red meats, almonds, cottage cheese, Greek yogurt, beans, and peanut butter.  Fortified protein bars, shakes and drinks can add 15-30 additional grams of protein per serving.  Also add:   1 daily general multivitamin   Vitamin C : 500mg twice daily   Zinc 220 mg daily  Vit D : once daily    Contact [facility] wound care team at [phone] or go to the nearest Emergency department if:    You have a fever greater than 101 taken by mouth.  Your pain gets worse or does not go away, even after taking your regular pain medicine.  Your skin around your wound is red, hot, swollen, or draining pus.  You have bleeding that continues to come through the dressing after holding pressure for 10 minutes       Call our [facility] wound  [facility] for questions/concerns  [phone] .

## 2024-10-07 NOTE — PROGRESS NOTES
[facility]   Outpatient Wound Care     Subjective:   Patient ID: Eli Bangura is a 59 y.o. female.    Chief Complaint: Wound Care (Right breast and neck wound)      History of Present Illness:   59 y.o. 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   Presents to clinic alone.  Under the care of oncology.  Currently receiving chemotherapy.  Reviewed all previous medical history since last visit of 09/30/2024.  Past medical history:  [place] was cancer free for breast cancer for 22 years.  Under the care of oncology clinic.  Followed by Lambert Mantilla APRN for PCP.     Today's visit 10/07/2024:  Reviewed previous progress notes since last visit of 09/30/2024.  All dressings removed per nursing staff at bedside.  Right subclavian wound red granulating wound bed with moderate amount of slough slight odor moderate serosanguineous drainage.  Right breast red granulating wound beds with dimpling and orange peel appearance minimal serosanguineous drainage.  Discussion today will continue same wound care.  Reinforced these are maintenance wounds.  Cleansed all wounds with half strength Dakin's will apply wet-to-dry half strength Dakin's gauze to right upper subclavian, Kerlix dressing perform daily.  Right breast cleansed with half strength Dakin's, cover with Xeroform gauze, ABD pad, secure with sport bra.  All wound care performed per nursing staff at bedside.  Instructions and supplies given.  Will return to the clinic in 2 weeks per patient request.  Instructed to call the office with any questions, concerns, or new skin issues.  Verbalized understanding of all instructions.    09/30/2024:  Reviewed previous progress notes since last visit of  09/16/2024.  Patient arrived after chemotherapy today.  Right upper subclavian wound bed 100% slough with slight odor draining moderate serosanguineous drainage.  Right breast dimpling with orange peel appearance., deformity, fungating tumor noted red granulating wound bed with minimal serosanguineous drainage.  Discussion with the patient today these or fungating tumors cancer and will change wound care to right supplies to assist with odor.  During examination in discussion with the patient today patient questions whether I can heel her wounds.  Reinforced these are fungating tumors in wound not be able to fully granulate wounds will provide Wound care to assist with smell and comfort.  Patient is refusing Home Health at this time.  Discussion with the patient today regarding current chemotherapy treatments if they were holiday for curative patient voices doctor never explained anything to her and wanted to know with chart said whether it was palliative or curative informed that it was palliative chemotherapy.  Patient voices only want to hear good new.  Reinforced not my intention to deliver bad news.  Demonstrated New wound care at bedside to right upper cleansing twice daily with full strength Dakin's wet-to-dry dressing moistened with full strength Dakin's and cover with gauze and tape.  Right breast cleanse every-other-day with full strength Dakin's applied Xeroform gauze cover with ABD pad, and secure with tape or bra.   Instructions and supplies given.  Left chest wall Mediport.  Instructed to call the office with any questions, concerns, or new skin issues.  Verbalized understanding of all instructions.    09/16/2024:  Reviewed previous progress notes from 09/05/2024.  Presents to clinic alone.  Dressings to right breast and upper chest removed per nursing staff at bedside.  Patient has a right double aluminum PICC line currently receiving chemotherapy.  Fungating tumor to the right breast in upper chest  area red granulating wound bed with moderate slough.  Instructed we will continue Dakin's solution applied Xeroform gauze to both areas and cover with foam border dressing to upper chest right subclavian area, and ABD pad secure with bra for right breast.  Reinforced that fungating tumor are to keep clean and free from infection.  Instructions and supplies given.  Will have her follow up in 2 weeks to clinic.  Instructed to call the office with any questions, concerns, or new skin issues.  Verbalized understanding of all instructions.    9/5/24:  Presents to clinic alone.  Right breast fungating tumor red granulating wound bed with slough noted moderate serosanguineous drainage.  Right supplies given wound red granulating wound bed with slough monitor serosanguineous drainage.  Discussion with the patient today will assist her with keeping fungating tumors clean and assist with smell by using Dakin's solution.  Instructed once started chemotherapy areas should decrease in size but may experienced more drainage.  Will have her just cleansed area with Dakin's solution, apply Xeroform gauze to both areas right subclavian cover foam border dressing and right breast ABD pad secure with bra.  May perform every-other-day. Instructions and supplies given.  All wound care performed per nursing staff at bedside.  Will follow up with a clinic in 10 days.  Instructed to call the office with any questions, concerns, or new skin issues.  Verbalized understanding of all instructions.      History includes:      Past Medical History:   Diagnosis Date    Anemia     Arthritis     Breast cancer     Graves disease     Hypertension     Hyperthyroidism       Past Surgical History:   Procedure Laterality Date    BREAST LUMPECTOMY      INSERTION OF TUNNELED CENTRAL VENOUS CATHETER (CVC) WITH SUBCUTANEOUS PORT N/A 8/19/2024    Procedure: [id number];  Surgeon: Thomas Verdin Jr., MD;  Location: [facility];  Service: General;   Laterality: N/A;      Social History     Socioeconomic History    Marital status: Single   Tobacco Use    Smoking status: Never     Passive exposure: Never    Smokeless tobacco: Never   Substance and Sexual Activity    Alcohol use: Never    Drug use: Never     Social Drivers of Health     Financial Resource Strain: Patient Declined (7/11/2023)    [facility]    Overall Financial Resource Strain (CARDIA)     Difficulty of Paying Living Expenses: Patient declined   Food Insecurity: Patient Declined (7/11/2023)    [facility], [facility] and [facility]    Hunger Vital Sign     Worried About Running Out of Food in the Last Year: Patient declined     Ran Out of Food in the Last Year: Patient declined   Transportation Needs: Patient Declined (7/11/2023)    [facility]    PRAPARE - Transportation     Lack of Transportation (Medical): Patient declined     Lack of Transportation (Non-Medical): Patient declined   Stress: Patient Declined (7/11/2023)    [facility], [facility]    [employer] - Occupational Stress Questionnaire     Feeling of Stress : Patient declined   Housing Stability: Unknown (7/11/2023)    [facility], [facility]  [facility]sidi[facility] and Affiliates    Housing Stability Vital Sign     Unable to Pay for Housing in the Last Year: Patient refused     Number of Places Lived in the Last Year: 1   .      Current Outpatient Medications   Medication Sig Dispense Refill    dexAMETHasone (DECADRON) 4 MG Tab Take 20mg (5 tablets) by mouth 12 hours and then again at 6 hours prior to each chemotherapy treatment 40 tablet 1    diclofenac sodium (VOLTAREN) 1 % Gel APPLY 4 GRAMS TOPICALLY TO AFFECTED AREA THREE TO FOUR TIMES A DAY AS NEEDED FOR PAIN      ergocalciferol (ERGOCALCIFEROL) 50,000 unit Cap Take 50,000 Units by mouth every 7 days.      ferrous gluconate (FERGON) 324 MG tablet TAKE 1 TABLET BY MOUTH ONCE DAILY FOR IRON      HYDROcodone-acetaminophen (NORCO) 5-325 mg per tablet Take 1 tablet by mouth every 4 (four) hours as needed for Pain. 84 tablet 0    meloxicam (MOBIC) 15 MG tablet TAKE 1 TABLET BY MOUTH ONCE DAILY (STOP IBUPROFEN)      methIMAzole (TAPAZOLE) 5 MG Tab Take 1 tablet (5 mg total) by mouth once daily. 30 tablet 11    ondansetron (ZOFRAN) 4 MG tablet Take 1 tablet (4 mg total) by mouth every 6 (six) hours as needed for Nausea. 30 tablet 1    potassium chloride (K-TAB) 20 mEq Take 1 tablet (20 mEq total) by mouth once daily. for 14 days 14 tablet 0    pregabalin (LYRICA) 50 MG capsule Take 50 mg by mouth 3 (three) times daily.      valsartan-hydrochlorothiazide (DIOVAN-HCT) 160-12.5 mg per tablet Take 1 tablet by mouth.       No current facility-administered medications for this encounter.       Review of Systems   Skin:  Positive for wound.   All other systems reviewed and are negative.         Labs Reviewed:   Chemistry:  Lab Results   Component Value Date    BUN 14.3 10/07/2024    BUN 13.4 09/30/2024    CREATININE 0.83 10/07/2024    CREATININE 1.04 (H) 09/30/2024    EGFRNORACEVR >60 10/07/2024    EGFRNORACEVR >60 09/30/2024    GLUCOSE 109 (H) 10/07/2024    AST 21 10/07/2024    AST 23 09/30/2024     "ALT 26 10/07/2024    ALT 20 09/30/2024    HGBA1C 5.0 09/26/2023        Hematology:  Lab Results   Component Value Date    WBC 2.00 (L) 10/07/2024    WBC 4.06 (L) 09/30/2024    HGB 10.5 (L) 10/07/2024    HGB 13.6 09/30/2024    HCT 31.6 (L) 10/07/2024    HCT 41.2 09/30/2024     10/07/2024     09/30/2024       Inflammatory Markers:  No results found for: "HSCRP", "SEDRATE"     Objective:        Physical Exam  Vitals reviewed.   Chest:          Comments: Right breast fungating tumor    Right upper chest fungating tumor  Skin:     General: Skin is warm and dry.      Capillary Refill: Capillary refill takes less than 2 seconds.      Findings: Wound present.             Comments: Right breast and subclavian open wounds (fungating tumors)   Neurological:      Mental Status: She is alert.            Wound 09/05/24 1427 Other (comment) Right medial Breast (Active)   09/05/24 1427   Present on Original Admission: Y   Primary Wound Type: Other   Side: Right   Orientation: medial   Location: Breast   Wound Approximate Age at First Assessment (Weeks):    Wound Number:    Is this injury device related?:    Incision Type:    Closure Method:    Wound Description (Comments):    Type:    Additional Comments:    Ankle-Brachial Index:    Pulses:    Removal Indication and Assessment:    Wound Outcome:    Wound Image   10/07/24 0956   Dressing Appearance Moist drainage 10/07/24 0956   Drainage Amount Moderate 10/07/24 0956   Drainage Characteristics/Odor Serosanguineous 10/07/24 0956   Appearance [place] 10/07/24 0956   Wound Length (cm) 5 cm 10/07/24 0956   Wound Width (cm) 8.3 cm 10/07/24 0956   Wound Depth (cm) 0.3 cm 10/07/24 0956   Wound Volume (cm^3) 12.45 cm^3 10/07/24 0956   Wound Surface Area (cm^2) 41.5 cm^2 10/07/24 0956            Wound 09/05/24 1439 Other (comment) Right Neck (Active)   09/05/24 1439   Present on Original Admission: Y   Primary Wound Type: Other   Side: Right   Orientation:    Location: Neck "   Wound Approximate Age at First Assessment (Weeks):    Wound Number:    Is this injury device related?:    Incision Type:    Closure Method:    Wound Description (Comments):    Type:    Additional Comments:    Ankle-Brachial Index:    Pulses:    Removal Indication and Assessment:    Wound Outcome:    Wound Image   10/07/24 0958   Dressing Appearance Moist drainage 10/07/24 0958   Drainage Amount Moderate 10/07/24 0958   Drainage Characteristics/Odor Serosanguineous 10/07/24 0958   Appearance Yellow 10/07/24 0958   Wound Length (cm) 3 cm 10/07/24 0958   Wound Width (cm) 1 cm 10/07/24 0958   Wound Depth (cm) 1.1 cm 10/07/24 0958   Wound Volume (cm^3) 3.3 cm^3 10/07/24 0958   Wound Surface Area (cm^2) 3 cm^2 10/07/24 0958         Assessment:         ICD-10-CM ICD-9-CM   1. Open wound of right breast, subsequent encounter  S21.001D V58.89     879.0   2. Open chest wound, right, subsequent encounter  S21.101D V58.89     875.0   3. Recurrent breast cancer, right  C50.911 174.9   4. Triple negative breast carcinoma  C50.919 174.9    Z17.421 V86.1         Plan:   Tissue pathology and/or culture taken:  [] Yes [x] No   Sharp debridement performed:   [] Yes [x] No   Labs ordered this visit:   [] Yes [x] No   Imaging ordered this visit:   [] Yes [x] No         1. Open wound of right breast, subsequent encounter     Wound care:  Will cleanse with full strength Dakin's every-other-day apply Xeroform gauze, ABD pad and secure with bra.    Will continue to monitor for signs of infection, watch for increased drainage, pain, fevers, chills, and swelling.   2. Open chest wound, right, subsequent encounter     Right subclavian open wound continued wound care:  Will cleanse daily up to twice daily wet-to-dry with full strength Dakin's with gauze and tape.    Will continue to monitor for signs of infection, watch for increased drainage, pain, fever, chills, and swelling   3. Recurrent breast cancer, right     Under the care of  oncology.    Currently receiving chemotherapy.   4. Triple negative breast carcinoma     Currently receiving chemotherapy.    Under the care of oncology.      The time spent including preparing to see the patient, obtaining patient history and assessment, evaluation of the plan of care, patient/caregiver counseling and education, orders, documentation, coordination of care, and other professional medical management activities for today's encounter was 15 minute.    Time spent performing procedures during today's encounter was 15 minute.    Follow up in about 15 days (around 10/22/2024) for 1 pm after Chemo. Teaching provided on s/s to call wound clinic for promptly.  ER precautions taught for after hours and weekends.       LEONID Perez

## 2024-10-21 ENCOUNTER — TELEPHONE (OUTPATIENT)
Dept: HEMATOLOGY/ONCOLOGY | Facility: CLINIC | Age: 59
End: 2024-10-21
Payer: MEDICAID

## 2024-10-22 ENCOUNTER — INFUSION (OUTPATIENT)
Dept: INFUSION THERAPY | Facility: HOSPITAL | Age: 59
End: 2024-10-22
Attending: INTERNAL MEDICINE
Payer: MEDICAID

## 2024-10-22 ENCOUNTER — APPOINTMENT (OUTPATIENT)
Dept: HEMATOLOGY/ONCOLOGY | Facility: CLINIC | Age: 59
End: 2024-10-22
Payer: MEDICAID

## 2024-10-22 ENCOUNTER — OFFICE VISIT (OUTPATIENT)
Dept: HEMATOLOGY/ONCOLOGY | Facility: CLINIC | Age: 59
End: 2024-10-22
Payer: MEDICAID

## 2024-10-22 VITALS
OXYGEN SATURATION: 100 % | WEIGHT: 203.81 LBS | TEMPERATURE: 99 F | HEIGHT: 65 IN | DIASTOLIC BLOOD PRESSURE: 84 MMHG | BODY MASS INDEX: 33.96 KG/M2 | HEART RATE: 102 BPM | SYSTOLIC BLOOD PRESSURE: 150 MMHG

## 2024-10-22 VITALS
DIASTOLIC BLOOD PRESSURE: 88 MMHG | RESPIRATION RATE: 18 BRPM | HEART RATE: 88 BPM | OXYGEN SATURATION: 98 % | SYSTOLIC BLOOD PRESSURE: 149 MMHG | TEMPERATURE: 96 F

## 2024-10-22 DIAGNOSIS — E87.6 HYPOKALEMIA: ICD-10-CM

## 2024-10-22 DIAGNOSIS — C50.911 CARCINOMA OF RIGHT BREAST, STAGE 4: Primary | ICD-10-CM

## 2024-10-22 DIAGNOSIS — C50.911 RECURRENT BREAST CANCER, RIGHT: Primary | ICD-10-CM

## 2024-10-22 LAB
ALBUMIN SERPL-MCNC: 4 G/DL (ref 3.5–5)
ALBUMIN/GLOB SERPL: 0.9 RATIO (ref 1.1–2)
ALP SERPL-CCNC: 148 UNIT/L (ref 40–150)
ALT SERPL-CCNC: 21 UNIT/L (ref 0–55)
ANION GAP SERPL CALC-SCNC: 9 MEQ/L
AST SERPL-CCNC: 23 UNIT/L (ref 5–34)
BASOPHILS # BLD AUTO: 0.02 X10(3)/MCL
BASOPHILS NFR BLD AUTO: 0.3 %
BILIRUB SERPL-MCNC: 0.4 MG/DL
BUN SERPL-MCNC: 11.8 MG/DL (ref 9.8–20.1)
CALCIUM SERPL-MCNC: 10.6 MG/DL (ref 8.4–10.2)
CHLORIDE SERPL-SCNC: 109 MMOL/L (ref 98–107)
CO2 SERPL-SCNC: 25 MMOL/L (ref 22–29)
CREAT SERPL-MCNC: 0.94 MG/DL (ref 0.55–1.02)
CREAT/UREA NIT SERPL: 13
EOSINOPHIL # BLD AUTO: 0 X10(3)/MCL (ref 0–0.9)
EOSINOPHIL NFR BLD AUTO: 0 %
ERYTHROCYTE [DISTWIDTH] IN BLOOD BY AUTOMATED COUNT: 13 % (ref 11.5–17)
GFR SERPLBLD CREATININE-BSD FMLA CKD-EPI: >60 ML/MIN/1.73/M2
GLOBULIN SER-MCNC: 4.3 GM/DL (ref 2.4–3.5)
GLUCOSE SERPL-MCNC: 154 MG/DL (ref 74–100)
HCT VFR BLD AUTO: 41 % (ref 37–47)
HGB BLD-MCNC: 13.3 G/DL (ref 12–16)
IMM GRANULOCYTES # BLD AUTO: 0.13 X10(3)/MCL (ref 0–0.04)
IMM GRANULOCYTES NFR BLD AUTO: 1.8 %
LYMPHOCYTES # BLD AUTO: 0.97 X10(3)/MCL (ref 0.6–4.6)
LYMPHOCYTES NFR BLD AUTO: 13.7 %
MAGNESIUM SERPL-MCNC: 2.3 MG/DL (ref 1.6–2.6)
MCH RBC QN AUTO: 32.1 PG (ref 27–31)
MCHC RBC AUTO-ENTMCNC: 32.4 G/DL (ref 33–36)
MCV RBC AUTO: 99 FL (ref 80–94)
MONOCYTES # BLD AUTO: 0.09 X10(3)/MCL (ref 0.1–1.3)
MONOCYTES NFR BLD AUTO: 1.3 %
NEUTROPHILS # BLD AUTO: 5.85 X10(3)/MCL (ref 2.1–9.2)
NEUTROPHILS NFR BLD AUTO: 82.9 %
NRBC BLD AUTO-RTO: 0.3 %
PLATELET # BLD AUTO: 239 X10(3)/MCL (ref 130–400)
PMV BLD AUTO: 10.3 FL (ref 7.4–10.4)
POTASSIUM SERPL-SCNC: 3.3 MMOL/L (ref 3.5–5.1)
PROT SERPL-MCNC: 8.3 GM/DL (ref 6.4–8.3)
RBC # BLD AUTO: 4.14 X10(6)/MCL (ref 4.2–5.4)
SODIUM SERPL-SCNC: 143 MMOL/L (ref 136–145)
WBC # BLD AUTO: 7.06 X10(3)/MCL (ref 4.5–11.5)

## 2024-10-22 PROCEDURE — 83735 ASSAY OF MAGNESIUM: CPT

## 2024-10-22 PROCEDURE — 96367 TX/PROPH/DG ADDL SEQ IV INF: CPT

## 2024-10-22 PROCEDURE — 96413 CHEMO IV INFUSION 1 HR: CPT

## 2024-10-22 PROCEDURE — 1159F MED LIST DOCD IN RCRD: CPT | Mod: CPTII,,, | Performed by: NURSE PRACTITIONER

## 2024-10-22 PROCEDURE — 96375 TX/PRO/DX INJ NEW DRUG ADDON: CPT

## 2024-10-22 PROCEDURE — 63600175 PHARM REV CODE 636 W HCPCS: Performed by: INTERNAL MEDICINE

## 2024-10-22 PROCEDURE — 80053 COMPREHEN METABOLIC PANEL: CPT

## 2024-10-22 PROCEDURE — 1160F RVW MEDS BY RX/DR IN RCRD: CPT | Mod: CPTII,,, | Performed by: NURSE PRACTITIONER

## 2024-10-22 PROCEDURE — 96415 CHEMO IV INFUSION ADDL HR: CPT

## 2024-10-22 PROCEDURE — 25000003 PHARM REV CODE 250: Performed by: INTERNAL MEDICINE

## 2024-10-22 PROCEDURE — 3077F SYST BP >= 140 MM HG: CPT | Mod: CPTII,,, | Performed by: NURSE PRACTITIONER

## 2024-10-22 PROCEDURE — 99214 OFFICE O/P EST MOD 30 MIN: CPT | Mod: PBBFAC,25 | Performed by: NURSE PRACTITIONER

## 2024-10-22 PROCEDURE — 36415 COLL VENOUS BLD VENIPUNCTURE: CPT

## 2024-10-22 PROCEDURE — 3079F DIAST BP 80-89 MM HG: CPT | Mod: CPTII,,, | Performed by: NURSE PRACTITIONER

## 2024-10-22 PROCEDURE — 99215 OFFICE O/P EST HI 40 MIN: CPT | Mod: S$PBB,,, | Performed by: NURSE PRACTITIONER

## 2024-10-22 PROCEDURE — 85025 COMPLETE CBC W/AUTO DIFF WBC: CPT

## 2024-10-22 PROCEDURE — 3008F BODY MASS INDEX DOCD: CPT | Mod: CPTII,,, | Performed by: NURSE PRACTITIONER

## 2024-10-22 RX ORDER — POTASSIUM CHLORIDE 20 MEQ/1
20 TABLET, EXTENDED RELEASE ORAL DAILY
Qty: 21 TABLET | Refills: 0 | Status: SHIPPED | OUTPATIENT
Start: 2024-10-22 | End: 2025-10-22

## 2024-10-22 RX ORDER — FAMOTIDINE 10 MG/ML
20 INJECTION INTRAVENOUS
Status: COMPLETED | OUTPATIENT
Start: 2024-10-22 | End: 2024-10-22

## 2024-10-22 RX ORDER — DIPHENHYDRAMINE HYDROCHLORIDE 50 MG/ML
50 INJECTION INTRAMUSCULAR; INTRAVENOUS
Status: COMPLETED | OUTPATIENT
Start: 2024-10-22 | End: 2024-10-22

## 2024-10-22 RX ORDER — HEPARIN 100 UNIT/ML
500 SYRINGE INTRAVENOUS
Status: DISCONTINUED | OUTPATIENT
Start: 2024-10-22 | End: 2024-10-22 | Stop reason: HOSPADM

## 2024-10-22 RX ORDER — EPINEPHRINE 1 MG/ML
0.3 INJECTION INTRAMUSCULAR; INTRAVENOUS; SUBCUTANEOUS ONCE AS NEEDED
Status: DISCONTINUED | OUTPATIENT
Start: 2024-10-22 | End: 2024-10-22 | Stop reason: HOSPADM

## 2024-10-22 RX ORDER — DIPHENHYDRAMINE HYDROCHLORIDE 50 MG/ML
50 INJECTION INTRAMUSCULAR; INTRAVENOUS ONCE AS NEEDED
Status: DISCONTINUED | OUTPATIENT
Start: 2024-10-22 | End: 2024-10-22 | Stop reason: HOSPADM

## 2024-10-22 RX ORDER — SODIUM CHLORIDE 0.9 % (FLUSH) 0.9 %
10 SYRINGE (ML) INJECTION
Status: DISCONTINUED | OUTPATIENT
Start: 2024-10-22 | End: 2024-10-22 | Stop reason: HOSPADM

## 2024-10-22 RX ADMIN — PACLITAXEL 366 MG: 6 INJECTION, SOLUTION INTRAVENOUS at 11:10

## 2024-10-22 RX ADMIN — DIPHENHYDRAMINE HYDROCHLORIDE 50 MG: 50 INJECTION INTRAMUSCULAR; INTRAVENOUS at 10:10

## 2024-10-22 RX ADMIN — FAMOTIDINE 20 MG: 10 INJECTION, SOLUTION INTRAVENOUS at 10:10

## 2024-10-22 RX ADMIN — DEXAMETHASONE SODIUM PHOSPHATE 20 MG: 4 INJECTION, SOLUTION INTRA-ARTICULAR; INTRALESIONAL; INTRAMUSCULAR; INTRAVENOUS; SOFT TISSUE at 10:10

## 2024-10-22 RX ADMIN — HEPARIN 500 UNITS: 100 SYRINGE at 03:10

## 2024-10-22 RX ADMIN — SODIUM CHLORIDE: 9 INJECTION, SOLUTION INTRAVENOUS at 10:10

## 2024-10-22 NOTE — NURSING
Infusion Note:  Pt has an appointment today for: Sarah Campos NP, and Infusion    Treatment Regimen: Taxol   Cycle: 3 Day: 1   every Q3 weeks cycle;      Given via Left Mediport    UPT done: Not done  UPT exception: Age >55 y.o. and No documented periods for >1 year    Nursing note:  Treatment parameters: were met    Denies pain, SOB, N/V/C/D, fever, cough, rash, or chills. RUE edema present. Pt reports she discussed her K and Ca levels with NP today and she will be starting on K pills. She states NP told her to stop taking tums and multivitamin pill. Reports compliance to dexamethasone PO at home, fergon.    Future appts scheduled: Sarah Campos NP, and Infusion in 3 weeks, labs, Dr. Farah, and infusion in 6 weeks.

## 2024-10-30 ENCOUNTER — HOSPITAL ENCOUNTER (OUTPATIENT)
Dept: WOUND CARE | Facility: HOSPITAL | Age: 59
Discharge: HOME | End: 2024-10-30
Attending: NURSE PRACTITIONER
Payer: MEDICAID

## 2024-10-30 VITALS
RESPIRATION RATE: 18 BRPM | TEMPERATURE: 98 F | OXYGEN SATURATION: 100 % | HEART RATE: 84 BPM | DIASTOLIC BLOOD PRESSURE: 84 MMHG | SYSTOLIC BLOOD PRESSURE: 133 MMHG

## 2024-10-30 DIAGNOSIS — C50.911 RECURRENT BREAST CANCER, RIGHT: ICD-10-CM

## 2024-10-30 DIAGNOSIS — S21.101D OPEN CHEST WOUND, RIGHT, SUBSEQUENT ENCOUNTER: ICD-10-CM

## 2024-10-30 DIAGNOSIS — S21.001D OPEN WOUND OF RIGHT BREAST, SUBSEQUENT ENCOUNTER: Primary | ICD-10-CM

## 2024-10-30 DIAGNOSIS — C50.919 TRIPLE NEGATIVE BREAST CARCINOMA: ICD-10-CM

## 2024-10-30 DIAGNOSIS — Z17.421 TRIPLE NEGATIVE BREAST CARCINOMA: ICD-10-CM

## 2024-10-30 PROCEDURE — 99211 OFF/OP EST MAY X REQ PHY/QHP: CPT

## 2024-10-30 PROCEDURE — 99214 OFFICE O/P EST MOD 30 MIN: CPT | Mod: ,,, | Performed by: NURSE PRACTITIONER

## 2024-10-30 PROCEDURE — 27000999 HC MEDICAL RECORD PHOTO DOCUMENTATION

## 2024-11-07 ENCOUNTER — TELEPHONE (OUTPATIENT)
Dept: HEMATOLOGY/ONCOLOGY | Facility: CLINIC | Age: 59
End: 2024-11-07
Payer: MEDICAID

## 2024-11-07 NOTE — TELEPHONE ENCOUNTER
On 11/6/24 patient called clinic. Patient stated she had an appointment with Albina Jamison CNP on 9/16/24. Patient requested a  copy of results to be mailed to her but she never received them. Notified Arlen Wallace MA. Arlen stated she will mail report to patient.

## 2024-11-11 ENCOUNTER — TELEPHONE (OUTPATIENT)
Dept: HEMATOLOGY/ONCOLOGY | Facility: CLINIC | Age: 59
End: 2024-11-11
Payer: MEDICAID

## 2024-11-12 ENCOUNTER — INFUSION (OUTPATIENT)
Dept: INFUSION THERAPY | Facility: HOSPITAL | Age: 59
End: 2024-11-12
Attending: INTERNAL MEDICINE
Payer: MEDICAID

## 2024-11-12 ENCOUNTER — APPOINTMENT (OUTPATIENT)
Dept: HEMATOLOGY/ONCOLOGY | Facility: CLINIC | Age: 59
End: 2024-11-12
Payer: MEDICAID

## 2024-11-12 ENCOUNTER — OFFICE VISIT (OUTPATIENT)
Dept: HEMATOLOGY/ONCOLOGY | Facility: CLINIC | Age: 59
End: 2024-11-12
Payer: MEDICAID

## 2024-11-12 VITALS
HEIGHT: 65 IN | SYSTOLIC BLOOD PRESSURE: 146 MMHG | BODY MASS INDEX: 34.43 KG/M2 | DIASTOLIC BLOOD PRESSURE: 86 MMHG | TEMPERATURE: 99 F | OXYGEN SATURATION: 100 % | HEART RATE: 96 BPM | WEIGHT: 206.63 LBS

## 2024-11-12 VITALS
HEART RATE: 81 BPM | SYSTOLIC BLOOD PRESSURE: 147 MMHG | DIASTOLIC BLOOD PRESSURE: 96 MMHG | RESPIRATION RATE: 20 BRPM | OXYGEN SATURATION: 100 % | TEMPERATURE: 98 F

## 2024-11-12 DIAGNOSIS — C50.911 RECURRENT BREAST CANCER, RIGHT: ICD-10-CM

## 2024-11-12 DIAGNOSIS — J98.8 RESPIRATORY INFECTION: Primary | ICD-10-CM

## 2024-11-12 DIAGNOSIS — C50.911 CARCINOMA OF RIGHT BREAST, STAGE 4: Primary | ICD-10-CM

## 2024-11-12 LAB
ALBUMIN SERPL-MCNC: 4.4 G/DL (ref 3.5–5)
ALBUMIN/GLOB SERPL: 1 RATIO (ref 1.1–2)
ALP SERPL-CCNC: 145 UNIT/L (ref 40–150)
ALT SERPL-CCNC: 27 UNIT/L (ref 0–55)
ANION GAP SERPL CALC-SCNC: 13 MEQ/L
AST SERPL-CCNC: 23 UNIT/L (ref 5–34)
BASOPHILS # BLD AUTO: 0.01 X10(3)/MCL
BASOPHILS NFR BLD AUTO: 0.2 %
BILIRUB SERPL-MCNC: 0.6 MG/DL
BUN SERPL-MCNC: 14.3 MG/DL (ref 9.8–20.1)
CALCIUM SERPL-MCNC: 10.8 MG/DL (ref 8.4–10.2)
CHLORIDE SERPL-SCNC: 106 MMOL/L (ref 98–107)
CO2 SERPL-SCNC: 23 MMOL/L (ref 22–29)
CREAT SERPL-MCNC: 0.9 MG/DL (ref 0.55–1.02)
CREAT/UREA NIT SERPL: 16
EOSINOPHIL # BLD AUTO: 0 X10(3)/MCL (ref 0–0.9)
EOSINOPHIL NFR BLD AUTO: 0 %
ERYTHROCYTE [DISTWIDTH] IN BLOOD BY AUTOMATED COUNT: 13.6 % (ref 11.5–17)
GFR SERPLBLD CREATININE-BSD FMLA CKD-EPI: >60 ML/MIN/1.73/M2
GLOBULIN SER-MCNC: 4.4 GM/DL (ref 2.4–3.5)
GLUCOSE SERPL-MCNC: 135 MG/DL (ref 74–100)
HCT VFR BLD AUTO: 43.1 % (ref 37–47)
HGB BLD-MCNC: 14.1 G/DL (ref 12–16)
IMM GRANULOCYTES # BLD AUTO: 0.06 X10(3)/MCL (ref 0–0.04)
IMM GRANULOCYTES NFR BLD AUTO: 1.2 %
LYMPHOCYTES # BLD AUTO: 0.97 X10(3)/MCL (ref 0.6–4.6)
LYMPHOCYTES NFR BLD AUTO: 19.6 %
MAGNESIUM SERPL-MCNC: 2.1 MG/DL (ref 1.6–2.6)
MCH RBC QN AUTO: 31.8 PG (ref 27–31)
MCHC RBC AUTO-ENTMCNC: 32.7 G/DL (ref 33–36)
MCV RBC AUTO: 97.1 FL (ref 80–94)
MONOCYTES # BLD AUTO: 0.07 X10(3)/MCL (ref 0.1–1.3)
MONOCYTES NFR BLD AUTO: 1.4 %
NEUTROPHILS # BLD AUTO: 3.85 X10(3)/MCL (ref 2.1–9.2)
NEUTROPHILS NFR BLD AUTO: 77.6 %
NRBC BLD AUTO-RTO: 0 %
PLATELET # BLD AUTO: 248 X10(3)/MCL (ref 130–400)
PMV BLD AUTO: 10.3 FL (ref 7.4–10.4)
POTASSIUM SERPL-SCNC: 3.9 MMOL/L (ref 3.5–5.1)
PROT SERPL-MCNC: 8.8 GM/DL (ref 6.4–8.3)
RBC # BLD AUTO: 4.44 X10(6)/MCL (ref 4.2–5.4)
SODIUM SERPL-SCNC: 142 MMOL/L (ref 136–145)
WBC # BLD AUTO: 4.96 X10(3)/MCL (ref 4.5–11.5)

## 2024-11-12 PROCEDURE — 83735 ASSAY OF MAGNESIUM: CPT

## 2024-11-12 PROCEDURE — 99215 OFFICE O/P EST HI 40 MIN: CPT | Mod: S$PBB,,, | Performed by: NURSE PRACTITIONER

## 2024-11-12 PROCEDURE — 3008F BODY MASS INDEX DOCD: CPT | Mod: CPTII,,, | Performed by: NURSE PRACTITIONER

## 2024-11-12 PROCEDURE — 3077F SYST BP >= 140 MM HG: CPT | Mod: CPTII,,, | Performed by: NURSE PRACTITIONER

## 2024-11-12 PROCEDURE — 96375 TX/PRO/DX INJ NEW DRUG ADDON: CPT

## 2024-11-12 PROCEDURE — 3079F DIAST BP 80-89 MM HG: CPT | Mod: CPTII,,, | Performed by: NURSE PRACTITIONER

## 2024-11-12 PROCEDURE — 80053 COMPREHEN METABOLIC PANEL: CPT

## 2024-11-12 PROCEDURE — 96413 CHEMO IV INFUSION 1 HR: CPT

## 2024-11-12 PROCEDURE — 25000003 PHARM REV CODE 250: Performed by: NURSE PRACTITIONER

## 2024-11-12 PROCEDURE — 1160F RVW MEDS BY RX/DR IN RCRD: CPT | Mod: CPTII,,, | Performed by: NURSE PRACTITIONER

## 2024-11-12 PROCEDURE — 63600175 PHARM REV CODE 636 W HCPCS: Performed by: NURSE PRACTITIONER

## 2024-11-12 PROCEDURE — 1159F MED LIST DOCD IN RCRD: CPT | Mod: CPTII,,, | Performed by: NURSE PRACTITIONER

## 2024-11-12 PROCEDURE — 96367 TX/PROPH/DG ADDL SEQ IV INF: CPT

## 2024-11-12 PROCEDURE — 36415 COLL VENOUS BLD VENIPUNCTURE: CPT

## 2024-11-12 PROCEDURE — 96415 CHEMO IV INFUSION ADDL HR: CPT

## 2024-11-12 PROCEDURE — 85025 COMPLETE CBC W/AUTO DIFF WBC: CPT

## 2024-11-12 PROCEDURE — 99214 OFFICE O/P EST MOD 30 MIN: CPT | Mod: PBBFAC,25 | Performed by: NURSE PRACTITIONER

## 2024-11-12 RX ORDER — DIPHENHYDRAMINE HYDROCHLORIDE 50 MG/ML
50 INJECTION INTRAMUSCULAR; INTRAVENOUS
Status: COMPLETED | OUTPATIENT
Start: 2024-11-12 | End: 2024-11-12

## 2024-11-12 RX ORDER — EPINEPHRINE 0.3 MG/.3ML
0.3 INJECTION SUBCUTANEOUS ONCE AS NEEDED
Status: CANCELLED | OUTPATIENT
Start: 2024-11-12

## 2024-11-12 RX ORDER — HEPARIN 100 UNIT/ML
500 SYRINGE INTRAVENOUS
Status: DISCONTINUED | OUTPATIENT
Start: 2024-11-12 | End: 2024-11-12 | Stop reason: HOSPADM

## 2024-11-12 RX ORDER — EPINEPHRINE 1 MG/ML
0.3 INJECTION INTRAMUSCULAR; INTRAVENOUS; SUBCUTANEOUS ONCE AS NEEDED
Status: DISCONTINUED | OUTPATIENT
Start: 2024-11-12 | End: 2024-11-12 | Stop reason: HOSPADM

## 2024-11-12 RX ORDER — DIPHENHYDRAMINE HYDROCHLORIDE 50 MG/ML
50 INJECTION INTRAMUSCULAR; INTRAVENOUS ONCE AS NEEDED
Status: DISCONTINUED | OUTPATIENT
Start: 2024-11-12 | End: 2024-11-12 | Stop reason: HOSPADM

## 2024-11-12 RX ORDER — SODIUM CHLORIDE 0.9 % (FLUSH) 0.9 %
10 SYRINGE (ML) INJECTION
Status: CANCELLED | OUTPATIENT
Start: 2024-11-12

## 2024-11-12 RX ORDER — FAMOTIDINE 10 MG/ML
20 INJECTION INTRAVENOUS
Status: COMPLETED | OUTPATIENT
Start: 2024-11-12 | End: 2024-11-12

## 2024-11-12 RX ORDER — HEPARIN 100 UNIT/ML
500 SYRINGE INTRAVENOUS
Status: CANCELLED | OUTPATIENT
Start: 2024-11-12

## 2024-11-12 RX ORDER — SODIUM CHLORIDE 0.9 % (FLUSH) 0.9 %
10 SYRINGE (ML) INJECTION
Status: DISCONTINUED | OUTPATIENT
Start: 2024-11-12 | End: 2024-11-12 | Stop reason: HOSPADM

## 2024-11-12 RX ORDER — FAMOTIDINE 10 MG/ML
20 INJECTION INTRAVENOUS
Status: CANCELLED | OUTPATIENT
Start: 2024-11-12

## 2024-11-12 RX ORDER — DIPHENHYDRAMINE HYDROCHLORIDE 50 MG/ML
50 INJECTION INTRAMUSCULAR; INTRAVENOUS
Status: CANCELLED
Start: 2024-11-12

## 2024-11-12 RX ORDER — DIPHENHYDRAMINE HYDROCHLORIDE 50 MG/ML
50 INJECTION INTRAMUSCULAR; INTRAVENOUS ONCE AS NEEDED
Status: CANCELLED | OUTPATIENT
Start: 2024-11-12

## 2024-11-12 RX ADMIN — DIPHENHYDRAMINE HYDROCHLORIDE 50 MG: 50 INJECTION INTRAMUSCULAR; INTRAVENOUS at 11:11

## 2024-11-12 RX ADMIN — HEPARIN 500 UNITS: 100 SYRINGE at 04:11

## 2024-11-12 RX ADMIN — FAMOTIDINE 20 MG: 10 INJECTION, SOLUTION INTRAVENOUS at 11:11

## 2024-11-12 RX ADMIN — SODIUM CHLORIDE: 9 INJECTION, SOLUTION INTRAVENOUS at 11:11

## 2024-11-12 RX ADMIN — PACLITAXEL 366 MG: 6 INJECTION, SOLUTION INTRAVENOUS at 12:11

## 2024-11-12 RX ADMIN — DEXAMETHASONE SODIUM PHOSPHATE 20 MG: 4 INJECTION, SOLUTION INTRA-ARTICULAR; INTRALESIONAL; INTRAMUSCULAR; INTRAVENOUS; SOFT TISSUE at 11:11

## 2024-11-12 NOTE — PROGRESS NOTES
History:  Past Medical History:   Diagnosis Date    Anemia     Arthritis     Breast cancer     Graves disease     Hypertension     Hyperthyroidism       Past Surgical History:   Procedure Laterality Date    BREAST LUMPECTOMY      INSERTION OF TUNNELED CENTRAL VENOUS CATHETER (CVC) WITH SUBCUTANEOUS PORT N/A 8/19/2024    Procedure: [id number];  Surgeon: Thomas Verdin Jr., MD;  Location: [facility];  Service: General;  Laterality: N/A;      Social History     Socioeconomic History    Marital status: Single   Tobacco Use    Smoking status: Never     Passive exposure: Never    Smokeless tobacco: Never   Substance and Sexual Activity    Alcohol use: Never    Drug use: Never     Social Drivers of Health     Financial Resource Strain: Patient Declined (7/11/2023)    [facility], [facility]    Overall Financial Resource Strain (CARDIA)     Difficulty of Paying Living Expenses: Patient declined   Food Insecurity: Patient Declined (7/11/2023)    [facility], [facility]idi[facility] and [facility]    Hunger Vital Sign     Worried About Running Out of Food in the Last Year: Patient declined     Ran Out of Food in the Last Year: Patient declined   Transportation Needs: Patient Declined (7/11/2023)    [facility] from [facility], [facility] Subsidiaries and Affiliates    PRAPARE - Transportation     Lack of Transportation (Medical): Patient declined     Lack of Transportation (Non-Medical): Patient declined   Stress: Patient Declined (7/11/2023)    [facility], [facility]sidiaries and Affiliates    [place] [employer] - Occupational Stress Questionnaire     Feeling of Stress : Patient declined   Housing Stability: Unknown (7/11/2023)    Received from [facility]idi[facility] and Affiliates, [facility] Its Subsidiaries and Affiliates    Housing Stability Vital Sign     Unable to Pay for Housing in the Last Year: Patient refused     Number of Places Lived in the Last Year: 1      Family History   Problem Relation Name Age of Onset    Breast cancer Maternal Cousin          Reason for Follow-up:  -history of right breast cancer, diagnosed 2002, ER negative, AK negative, HER2 positive, T2 N1 M0, S/P neoadjuvant chemotherapy, lumpectomy, axillary lymph dissection, adjuvant radiotherapy (completed 10/24/2022)  -local and regional recurrence, triple negative, diagnosed on biopsy of supraclavicular lymph node 06/27/2024; on mammogram, right breast mass; supraclavicular lymphadenopathy; right cervical lymphadenopathy  -Postmenopausal   -Hypercalcemia   -High serum parathyroid hormone (PTH)   -Liver mass, right lobe   -Mediastinal lymphadenopathy   -Lung nodules   -Masses of both breasts   -B/L axillary lymphadenopathy   -Breast cancer metastasized to axillary lymph node, left   -B/L cervical lymphadenopathy   -B/L supraclavicular lymphadenopathy   -history of Graves disease      Oncologic/Hematologic History:  Oncology History   Stage IV carcinoma of breast   8/27/2024 Initial Diagnosis    Stage IV carcinoma of breast     9/9/2024 -  Chemotherapy    Treatment Summary   Plan Name: OP BREAST PACLITAXEL Q3W  Treatment Goal: Palliative  Status: Active  Start Date: 9/9/2024  End Date: 12/24/2024 (Planned)  Provider: Israel Farah MD  Chemotherapy: PACLitaxeL (TAXOL) 175 mg/m2 = 366  mg in 0.9% NaCl 500 mL chemo infusion, 175 mg/m2 = 366 mg, Intravenous, Clinic/HOD 1 time, 3 of 6 cycles  Administration: 366 mg (2024), 366 mg (2024), 366 mg (10/22/2024)     Past medical history: Anemia; arthritis; breast cancer; Graves disease (diagnosed ; started on methimazole by endocrinologist in [place], in ); hypertension; peripheral neuropathy  -2023:  Venous Doppler bilateral lower extremities (swelling):  No DVT  -2023: TTE:  LVEF 55-60%  -2023:  Limited abdominal ultrasound (elevated liver enzymes):  6.6 cm cyst within the liver near the gallbladder; cholelithiasis with minimal gallbladder wall thickening  -2014:  Pelvic ultrasound: Markedly enlarged uterus with multiple large fibroids; endometrial stripe is thickened, 1.4 cm  Procedure/surgical history: Breast lumpectomy   Social history:  .  Lives in [place].  No children.  Never wanted to have children.  Never became pregnant.  No history of tobacco, alcohol, or illicit drug abuse.  Does not work.  Family history:  Sister experienced hyperthyroidism, treated with radioiodine.  She does not know much about her family history but says that there are cancers in folks on both parents sides of family.  A female cousin on mother's side of family experienced breast cancer in her 30s and  from it.  Health maintenance:  Primary care provider in [facility].  Last mammogram 2023 at [facility].  No screening colonoscopy ever.  Menstrual/Ob gyn history:  Menarche at age 13.  Last menstrual cycle 2023.  Never became pregnant.  Never wanted to become pregnant.  Took birth control pills for 2 years several years ago.  No hormone replacement therapy after menopause.  ====================================      59-year-old lady, referred by Rere Jernigan MD, surgery, with recurrent breast cancer.      07/10/2024:  Office note: Rere Jernigan MD (surgery):  Pt is a 59 y.o. female  with history of right breast cancer s/p BCT in 2002 who presents with painless large supraclavicular mass.    First noticed it 4 months ago and has steadily increased in size past month.  Last mammogram 1 year ago and new [facility].  Also reports progressive skin changes of the right breast not associated with lumpectomy incision.  Receptor status unknown.       Investigations reviewed:  -no old records available   -according to her, she was diagnosed with right breast cancer in January 2022  -mammogram showed a large lobulated mass, 3.7 x 2.7 cm  -biopsy:  Infiltrative ductal carcinoma, possibly invasive lobular  -right breast mass was palpable in the upper outer quadrant; axillary lymphadenopathy was noted (3-4 palpable lymph nodes right axilla)  -T2 N1 M0 IDC right breast (she had palpable axillary lymph nodes in the right and a large palpable mass in the right breast)  -ER negative; WI negative; HER2 positive  -S/P neoadjuvant chemotherapy (according to her, she received 4 cycles of neoadjuvant chemotherapy with Adriamycin/Taxotere every 3 weeks apart x4 cycles)  -followed by lumpectomy and axillary dissection  -no residual tumor post chemotherapy; 17 axillary lymph nodes negative for tumor  -S/P adjuvant radiotherapy, 6600 cGy/35 fractions, completed 10/24/2022  -07/28/2011:  DEXA scan:  BMD normal  -12/19/2022:  Screening mammogram (comparison: 11/08/2019, etc.):  BI-RADS: 2-benign  -07/06/2023:  Echocardiogram:  LVEF 60%  -11/27/2023:  Bilateral diagnostic mammogram and limited ultrasound right breast (comparison: 12/19/2022, 12/16/2001, etc.) (history of right breast cancer with worsening right breast pain and thickening) large elongated heterogeneous mass with irregular borders outer aspect of the right breast (extending from the nipple outward towards the lateral chest wall at the 8-9 o'clock position, 5.8 x 3 x 1.8 cm although margins are difficult to accurately define), highly suspicious for recurrent  malignancy; there is a separate elongated hypoechoic lesion in the upper outer quadrant right breast 11 o'clock position, 5 cm from nipple, 3 x 2.6 x 0.7 cm, near the site of previous surgery), perhaps benign scar tissue related to previous lumpectomy:  BI-RADS: 5-highly suggestive of malignancy  -07/10/2024:  Surgery Office note:  Painless large supraclavicular mass, noted 4 months ago, steadily enlarging; also reported progressive skin changes right breast not associated with lumpectomy incision; on physical exam, large exophytic right supraclavicular nodule, nonmobile and fixed, overlying skin erythematous without ulceration; right upper outer quadrant lumpectomy incision; right lower inner quadrant periareolar ecchymosis with subcutaneous firmness without distinct mass; slight nipple inversion; no drainage; no palpable axillary lymphadenopathy  -05/30/2024:  Ultrasound soft tissues of the head and neck (lymphadenopathy): Pathologic lymphadenopathy (4.5 x 3.7 x 3.2 cm) at the base of the neck on the right; multiple smaller morphologically abnormal cervical chain lymph nodes on the right which demonstrate heterogeneous echogenicity similar to the dominant mass. Findings highly suspicious for malignancy particularly in this patient with a known history of prior right breast cancer. Dominant mass lesion corresponds with palpable complaint. Recommend biopsy/tissue diagnosis.   -06/27/2024:  Right supraclavicular mass, core needle biopsy (firm 6 x 6 cm exophytic right supraclavicular mass): Metastatic carcinoma in fibroadipose tissue, moderately to poorly-differentiated, consistent with breast origin (metastatic breast carcinoma in fibroadipose tissue  -ER negative (0%); TN negative (0%); HER2 negative (0); Ki-67 high proliferation  (93.6%)    Interval History:    11/12/24  Ms. Bangura presents today for follow-up of her breast cancer and cycle 4 of Paclitaxel. She reports feeling well overall. She notes her breast  wounds are healing and closing. She continues to follow-up with wound care. She has continued to take Vitamin D, we have asked her to discontinue it again today. She is taking Lyrica 50mg in the morning, 50mg in the afternoon and 100mg QHS for neuropathy in her right and left foot. Her podiatrist has discontinued gabapentin due to achilles tendinitis issues per the patient. She has good appetite. She noted headaches for a couple of days which she attributes to her sinus and has since resolved.  She denies fevers, CP, N/V, diarrhea or constipation.     10/22/24  Ms. Bangura presents today for follow-up and cycle 3 of Paclitaxel. She notes neuropathy symptoms in her right hand and left foot. She admits she has been taking Calcium supplements and has an appointment with Endocrine January 2025. She notes fatigue. She admits she stopped potassium prior to completing the instructed course. She continues wound care management of her right shouler wound and right breast. She feels the right breast is becoming softer and less discharge.       09/30/2024:   -biomarker testing on right supraclavicular mass biopsy, performed 06/27/2024: Positive for PD-L1 expression (CPS 15)  -08/09/2024:  BRCA1/2 analysis with [employer]-expanded +RNA insight:  Negative for pathogenic mutations, variants of unknown significance, end gross deletion/duplications (negative: No clinically significant variants detected)  -08/28/2024: Baseline tumor markers: CEA 4.64, elevated; CA 27.29 level normal; CA 15-3 level normal  -palliative Taxol, every 3 weeks, started 09/09/2024  -09/13/2024:  Staging FDG PET-CT (was supposed to be performed before start of chemotherapy; however, could only be performed 4 days after starting palliative chemotherapy with Taxol):    Bilateral breast masses with skin thickening and hypermetabolic activity in the right breast consistent with patient's known primary malignancy.  There is metastatic bilateral axillary,  bilateral cervical, bilateral supraclavicular, right internal mammary, and mediastinal lymphadenopathy.  Additionally there is a metastatic mass in the liver and metastatic retrocrural lymphadenopathy.  -09/19/2024: Patient refused liver biopsy (apparently, in denies; according to her, liver lesion is just a cyst)  -09/30/2024:  WBC 4.06, hemoglobin 13.6, platelets 192, ANC 3.43; potassium 3.4, low; calcium 10.7, albumin 3.8 (mild hypercalcemia)  >>>  -check magnesium level   -start potassium chloride 20 mEq p.o. q.d. x2 weeks; no refills; in 2 weeks, recheck CMP and magnesium level  -check ionized calcium level, intact PTH level, PTH side, vitamin-D level  -hydrate with 1 L of normal saline in our office today.  Presents for a follow-up visit.  Overall, stable.  Says that appetite is great.  ECOG 1.  Did not experience any side effects with Taxol.  Denies undue weakness, fatigue, malaise, unusual headaches, focal neurological symptoms, neuropathy, chest pain, cough, dyspnea, hemoptysis, abdominal pain, nausea, vomiting, GI bleeding, anorexia, unintentional weight loss, any new lumps or lymphadenopathy, etc..  Has widely metastatic disease.  She is aware of extremely guarded/poor prognosis.  However, given her good performance status, reasonable to continue chemotherapy.  So far, she has received only 1 cycle of Taxol.  No severe cytopenias.  Denies muscle cramps.  Today, hypokalemic and hypocalcemic; electrolyte abnormalities will be corrected.    08/08/2024:   Pleasant, healthy-appearing lady who presents for initial medical oncology consultation.  In no acute discomfort.    Says that right supra clavicular mass started April 2024; it has been enlarging and fluctuating in size.  It is painful.  7/10 severity pain.  Also, pain was right breast area.  Right breast is enlarging.  There is a small area of ulceration in the right breast.  No bleeding or discharge.  She denies fevers or chills.  Has been taking Lyrica  meloxicam without the relief of pain.  We will start Norco.  Some fatigue.  However, ECOG 1.  Pain from neck down to breast area, especially at night.  No unusual headaches, focal neurological symptoms, vision impairment, gait impairment, hemoptysis, fevers, chills, any bleeding or discharge from right breast superficial ulceration, abdominal pain, nausea, vomiting, GI bleeding, etc..  No bone pains.  Appetite is fair.        Medications:  Current Outpatient Medications on File Prior to Visit   Medication Sig Dispense Refill    dexAMETHasone (DECADRON) 4 MG Tab Take 20mg (5 tablets) by mouth 12 hours and then again at 6 hours prior to each chemotherapy treatment 40 tablet 1    ergocalciferol (ERGOCALCIFEROL) 50,000 unit Cap Take 50,000 Units by mouth every 7 days.      ferrous gluconate (FERGON) 324 MG tablet TAKE 1 TABLET BY MOUTH ONCE DAILY FOR IRON      HYDROcodone-acetaminophen (NORCO) 5-325 mg per tablet Take 1 tablet by mouth every 4 (four) hours as needed for Pain. 84 tablet 0    methIMAzole (TAPAZOLE) 5 MG Tab Take 1 tablet (5 mg total) by mouth once daily. 30 tablet 11    ondansetron (ZOFRAN) 4 MG tablet Take 1 tablet (4 mg total) by mouth every 6 (six) hours as needed for Nausea. 30 tablet 1    potassium chloride SA (K-DUR,KLOR-CON) 20 MEQ tablet Take 1 tablet (20 mEq total) by mouth once daily. 21 tablet 0    pregabalin (LYRICA) 50 MG capsule Take 50 mg by mouth 3 (three) times daily.      valsartan-hydrochlorothiazide (DIOVAN-HCT) 160-12.5 mg per tablet Take 1 tablet by mouth.      diclofenac sodium (VOLTAREN) 1 % Gel APPLY 4 GRAMS TOPICALLY TO AFFECTED AREA THREE TO FOUR TIMES A DAY AS NEEDED FOR PAIN (Patient not taking: Reported on 10/22/2024)      meloxicam (MOBIC) 15 MG tablet TAKE 1 TABLET BY MOUTH ONCE DAILY (STOP IBUPROFEN) (Patient not taking: Reported on 11/12/2024)       No current facility-administered medications on file prior to visit.       Review of Systems:   All systems reviewed and  found to be negative except for the symptoms detailed above    Physical Examination:   VITAL SIGNS:   Vitals:    11/12/24 0953   BP: (!) 146/86   Pulse: 96   Temp: 98.6 °F (37 °C)         GENERAL:  In no apparent distress.    HEAD:  No signs of head trauma.  EYES:  Pupils are equal.  Extraocular motions intact.    EARS:  Hearing grossly intact.  MOUTH:  Oropharynx is normal.   NECK:  No adenopathy, no JVD.     CHEST:  Chest with clear breath sounds bilaterally.  No wheezes, rales, rhonchi.    CARDIAC:  Regular rate and rhythm.  S1 and S2, without murmurs, gallops, rubs.  VASCULAR:  No Edema.  Peripheral pulses normal and equal in all extremities.  ABDOMEN:  Soft, without detectable tenderness.  No sign of distention.  No   rebound or guarding, and no masses palpated.   Bowel Sounds normal.  MUSCULOSKELETAL:  Good range of motion of all major joints. Extremities without clubbing, cyanosis or edema.    NEUROLOGIC EXAM:  Alert and oriented x 3.  No focal sensory or strength deficits.   Speech normal.  Follows commands.  PSYCHIATRIC:  Mood normal.  -08/08/2024: Breast examination was performed with a verbal consent, in the presence of Jani Heck LPN; entire right breast is involved with tumor; entire right breast is indurated with tumor, with conglomeration of nodules around the central area; a small superficial ulceration is noted along the inferior medial aspect of right areolar area; a large slightly tender 6 supraclavicular masses noted; diffuse edema is noted in the right supraclavicular area and right breast area as well as infraclavicular area; left breast is normal    No results found for this or any previous visit.  No results found for this or any previous visit.    Assessment:  Problem List Items Addressed This Visit    None      Orders for 1/12/2024:  -continue Taxol every 3 weeks  Check CBC and CMP every 3 weeks  Re-stage with contrast-enhanced CT scans of C/A/P/soft tissues of the neck with contrast mid  November (11/18/24)   Wound care follow-up for management of breast wound   Continue Norco 5 mg every 4 hours PRN for pain  Follow-up with endocrinology for Graves disease January 2025.  Reiterated to stop all calcium rich foods and supplements  and Vitamin D supplements. Advised to increase fluid intake.    Follow-up with MD in 3 weeks with chemo and scans     Above discussed at length with the patient.  All questions answered.  Discussed labs and scans and gave her copies of relevant records.  Guarded prognosis discussed.  She understands and agrees with this plan.  =================================      History of right breast IDC, ER negative, MO negative, HER2 positive:  -mammogram: 3.7 x 2.7 cm right breast mass  -pathology: Infiltrative ductal carcinoma, possibly invasive lobular  -ER negative, MO negative, HER2 positive   -T2 N1 M0; palpable axillary lymphadenopathy; large palpable mass right breast)  -S/P neoadjuvant chemotherapy (according to her, she received 4 cycles of neoadjuvant chemotherapy with Adriamycin/Taxotere every 3 weeks apart x4 cycles)  -followed by lumpectomy and axillary dissection  -no residual tumor post chemotherapy; 17 axillary lymph nodes negative for tumor  -S/P adjuvant radiotherapy, 6600 cGy/35 fractions, completed 10/24/2022  -details of adjuvant chemotherapy, if any, not known  >>>  -screening mammogram 12/19/2022: BI-RADS: 2-benign  >>>  Regional, local, and metastatic recurrence, triple negative:  -echocardiogram 07/06/2023: LVEF 60%  -mammogram and ultrasound 11/27/2023:  Right breast mass 5.8 x 3 x 1.8 cm: BI-RADS: 5  -surgery consultation/follow-up 07/10/2024: Painless large supraclavicular mass, noted 4 months ago, steadily enlarging; progressive skin changes right breast not associated with lumpectomy incision, large exophytic right supraclavicular nodule/lymphadenopathy) nonmobile and fixed; overlying skin erythematous without ulceration), right lower inner quadrant  periareolar ecchymosis and subcutaneous firmness without distinct mass; no palpable axillary lymphadenopathy  -ultrasound neck 05/30/2024:  Pathologic lymphadenopathy 4.5 x 3.7 x 3.2 cm at the base of the neck of the right; multiple smaller morphologically abnormal cervical chain lymph nodes on the right  -core needle biopsy right supraclavicular mass 06/27/2024:  Firm, 6 x 6 cm: Moderately to poorly-differentiated metastatic breast carcinoma  -ER negative (0%); MI negative (0%); HER2 negative (0); Ki-67 high proliferation (93.6%)  -08/08/2024: Breast examination was performed with her verbal consent, in the presence of Jani Heck LPN; entire right breast is involved with tumor; entire right breast is indurated with tumor, with conglomeration of nodules around the central area; a small superficial ulceration is noted along the inferior medial aspect of right areolar area; a large slightly tender 6 supraclavicular masses noted; diffuse edema is noted in the right supraclavicular area and right breast area as well as infraclavicular area; left breast is normal  -tissue NGS testing (performed on right supraclavicular mass biopsy 06/27/2024):  HRD positive (CELINE-high); negative for AKT 1, BRAF, BRCA1, BRCA2, ESR 1, etc.  -08/09/2024: Liquid biopsy:  Borderline TMB  -08/08/2024:  Mild hypercalcemia: Calcium 10.6, albumin 3.9; intact PTH level 100.3, elevated; PTH related peptide normal; vitamin-D level normal  -08/08/2024:  Labs: Postmenopausal  -Hypercalcemia  -08/14/2024: Echocardiogram:  LVEF 55-60%  -08/19/2024: MediPort placed  -brain MRI 08/20/2024: No brain metastases  -staging CTs C/A/P/neck 08/20/2024: Extensive metastases  -biomarker testing on right supraclavicular mass biopsy, performed 06/27/2024: Positive for PD-L1 expression (CPS 15); rest, as prior  -genetic testing 08/09/2024:  Negative  -baseline tumor markers 08/28/2024:  Only CEA level slightly elevated, 4.64  -palliative Taxol, every 3 weeks,  started 09/09/2024  -staging PET-CT 09/13/2024:  (was supposed to be performed before starting chemotherapy; could only be performed 4 days after starting palliative chemotherapy with Taxol):  Bilateral breast masses; bilateral axillary, bilateral cervical, bilateral supraclavicular, right internal mammary, and mediastinal lymphadenopathy; metastatic mass in liver; metastatic retrocrural lymphadenopathy  -09/19/2024: Patient refused liver biopsy (apparently, in denies; according to her, liver lesion is just a cyst)  >>>  Plan:  -triple negative recurrent, metastatic disease   -genetic testing negative  -extensive metastatic disease  -ER 0, IL 0, HER2 0  -PD-L1 positive, CPS 15; therefore, first-line chemotherapy recommended: Pembrolizumab +chemotherapy (albumin bound paclitaxel, paclitaxel, or gemcitabine and carboplatin (category 1, Preferred)  -before PD-L1 testing results was available, we started her on Taxol;  -therefore, in 2nd line, we will use pembrolizumab +chemotherapy  -palliative Taxol every 3 weeks, started 09/09/2024; continue every 3 weeks (Taxol 175 mg per m2 IV every 3 weeks, to continue until disease progression or until unacceptable toxicity)  -with the Taxol, monitor for potential cardiovascular effects including infusion related hypotension/bradycardia/hypotension, peripheral neuropathy, edema, nausea, vomiting, diarrhea, stomatitis, cytopenias, etc.   -check CBC and CMP weekly  -re-stage with contrast-enhanced CT scans of C/A/P/soft tissues of the neck 2 months (mid November) after starting chemotherapy  -patient has been previously treated with Adriamycin/Taxotere; therefore, we will avoid Adriamycin to the extent possible  -at some point, olaparib may be considered because of HRD positive status  -she declined liver biopsy  -follow-up with NP in 3 weeks    Chemotherapy:  -Taxol 175 mg per m2 every 3 weeks until disease progression or unacceptable toxicity    Side effects/monitoring with  Taxol:  -watch for cardiovascular effects including infusion related hypotension, bradycardia, hypertension, etc.  -watch out for extravasation: Taxol is an irritant with vesicle like properties  -peripheral neuropathy:  Primarily distal sensory neuropathy; a mixture of paresthesias and dysesthesias including burning, numbness, tingling, and shooting pains, typically in a stocking-glove distribution; most mild-to-moderate cases resolve several months after discontinuation but maybe longer in patients with diabetes.  Severe neuropathy maybe irreversible in some patients  -side effects in> 10% patients:  Edema, hypotension, alopecia, nausea, vomiting, diarrhea, stomatitis, cytopenias, LFT abnormalities, peripheral neuropathy, arthralgias, myalgias, etc.  -Boxed warnings: Hypersensitivity reactions; bone marrow suppression  -premedicate with dexamethasone (20 mg orally at 12 and 6 hours prior to paclitaxel, diphenhydramine (50 mg IV 30 to 60 minutes prior to paclitaxel), and cimetidine or famotidine (IV 30 to 60 minutes prior to paclitaxel).  -do not repeat course until neutrophil count is =/> 1500 mm3 and the platelet count is =/> 100,000 mm3; reduced doses by 20% if patient experiences severe peripheral neuropathy or severe neutropenia <500 mm3 for a week or longer     -08/08/2024: Refer to wound care for management of breast wound   -08/08/2024:  Start Norco 5 mg every 4 hours PRN for pain      -09/30/2024:  WBC 4.06, hemoglobin 13.6, platelets 192, ANC 3.43; potassium 3.4, low; calcium 10.7, albumin 3.8 (mild hypercalcemia)  >>>  -check magnesium level   -start potassium chloride 20 mEq p.o. q.d. x2 weeks; no refills; in 2 weeks, recheck CMP and magnesium level  -check ionized calcium level, intact PTH level, PTH side, vitamin-D level  -hydrate with 1 L of normal saline in our office today.      Sites of disease/recurrence/metastases:  Right supraclavicular lymphadenopathy; right breast mass, right cervical  lymphadenopathy, left axillary lymphadenopathy, mediastinal and hilar lymphadenopathy, hepatic mass (metastases), left breast nodule  -B/L axillary lymphadenopathy   -Masses of both breasts   -B/L cervical lymphadenopathy   -B/L supraclavicular lymphadenopathy   -staging PET-CT 09/13/2024:  (was supposed to be performed before starting chemotherapy; could only be performed 4 days after starting palliative chemotherapy with Taxol):  Bilateral breast masses; bilateral axillary, bilateral cervical, bilateral supraclavicular, right internal mammary, and mediastinal lymphadenopathy; metastatic mass in liver; metastatic retrocrural lymphadenopathy  -09/19/2024: Patient refused liver biopsy (apparently, in denies; according to her, liver lesion is just a cyst)      Molecular markers:  -ER negative (0%); OR negative (0%); HER2 negative (0); Ki-67 high proliferation (93.6%)  -tissue NGS testing (performed on right supraclavicular mass biopsy 06/27/2024):    Positive for PD-L1 expression (CPS 15)  HRD positive (CELINE-high); negative for AKT 1, BRAF, BRCA1, BRCA2, ESR 1, etc.  -08/09/2024: Liquid biopsy:  Borderline TMB  -genetic testing 08/09/2024:  Negative      Graves' disease:   -diagnosed 07/2023  -07/11/2023: CT soft tissues of the neck with contrast) dysphagia, acute thyrotoxicosis, goiter): Mild diffuse enlargement of the thyroid gland without displacement or mass effect of the aerodigestive tract; no suspicious cervical lymphadenopathy  -07/12/2023: Ultrasound thyroid:  Hoarseness, thyrotoxicosis:  Heterogeneous hyperemic thyroid typical of thyroiditis; small 6 mm mid pole right thyroid nodule is not suspicious and does not warrant surveillance per TI-RADS criteria  -confirmed with TSH receptor antibodies (TRAb/TSI, i.e., thyroid-stimulating immunoglobulins)  -as of 05/21/2024, on methimazole 5 mg daily; has responded well  -endocrinologist: Roland Ross MD   -05/21/2024:  Endocrinologist plan:  Plan to complete  18 months of therapy before attempting to taper of methimazole   >>>  -follow-up with endocrinology          Follow-up:  Follow up in about 1 week (around 11/19/2024) for NP + lab .

## 2024-11-12 NOTE — NURSING
Infusion Note:  Pt has an appointment today for: Andres, Sarah Lynn NP, and Infusion    Treatment Regimen: Taxol   Cycle: 4 Day: 1   every Q3 weeks cycle;      Given via Left Mediport    UPT done: Not done  UPT exception: Age >55 y.o. and No documented periods for >1 year    Nursing note:  Treatment parameters: were met    Ca 10.8 today. Pt states she forgot she was supposed to stop taking Vit D. She states NP told her again today to stop taking Vit D. Reports compliance to PO Dex 12 and 6 hrs prior to CH. Neuropathy bilat hands and feet present, podiatrist stopped gabapentin due to achiles tendonitis issues per pt. Pt states RUE edema has resolved. Reports R shoulder hole/wound that she is seeing wound care for. States its getting better.    Future appts scheduled: Labs, Dr. Farah, and Infusion on 12/3

## 2024-11-14 RX ORDER — CEFDINIR 300 MG/1
300 CAPSULE ORAL EVERY 12 HOURS
Qty: 14 CAPSULE | Refills: 0 | Status: SHIPPED | OUTPATIENT
Start: 2024-11-14 | End: 2024-11-14 | Stop reason: SDUPTHER

## 2024-11-15 DIAGNOSIS — C50.911 RECURRENT BREAST CANCER, RIGHT: ICD-10-CM

## 2024-11-15 DIAGNOSIS — E87.6 HYPOKALEMIA: ICD-10-CM

## 2024-11-18 ENCOUNTER — HOSPITAL ENCOUNTER (OUTPATIENT)
Dept: RADIOLOGY | Facility: HOSPITAL | Age: 59
Discharge: HOME | End: 2024-11-18
Attending: INTERNAL MEDICINE
Payer: MEDICAID

## 2024-11-18 DIAGNOSIS — C50.911 NEOPLASM OF RIGHT BREAST, REGIONAL LYMPH NODE STAGING CATEGORY N3C: METASTASIS IN IPSILATERAL SUPRACLAVICULAR LYMPH NODE: ICD-10-CM

## 2024-11-18 DIAGNOSIS — C50.911 RECURRENT BREAST CANCER, RIGHT: ICD-10-CM

## 2024-11-18 DIAGNOSIS — Z17.421 TRIPLE NEGATIVE BREAST CARCINOMA: ICD-10-CM

## 2024-11-18 DIAGNOSIS — E83.52 HYPERCALCEMIA: ICD-10-CM

## 2024-11-18 DIAGNOSIS — C50.919 TRIPLE NEGATIVE BREAST CARCINOMA: ICD-10-CM

## 2024-11-18 DIAGNOSIS — C77.0 NEOPLASM OF RIGHT BREAST, REGIONAL LYMPH NODE STAGING CATEGORY N3C: METASTASIS IN IPSILATERAL SUPRACLAVICULAR LYMPH NODE: ICD-10-CM

## 2024-11-18 PROCEDURE — 25500020 PHARM REV CODE 255

## 2024-11-18 PROCEDURE — 71260 CT THORAX DX C+: CPT | Mod: TC

## 2024-11-18 PROCEDURE — 70491 CT SOFT TISSUE NECK W/DYE: CPT | Mod: TC

## 2024-11-18 RX ORDER — DIATRIZOATE MEGLUMINE AND DIATRIZOATE SODIUM 660; 100 MG/ML; MG/ML
SOLUTION ORAL; RECTAL
Status: DISPENSED
Start: 2024-11-18 | End: 2024-11-18

## 2024-11-18 RX ADMIN — IOHEXOL 100 ML: 350 INJECTION, SOLUTION INTRAVENOUS at 11:11

## 2024-11-27 ENCOUNTER — HOSPITAL ENCOUNTER (OUTPATIENT)
Dept: WOUND CARE | Facility: HOSPITAL | Age: 59
Discharge: HOME | End: 2024-11-27
Attending: NURSE PRACTITIONER
Payer: MEDICAID

## 2024-11-27 VITALS
HEART RATE: 64 BPM | TEMPERATURE: 98 F | SYSTOLIC BLOOD PRESSURE: 130 MMHG | OXYGEN SATURATION: 100 % | WEIGHT: 206.56 LBS | HEIGHT: 65 IN | BODY MASS INDEX: 34.41 KG/M2 | RESPIRATION RATE: 20 BRPM | DIASTOLIC BLOOD PRESSURE: 81 MMHG

## 2024-11-27 DIAGNOSIS — S21.001D OPEN WOUND OF RIGHT BREAST, SUBSEQUENT ENCOUNTER: Primary | ICD-10-CM

## 2024-11-27 DIAGNOSIS — C50.919 TRIPLE NEGATIVE BREAST CARCINOMA: ICD-10-CM

## 2024-11-27 DIAGNOSIS — S21.101D OPEN CHEST WOUND, RIGHT, SUBSEQUENT ENCOUNTER: ICD-10-CM

## 2024-11-27 DIAGNOSIS — C50.911 RECURRENT BREAST CANCER, RIGHT: ICD-10-CM

## 2024-11-27 DIAGNOSIS — Z17.421 TRIPLE NEGATIVE BREAST CARCINOMA: ICD-10-CM

## 2024-11-27 PROCEDURE — 99211 OFF/OP EST MAY X REQ PHY/QHP: CPT

## 2024-11-27 PROCEDURE — 27000999 HC MEDICAL RECORD PHOTO DOCUMENTATION

## 2024-11-27 PROCEDURE — 99213 OFFICE O/P EST LOW 20 MIN: CPT | Mod: ,,, | Performed by: NURSE PRACTITIONER

## 2024-11-27 NOTE — PATIENT INSTRUCTIONS
Pt seen today by: Perri Estes NP    Self care DRESSING INSTRUCTIONS:        Wound location: Rt side of neck(Subclavian)  Dressings to neck to be changed done daily or as needed. Wet to dry dressing with Vashe. Cover with foam border dressing.  Dressings to right breast to be changed every other day. Or as needed. Apply Xeroform to area, cover with ABD pad, put on Bra.      Wound to Rt breast: Cleanse with Vashe, apply Xeroform, abd pad secure with tape or may just apply bra over bandage, change dressing every other day and/or as needed if soiled or not intact.    Return visit: 1 month    The current daily value (%DV) for protein is 50 grams per day and is meant as a general goal for most people. Further increasing your dietary protein intake is very important for wound healing. Typically one needs over 100g of protein per day to help with wound healing needs.  If you are a dialysis patient or have problems with your kidneys, talk to your Nephrologist about how much protein you can take in with your condition.  Examples of high protein items that can be added to your diet include: eggs, chicken, red meats, almonds, cottage cheese, Greek yogurt, beans, and peanut butter.  Fortified protein bars, shakes and drinks can add 15-30 additional grams of protein per serving.  Also add:   1 daily general multivitamin   Vitamin C : 500mg twice daily   Zinc 220 mg daily  Vit D : once daily    Contact [facility] wound care team at [phone] or go to the nearest Emergency department if:    You have a fever greater than 101 taken by mouth.  Your pain gets worse or does not go away, even after taking your regular pain medicine.  Your skin around your wound is red, hot, swollen, or draining pus.  You have bleeding that continues to come through the dressing after holding pressure for 10 minutes       Call our [facility] wound clinic for questions/concerns a [phone] .

## 2024-11-27 NOTE — PROGRESS NOTES
[facility]   Outpatient Wound Care     Subjective:   Patient ID: Eli Bangura is a 59 y.o. female.    Chief Complaint: Wound Care      History of Present Illness:   59 y.o. Black or  female presents to wound care clinic today for right sub cleansing wound and right breast fungating tumor.  Presents to clinic alone.  Reviewed all previous medical history since last visit of 10/30/2024.  Past medical history:  s was cancer free for breast cancer for 22 years.  Under the care of oncology clinic.  Followed by Lambert Mantilla APRN for PCP.     Today's visit 11/27/24:  Reviewed previous progress notes since last visit of 10/30/2024.  Right subclavian the wound bed red granulating wound bed with minimal serosanguineous drainage.  Right breast fungating tumors have recessed at this time. Discussion with the patient today  will continue same wound care.  All wound care performed per nursing staff at bedside.  Right upper subclavian  in wound cleansed with Vashe, apply wet-to-dry Vashe dressing and cover with foam border dressing.  Right breast cleansed with wash, applied Xeroform gauze, ABD pad and will secure with sports bra.  Tolerated well.  Instructions and supplies given.  Requesting to come back in 3 weeks.  Instructed to call the office with any questions, concerns, or new skin issues.    10/30/24:  Reviewed previous progress notes since last visit of 10/07/2024.  Right subclavian wound red granulating wound bed with moderate amount of slough.  Able to mechanically debride slough.  Instructed with the patient today will continue wet-to-dry half strength Dakin's dressings daily cover with foam border dressing.  Right breast fungating tumors red granulating wound bed with minimal serosanguineous drainage dimpling and orange peel  appearance significantly decreased in size since last visit.  Cleanse right breast wound using half strength Dakin's, applied Xeroform gauze, ABD pad, will secure sports bra.  Tolerated well.  Instructions and supplies given.  Will return to the clinic in 1 month.  Instructed to call the office with any questions, concerns, or new skin issues.  Verbalized understanding of all instructions.      10/07/2024:  Reviewed previous progress notes since last visit of 09/30/2024.  All dressings removed per nursing staff at bedside.  Right subclavian wound red granulating wound bed with moderate amount of slough slight odor moderate serosanguineous drainage.  Right breast red granulating wound beds with dimpling and orange peel appearance minimal serosanguineous drainage.  Discussion today will continue same wound care.  Reinforced these are maintenance wounds.  Cleansed all wounds with half strength Dakin's will apply wet-to-dry half strength Dakin's gauze to right upper subclavian, Kerlix dressing perform daily.  Right breast cleansed with half strength Dakin's, cover with Xeroform gauze, ABD pad, secure with sport bra.  All wound care performed per nursing staff at bedside.  Instructions and supplies given.  Will return to the clinic in 2 weeks per patient request.  Instructed to call the office with any questions, concerns, or new skin issues.  Verbalized understanding of all instructions.    09/30/2024:  Reviewed previous progress notes since last visit of 09/16/2024.  Patient arrived after chemotherapy today.  Right upper subclavian wound bed 100% slough with slight odor draining moderate serosanguineous drainage.  Right breast dimpling with orange peel appearance., deformity, fungating tumor noted red granulating wound bed with minimal serosanguineous drainage.  Discussion with the patient today these or fungating tumors cancer and will change wound care to right supplies to assist with odor.  During examination in  discussion with the patient today patient questions whether I can heel her wounds.  Reinforced these are fungating tumors in wound not be able to fully granulate wounds will provide Wound care to assist with smell and comfort.  Patient is refusing Home Health at this time.  Discussion with the patient today regarding current chemotherapy treatments if they were holiday for curative patient voices doctor never explained anything to her and wanted to know with chart said whether it was palliative or curative informed that it was palliative chemotherapy.  Patient voices only want to hear good new.  Reinforced not my intention to deliver bad news.  Demonstrated New wound care at bedside to right upper cleansing twice daily with full strength Dakin's wet-to-dry dressing moistened with full strength Dakin's and cover with gauze and tape.  Right breast cleanse every-other-day with full strength Dakin's applied Xeroform gauze cover with ABD pad, and secure with tape or bra.   Instructions and supplies given.  Left chest wall Mediport.  Instructed to call the office with any questions, concerns, or new skin issues.  Verbalized understanding of all instructions.    09/16/2024:  Reviewed previous progress notes from 09/05/2024.  Presents to clinic alone.  Dressings to right breast and upper chest removed per nursing staff at bedside.  Patient has a right double aluminum PICC line currently receiving chemotherapy.  Fungating tumor to the right breast in upper chest area red granulating wound bed with moderate slough.  Instructed we will continue Dakin's solution applied Xeroform gauze to both areas and cover with foam border dressing to upper chest right subclavian area, and ABD pad secure with bra for right breast.  Reinforced that fungating tumor are to keep clean and free from infection.  Instructions and supplies given.  Will have her follow up in 2 weeks to clinic.  Instructed to call the office with any questions,  concerns, or new skin issues.  Verbalized understanding of all instructions.    9/5/24:  Presents to clinic alone.  Right breast fungating tumor red granulating wound bed with slough noted moderate serosanguineous drainage.  Right supplies given wound red granulating wound bed with slough monitor serosanguineous drainage.  Discussion with the patient today will assist her with keeping fungating tumors clean and assist with smell by using Dakin's solution.  Instructed once started chemotherapy areas should decrease in size but may experienced more drainage.  Will have her just cleansed area with Dakin's solution, apply Xeroform gauze to both areas right subclavian cover foam border dressing and right breast ABD pad secure with bra.  May perform every-other-day. Instructions and supplies given.  All wound care performed per nursing staff at bedside.  Will follow up with a clinic in 10 days.  Instructed to call the office with any questions, concerns, or new skin issues.  Verbalized understanding of all instructions.      History includes:      Past Medical History:   Diagnosis Date    Anemia     Arthritis     Breast cancer     Graves disease     Hypertension     Hyperthyroidism       Past Surgical History:   Procedure Laterality Date    BREAST LUMPECTOMY      INSERTION OF TUNNELED CENTRAL VENOUS CATHETER (CVC) WITH SUBCUTANEOUS PORT N/A 8/19/2024    Procedure: [id number];  Surgeon: Thomas Verdin Jr., MD;  Location: [facility];  Service: General;  Laterality: N/A;      Social History     Socioeconomic History    Marital status: Single   Tobacco Use    Smoking status: Never     Passive exposure: Never    Smokeless tobacco: Never   Substance and Sexual Activity    Alcohol use: Never    Drug use: Never     Social Drivers of Health     Financial Resource Strain: Patient Declined (7/11/2023)    [facility] and Affiliates, [facility]  [facility]    Overall Financial Resource Strain (CARDIA)     Difficulty of Paying Living Expenses: Patient declined   Food Insecurity: Patient Declined (7/11/2023)    [facility], [facility]    Hunger Vital Sign     Worried About Running Out of Food in the Last Year: Patient declined     Ran Out of Food in the Last Year: Patient declined   Transportation Needs: Patient Declined (7/11/2023)    [facility], [facility]    PRAPARE - Transportation     Lack of Transportation (Medical): Patient declined     Lack of Transportation (Non-Medical): Patient declined   Stress: Patient Declined (7/11/2023)    [facility], [facility] and [facility]    [employer] - Occupational Stress Questionnaire     Feeling of Stress : Patient declined   Housing Stability: Unknown (7/11/2023)    [facility], [facility] and Aff[facility]ates    Housing Stability Vital Sign     Unable to Pay for Housing in the Last Year: Patient refused     Number of Places Lived in the Last Year: 1   .      Current Outpatient Medications   Medication Sig Dispense Refill    dexAMETHasone (DECADRON) 4 MG Tab Take 20mg (5 tablets) by mouth 12 hours and then again at 6 hours prior to each chemotherapy treatment 40 tablet 1    diclofenac sodium (VOLTAREN) 1 % Gel APPLY 4  "GRAMS TOPICALLY TO AFFECTED AREA THREE TO FOUR TIMES A DAY AS NEEDED FOR PAIN (Patient not taking: Reported on 10/22/2024)      ergocalciferol (ERGOCALCIFEROL) 50,000 unit Cap Take 50,000 Units by mouth every 7 days.      ferrous gluconate (FERGON) 324 MG tablet TAKE 1 TABLET BY MOUTH ONCE DAILY FOR IRON      HYDROcodone-acetaminophen (NORCO) 5-325 mg per tablet Take 1 tablet by mouth every 4 (four) hours as needed for Pain. 84 tablet 0    meloxicam (MOBIC) 15 MG tablet TAKE 1 TABLET BY MOUTH ONCE DAILY (STOP IBUPROFEN) (Patient not taking: Reported on 11/12/2024)      methIMAzole (TAPAZOLE) 5 MG Tab Take 1 tablet (5 mg total) by mouth once daily. 30 tablet 11    ondansetron (ZOFRAN) 4 MG tablet Take 1 tablet (4 mg total) by mouth every 6 (six) hours as needed for Nausea. 30 tablet 1    potassium chloride SA (K-DUR,KLOR-CON) 20 MEQ tablet Take 1 tablet (20 mEq total) by mouth once daily. 21 tablet 0    pregabalin (LYRICA) 50 MG capsule Take 50 mg by mouth 3 (three) times daily.      valsartan-hydrochlorothiazide (DIOVAN-HCT) 160-12.5 mg per tablet Take 1 tablet by mouth.       No current facility-administered medications for this encounter.       Review of Systems   Skin:  Positive for wound.   All other systems reviewed and are negative.         Labs Reviewed:   Chemistry:  Lab Results   Component Value Date    BUN 14.3 11/12/2024    BUN 11.8 10/22/2024    CREATININE 0.90 11/12/2024    CREATININE 0.94 10/22/2024    EGFRNORACEVR >60 11/12/2024    EGFRNORACEVR >60 10/22/2024    GLUCOSE 135 (H) 11/12/2024    AST 23 11/12/2024    AST 23 10/22/2024    ALT 27 11/12/2024    ALT 21 10/22/2024    HGBA1C 5.0 09/26/2023        Hematology:  Lab Results   Component Value Date    WBC 4.96 11/12/2024    WBC 7.06 10/22/2024    HGB 14.1 11/12/2024    HGB 13.3 10/22/2024    HCT 43.1 11/12/2024    HCT 41.0 10/22/2024     11/12/2024     10/22/2024       Inflammatory Markers:  No results found for: "HSCRP", "SEDRATE" "     Objective:        Physical Exam  Vitals reviewed.   Chest:          Comments: Right breast fungating tumor    Right upper chest fungating tumor  Skin:     General: Skin is warm and dry.      Capillary Refill: Capillary refill takes less than 2 seconds.      Findings: Wound present.             Comments: Right subclavian red granulating wound bed with minimal serosanguineous drainage.    Right fungating tumors recessed.    Neurological:      Mental Status: She is alert.       [REMOVED]      Wound 09/05/24 1427 Other (comment) Right medial Breast (Removed)   09/05/24 1427 Breast   Present on Original Admission: Y   Primary Wound Type: Other   Side: Right   Orientation: medial   Wound Approximate Age at First Assessment (Weeks):    Wound Number:    Is this injury device related?:    Incision Type:    Closure Method:    Wound Description (Comments):    Type:    Additional Comments:    Ankle-Brachial Index:    Pulses:    Removal Indication and Assessment:    Wound Outcome: Healed   Removed 11/27/24 1126   Wound Image   11/27/24 1127   Dressing Appearance Moist drainage 11/27/24 1127   Drainage Amount Moderate 11/27/24 1127   Drainage Characteristics/Odor Serosanguineous 11/27/24 1127   Appearance Pink 11/27/24 1127   Tissue loss description Full thickness 11/27/24 1127   Periwound Area Intact 11/27/24 1127   Wound Edges Undefined 11/27/24 1127   Wound Length (cm) 2.8 cm 11/27/24 1127   Wound Width (cm) 1.9 cm 11/27/24 1127   Wound Depth (cm) 1.2 cm 11/27/24 1127   Wound Volume (cm^3) 6.384 cm^3 11/27/24 1127   Wound Surface Area (cm^2) 5.32 cm^2 11/27/24 1127   Care Cleansed with:;Antimicrobial agent 11/27/24 1127   Dressing Removed;Applied;Changed 11/27/24 1127                 Assessment:         ICD-10-CM ICD-9-CM   1. Open wound of right breast, subsequent encounter  S21.001D V58.89     879.0   2. Open chest wound, right, subsequent encounter  S21.101D V58.89     875.0   3. Recurrent breast cancer, right  C50.911  174.9   4. Triple negative breast carcinoma  C50.919 174.9    Z17.421 V86.1         Plan:   Tissue pathology and/or culture taken:  [] Yes [x] No   Sharp debridement performed:   [] Yes [x] No   Labs ordered this visit:   [] Yes [x] No   Imaging ordered this visit:   [] Yes [x] No         1. Open wound of right breast, subsequent encounter     Will continue same wound care:  Will cleanse with full strength Dakin's every-other-day apply Xeroform gauze, ABD pad and secure with bra.    Will continue to monitor for signs of infection, watch for increased drainage, pain, fevers, chills, and swelling.   2. Open chest wound, right, subsequent encounter     Will continue wound care:  Right subclavian open wound continued wound care:  Will cleanse daily up to daily wet-to-dry with Vashe with gauze and tape.    Will continue to monitor for signs of infection, watch for increased drainage, pain, fever, chills, and swelling   3. Recurrent breast cancer, right     Under the care of oncology.     Currently receiving chemotherapy.   4. Triple negative breast carcinoma     Currently receiving chemotherapy.    Under the care of oncology.      The time spent including preparing to see the patient, obtaining patient history and assessment, evaluation of the plan of care, patient/caregiver counseling and education, orders, documentation, coordination of care, and other professional medical management activities for today's encounter was 20 minute.    Time spent performing procedures during today's encounter was 20 minute.    Follow up in about 3 weeks (around 12/18/2024) for breast wound. Teaching provided on s/s to call wound clinic for promptly.  ER precautions taught for after hours and weekends.       LEONID Perez

## 2024-12-02 ENCOUNTER — TELEPHONE (OUTPATIENT)
Dept: HEMATOLOGY/ONCOLOGY | Facility: CLINIC | Age: 59
End: 2024-12-02
Payer: MEDICAID

## 2024-12-02 PROBLEM — G62.0 CHEMOTHERAPY-INDUCED NEUROPATHY: Status: ACTIVE | Noted: 2024-12-02

## 2024-12-02 PROBLEM — T45.1X5A CHEMOTHERAPY-INDUCED NEUROPATHY: Status: ACTIVE | Noted: 2024-12-02

## 2024-12-03 ENCOUNTER — INFUSION (OUTPATIENT)
Dept: INFUSION THERAPY | Facility: HOSPITAL | Age: 59
End: 2024-12-03
Attending: INTERNAL MEDICINE
Payer: MEDICAID

## 2024-12-03 ENCOUNTER — OFFICE VISIT (OUTPATIENT)
Dept: HEMATOLOGY/ONCOLOGY | Facility: CLINIC | Age: 59
End: 2024-12-03
Attending: INTERNAL MEDICINE
Payer: MEDICAID

## 2024-12-03 VITALS
HEIGHT: 65 IN | DIASTOLIC BLOOD PRESSURE: 66 MMHG | BODY MASS INDEX: 34.82 KG/M2 | RESPIRATION RATE: 18 BRPM | WEIGHT: 209 LBS | SYSTOLIC BLOOD PRESSURE: 125 MMHG | OXYGEN SATURATION: 100 % | HEART RATE: 96 BPM | TEMPERATURE: 97 F

## 2024-12-03 VITALS — DIASTOLIC BLOOD PRESSURE: 86 MMHG | SYSTOLIC BLOOD PRESSURE: 156 MMHG | HEART RATE: 93 BPM | OXYGEN SATURATION: 100 %

## 2024-12-03 DIAGNOSIS — Z51.11 CHEMOTHERAPY MANAGEMENT, ENCOUNTER FOR: ICD-10-CM

## 2024-12-03 DIAGNOSIS — C50.911 NEOPLASM OF RIGHT BREAST, REGIONAL LYMPH NODE STAGING CATEGORY N3C: METASTASIS IN IPSILATERAL SUPRACLAVICULAR LYMPH NODE: ICD-10-CM

## 2024-12-03 DIAGNOSIS — C50.911 CARCINOMA OF RIGHT BREAST, STAGE 4: Primary | ICD-10-CM

## 2024-12-03 DIAGNOSIS — R59.0 AXILLARY LYMPHADENOPATHY: ICD-10-CM

## 2024-12-03 DIAGNOSIS — R91.8 LUNG NODULES: ICD-10-CM

## 2024-12-03 DIAGNOSIS — Z17.421 TRIPLE NEGATIVE BREAST CARCINOMA: ICD-10-CM

## 2024-12-03 DIAGNOSIS — N63.10 MASSES OF BOTH BREASTS: ICD-10-CM

## 2024-12-03 DIAGNOSIS — R59.0 SUPRACLAVICULAR LYMPHADENOPATHY: ICD-10-CM

## 2024-12-03 DIAGNOSIS — C50.911 CARCINOMA OF RIGHT BREAST, STAGE 4: ICD-10-CM

## 2024-12-03 DIAGNOSIS — Z85.3 HISTORY OF RIGHT BREAST CANCER: ICD-10-CM

## 2024-12-03 DIAGNOSIS — R16.0 LIVER MASS, RIGHT LOBE: ICD-10-CM

## 2024-12-03 DIAGNOSIS — R59.0 CERVICAL LYMPHADENOPATHY: ICD-10-CM

## 2024-12-03 DIAGNOSIS — E83.52 HYPERCALCEMIA: ICD-10-CM

## 2024-12-03 DIAGNOSIS — Z78.0 POSTMENOPAUSAL: ICD-10-CM

## 2024-12-03 DIAGNOSIS — R59.0 LAD (LYMPHADENOPATHY) OF RIGHT CERVICAL REGION: ICD-10-CM

## 2024-12-03 DIAGNOSIS — C77.3 BREAST CANCER METASTASIZED TO AXILLARY LYMPH NODE, LEFT: ICD-10-CM

## 2024-12-03 DIAGNOSIS — C50.919 TRIPLE NEGATIVE BREAST CARCINOMA: ICD-10-CM

## 2024-12-03 DIAGNOSIS — R22.1 SUBCUTANEOUS NODULE OF NECK: Primary | ICD-10-CM

## 2024-12-03 DIAGNOSIS — E87.6 HYPOKALEMIA: ICD-10-CM

## 2024-12-03 DIAGNOSIS — R59.0 MEDIASTINAL LYMPHADENOPATHY: ICD-10-CM

## 2024-12-03 DIAGNOSIS — C50.912 BREAST CANCER METASTASIZED TO AXILLARY LYMPH NODE, LEFT: ICD-10-CM

## 2024-12-03 DIAGNOSIS — C77.0 NEOPLASM OF RIGHT BREAST, REGIONAL LYMPH NODE STAGING CATEGORY N3C: METASTASIS IN IPSILATERAL SUPRACLAVICULAR LYMPH NODE: ICD-10-CM

## 2024-12-03 DIAGNOSIS — R79.89 HIGH SERUM PARATHYROID HORMONE (PTH): ICD-10-CM

## 2024-12-03 DIAGNOSIS — G62.0 CHEMOTHERAPY-INDUCED NEUROPATHY: ICD-10-CM

## 2024-12-03 DIAGNOSIS — T45.1X5A CHEMOTHERAPY-INDUCED NEUROPATHY: ICD-10-CM

## 2024-12-03 DIAGNOSIS — Z98.890 HISTORY OF LUMPECTOMY OF RIGHT BREAST: ICD-10-CM

## 2024-12-03 DIAGNOSIS — N63.20 MASSES OF BOTH BREASTS: ICD-10-CM

## 2024-12-03 DIAGNOSIS — E05.00 GRAVES DISEASE: ICD-10-CM

## 2024-12-03 LAB
T4 FREE SERPL-MCNC: 1.08 NG/DL (ref 0.7–1.48)
TSH SERPL-ACNC: 0.4 UIU/ML (ref 0.35–4.94)

## 2024-12-03 PROCEDURE — 84439 ASSAY OF FREE THYROXINE: CPT | Performed by: INTERNAL MEDICINE

## 2024-12-03 PROCEDURE — 25000003 PHARM REV CODE 250: Performed by: INTERNAL MEDICINE

## 2024-12-03 PROCEDURE — 96375 TX/PRO/DX INJ NEW DRUG ADDON: CPT

## 2024-12-03 PROCEDURE — 99214 OFFICE O/P EST MOD 30 MIN: CPT | Mod: PBBFAC,25 | Performed by: INTERNAL MEDICINE

## 2024-12-03 PROCEDURE — 96367 TX/PROPH/DG ADDL SEQ IV INF: CPT

## 2024-12-03 PROCEDURE — 63600175 PHARM REV CODE 636 W HCPCS: Performed by: INTERNAL MEDICINE

## 2024-12-03 PROCEDURE — 96415 CHEMO IV INFUSION ADDL HR: CPT

## 2024-12-03 PROCEDURE — 84443 ASSAY THYROID STIM HORMONE: CPT | Performed by: INTERNAL MEDICINE

## 2024-12-03 PROCEDURE — 96413 CHEMO IV INFUSION 1 HR: CPT

## 2024-12-03 RX ORDER — FAMOTIDINE 10 MG/ML
20 INJECTION INTRAVENOUS
OUTPATIENT
Start: 2024-12-24

## 2024-12-03 RX ORDER — DIPHENHYDRAMINE HYDROCHLORIDE 50 MG/ML
50 INJECTION INTRAMUSCULAR; INTRAVENOUS
Status: COMPLETED | OUTPATIENT
Start: 2024-12-03 | End: 2024-12-03

## 2024-12-03 RX ORDER — HEPARIN 100 UNIT/ML
500 SYRINGE INTRAVENOUS
OUTPATIENT
Start: 2024-12-24

## 2024-12-03 RX ORDER — GABAPENTIN 300 MG/1
300 CAPSULE ORAL 3 TIMES DAILY
COMMUNITY

## 2024-12-03 RX ORDER — DIPHENHYDRAMINE HYDROCHLORIDE 50 MG/ML
50 INJECTION INTRAMUSCULAR; INTRAVENOUS ONCE AS NEEDED
OUTPATIENT
Start: 2024-12-24

## 2024-12-03 RX ORDER — FAMOTIDINE 10 MG/ML
20 INJECTION INTRAVENOUS
Status: COMPLETED | OUTPATIENT
Start: 2024-12-03 | End: 2024-12-03

## 2024-12-03 RX ORDER — HEPARIN 100 UNIT/ML
500 SYRINGE INTRAVENOUS
Status: CANCELLED | OUTPATIENT
Start: 2024-12-03

## 2024-12-03 RX ORDER — SODIUM CHLORIDE 0.9 % (FLUSH) 0.9 %
10 SYRINGE (ML) INJECTION
Status: CANCELLED | OUTPATIENT
Start: 2024-12-03

## 2024-12-03 RX ORDER — EPINEPHRINE 0.3 MG/.3ML
0.3 INJECTION SUBCUTANEOUS ONCE AS NEEDED
OUTPATIENT
Start: 2024-12-24

## 2024-12-03 RX ORDER — HEPARIN 100 UNIT/ML
500 SYRINGE INTRAVENOUS
Status: DISCONTINUED | OUTPATIENT
Start: 2024-12-03 | End: 2024-12-03 | Stop reason: HOSPADM

## 2024-12-03 RX ORDER — DIPHENHYDRAMINE HYDROCHLORIDE 50 MG/ML
50 INJECTION INTRAMUSCULAR; INTRAVENOUS
Start: 2024-12-24

## 2024-12-03 RX ORDER — EPINEPHRINE 0.3 MG/.3ML
0.3 INJECTION SUBCUTANEOUS ONCE AS NEEDED
Status: CANCELLED | OUTPATIENT
Start: 2024-12-03

## 2024-12-03 RX ORDER — SODIUM CHLORIDE 0.9 % (FLUSH) 0.9 %
10 SYRINGE (ML) INJECTION
OUTPATIENT
Start: 2024-12-24

## 2024-12-03 RX ORDER — DIPHENHYDRAMINE HYDROCHLORIDE 50 MG/ML
50 INJECTION INTRAMUSCULAR; INTRAVENOUS ONCE AS NEEDED
Status: CANCELLED | OUTPATIENT
Start: 2024-12-03

## 2024-12-03 RX ORDER — POTASSIUM CHLORIDE 1500 MG/1
20 TABLET, EXTENDED RELEASE ORAL DAILY
Qty: 14 TABLET | Refills: 0 | Status: SHIPPED | OUTPATIENT
Start: 2024-12-03 | End: 2024-12-17

## 2024-12-03 RX ORDER — DIPHENHYDRAMINE HYDROCHLORIDE 50 MG/ML
50 INJECTION INTRAMUSCULAR; INTRAVENOUS ONCE AS NEEDED
Status: DISCONTINUED | OUTPATIENT
Start: 2024-12-03 | End: 2024-12-03 | Stop reason: HOSPADM

## 2024-12-03 RX ORDER — SODIUM CHLORIDE 0.9 % (FLUSH) 0.9 %
10 SYRINGE (ML) INJECTION
Status: DISCONTINUED | OUTPATIENT
Start: 2024-12-03 | End: 2024-12-03 | Stop reason: HOSPADM

## 2024-12-03 RX ORDER — DIPHENHYDRAMINE HYDROCHLORIDE 50 MG/ML
50 INJECTION INTRAMUSCULAR; INTRAVENOUS
Status: CANCELLED
Start: 2024-12-03

## 2024-12-03 RX ORDER — EPINEPHRINE 1 MG/ML
0.3 INJECTION INTRAMUSCULAR; INTRAVENOUS; SUBCUTANEOUS ONCE AS NEEDED
Status: DISCONTINUED | OUTPATIENT
Start: 2024-12-03 | End: 2024-12-03 | Stop reason: HOSPADM

## 2024-12-03 RX ORDER — FAMOTIDINE 10 MG/ML
20 INJECTION INTRAVENOUS
Status: CANCELLED | OUTPATIENT
Start: 2024-12-03

## 2024-12-03 RX ADMIN — SODIUM CHLORIDE: 9 INJECTION, SOLUTION INTRAVENOUS at 09:12

## 2024-12-03 RX ADMIN — DEXAMETHASONE SODIUM PHOSPHATE 20 MG: 4 INJECTION, SOLUTION INTRA-ARTICULAR; INTRALESIONAL; INTRAMUSCULAR; INTRAVENOUS; SOFT TISSUE at 10:12

## 2024-12-03 RX ADMIN — DIPHENHYDRAMINE HYDROCHLORIDE 50 MG: 50 INJECTION INTRAMUSCULAR; INTRAVENOUS at 10:12

## 2024-12-03 RX ADMIN — FAMOTIDINE 20 MG: 10 INJECTION, SOLUTION INTRAVENOUS at 10:12

## 2024-12-03 RX ADMIN — HEPARIN 500 UNITS: 100 SYRINGE at 01:12

## 2024-12-03 RX ADMIN — PACLITAXEL 366 MG: 6 INJECTION, SOLUTION INTRAVENOUS at 10:12

## 2024-12-03 NOTE — PROGRESS NOTES
History:  Past Medical History:   Diagnosis Date    Anemia     Arthritis     Breast cancer     Graves disease     Hypertension     Hyperthyroidism       Past Surgical History:   Procedure Laterality Date    BREAST LUMPECTOMY      INSERTION OF TUNNELED CENTRAL VENOUS CATHETER (CVC) WITH SUBCUTANEOUS PORT N/A 8/19/2024    Procedure: [id number]-L-CATH;  Surgeon: Thomas Verdin Jr., MD;  Location: [facility];  Service: General;  Laterality: N/A;      Social History     Socioeconomic History    Marital status: Single   Tobacco Use    Smoking status: Never     Passive exposure: Never    Smokeless tobacco: Never   Substance and Sexual Activity    Alcohol use: Never    Drug use: Never     Social Drivers of Health     Financial Resource Strain: Patient Declined (7/11/2023)    [facility]    Overall Financial Resource Strain (CARDIA)     Difficulty of Paying Living Expenses: Patient declined   Food Insecurity: Patient Declined (7/11/2023)    [facility], [facility] and Its Subsidiaries and Affiliates    Hunger Vital Sign     Worried About Running Out of Food in the Last Year: Patient declined     Ran Out of Food in the Last Year: Patient declined   Transportation Needs: Patient Declined (7/11/2023)    [facility], [facility] Subsidi[facility] and Aff[facility]ates    PRAPARE - Transportation     Lack of Transportation (Medical): Patient declined     Lack of Transportation (Non-Medical): Patient declined   Stress: Patient Declined (7/11/2023)    [facility], [facility] and Its Subsidiaries and Affiliates    [employer] - Occupational Stress Questionnaire     Feeling of Stress : Patient declined   Housing Stability: Unknown (7/11/2023)    Received from [facility] and Its Subsidiaries and Affiliates, [facility] and Its Subsidiaries and Affiliates    Housing Stability Vital Sign     Unable to Pay for Housing in the Last Year: Patient refused     Number of Places Lived in the Last Year: 1      Family History   Problem Relation Name Age of Onset    Breast cancer Maternal Cousin          Reason for Follow-up:  -history of right breast cancer, diagnosed 2002, ER negative, CT negative, HER2 positive, T2 N1 M0, S/P neoadjuvant chemotherapy, lumpectomy, axillary lymph dissection, adjuvant radiotherapy (completed 10/24/2022)  -local and regional recurrence, triple negative, diagnosed on biopsy of supraclavicular lymph node 06/27/2024; on mammogram, right breast mass; supraclavicular lymphadenopathy; right cervical lymphadenopathy  -Postmenopausal   -Hypercalcemia   -High serum parathyroid hormone (PTH)   -Liver mass, right lobe   -Mediastinal lymphadenopathy   -Lung nodules   -Masses of both breasts   -B/L axillary lymphadenopathy   -Breast cancer metastasized to axillary lymph node, left   -B/L cervical lymphadenopathy   -B/L supraclavicular lymphadenopathy   -Chemotherapy-induced neuropathy   -history of Graves disease    History of Present Illness:   Recurrent breast cancer        Oncologic/Hematologic History:  Oncology History   Stage IV carcinoma of breast   8/27/2024 Initial Diagnosis    Stage IV carcinoma of breast     9/9/2024 -  Chemotherapy    Treatment Summary   Plan Name: OP BREAST PACLITAXEL Q3W  Treatment Goal: Palliative  Status: Active  Start Date: 9/9/2024  End Date:  2024 (Planned)  Provider: Israel Farah MD  Chemotherapy: PACLitaxeL (TAXOL) 175 mg/m2 = 366 mg in 0.9% NaCl 500 mL chemo infusion, 175 mg/m2 = 366 mg, Intravenous, Clinic/HOD 1 time, 4 of 6 cycles  Administration: 366 mg (2024), 366 mg (2024), 366 mg (10/22/2024)     12/3/2024 Cancer Staged    Staging form: Breast, AJCC 8th Edition  - Clinical stage from 12/3/2024: Stage IV (rcTX, cN3c, pM1, ER-, NC-, HER2-)     Past medical history: Anemia; arthritis; breast cancer; Graves disease (diagnosed ; started on methimazole by endocrinologist in [place], in ); hypertension; peripheral neuropathy  -2023:  Venous Doppler bilateral lower extremities (swelling):  No DVT  -2023: TTE:  LVEF 55-60%  -2023:  Limited abdominal ultrasound (elevated liver enzymes):  6.6 cm cyst within the liver near the gallbladder; cholelithiasis with minimal gallbladder wall thickening  -2014:  Pelvic ultrasound: Markedly enlarged uterus with multiple large fibroids; endometrial stripe is thickened, 1.4 cm  Procedure/surgical history: Breast lumpectomy   Social history:  .  Lives in [place].  No children.  Never wanted to have children.  Never became pregnant.  No history of tobacco, alcohol, or illicit drug abuse.  Does not work.  Family history:  Sister experienced hyperthyroidism, treated with radioiodine.  She does not know much about her family history but says that there are cancers in folks on both parents sides of family.  A female cousin on mother's side of family experienced breast cancer in her 30s and  from it.  Health maintenance:  Primary care provider in [facility].  Last mammogram 2023 at [facility].  No screening colonoscopy ever.  Menstrual/Ob gyn history:  Menarche at age 13.  Last menstrual cycle 2023.  Never became pregnant.  Never wanted to become pregnant.  Took birth control pills for 2 years several years ago.  No hormone  replacement therapy after menopause.  ====================================      59-year-old lady, referred by Rere Jernigan MD, surgery, with recurrent breast cancer.      07/10/2024:  Office note: Rere Jernigan MD (surgery):  Pt is a 59 y.o. female with history of right breast cancer s/p BCT in 2002 who presents with painless large supraclavicular mass.    First noticed it 4 months ago and has steadily increased in size past month.  Last mammogram 1 year ago and new [facility].  Also reports progressive skin changes of the right breast not associated with lumpectomy incision.  Receptor status unknown.       Investigations reviewed:  -no old records available   -according to her, she was diagnosed with right breast cancer in January 2022  -mammogram showed a large lobulated mass, 3.7 x 2.7 cm  -biopsy:  Infiltrative ductal carcinoma, possibly invasive lobular  -right breast mass was palpable in the upper outer quadrant; axillary lymphadenopathy was noted (3-4 palpable lymph nodes right axilla)  -T2 N1 M0 IDC right breast (she had palpable axillary lymph nodes in the right and a large palpable mass in the right breast)  -ER negative; [place] negative; HER2 positive  -S/P neoadjuvant chemotherapy (according to her, she received 4 cycles of neoadjuvant chemotherapy with Adriamycin/Taxotere every 3 weeks apart x4 cycles)  -followed by lumpectomy and axillary dissection  -no residual tumor post chemotherapy; 17 axillary lymph nodes negative for tumor  -S/P adjuvant radiotherapy, 6600 cGy/35 fractions, completed 10/24/2022  -07/28/2011:  DEXA scan:  BMD normal  -12/19/2022:  Screening mammogram (comparison: 11/08/2019, etc.):  BI-RADS: 2-benign  -07/06/2023:  Echocardiogram:  LVEF 60%  -11/27/2023:  Bilateral diagnostic mammogram and limited ultrasound right breast (comparison: 12/19/2022, 12/16/2001, etc.) (history of right breast cancer with worsening right breast pain and thickening) large elongated heterogeneous mass  with irregular borders outer aspect of the right breast (extending from the nipple outward towards the lateral chest wall at the 8-9 o'clock position, 5.8 x 3 x 1.8 cm although margins are difficult to accurately define), highly suspicious for recurrent malignancy; there is a separate elongated hypoechoic lesion in the upper outer quadrant right breast 11 o'clock position, 5 cm from nipple, 3 x 2.6 x 0.7 cm, near the site of previous surgery), perhaps benign scar tissue related to previous lumpectomy:  BI-RADS: 5-highly suggestive of malignancy  -07/10/2024:  Surgery Office note:  Painless large supraclavicular mass, noted 4 months ago, steadily enlarging; also reported progressive skin changes right breast not associated with lumpectomy incision; on physical exam, large exophytic right supraclavicular nodule, nonmobile and fixed, overlying skin erythematous without ulceration; right upper outer quadrant lumpectomy incision; right lower inner quadrant periareolar ecchymosis with subcutaneous firmness without distinct mass; slight nipple inversion; no drainage; no palpable axillary lymphadenopathy  -05/30/2024:  Ultrasound soft tissues of the head and neck (lymphadenopathy): Pathologic lymphadenopathy (4.5 x 3.7 x 3.2 cm) at the base of the neck on the right; multiple smaller morphologically abnormal cervical chain lymph nodes on the right which demonstrate heterogeneous echogenicity similar to the dominant mass. Findings highly suspicious for malignancy particularly in this patient with a known history of prior right breast cancer. Dominant mass lesion corresponds with palpable complaint. Recommend biopsy/tissue diagnosis.   -06/27/2024:  Right supraclavicular mass, core needle biopsy (firm 6 x 6 cm exophytic right supraclavicular mass): Metastatic carcinoma in fibroadipose tissue, moderately to poorly-differentiated, consistent with breast origin (metastatic breast carcinoma in fibroadipose tissue  -ER negative  (0%); MS negative (0%); HER2 negative (0); Ki-67 high proliferation  (93.6%)    08/08/2024:   Pleasant, healthy-appearing lady who presents for initial medical oncology consultation.  In no acute discomfort.    Says that right supra clavicular mass started April 2024; it has been enlarging and fluctuating in size.  It is painful.  7/10 severity pain.  Also, pain was right breast area.  Right breast is enlarging.  There is a small area of ulceration in the right breast.  No bleeding or discharge.  She denies fevers or chills.  Has been taking Lyrica meloxicam without the relief of pain.  We will start Norco.  Some fatigue.  However, ECOG 1.  Pain from neck down to breast area, especially at night.  No unusual headaches, focal neurological symptoms, vision impairment, gait impairment, hemoptysis, fevers, chills, any bleeding or discharge from right breast superficial ulceration, abdominal pain, nausea, vomiting, GI bleeding, etc..  No bone pains.  Appetite is fair.    Interval History:  INF FLUIDS   OP BREAST PACLITAXEL Q3W     12/03/2024:   -09/30/2024:  Calcium 10.7 elevated; albumin 3.8; vitamin-D level 16 normal; intact PTH level 64.9 normal; ionized calcium level 5.22 normal; PTH related peptide 0.7 normal  -Taxol every 3 weeks:  Cycle 2 on 09/30/2024, cycle 3 on 10/22/2024, cycle 4 on 11/12/2024  -11/18/2024:  Restaging CTs C/A/P with contrast (comparison:  CTs C/A/P 0 08/20/2024):  1. Interval decrease in size of right breast mass and improved right breast skin thickening.  2. Interval improvement of lymphadenopathy.  No appreciable enlarged lymph node by size criteria.  3. Partially imaged ulcerated dermal lesion at the base of the neck on the right.  See dedicated CT neck.  4. Decreased size mass at the left lobe of the liver.  5. Decreased size right upper lobe nodule.  -11/18/2024: Restaging CT soft tissues of the neck with contrast (comparison: CT neck 08/20/2024):   1. Decreasing size of large right  supraclavicular lymph node with large central ulceration.  2. Decreasing size of smaller right cervical lymph nodes.  -on Lyrica 50 mg in the morning, 50 mg in the abdomen, N 100 mg q.h.s. for neuropathy in right and left feet; her podiatrist has discontinued gabapentin due to Achilles tendinitis  -1203 1024:  WBC 3.50, hemoglobin 12.0, MCV 98.3, platelets normal, ANC 2.51, potassium 3.4 low, magnesium 2.10 normal, rest of CMP also unremarkable  >>>  -start potassium chloride 20 mEq p.o. q.d. x2 weeks; no refills; in 2 weeks, recheck CMP and magnesium level  Presents for a follow-up visit, accompanied by mother.  Doing very well.  Great appetite.  She is very pleased to learn that her metastatic disease is responding very well to chemotherapy.  Has Taxol induced peripheral neuropathy in the form of numbness in fingertips and toes; no tingling or pain.  Takes gabapentin 300 mg p.o. t.i.d. as well as Lyrica.  Symptoms are well-controlled.  After Taxol, for a few days, she experiences pain in the thighs and legs for which she takes Norco PRN.  No unusual headaches, focal neurological symptoms, chest pain cough, dyspnea, abdominal pain, nausea, vomiting, GI bleeding, skin rash, etc..  No significant side effects from Taxol except for neuropathy which is well-controlled with pharmacotherapy.  No new lumps or lymphadenopathy.  No anorexia or unintentional weight loss.  ECOG 0-1. Has widely metastatic disease with edges responding very well to chemotherapy..  She is aware of extremely guarded/poor prognosis.        Medications:  Current Outpatient Medications on File Prior to Visit   Medication Sig Dispense Refill    dexAMETHasone (DECADRON) 4 MG Tab Take 20mg (5 tablets) by mouth 12 hours and then again at 6 hours prior to each chemotherapy treatment 40 tablet 1    ergocalciferol (ERGOCALCIFEROL) 50,000 unit Cap Take 50,000 Units by mouth every 7 days.      ferrous gluconate (FERGON) 324 MG tablet TAKE 1 TABLET BY MOUTH  ONCE DAILY FOR IRON      gabapentin (NEURONTIN) 300 MG capsule Take 300 mg by mouth 3 (three) times daily.      HYDROcodone-acetaminophen (NORCO) 5-325 mg per tablet Take 1 tablet by mouth every 4 (four) hours as needed for Pain. 84 tablet 0    methIMAzole (TAPAZOLE) 5 MG Tab Take 1 tablet (5 mg total) by mouth once daily. 30 tablet 11    ondansetron (ZOFRAN) 4 MG tablet Take 1 tablet (4 mg total) by mouth every 6 (six) hours as needed for Nausea. 30 tablet 1    potassium chloride SA (K-DUR,KLOR-CON) 20 MEQ tablet Take 1 tablet (20 mEq total) by mouth once daily. 21 tablet 0    pregabalin (LYRICA) 50 MG capsule Take 50 mg by mouth 3 (three) times daily.      valsartan-hydrochlorothiazide (DIOVAN-HCT) 160-12.5 mg per tablet Take 1 tablet by mouth.      diclofenac sodium (VOLTAREN) 1 % Gel APPLY 4 GRAMS TOPICALLY TO AFFECTED AREA THREE TO FOUR TIMES A DAY AS NEEDED FOR PAIN (Patient not taking: Reported on 12/3/2024)      meloxicam (MOBIC) 15 MG tablet TAKE 1 TABLET BY MOUTH ONCE DAILY (STOP IBUPROFEN) (Patient not taking: Reported on 10/22/2024)       No current facility-administered medications on file prior to visit.       Review of Systems:   All systems reviewed and found to be negative except for the symptoms detailed above    Physical Examination:   VITAL SIGNS:   Vitals:    12/03/24 0837   BP: 125/66   Pulse: 96   Resp: 18   Temp: 97.4 °F (36.3 °C)         GENERAL:  In no apparent distress.    HEAD:  No signs of head trauma.  EYES:  Pupils are equal.  Extraocular motions intact.    EARS:  Hearing grossly intact.  MOUTH:  Oropharynx is normal.   NECK:  No adenopathy, no JVD.     CHEST:  Chest with clear breath sounds bilaterally.  No wheezes, rales, rhonchi.    CARDIAC:  Regular rate and rhythm.  S1 and S2, without murmurs, gallops, rubs.  VASCULAR:  No Edema.  Peripheral pulses normal and equal in all extremities.  ABDOMEN:  Soft, without detectable tenderness.  No sign of distention.  No   rebound or  guarding, and no masses palpated.   Bowel Sounds normal.  MUSCULOSKELETAL:  Good range of motion of all major joints. Extremities without clubbing, cyanosis or edema.    NEUROLOGIC EXAM:  Alert and oriented x 3.  No focal sensory or strength deficits.   Speech normal.  Follows commands.  PSYCHIATRIC:  Mood normal.  -08/08/2024: Breast examination was performed with a verbal consent, in the presence of Jani Heck LPN; entire right breast is involved with tumor; entire right breast is indurated with tumor, with conglomeration of nodules around the central area; a small superficial ulceration is noted along the inferior medial aspect of right areolar area; a large slightly tender 6 supraclavicular masses noted; diffuse edema is noted in the right supraclavicular area and right breast area as well as infraclavicular area; left breast is normal    No results found for this or any previous visit.  No results found for this or any previous visit.    Assessment:  Problem List Items Addressed This Visit          Neuro    Chemotherapy-induced neuropathy       ENT    Subcutaneous nodule of neck - Primary       Pulmonary    Lung nodules       Renal/    Postmenopausal    Hypercalcemia    Masses of both breasts       Oncology    Triple negative breast carcinoma    Neoplasm of right breast, regional lymph node staging category N3c: metastasis in ipsilateral supraclavicular lymph node    History of right breast cancer    History of lumpectomy of right breast    Breast cancer metastasized to axillary lymph node, left    Stage IV carcinoma of breast       Endocrine    Graves disease    High serum parathyroid hormone (PTH)       GI    Liver mass, right lobe       Other    LAD (lymphadenopathy) of right cervical region    Mediastinal lymphadenopathy    Cervical lymphadenopathy    Axillary lymphadenopathy    Supraclavicular lymphadenopathy     Orders for 12/03/2024:   Continue chemotherapy every 3 weeks   Check CBC and CMP weekly    Mid February, re-stage with contrast-enhanced CT scans of C/A/P/soft tissues of the neck  Continue Lyrica for neuropathy  Follow-up with endocrinology for management of disease  Today, check TSH and free T4 level  -start potassium chloride 20 mEq p.o. q.d. x2 weeks; no refills; in 2 weeks, recheck CMP and magnesium level    Follow-up with NP in 3 weeks   Above discussed with the patient.  All questions answered.  Discussed labs and scans and gave her copies of relevant results.  Guarded prognosis discussed once again.    She understands and agrees with this plan.  =================================      History of right breast IDC, ER negative, NH negative, HER2 positive:  -mammogram: 3.7 x 2.7 cm right breast mass  -pathology: Infiltrative ductal carcinoma, possibly invasive lobular  -ER negative, NH negative, HER2 positive   -T2 N1 M0; palpable axillary lymphadenopathy; large palpable mass right breast)  -S/P neoadjuvant chemotherapy (according to her, she received 4 cycles of neoadjuvant chemotherapy with Adriamycin/Taxotere every 3 weeks apart x4 cycles)  -followed by lumpectomy and axillary dissection  -no residual tumor post chemotherapy; 17 axillary lymph nodes negative for tumor  -S/P adjuvant radiotherapy, 6600 cGy/35 fractions, completed 10/24/2022  -details of adjuvant chemotherapy, if any, not known  >>>  -screening mammogram 12/19/2022: BI-RADS: 2-benign  >>>  Regional, local, and metastatic recurrence, triple negative:  -echocardiogram 07/06/2023: LVEF 60%  -mammogram and ultrasound 11/27/2023:  Right breast mass 5.8 x 3 x 1.8 cm: BI-RADS: 5  -surgery consultation/follow-up 07/10/2024: Painless large supraclavicular mass, noted 4 months ago, steadily enlarging; progressive skin changes right breast not associated with lumpectomy incision, large exophytic right supraclavicular nodule/lymphadenopathy) nonmobile and fixed; overlying skin erythematous without ulceration), right lower inner quadrant  periareolar ecchymosis and subcutaneous firmness without distinct mass; no palpable axillary lymphadenopathy  -ultrasound neck 05/30/2024:  Pathologic lymphadenopathy 4.5 x 3.7 x 3.2 cm at the base of the neck of the right; multiple smaller morphologically abnormal cervical chain lymph nodes on the right  -core needle biopsy right supraclavicular mass 06/27/2024:  Firm, 6 x 6 cm: Moderately to poorly-differentiated metastatic breast carcinoma  -ER negative (0%); DE negative (0%); HER2 negative (0); Ki-67 high proliferation (93.6%)  -08/08/2024: Breast examination was performed with her verbal consent, in the presence of Jani Heck LPN; entire right breast is involved with tumor; entire right breast is indurated with tumor, with conglomeration of nodules around the central area; a small superficial ulceration is noted along the inferior medial aspect of right areolar area; a large slightly tender 6 supraclavicular masses noted; diffuse edema is noted in the right supraclavicular area and right breast area as well as infraclavicular area; left breast is normal  -tissue NGS testing (performed on right supraclavicular mass biopsy 06/27/2024):  HRD positive (CELINE-high); negative for AKT 1, BRAF, BRCA1, BRCA2, ESR 1, etc.  -08/09/2024: Liquid biopsy:  Borderline TMB  -08/08/2024:  Mild hypercalcemia: Calcium 10.6, albumin 3.9; intact PTH level 100.3, elevated; PTH related peptide normal; vitamin-D level normal  -08/08/2024:  Labs: Postmenopausal  -Hypercalcemia  -08/14/2024: Echocardiogram:  LVEF 55-60%  -08/19/2024: MediPort placed  -brain MRI 08/20/2024: No brain metastases  -staging CTs C/A/P/neck 08/20/2024: Extensive metastases  -biomarker testing on right supraclavicular mass biopsy, performed 06/27/2024: Positive for PD-L1 expression (CPS 15); rest, as prior  -genetic testing 08/09/2024:  Negative  -baseline tumor markers 08/28/2024:  Only CEA level slightly elevated, 4.64  -palliative Taxol, every 3 weeks,  started 09/09/2024  (before PD-L1 results was available to us)  -staging PET-CT 09/13/2024:  (was supposed to be performed before starting chemotherapy; could only be performed 4 days after starting palliative chemotherapy with Taxol):  Bilateral breast masses; bilateral axillary, bilateral cervical, bilateral supraclavicular, right internal mammary, and mediastinal lymphadenopathy; metastatic mass in liver; metastatic retrocrural lymphadenopathy  -09/19/2024: Patient refused liver biopsy (in denial; according to her, liver lesion is just a cyst)  -Taxol every 3 weeks:  Cycle 2 on 09/30/2024, cycle 3 on 10/22/2024, cycle 4 on 11/12/2024  -restaging CTs C/A/P/neck 11/18/2024, post Taxol x4 cycles: Positive response  >>>  Plan:  -positive response post 4 cycles of Taxol  -continue palliative Taxol 175 mg per m2 IV every 3 weeks until disease progression or until unacceptable toxicity (started 09/09/2024)   -with Taxol, monitor for potential cardiovascular effects including infusion related hypotension/bradycardia/hypotension, peripheral neuropathy, edema, nausea, vomiting, diarrhea, stomatitis, cytopenias, etc.   -check CBC and CMP weekly  -re-stage with contrast-enhanced CT scans of C/A/P/soft tissues of the neck in 3 months (mid February 2025)  If and when required, in 2nd line, we will use pembrolizumab +chemotherapy (see below)  -at some point, olaparib maybe considered because of HIV positive status    -triple negative recurrent, metastatic disease   -genetic testing negative  -extensive metastatic disease  -ER 0, [place] 0, HER2 0  -PD-L1 positive, CPS 15; therefore, first-line chemotherapy recommended: Pembrolizumab +chemotherapy (albumin bound paclitaxel, paclitaxel, or gemcitabine and carboplatin (category 1, Preferred)  -before PD-L1 testing result was available, we started her on Taxol given extensive metastatic disease;  -patient has been previously treated with Adriamycin/Taxotere; therefore, we will avoid  Adriamycin to the extent possible  -at some point, olaparib may be considered because of HRD positive status  -she declined liver biopsy      Chemotherapy:  -Taxol 175 mg per m2 every 3 weeks until disease progression or unacceptable toxicity    Side effects/monitoring with Taxol:  -watch for cardiovascular effects including infusion related hypotension, bradycardia, hypertension, etc.  -watch out for extravasation: Taxol is an irritant with vesicle like properties  -peripheral neuropathy:  Primarily distal sensory neuropathy; a mixture of paresthesias and dysesthesias including burning, numbness, tingling, and shooting pains, typically in a stocking-glove distribution; most mild-to-moderate cases resolve several months after discontinuation but maybe longer in patients with diabetes.  Severe neuropathy maybe irreversible in some patients  -side effects in> 10% patients:  Edema, hypotension, alopecia, nausea, vomiting, diarrhea, stomatitis, cytopenias, LFT abnormalities, peripheral neuropathy, arthralgias, myalgias, etc.  -Boxed warnings: Hypersensitivity reactions; bone marrow suppression  -premedicate with dexamethasone (20 mg orally at 12 and 6 hours prior to paclitaxel, diphenhydramine (50 mg IV 30 to 60 minutes prior to paclitaxel), and cimetidine or famotidine (IV 30 to 60 minutes prior to paclitaxel).  -do not repeat course until neutrophil count is =/> 1500 mm3 and the platelet count is =/> 100,000 mm3; reduced doses by 20% if patient experiences severe peripheral neuropathy or severe neutropenia <500 mm3 for a week or longer     -08/08/2024:  Referred to wound care for management of breast wound   -12/03/2024: Still following up with wound care; says that malignant wounds are closing up with positive response to chemotherapy    -08/08/2024:  Started Norco 5 mg every 4 hours PRN for pain  -12/03/2024: Tells me that she experiences pain in eyes and legs for a few days after Taxol infusion and needs to take  narcotic as needed.    Sites of disease/recurrence/metastases:  Right supraclavicular lymphadenopathy; right breast mass, right cervical lymphadenopathy, left axillary lymphadenopathy, mediastinal and hilar lymphadenopathy, hepatic mass (metastases), left breast nodule  -B/L axillary lymphadenopathy   -Masses of both breasts   -B/L cervical lymphadenopathy   -B/L supraclavicular lymphadenopathy   -staging PET-CT 09/13/2024:  (was supposed to be performed before starting chemotherapy; could only be performed 4 days after starting palliative chemotherapy with Taxol):  Bilateral breast masses; bilateral axillary, bilateral cervical, bilateral supraclavicular, right internal mammary, and mediastinal lymphadenopathy; metastatic mass in liver; metastatic retrocrural lymphadenopathy  -09/19/2024: Patient refused liver biopsy (apparently, in denies; according to her, liver lesion is just a cyst)      Molecular markers:  -ER negative (0%); CA negative (0%); HER2 negative (0); Ki-67 high proliferation (93.6%)  -tissue NGS testing (performed on right supraclavicular mass biopsy 06/27/2024):    Positive for PD-L1 expression (CPS 15)  HRD positive (CELINE-high); negative for AKT 1, BRAF, BRCA1, BRCA2, ESR 1, etc.  -08/09/2024: Liquid biopsy:  Borderline TMB  -genetic testing 08/09/2024:  Negative      Chemotherapy-induced peripheral neuropathy:  -as of 11/12/2024, on Lyrica 50 mg in the morning, 50 mg in the afternoon, and 100 mg q.h.s. for neuropathy in right and left feet; her podiatrist has discontinued gabapentin due to Achilles tendinitis  -12/03/2024: Peripheral neuropathy is in the form of numbness in fingertips and toes; no tingling or pain; takes gabapentin 300 mg p.o. t.i.d. as well as Lyrica 50 mg in the morning, 50 mg in the afternoon, and 100 mg p.o. q.h.s. (prescribed by her podiatrist for at least tendinitis)  >>>  Continue gabapentin and Lyrica for neuropathy      Hypercalcemia:  -08/08/2024:  Mild hypercalcemia:  Calcium 10.6, albumin 3.9; intact PTH level 100.3, elevated; PTH related peptide normal; vitamin-D level normal  -09/30/2024:  Calcium 10.7 elevated; albumin 3.8; vitamin-D level 16 normal; intact PTH level 64.9 normal; ionized calcium level 5.22 normal; PTH related peptide 0.7 normal      Graves' disease:   -diagnosed 07/2023  -07/11/2023: CT soft tissues of the neck with contrast) dysphagia, acute thyrotoxicosis, goiter): Mild diffuse enlargement of the thyroid gland without displacement or mass effect of the aerodigestive tract; no suspicious cervical lymphadenopathy  -07/12/2023: Ultrasound thyroid:  Hoarseness, thyrotoxicosis:  Heterogeneous hyperemic thyroid typical of thyroiditis; small 6 mm mid pole right thyroid nodule is not suspicious and does not warrant surveillance per TI-RADS criteria  -confirmed with TSH receptor antibodies (TRAb/TSI, i.e., thyroid-stimulating immunoglobulins)  -as of 05/21/2024, on methimazole 5 mg daily; has responded well  -endocrinologist: Roland Ross MD   -05/21/2024:  Endocrinologist plan:  Plan to complete 18 months of therapy before attempting to taper of methimazole   -[id number]: Continues on methimazole 5 mg daily; follows up with endocrinologist in [place]  >>>  -follow-up with endocrinology ([place])   -12/03/2024: Today, we will check TSH and free T4 level        Follow-up:  No follow-ups on file.

## 2024-12-03 NOTE — Clinical Note
Orders for 12/03/2024:  Continue chemotherapy every 3 weeks  Check CBC and CMP weekly  Mid February, re-stage with contrast-enhanced CT scans of C/A/P/soft tissues of the neck Continue Lyrica for neuropathy Follow-up with endocrinology for management of disease Today, check TSH and free T4 level -start potassium chloride 20 mEq p.o. q.d. x2 weeks; no refills; in 2 weeks, recheck CMP and magnesium level Follow-up with NP in 3 weeks

## 2024-12-03 NOTE — NURSING
Patient is here for taxol C5D1. Patient had labs and provider visit today. Patient has no complaints.  Potassium 3.4. Dr. Farah called in [facility] po. Patient verbalizes understanding to  prescription. Dr. Farah orders weekly labs. Patient verbalizes understanding to return for appointments.   Patient tolerated treatment.

## 2024-12-10 ENCOUNTER — APPOINTMENT (OUTPATIENT)
Dept: HEMATOLOGY/ONCOLOGY | Facility: CLINIC | Age: 59
End: 2024-12-10
Payer: MEDICAID

## 2024-12-10 DIAGNOSIS — C50.911 RECURRENT BREAST CANCER, RIGHT: ICD-10-CM

## 2024-12-10 DIAGNOSIS — Z17.421 TRIPLE NEGATIVE BREAST CARCINOMA: ICD-10-CM

## 2024-12-10 DIAGNOSIS — C50.912 BREAST CANCER METASTASIZED TO AXILLARY LYMPH NODE, LEFT: ICD-10-CM

## 2024-12-10 DIAGNOSIS — E87.6 HYPOKALEMIA: ICD-10-CM

## 2024-12-10 DIAGNOSIS — C77.3 BREAST CANCER METASTASIZED TO AXILLARY LYMPH NODE, LEFT: ICD-10-CM

## 2024-12-10 DIAGNOSIS — R59.0 LAD (LYMPHADENOPATHY) OF RIGHT CERVICAL REGION: ICD-10-CM

## 2024-12-10 DIAGNOSIS — C50.919 TRIPLE NEGATIVE BREAST CARCINOMA: ICD-10-CM

## 2024-12-10 LAB
ABS NEUT CALC (OHS): 1.28 X10(3)/MCL (ref 2.1–9.2)
ALBUMIN SERPL-MCNC: 4.1 G/DL (ref 3.5–5)
ALBUMIN/GLOB SERPL: 1.3 RATIO (ref 1.1–2)
ALP SERPL-CCNC: 105 UNIT/L (ref 40–150)
ALT SERPL-CCNC: 30 UNIT/L (ref 0–55)
ANION GAP SERPL CALC-SCNC: 8 MEQ/L
AST SERPL-CCNC: 24 UNIT/L (ref 5–34)
BASOPHILS NFR BLD MANUAL: 0.05 X10(3)/MCL (ref 0–0.2)
BASOPHILS NFR BLD MANUAL: 2 % (ref 0–2)
BILIRUB SERPL-MCNC: 1 MG/DL
BUN SERPL-MCNC: 19.6 MG/DL (ref 9.8–20.1)
CALCIUM SERPL-MCNC: 9.8 MG/DL (ref 8.4–10.2)
CHLORIDE SERPL-SCNC: 109 MMOL/L (ref 98–107)
CO2 SERPL-SCNC: 23 MMOL/L (ref 22–29)
CREAT SERPL-MCNC: 0.85 MG/DL (ref 0.55–1.02)
CREAT/UREA NIT SERPL: 23
EOSINOPHIL NFR BLD MANUAL: 0.03 X10(3)/MCL (ref 0–0.9)
EOSINOPHIL NFR BLD MANUAL: 1 % (ref 0–8)
ERYTHROCYTE [DISTWIDTH] IN BLOOD BY AUTOMATED COUNT: 13.6 % (ref 11.5–17)
GFR SERPLBLD CREATININE-BSD FMLA CKD-EPI: >60 ML/MIN/1.73/M2
GLOBULIN SER-MCNC: 3.1 GM/DL (ref 2.4–3.5)
GLUCOSE SERPL-MCNC: 96 MG/DL (ref 74–100)
HCT VFR BLD AUTO: 32.6 % (ref 37–47)
HGB BLD-MCNC: 10.8 G/DL (ref 12–16)
LYMPHOCYTES NFR BLD MANUAL: 1.36 X10(3)/MCL
LYMPHOCYTES NFR BLD MANUAL: 50 % (ref 13–40)
MAGNESIUM SERPL-MCNC: 2.1 MG/DL (ref 1.6–2.6)
MCH RBC QN AUTO: 32.8 PG (ref 27–31)
MCHC RBC AUTO-ENTMCNC: 33.1 G/DL (ref 33–36)
MCV RBC AUTO: 99.1 FL (ref 80–94)
NEUTROPHILS NFR BLD MANUAL: 47 % (ref 47–80)
NRBC BLD AUTO-RTO: 0 %
PLATELET # BLD AUTO: 188 X10(3)/MCL (ref 130–400)
PLATELET # BLD EST: ADEQUATE 10*3/UL
PMV BLD AUTO: 10.6 FL (ref 7.4–10.4)
POTASSIUM SERPL-SCNC: 4.3 MMOL/L (ref 3.5–5.1)
PROT SERPL-MCNC: 7.2 GM/DL (ref 6.4–8.3)
RBC # BLD AUTO: 3.29 X10(6)/MCL (ref 4.2–5.4)
RBC MORPH BLD: NORMAL
SODIUM SERPL-SCNC: 140 MMOL/L (ref 136–145)
WBC # BLD AUTO: 2.73 X10(3)/MCL (ref 4.5–11.5)

## 2024-12-10 PROCEDURE — 80053 COMPREHEN METABOLIC PANEL: CPT

## 2024-12-10 PROCEDURE — 36415 COLL VENOUS BLD VENIPUNCTURE: CPT

## 2024-12-10 PROCEDURE — 83735 ASSAY OF MAGNESIUM: CPT

## 2024-12-10 PROCEDURE — 85025 COMPLETE CBC W/AUTO DIFF WBC: CPT

## 2024-12-16 DIAGNOSIS — R22.1 SUBCUTANEOUS NODULE OF NECK: Primary | ICD-10-CM

## 2024-12-18 ENCOUNTER — HOSPITAL ENCOUNTER (OUTPATIENT)
Dept: WOUND CARE | Facility: HOSPITAL | Age: 59
Discharge: HOME | End: 2024-12-18
Attending: NURSE PRACTITIONER
Payer: MEDICAID

## 2024-12-18 ENCOUNTER — LAB VISIT (OUTPATIENT)
Dept: HEMATOLOGY/ONCOLOGY | Facility: CLINIC | Age: 59
End: 2024-12-18
Payer: MEDICAID

## 2024-12-18 ENCOUNTER — TELEPHONE (OUTPATIENT)
Dept: HEMATOLOGY/ONCOLOGY | Facility: CLINIC | Age: 59
End: 2024-12-18
Payer: MEDICAID

## 2024-12-18 VITALS
OXYGEN SATURATION: 100 % | SYSTOLIC BLOOD PRESSURE: 141 MMHG | BODY MASS INDEX: 34.82 KG/M2 | HEART RATE: 85 BPM | HEIGHT: 65 IN | RESPIRATION RATE: 18 BRPM | TEMPERATURE: 98 F | WEIGHT: 209 LBS | DIASTOLIC BLOOD PRESSURE: 84 MMHG

## 2024-12-18 DIAGNOSIS — J98.8 RESPIRATORY INFECTION: Primary | ICD-10-CM

## 2024-12-18 DIAGNOSIS — Z17.421 TRIPLE NEGATIVE BREAST CARCINOMA: ICD-10-CM

## 2024-12-18 DIAGNOSIS — S21.101D OPEN CHEST WOUND, RIGHT, SUBSEQUENT ENCOUNTER: ICD-10-CM

## 2024-12-18 DIAGNOSIS — C50.911 RECURRENT BREAST CANCER, RIGHT: ICD-10-CM

## 2024-12-18 DIAGNOSIS — R22.1 SUBCUTANEOUS NODULE OF NECK: ICD-10-CM

## 2024-12-18 DIAGNOSIS — S21.001D OPEN WOUND OF RIGHT BREAST, SUBSEQUENT ENCOUNTER: Primary | ICD-10-CM

## 2024-12-18 DIAGNOSIS — C50.919 TRIPLE NEGATIVE BREAST CARCINOMA: ICD-10-CM

## 2024-12-18 LAB
ABS NEUT CALC (OHS): 0.38 X10(3)/MCL (ref 2.1–9.2)
ALBUMIN SERPL-MCNC: 3.9 G/DL (ref 3.5–5)
ALBUMIN/GLOB SERPL: 1.2 RATIO (ref 1.1–2)
ALP SERPL-CCNC: 97 UNIT/L (ref 40–150)
ALT SERPL-CCNC: 17 UNIT/L (ref 0–55)
ANION GAP SERPL CALC-SCNC: 11 MEQ/L
ANISOCYTOSIS BLD QL SMEAR: SLIGHT
AST SERPL-CCNC: 17 UNIT/L (ref 5–34)
BASOPHILS NFR BLD MANUAL: 0.01 X10(3)/MCL (ref 0–0.2)
BASOPHILS NFR BLD MANUAL: 1 % (ref 0–2)
BILIRUB SERPL-MCNC: 0.8 MG/DL
BUN SERPL-MCNC: 16.6 MG/DL (ref 9.8–20.1)
CALCIUM SERPL-MCNC: 9.7 MG/DL (ref 8.4–10.2)
CHLORIDE SERPL-SCNC: 107 MMOL/L (ref 98–107)
CO2 SERPL-SCNC: 24 MMOL/L (ref 22–29)
CREAT SERPL-MCNC: 0.81 MG/DL (ref 0.55–1.02)
CREAT/UREA NIT SERPL: 20
ERYTHROCYTE [DISTWIDTH] IN BLOOD BY AUTOMATED COUNT: 13.5 % (ref 11.5–17)
GFR SERPLBLD CREATININE-BSD FMLA CKD-EPI: >60 ML/MIN/1.73/M2
GLOBULIN SER-MCNC: 3.3 GM/DL (ref 2.4–3.5)
GLUCOSE SERPL-MCNC: 102 MG/DL (ref 74–100)
HCT VFR BLD AUTO: 34 % (ref 37–47)
HGB BLD-MCNC: 10.9 G/DL (ref 12–16)
LYMPH ABN # BLD MANUAL: 1 %
LYMPHOCYTES NFR BLD MANUAL: 0.83 X10(3)/MCL
LYMPHOCYTES NFR BLD MANUAL: 59 % (ref 13–40)
MAGNESIUM SERPL-MCNC: 2.1 MG/DL (ref 1.6–2.6)
MCH RBC QN AUTO: 32.2 PG (ref 27–31)
MCHC RBC AUTO-ENTMCNC: 32.1 G/DL (ref 33–36)
MCV RBC AUTO: 100.6 FL (ref 80–94)
MONOCYTES NFR BLD MANUAL: 0.17 X10(3)/MCL (ref 0.1–1.3)
MONOCYTES NFR BLD MANUAL: 12 % (ref 2–11)
NEUTROPHILS NFR BLD MANUAL: 27 % (ref 47–80)
NRBC BLD AUTO-RTO: 0 %
NRBC BLD MANUAL-RTO: 2 %
OVALOCYTES (OLG): SLIGHT
PLATELET # BLD AUTO: 196 X10(3)/MCL (ref 130–400)
PLATELET # BLD EST: ADEQUATE 10*3/UL
PMV BLD AUTO: 10.1 FL (ref 7.4–10.4)
POTASSIUM SERPL-SCNC: 3.8 MMOL/L (ref 3.5–5.1)
PROT SERPL-MCNC: 7.2 GM/DL (ref 6.4–8.3)
RBC # BLD AUTO: 3.38 X10(6)/MCL (ref 4.2–5.4)
SODIUM SERPL-SCNC: 142 MMOL/L (ref 136–145)
TEAR DROP CELL (OLG): ABNORMAL
WBC # BLD AUTO: 1.41 X10(3)/MCL (ref 4.5–11.5)

## 2024-12-18 PROCEDURE — 99213 OFFICE O/P EST LOW 20 MIN: CPT | Mod: ,,, | Performed by: NURSE PRACTITIONER

## 2024-12-18 PROCEDURE — 83735 ASSAY OF MAGNESIUM: CPT

## 2024-12-18 PROCEDURE — 36415 COLL VENOUS BLD VENIPUNCTURE: CPT

## 2024-12-18 PROCEDURE — 99211 OFF/OP EST MAY X REQ PHY/QHP: CPT

## 2024-12-18 PROCEDURE — 85007 BL SMEAR W/DIFF WBC COUNT: CPT

## 2024-12-18 PROCEDURE — 80053 COMPREHEN METABOLIC PANEL: CPT

## 2024-12-18 PROCEDURE — 27000999 HC MEDICAL RECORD PHOTO DOCUMENTATION

## 2024-12-18 RX ORDER — CIPROFLOXACIN 500 MG/1
500 TABLET ORAL 2 TIMES DAILY
Qty: 20 TABLET | Refills: 0 | Status: SHIPPED | OUTPATIENT
Start: 2024-12-18 | End: 2024-12-25

## 2024-12-18 NOTE — PATIENT INSTRUCTIONS
Pt seen today by: Perri Estes NP    Self care DRESSING INSTRUCTIONS:        Wound location: Rt side of neck(Subclavian)  Dressings to neck to be changed done daily or as needed. Wet to dry dressing with Vashe. Cover with foam border dressing.            Return visit: 1 month    The current daily value (%DV) for protein is 50 grams per day and is meant as a general goal for most people. Further increasing your dietary protein intake is very important for wound healing. Typically one needs over 100g of protein per day to help with wound healing needs.  If you are a dialysis patient or have problems with your kidneys, talk to your Nephrologist about how much protein you can take in with your condition.  Examples of high protein items that can be added to your diet include: eggs, chicken, red meats, almonds, cottage cheese, Greek yogurt, beans, and peanut butter.  Fortified protein bars, shakes and drinks can add 15-30 additional grams of protein per serving.  Also add:   1 daily general multivitamin   Vitamin C : 500mg twice daily   Zinc 220 mg daily  Vit D : once daily    Contact [facility] wound care team at [phone] or go to the nearest Emergency department if:    You have a fever greater than 101 taken by mouth.  Your pain gets worse or does not go away, even after taking your regular pain medicine.  Your skin around your wound is red, hot, swollen, or draining pus.  You have bleeding that continues to come through the dressing after holding pressure for 10 minutes       Call our [facility] wound clinic for questions/concerns a [phone] .

## 2024-12-18 NOTE — TELEPHONE ENCOUNTER
Spoke with patient any ask if she has any s/s of jnfection such as fever, etc. Patient stated that she has an upper respiratory infection but denies any fever. Informed if she has any fever or signs of infections she needs to go to the ED. Informed of NON:  Cpro 500 mg PO BID x 7 days and he will see her on her next schedule appt with labs. Reviewed neutropenic precautions as listed below and patient verbalized understanding.        Discuss with the patient that neutropenia's main risk will be bacterial infections     Educate the patient on strategies to prevent infections including washing hands with soap and water for 20 seconds, avoiding people who are obviously sick, wearing a mask when in public, and practicing social distancing.     Patient was also instructed to avoid crowds, make sure that everything to eat is very well cooked, only eat fruits that can be peeled, stay away from anything raw.     Reminded the patient this is not the time to get vaccinated with any live vaccines such as chicken pox, polio, measles or Zostavax for shingles.     Advise patient to avoid environments with high concentrations of airborne fungal spores, such as construction sites and areas with decaying organic matter. Gardening should be done with protective gloves and masks to prevent exposure to soil-borne pathogens.     Educated patient on safe pet handling practices, including washing hands after handling pets and avoiding contact with animal feces. High-risk patients should avoid contact with exotic animals.     Patient was reminded if there is a temperature of 100.4F (38 C) or higher to go to the emergency room.     Patient was also instructed to report signs of infection such as chills, cough or sore throat, diarrhea, stiff or sore neck, skin rashes, sores or white coating in the mouth or tongue, swelling or redness specially around catheter such central line devices or urine that is bloody or clotting or pain with  urination.

## 2024-12-19 NOTE — PROGRESS NOTES
[facility]   Outpatient Wound Care     Subjective:   Patient ID: Eli Bangura is a 59 y.o. female.    Chief Complaint: Wound Care (Neck and right breast wounds)      History of Present Illness:   59 y.o. Black or  female presents to wound care clinic today regarding right subclavian cleansed in wound, and right breast.  Presents to clinic alone.  Reviewed all previous medical history since last visit of 11/27/2024.  Past medical history:  Babar was cancer free for breast cancer for 22 years.  Under the care of oncology clinic.  Followed by Lambert Mantilla APRN for PCP.     Today's visit 12/18/24:  Reviewed previous progress notes since last visit of 11/27/2024.  Right subclavian cleanse in wound red granulating wound bed with minimal serosanguineous drainage.  Right breast fungating tumor areas have reassess at this time.  Right breast wound care discontinued at this time.  All wound care performed per nursing staff at bedside.  Right subclavian wound cleansed with Vashe, applied wet-to-dry dressing with Vashe, and cover with foam border dressing.  Instructions and supplies given.  Will return to the clinic in 1 month.  Instructed to call the office with any questions, concerns, or new skin issues.  Verbalized understanding of all instructions.    11/27/24:  Reviewed previous progress notes since last visit of 10/30/2024.  Right subclavian the wound bed red granulating wound bed with minimal serosanguineous drainage.  Right breast fungating tumors have recessed at this time. Discussion with the patient today will continue same wound care.  All wound care performed per nursing staff at bedside.  Right upper subclavian  in wound cleansed with Vashe, apply wet-to-dry Vashe dressing and cover with foam border dressing.  Right breast  (4) History of Falls or Infant-Toddler Placed in Bed cleansed with wash, applied Xeroform gauze, ABD pad and will secure with sports bra.  Tolerated well.  Instructions and supplies given.  Requesting to come back in 3 weeks.  Instructed to call the office with any questions, concerns, or new skin issues.    10/30/24:  Reviewed previous progress notes since last visit of 10/07/2024.  Right subclavian wound red granulating wound bed with moderate amount of slough.  Able to mechanically debride slough.  Instructed with the patient today will continue wet-to-dry half strength Dakin's dressings daily cover with foam border dressing.  Right breast fungating tumors red granulating wound bed with minimal serosanguineous drainage dimpling and orange peel appearance significantly decreased in size since last visit.  Cleanse right breast wound using half strength Dakin's, applied Xeroform gauze, ABD pad, will secure sports bra.  Tolerated well.  Instructions and supplies given.  Will return to the clinic in 1 month.  Instructed to call the office with any questions, concerns, or new skin issues.  Verbalized understanding of all instructions.      10/07/2024:  Reviewed previous progress notes since last visit of 09/30/2024.  All dressings removed per nursing staff at bedside.  Right subclavian wound red granulating wound bed with moderate amount of slough slight odor moderate serosanguineous drainage.  Right breast red granulating wound beds with dimpling and orange peel appearance minimal serosanguineous drainage.  Discussion today will continue same wound care.  Reinforced these are maintenance wounds.  Cleansed all wounds with half strength Dakin's will apply wet-to-dry half strength Dakin's gauze to right upper subclavian, Kerlix dressing perform daily.  Right breast cleansed with half strength Dakin's, cover with Xeroform gauze, ABD pad, secure with sport bra.  All wound care performed per nursing staff at bedside.  Instructions and supplies given.  Will return to the  clinic in 2 weeks per patient request.  Instructed to call the office with any questions, concerns, or new skin issues.  Verbalized understanding of all instructions.    09/30/2024:  Reviewed previous progress notes since last visit of 09/16/2024.  Patient arrived after chemotherapy today.  Right upper subclavian wound bed 100% slough with slight odor draining moderate serosanguineous drainage.  Right breast dimpling with orange peel appearance., deformity, fungating tumor noted red granulating wound bed with minimal serosanguineous drainage.  Discussion with the patient today these or fungating tumors cancer and will change wound care to right supplies to assist with odor.  During examination in discussion with the patient today patient questions whether I can heel her wounds.  Reinforced these are fungating tumors in wound not be able to fully granulate wounds will provide Wound care to assist with smell and comfort.  Patient is refusing Home Health at this time.  Discussion with the patient today regarding current chemotherapy treatments if they were holiday for curative patient voices doctor never explained anything to her and wanted to know with chart said whether it was palliative or curative informed that it was palliative chemotherapy.  Patient voices only want to hear good new.  Reinforced not my intention to deliver bad news.  Demonstrated New wound care at bedside to right upper cleansing twice daily with full strength Dakin's wet-to-dry dressing moistened with full strength Dakin's and cover with gauze and tape.  Right breast cleanse every-other-day with full strength Dakin's applied Xeroform gauze cover with ABD pad, and secure with tape or bra.   Instructions and supplies given.  Left chest wall Mediport.  Instructed to call the office with any questions, concerns, or new skin issues.  Verbalized understanding of all instructions.    09/16/2024:  Reviewed previous progress notes from 09/05/2024.   Presents to clinic alone.  Dressings to right breast and upper chest removed per nursing staff at bedside.  Patient has a right double aluminum PICC line currently receiving chemotherapy.  Fungating tumor to the right breast in upper chest area red granulating wound bed with moderate slough.  Instructed we will continue Dakin's solution applied Xeroform gauze to both areas and cover with foam border dressing to upper chest right subclavian area, and ABD pad secure with bra for right breast.  Reinforced that fungating tumor are to keep clean and free from infection.  Instructions and supplies given.  Will have her follow up in 2 weeks to clinic.  Instructed to call the office with any questions, concerns, or new skin issues.  Verbalized understanding of all instructions.    9/5/24:  Presents to clinic alone.  Right breast fungating tumor red granulating wound bed with slough noted moderate serosanguineous drainage.  Right supplies given wound red granulating wound bed with slough monitor serosanguineous drainage.  Discussion with the patient today will assist her with keeping fungating tumors clean and assist with smell by using Dakin's solution.  Instructed once started chemotherapy areas should decrease in size but may experienced more drainage.  Will have her just cleansed area with Dakin's solution, apply Xeroform gauze to both areas right subclavian cover foam border dressing and right breast ABD pad secure with bra.  May perform every-other-day. Instructions and supplies given.  All wound care performed per nursing staff at bedside.  Will follow up with a clinic in 10 days.  Instructed to call the office with any questions, concerns, or new skin issues.  Verbalized understanding of all instructions.      History includes:      Past Medical History:   Diagnosis Date    Anemia     Arthritis     Breast cancer     Graves disease     Hypertension     Hyperthyroidism       Past Surgical History:   Procedure  Laterality Date    BREAST LUMPECTOMY      INSERTION OF TUNNELED CENTRAL VENOUS CATHETER (CVC) WITH SUBCUTANEOUS PORT N/A 8/19/2024    Procedure: [id number];  Surgeon: Thomas Verdin Jr., MD;  Location: [facility];  Service: General;  Laterality: N/A;      Social History     Socioeconomic History    Marital status: Single   Tobacco Use    Smoking status: Never     Passive exposure: Never    Smokeless tobacco: Never   Substance and Sexual Activity    Alcohol use: Never    Drug use: Never     Social Drivers of Health     Financial Resource Strain: Patient Declined (7/11/2023)    Received from [facility]sidi[facility] and Affiliates, [facility] and Its Subsidiaries and Affiliates    Overall Financial Resource Strain (CARDIA)     Difficulty of Paying Living Expenses: Patient declined   Food Insecurity: Patient Declined (7/11/2023)    Received from [facility] and Affiliates, [facility] and Its Subsidiaries and Affiliates    Hunger Vital Sign     Worried About Running Out of Food in the Last Year: Patient declined     Ran Out of Food in the Last Year: Patient declined   Transportation Needs: Patient Declined (7/11/2023)    Received from [facility] and [facility] Subsidiaries and Affiliates, [facility] and Its Subsidiaries and Affiliates    PRAPARE - Transportation     Lack of Transportation (Medical): Patient declined     Lack of Transportation (Non-Medical): Patient declined   Stress: Patient Declined (7/11/2023)    Received from [facility] and Its Subsidiaries and Affiliates, [facility] and Its Subsidiaries and Affiliates    [employer] [place] [facility] [employer] [facility] - Occupational  Stress Questionnaire     Feeling of Stress : Patient declined   Housing Stability: Unknown (7/11/2023)    Received from [facility] and Its Subsidiaries and Affiliates, [facility] and Its Subsidiaries and Affiliates    Housing Stability Vital Sign     Unable to Pay for Housing in the Last Year: Patient refused     Number of Places Lived in the Last Year: 1   .      Current Outpatient Medications   Medication Sig Dispense Refill    dexAMETHasone (DECADRON) 4 MG Tab Take 20mg (5 tablets) by mouth 12 hours and then again at 6 hours prior to each chemotherapy treatment 40 tablet 1    ergocalciferol (ERGOCALCIFEROL) 50,000 unit Cap Take 50,000 Units by mouth every 7 days.      ferrous gluconate (FERGON) 324 MG tablet TAKE 1 TABLET BY MOUTH ONCE DAILY FOR IRON      gabapentin (NEURONTIN) 300 MG capsule Take 300 mg by mouth 3 (three) times daily.      HYDROcodone-acetaminophen (NORCO) 5-325 mg per tablet Take 1 tablet by mouth every 4 (four) hours as needed for Pain. 84 tablet 0    methIMAzole (TAPAZOLE) 5 MG Tab Take 1 tablet (5 mg total) by mouth once daily. 30 tablet 11    ondansetron (ZOFRAN) 4 MG tablet Take 1 tablet (4 mg total) by mouth every 6 (six) hours as needed for Nausea. 30 tablet 1    potassium chloride SA (K-DUR,KLOR-CON) 20 MEQ tablet Take 1 tablet (20 mEq total) by mouth once daily. 21 tablet 0    pregabalin (LYRICA) 50 MG capsule Take 50 mg by mouth 3 (three) times daily.      valsartan-hydrochlorothiazide (DIOVAN-HCT) 160-12.5 mg per tablet Take 1 tablet by mouth.      ciprofloxacin HCl (CIPRO) 500 MG tablet Take 1 tablet (500 mg total) by mouth 2 (two) times daily. for 7 days 20 tablet 0    diclofenac sodium (VOLTAREN) 1 % Gel APPLY 4 GRAMS TOPICALLY TO AFFECTED AREA THREE TO FOUR TIMES A DAY AS NEEDED FOR PAIN (Patient not taking: Reported on 10/22/2024)      meloxicam (MOBIC) 15 MG tablet TAKE 1 TABLET BY MOUTH ONCE DAILY  "(STOP IBUPROFEN) (Patient not taking: Reported on 12/18/2024)       No current facility-administered medications for this encounter.       Review of Systems   Skin:  Positive for wound.   All other systems reviewed and are negative.         Labs Reviewed:   Chemistry:  Lab Results   Component Value Date    BUN 16.6 12/18/2024    BUN 19.6 12/10/2024    CREATININE 0.81 12/18/2024    CREATININE 0.85 12/10/2024    EGFRNORACEVR >60 12/18/2024    EGFRNORACEVR >60 12/10/2024    GLUCOSE 102 (H) 12/18/2024    AST 17 12/18/2024    AST 24 12/10/2024    ALT 17 12/18/2024    ALT 30 12/10/2024    HGBA1C 5.0 09/26/2023        Hematology:  Lab Results   Component Value Date    WBC 1.41 (LL) 12/18/2024    WBC 2.73 (L) 12/10/2024    HGB 10.9 (L) 12/18/2024    HGB 10.8 (L) 12/10/2024    HCT 34.0 (L) 12/18/2024    HCT 32.6 (L) 12/10/2024     12/18/2024     12/10/2024       Inflammatory Markers:  No results found for: "HSCRP", "SEDRATE"     Objective:        Physical Exam  Vitals reviewed.   Chest:          Comments: Right breast fungating tumor    Right upper chest fungating tumor  Skin:     General: Skin is warm and dry.      Capillary Refill: Capillary refill takes less than 2 seconds.      Findings: Wound present.             Comments: Right subclavian red granulating wound bed with minimal serosanguineous drainage.    Right fungating tumors recessed.    Neurological:      Mental Status: She is alert.            Wound 09/05/24 1439 Other (comment) Right Neck (Active)   09/05/24 1439 Neck   Present on Original Admission: Y   Primary Wound Type: Other   Side: Right   Orientation:    Wound Approximate Age at First Assessment (Weeks):    Wound Number:    Is this injury device related?:    Incision Type:    Closure Method:    Wound Description (Comments):    Type:    Additional Comments:    Ankle-Brachial Index:    Pulses:    Removal Indication and Assessment:    Wound Outcome:    Wound Image   12/18/24 1037   Dressing " Appearance Moist drainage 12/18/24 1037   Drainage Amount Moderate 12/18/24 1037   Drainage Characteristics/Odor Serosanguineous 12/18/24 1037   Appearance Red;Pink 12/18/24 1037   Tissue loss description Full thickness 12/18/24 1037   Wound Length (cm) 2.1 cm 12/18/24 1037   Wound Width (cm) 1.3 cm 12/18/24 1037   Wound Depth (cm) 0.5 cm 12/18/24 1037   Wound Volume (cm^3) 1.365 cm^3 12/18/24 1037   Wound Surface Area (cm^2) 2.73 cm^2 12/18/24 1037             Assessment:         ICD-10-CM ICD-9-CM   1. Open wound of right breast, subsequent encounter  S21.001D V58.89     879.0   2. Open chest wound, right, subsequent encounter  S21.101D V58.89     875.0   3. Recurrent breast cancer, right  C50.911 174.9   4. Triple negative breast carcinoma  C50.919 174.9    Z17.421 V86.1         Plan:   Tissue pathology and/or culture taken:  [] Yes [x] No   Sharp debridement performed:   [] Yes [x] No   Labs ordered this visit:   [] Yes [x] No   Imaging ordered this visit:   [] Yes [x] No         1. Open wound of right breast, subsequent encounter     Fully granulated.    Will cleanse daily with soap and water leave open air.    Will continue to monitor for signs of infection, watch for increased drainage, pain, fevers, chills, and swelling.   2. Open chest wound, right, subsequent encounter     Will continue wound care:  Right subclavian open wound continued wound care:  Will cleanse daily up to daily wet-to-dry with Vashe with gauze and tape.    Will continue to monitor for signs of infection, watch for increased drainage, pain, fever, chills, and swelling   3. Recurrent breast cancer, right     Under the care of oncology.     Currently receiving chemotherapy.   4. Triple negative breast carcinoma     Currently receiving chemotherapy.    Under the care of oncology.      The time spent including preparing to see the patient, obtaining patient history and assessment, evaluation of the plan of care, patient/caregiver counseling  and education, orders, documentation, coordination of care, and other professional medical management activities for today's encounter was 20 minute.    Time spent performing procedures during today's encounter was 20 minute.    Follow up in about 5 weeks (around 1/23/2025) for 11:00 am. Teaching provided on s/s to call wound clinic for promptly.  ER precautions taught for after hours and weekends.       LEONID Perez

## 2024-12-24 ENCOUNTER — INFUSION (OUTPATIENT)
Dept: INFUSION THERAPY | Facility: HOSPITAL | Age: 59
End: 2024-12-24
Attending: INTERNAL MEDICINE
Payer: MEDICAID

## 2024-12-24 ENCOUNTER — OFFICE VISIT (OUTPATIENT)
Dept: HEMATOLOGY/ONCOLOGY | Facility: CLINIC | Age: 59
End: 2024-12-24
Payer: MEDICAID

## 2024-12-24 ENCOUNTER — APPOINTMENT (OUTPATIENT)
Dept: HEMATOLOGY/ONCOLOGY | Facility: CLINIC | Age: 59
End: 2024-12-24
Payer: MEDICAID

## 2024-12-24 VITALS
TEMPERATURE: 98 F | RESPIRATION RATE: 20 BRPM | RESPIRATION RATE: 20 BRPM | HEART RATE: 103 BPM | OXYGEN SATURATION: 100 % | SYSTOLIC BLOOD PRESSURE: 149 MMHG | DIASTOLIC BLOOD PRESSURE: 79 MMHG | BODY MASS INDEX: 34.72 KG/M2 | OXYGEN SATURATION: 100 % | WEIGHT: 208.38 LBS | TEMPERATURE: 98 F | HEART RATE: 99 BPM | SYSTOLIC BLOOD PRESSURE: 143 MMHG | DIASTOLIC BLOOD PRESSURE: 91 MMHG | HEIGHT: 65 IN

## 2024-12-24 DIAGNOSIS — G62.0 CHEMOTHERAPY-INDUCED NEUROPATHY: ICD-10-CM

## 2024-12-24 DIAGNOSIS — C50.911 CARCINOMA OF RIGHT BREAST, STAGE 4: Primary | ICD-10-CM

## 2024-12-24 DIAGNOSIS — E87.6 HYPOKALEMIA: ICD-10-CM

## 2024-12-24 DIAGNOSIS — R22.1 SUBCUTANEOUS NODULE OF NECK: ICD-10-CM

## 2024-12-24 DIAGNOSIS — T45.1X5A CHEMOTHERAPY-INDUCED NEUROPATHY: ICD-10-CM

## 2024-12-24 DIAGNOSIS — C50.919 TRIPLE NEGATIVE BREAST CARCINOMA: ICD-10-CM

## 2024-12-24 DIAGNOSIS — Z17.421 TRIPLE NEGATIVE BREAST CARCINOMA: ICD-10-CM

## 2024-12-24 DIAGNOSIS — C50.911 RECURRENT BREAST CANCER, RIGHT: ICD-10-CM

## 2024-12-24 DIAGNOSIS — E05.00 GRAVES DISEASE: ICD-10-CM

## 2024-12-24 LAB
ALBUMIN SERPL-MCNC: 4.1 G/DL (ref 3.5–5)
ALBUMIN/GLOB SERPL: 1.1 RATIO (ref 1.1–2)
ALP SERPL-CCNC: 118 UNIT/L (ref 40–150)
ALT SERPL-CCNC: 17 UNIT/L (ref 0–55)
ANION GAP SERPL CALC-SCNC: 8 MEQ/L
AST SERPL-CCNC: 18 UNIT/L (ref 5–34)
BASOPHILS # BLD AUTO: 0.01 X10(3)/MCL
BASOPHILS NFR BLD AUTO: 0.3 %
BILIRUB SERPL-MCNC: 0.4 MG/DL
BUN SERPL-MCNC: 15.7 MG/DL (ref 9.8–20.1)
CALCIUM SERPL-MCNC: 10.3 MG/DL (ref 8.4–10.2)
CHLORIDE SERPL-SCNC: 108 MMOL/L (ref 98–107)
CO2 SERPL-SCNC: 24 MMOL/L (ref 22–29)
CREAT SERPL-MCNC: 0.83 MG/DL (ref 0.55–1.02)
CREAT/UREA NIT SERPL: 19
EOSINOPHIL # BLD AUTO: 0 X10(3)/MCL (ref 0–0.9)
EOSINOPHIL NFR BLD AUTO: 0 %
ERYTHROCYTE [DISTWIDTH] IN BLOOD BY AUTOMATED COUNT: 13.6 % (ref 11.5–17)
GFR SERPLBLD CREATININE-BSD FMLA CKD-EPI: >60 ML/MIN/1.73/M2
GLOBULIN SER-MCNC: 3.8 GM/DL (ref 2.4–3.5)
GLUCOSE SERPL-MCNC: 158 MG/DL (ref 74–100)
HCT VFR BLD AUTO: 37.3 % (ref 37–47)
HGB BLD-MCNC: 12.4 G/DL (ref 12–16)
IMM GRANULOCYTES # BLD AUTO: 0.06 X10(3)/MCL (ref 0–0.04)
IMM GRANULOCYTES NFR BLD AUTO: 1.8 %
LYMPHOCYTES # BLD AUTO: 0.94 X10(3)/MCL (ref 0.6–4.6)
LYMPHOCYTES NFR BLD AUTO: 27.6 %
MAGNESIUM SERPL-MCNC: 2.1 MG/DL (ref 1.6–2.6)
MCH RBC QN AUTO: 32.9 PG (ref 27–31)
MCHC RBC AUTO-ENTMCNC: 33.2 G/DL (ref 33–36)
MCV RBC AUTO: 98.9 FL (ref 80–94)
MONOCYTES # BLD AUTO: 0.08 X10(3)/MCL (ref 0.1–1.3)
MONOCYTES NFR BLD AUTO: 2.4 %
NEUTROPHILS # BLD AUTO: 2.31 X10(3)/MCL (ref 2.1–9.2)
NEUTROPHILS NFR BLD AUTO: 67.9 %
NRBC BLD AUTO-RTO: 0.6 %
PLATELET # BLD AUTO: 215 X10(3)/MCL (ref 130–400)
PMV BLD AUTO: 10.2 FL (ref 7.4–10.4)
POTASSIUM SERPL-SCNC: 3.3 MMOL/L (ref 3.5–5.1)
PROT SERPL-MCNC: 7.9 GM/DL (ref 6.4–8.3)
RBC # BLD AUTO: 3.77 X10(6)/MCL (ref 4.2–5.4)
SODIUM SERPL-SCNC: 140 MMOL/L (ref 136–145)
WBC # BLD AUTO: 3.4 X10(3)/MCL (ref 4.5–11.5)

## 2024-12-24 PROCEDURE — 36415 COLL VENOUS BLD VENIPUNCTURE: CPT

## 2024-12-24 PROCEDURE — 25000003 PHARM REV CODE 250: Performed by: INTERNAL MEDICINE

## 2024-12-24 PROCEDURE — 85025 COMPLETE CBC W/AUTO DIFF WBC: CPT

## 2024-12-24 PROCEDURE — 96367 TX/PROPH/DG ADDL SEQ IV INF: CPT

## 2024-12-24 PROCEDURE — 99214 OFFICE O/P EST MOD 30 MIN: CPT | Mod: PBBFAC | Performed by: NURSE PRACTITIONER

## 2024-12-24 PROCEDURE — 96409 CHEMO IV PUSH SNGL DRUG: CPT

## 2024-12-24 PROCEDURE — 96375 TX/PRO/DX INJ NEW DRUG ADDON: CPT

## 2024-12-24 PROCEDURE — 80053 COMPREHEN METABOLIC PANEL: CPT

## 2024-12-24 PROCEDURE — 83735 ASSAY OF MAGNESIUM: CPT

## 2024-12-24 PROCEDURE — 63600175 PHARM REV CODE 636 W HCPCS: Performed by: INTERNAL MEDICINE

## 2024-12-24 RX ORDER — DIPHENHYDRAMINE HYDROCHLORIDE 50 MG/ML
50 INJECTION INTRAMUSCULAR; INTRAVENOUS ONCE AS NEEDED
Status: DISCONTINUED | OUTPATIENT
Start: 2024-12-24 | End: 2024-12-24 | Stop reason: HOSPADM

## 2024-12-24 RX ORDER — SODIUM CHLORIDE 0.9 % (FLUSH) 0.9 %
10 SYRINGE (ML) INJECTION
Status: DISCONTINUED | OUTPATIENT
Start: 2024-12-24 | End: 2024-12-24 | Stop reason: HOSPADM

## 2024-12-24 RX ORDER — EPINEPHRINE 1 MG/ML
0.3 INJECTION INTRAMUSCULAR; INTRAVENOUS; SUBCUTANEOUS ONCE AS NEEDED
Status: DISCONTINUED | OUTPATIENT
Start: 2024-12-24 | End: 2024-12-24 | Stop reason: HOSPADM

## 2024-12-24 RX ORDER — FAMOTIDINE 10 MG/ML
20 INJECTION INTRAVENOUS
Status: COMPLETED | OUTPATIENT
Start: 2024-12-24 | End: 2024-12-24

## 2024-12-24 RX ORDER — DIPHENHYDRAMINE HYDROCHLORIDE 50 MG/ML
50 INJECTION INTRAMUSCULAR; INTRAVENOUS
Status: COMPLETED | OUTPATIENT
Start: 2024-12-24 | End: 2024-12-24

## 2024-12-24 RX ORDER — POTASSIUM CHLORIDE 20 MEQ/1
TABLET, EXTENDED RELEASE ORAL
Qty: 21 TABLET | Refills: 0 | OUTPATIENT
Start: 2024-12-24

## 2024-12-24 RX ORDER — HEPARIN 100 UNIT/ML
500 SYRINGE INTRAVENOUS
Status: DISCONTINUED | OUTPATIENT
Start: 2024-12-24 | End: 2024-12-24 | Stop reason: HOSPADM

## 2024-12-24 RX ORDER — POTASSIUM CHLORIDE 20 MEQ/1
20 TABLET, EXTENDED RELEASE ORAL DAILY
Qty: 21 TABLET | Refills: 0 | Status: SHIPPED | OUTPATIENT
Start: 2024-12-24 | End: 2025-12-24

## 2024-12-24 RX ADMIN — DIPHENHYDRAMINE HYDROCHLORIDE 50 MG: 50 INJECTION INTRAMUSCULAR; INTRAVENOUS at 09:12

## 2024-12-24 RX ADMIN — HEPARIN 500 UNITS: 100 SYRINGE at 12:12

## 2024-12-24 RX ADMIN — PACLITAXEL 366 MG: 6 INJECTION, SOLUTION INTRAVENOUS at 10:12

## 2024-12-24 RX ADMIN — DEXAMETHASONE SODIUM PHOSPHATE 20 MG: 4 INJECTION, SOLUTION INTRA-ARTICULAR; INTRALESIONAL; INTRAMUSCULAR; INTRAVENOUS; SOFT TISSUE at 09:12

## 2024-12-24 RX ADMIN — FAMOTIDINE 20 MG: 10 INJECTION, SOLUTION INTRAVENOUS at 09:12

## 2024-12-24 NOTE — NURSING
Patient here for labs/provider visit/taxol C6. Labs values potassium 3.3, calcium 10.3. Jeferson Gonzalez NP, called in [facility] for patient. Patient verbalizes understanding. Patient tolerated well.

## 2024-12-24 NOTE — PROGRESS NOTES
History:  Past Medical History:   Diagnosis Date    Anemia     Arthritis     Breast cancer     Graves disease     Hypertension     Hyperthyroidism       Past Surgical History:   Procedure Laterality Date    BREAST LUMPECTOMY      INSERTION OF TUNNELED CENTRAL VENOUS CATHETER (CVC) WITH SUBCUTANEOUS PORT N/A 8/19/2024    Procedure: [id number]-[id number];  Surgeon: Thomas Verdin Jr., MD;  Location: [facility];  Service: General;  Laterality: N/A;      Social History     Socioeconomic History    Marital status: Single   Tobacco Use    Smoking status: Never     Passive exposure: Never    Smokeless tobacco: Never   Substance and Sexual Activity    Alcohol use: Never    Drug use: Never     Social Drivers of Health     Financial Resource Strain: Patient Declined (7/11/2023)    [facility]    Overall Financial Resource Strain (CARDIA)     Difficulty of Paying Living Expenses: Patient declined   Food Insecurity: Patient Declined (7/11/2023)    [facility], [email or web address] [facility] Subsidiaries and Affiliates    Hunger Vital Sign     Worried About Running Out of Food in the Last Year: Patient declined     Ran Out of Food in the Last Year: Patient declined   Transportation Needs: Patient Declined (7/11/2023)    [facility], [employer] [facility] and Its Subsidiaries and Affiliates    PRAPARE - Transportation     Lack of Transportation (Medical): Patient declined     Lack of Transportation (Non-Medical): Patient declined   Stress: Patient Declined (7/11/2023)    [facility], [facility] Subsidiaries and Affiliates    [employer] - Occupational Stress Questionnaire     Feeling of Stress : Patient declined   Housing Stability: Unknown (7/11/2023)    Received from [facility] and Its Subsidiaries and Affiliates, [facility] and Its Subsidiaries and Affiliates    Housing Stability Vital Sign     Unable to Pay for Housing in the Last Year: Patient refused     Number of Places Lived in the Last Year: 1      Family History   Problem Relation Name Age of Onset    Breast cancer Maternal Cousin          Reason for Follow-up:  -history of right breast cancer, diagnosed 2002, ER negative, NC negative, HER2 positive, T2 N1 M0, S/P neoadjuvant chemotherapy, lumpectomy, axillary lymph dissection, adjuvant radiotherapy (completed 10/24/2022)  -local and regional recurrence, triple negative, diagnosed on biopsy of supraclavicular lymph node 06/27/2024; on mammogram, right breast mass; supraclavicular lymphadenopathy; right cervical lymphadenopathy  -Postmenopausal   -Hypercalcemia   -High serum parathyroid hormone (PTH)   -Liver mass, right lobe   -Mediastinal lymphadenopathy   -Lung nodules   -Masses of both breasts   -B/L axillary lymphadenopathy   -Breast cancer metastasized to axillary lymph node, left   -B/L cervical lymphadenopathy   -B/L supraclavicular lymphadenopathy   -Chemotherapy-induced neuropathy   -history of Graves disease    History of Present Illness:   Follow-up        Oncologic/Hematologic History:  Oncology History   Stage IV carcinoma of breast   8/27/2024 Initial Diagnosis    Stage IV carcinoma of breast     9/9/2024 -  Chemotherapy    Treatment Summary   Plan Name: OP BREAST PACLITAXEL Q3W  Treatment Goal: Palliative  Status: Active  Start Date: 9/9/2024  End Date: 2/25/2025  (Planned)  Provider: Israel Farah MD  Chemotherapy: PACLitaxeL (TAXOL) 175 mg/m2 = 366 mg in 0.9% NaCl 500 mL chemo infusion, 175 mg/m2 = 366 mg, Intravenous, Clinic/HOD 1 time, 6 of 9 cycles  Administration: 366 mg (2024), 366 mg (2024), 366 mg (10/22/2024), 366 mg (2024), 366 mg (12/3/2024)     12/3/2024 Cancer Staged    Staging form: Breast, AJCC 8th Edition  - Clinical stage from 12/3/2024: Stage IV (rcTX, cN3c, pM1, ER-, VA-, HER2-)     Past medical history: Anemia; arthritis; breast cancer; Graves disease (diagnosed ; started on methimazole by endocrinologist in [place], in ); hypertension; peripheral neuropathy  -2023:  Venous Doppler bilateral lower extremities (swelling):  No DVT  -2023: TTE:  LVEF 55-60%  -2023:  Limited abdominal ultrasound (elevated liver enzymes):  6.6 cm cyst within the liver near the gallbladder; cholelithiasis with minimal gallbladder wall thickening  -2014:  Pelvic ultrasound: Markedly enlarged uterus with multiple large fibroids; endometrial stripe is thickened, 1.4 cm  Procedure/surgical history: Breast lumpectomy   Social history:  .  Lives in [place].  No children.  Never wanted to have children.  Never became pregnant.  No history of tobacco, alcohol, or illicit drug abuse.  Does not work.  Family history:  Sister experienced hyperthyroidism, treated with radioiodine.  She does not know much about her family history but says that there are cancers in folks on both parents sides of family.  A female cousin on mother's side of family experienced breast cancer in her 30s and  from it.  Health maintenance:  Primary care provider in [facility].  Last mammogram 2023 at [facility].  No screening colonoscopy ever.  Menstrual/Ob gyn history:  Menarche at age 13.  Last menstrual cycle 2023.  Never became pregnant.  Never wanted to become pregnant.  Took birth control pills for 2 years several  years ago.  No hormone replacement therapy after menopause.  ====================================      59-year-old lady, referred by Rere Jernigan MD, surgery, with recurrent breast cancer.      07/10/2024:  Office note: Rere Jernigan MD (surgery):  Pt is a 59 y.o. female with history of right breast cancer s/p BCT in 2002 who presents with painless large supraclavicular mass.    First noticed it 4 months ago and has steadily increased in size past month.  Last mammogram 1 year ago and new [place].  Also reports progressive skin changes of the right breast not associated with lumpectomy incision.  Receptor status unknown.       Investigations reviewed:  -no old records available   -according to her, she was diagnosed with right breast cancer in January 2022  -mammogram showed a large lobulated mass, 3.7 x 2.7 cm  -biopsy:  Infiltrative ductal carcinoma, possibly invasive lobular  -right breast mass was palpable in the upper outer quadrant; axillary lymphadenopathy was noted (3-4 palpable lymph nodes right axilla)  -T2 N1 M0 IDC right breast (she had palpable axillary lymph nodes in the right and a large palpable mass in the right breast)  -ER negative; IL negative; HER2 positive  -S/P neoadjuvant chemotherapy (according to her, she received 4 cycles of neoadjuvant chemotherapy with Adriamycin/Taxotere every 3 weeks apart x4 cycles)  -followed by lumpectomy and axillary dissection  -no residual tumor post chemotherapy; 17 axillary lymph nodes negative for tumor  -S/P adjuvant radiotherapy, 6600 cGy/35 fractions, completed 10/24/2022  -07/28/2011:  DEXA scan:  BMD normal  -12/19/2022:  Screening mammogram (comparison: 11/08/2019, etc.):  BI-RADS: 2-benign  -07/06/2023:  Echocardiogram:  LVEF 60%  -11/27/2023:  Bilateral diagnostic mammogram and limited ultrasound right breast (comparison: 12/19/2022, 12/16/2001, etc.) (history of right breast cancer with worsening right breast pain and thickening) large  elongated heterogeneous mass with irregular borders outer aspect of the right breast (extending from the nipple outward towards the lateral chest wall at the 8-9 o'clock position, 5.8 x 3 x 1.8 cm although margins are difficult to accurately define), highly suspicious for recurrent malignancy; there is a separate elongated hypoechoic lesion in the upper outer quadrant right breast 11 o'clock position, 5 cm from nipple, 3 x 2.6 x 0.7 cm, near the site of previous surgery), perhaps benign scar tissue related to previous lumpectomy:  BI-RADS: 5-highly suggestive of malignancy  -07/10/2024:  Surgery Office note:  Painless large supraclavicular mass, noted 4 months ago, steadily enlarging; also reported progressive skin changes right breast not associated with lumpectomy incision; on physical exam, large exophytic right supraclavicular nodule, nonmobile and fixed, overlying skin erythematous without ulceration; right upper outer quadrant lumpectomy incision; right lower inner quadrant periareolar ecchymosis with subcutaneous firmness without distinct mass; slight nipple inversion; no drainage; no palpable axillary lymphadenopathy  -05/30/2024:  Ultrasound soft tissues of the head and neck (lymphadenopathy): Pathologic lymphadenopathy (4.5 x 3.7 x 3.2 cm) at the base of the neck on the right; multiple smaller morphologically abnormal cervical chain lymph nodes on the right which demonstrate heterogeneous echogenicity similar to the dominant mass. Findings highly suspicious for malignancy particularly in this patient with a known history of prior right breast cancer. Dominant mass lesion corresponds with palpable complaint. Recommend biopsy/tissue diagnosis.   -06/27/2024:  Right supraclavicular mass, core needle biopsy (firm 6 x 6 cm exophytic right supraclavicular mass): Metastatic carcinoma in fibroadipose tissue, moderately to poorly-differentiated, consistent with breast origin (metastatic breast carcinoma in  fibroadipose tissue  -ER negative (0%); IL negative (0%); HER2 negative (0); Ki-67 high proliferation  (93.6%)    08/08/2024:   Pleasant, healthy-appearing lady who presents for initial medical oncology consultation.  In no acute discomfort.    Says that right supra clavicular mass started April 2024; it has been enlarging and fluctuating in size.  It is painful.  7/10 severity pain.  Also, pain was right breast area.  Right breast is enlarging.  There is a small area of ulceration in the right breast.  No bleeding or discharge.  She denies fevers or chills.  Has been taking Lyrica meloxicam without the relief of pain.  We will start Norco.  Some fatigue.  However, ECOG 1.  Pain from neck down to breast area, especially at night.  No unusual headaches, focal neurological symptoms, vision impairment, gait impairment, hemoptysis, fevers, chills, any bleeding or discharge from right breast superficial ulceration, abdominal pain, nausea, vomiting, GI bleeding, etc..  No bone pains.  Appetite is fair.    Interval History:  INF FLUIDS   OP BREAST PACLITAXEL Q3W     12/03/2024:   -09/30/2024:  Calcium 10.7 elevated; albumin 3.8; vitamin-D level 16 normal; intact PTH level 64.9 normal; ionized calcium level 5.22 normal; PTH related peptide 0.7 normal  -Taxol every 3 weeks:  Cycle 2 on 09/30/2024, cycle 3 on 10/22/2024, cycle 4 on 11/12/2024  -11/18/2024:  Restaging CTs C/A/P with contrast (comparison:  CTs C/A/P 0 08/20/2024):  1. Interval decrease in size of right breast mass and improved right breast skin thickening.  2. Interval improvement of lymphadenopathy.  No appreciable enlarged lymph node by size criteria.  3. Partially imaged ulcerated dermal lesion at the base of the neck on the right.  See dedicated CT neck.  4. Decreased size mass at the left lobe of the liver.  5. Decreased size right upper lobe nodule.  -11/18/2024: Restaging CT soft tissues of the neck with contrast (comparison: CT neck 08/20/2024):   1.  Decreasing size of large right supraclavicular lymph node with large central ulceration.  2. Decreasing size of smaller right cervical lymph nodes.  -on Lyrica 50 mg in the morning, 50 mg in the abdomen, N 100 mg q.h.s. for neuropathy in right and left feet; her podiatrist has discontinued gabapentin due to Achilles tendinitis  -1203 1024:  WBC 3.50, hemoglobin 12.0, MCV 98.3, platelets normal, ANC 2.51, potassium 3.4 low, magnesium 2.10 normal, rest of CMP also unremarkable  >>>  -start potassium chloride 20 mEq p.o. q.d. x2 weeks; no refills; in 2 weeks, recheck CMP and magnesium level  Presents for a follow-up visit, accompanied by mother.  Doing very well.  Great appetite.  She is very pleased to learn that her metastatic disease is responding very well to chemotherapy.  Has Taxol induced peripheral neuropathy in the form of numbness in fingertips and toes; no tingling or pain.  Takes gabapentin 300 mg p.o. t.i.d. as well as Lyrica.  Symptoms are well-controlled.  After Taxol, for a few days, she experiences pain in the thighs and legs for which she takes Norco PRN.  No unusual headaches, focal neurological symptoms, chest pain cough, dyspnea, abdominal pain, nausea, vomiting, GI bleeding, skin rash, etc..  No significant side effects from Taxol except for neuropathy which is well-controlled with pharmacotherapy.  No new lumps or lymphadenopathy.  No anorexia or unintentional weight loss.  ECOG 0-1. Has widely metastatic disease with edges responding very well to chemotherapy..  She is aware of extremely guarded/poor prognosis.      Interval history  12/24/2024:  12/18/2024 patient was seen and evaluated by wound care our emergency room by Perri JAQUEZ for a wound on her right breast.  On today's visit the patient presents to the office in the company of her elderly family member.  She is doing well and denies any major significant issues today.  Wonders if she can go back to taking her  multivitamins.  Medications:  Current Outpatient Medications on File Prior to Visit   Medication Sig Dispense Refill    ciprofloxacin HCl (CIPRO) 500 MG tablet Take 1 tablet (500 mg total) by mouth 2 (two) times daily. for 7 days 20 tablet 0    dexAMETHasone (DECADRON) 4 MG Tab Take 20mg (5 tablets) by mouth 12 hours and then again at 6 hours prior to each chemotherapy treatment 40 tablet 1    diclofenac sodium (VOLTAREN) 1 % Gel       ergocalciferol (ERGOCALCIFEROL) 50,000 unit Cap Take 50,000 Units by mouth every 7 days.      ferrous gluconate (FERGON) 324 MG tablet TAKE 1 TABLET BY MOUTH ONCE DAILY FOR IRON      gabapentin (NEURONTIN) 300 MG capsule Take 300 mg by mouth 3 (three) times daily.      HYDROcodone-acetaminophen (NORCO) 5-325 mg per tablet Take 1 tablet by mouth every 4 (four) hours as needed for Pain. 84 tablet 0    meloxicam (MOBIC) 15 MG tablet       methIMAzole (TAPAZOLE) 5 MG Tab Take 1 tablet (5 mg total) by mouth once daily. 30 tablet 11    ondansetron (ZOFRAN) 4 MG tablet Take 1 tablet (4 mg total) by mouth every 6 (six) hours as needed for Nausea. 30 tablet 1    potassium chloride SA (K-DUR,KLOR-CON) 20 MEQ tablet Take 1 tablet (20 mEq total) by mouth once daily. 21 tablet 0    pregabalin (LYRICA) 50 MG capsule Take 50 mg by mouth 3 (three) times daily.      valsartan-hydrochlorothiazide (DIOVAN-HCT) 160-12.5 mg per tablet Take 1 tablet by mouth.       No current facility-administered medications on file prior to visit.       Review of System  Review of Systems   Constitutional:  Positive for fatigue. Negative for appetite change.   Endocrine: Negative for hot flashes.   Musculoskeletal:  Negative for myalgias.   Neurological:  Positive for numbness.       Physical Exam  Physical Exam  Vitals and nursing note reviewed.   Constitutional:       Appearance: Normal appearance.   HENT:      Head: Normocephalic and atraumatic.   Eyes:      General: No scleral icterus.     Conjunctiva/sclera:  Conjunctivae normal.   Cardiovascular:      Rate and Rhythm: Normal rate and regular rhythm.      Heart sounds: Normal heart sounds.   Pulmonary:      Effort: Pulmonary effort is normal. No respiratory distress.   Abdominal:      General: Bowel sounds are normal. There is no distension.      Palpations: Abdomen is soft.      Tenderness: There is no abdominal tenderness.   Musculoskeletal:         General: Normal range of motion.   Skin:     General: Skin is warm and dry.   Neurological:      General: No focal deficit present.      Mental Status: She is alert and oriented to person, place, and time.   Psychiatric:         Mood and Affect: Mood normal.         Behavior: Behavior normal.         Thought Content: Thought content normal.         Judgment: Judgment normal.     VITAL SIGNS:   Vitals:    12/24/24 0812   BP: (!) 149/91   Pulse: 103   Resp: 20   Temp: 98 °F (36.7 °C)       Visit Labs       12/24/24 0822    CBC with Differential  Collected: 12/24/24 0746  Final result  Specimen: Blood    WBC 3.40 Low  x10(3)/mcL Mono % 2.4 %   RBC 3.77 Low  x10(6)/mcL Eos % 0.0 %   Hgb 12.4 g/dL Basophil % 0.3 %   Hct 37.3 % Lymph # 0.94 x10(3)/mcL   MCV 98.9 High  fL Neut # 2.31 x10(3)/mcL   MCH 32.9 High  pg Mono # 0.08 Low  x10(3)/mcL   MCHC 33.2 g/dL Eos # 0.00 x10(3)/mcL   RDW 13.6 % Baso # 0.01 x10(3)/mcL   Platelet 215 x10(3)/mcL IG# 0.06 High  x10(3)/mcL   MPV 10.2 fL IG% 1.8 %   Neut % 67.9 % NRBC% 0.6 %   Lymph % 27.6 %                   12/24/24 0837    Comprehensive Metabolic Panel  Collected: 12/24/24 0746  Final result  Specimen: Blood    Sodium 140 mmol/L Globulin 3.8 High  gm/dL   Potassium 3.3 Low  mmol/L Albumin/Globulin Ratio 1.1 ratio   Chloride 108 High  mmol/L Bilirubin Total 0.4 mg/dL   CO2 24 mmol/L  unit/L   Glucose 158 High  mg/dL ALT 17 unit/L   Blood Urea Nitrogen 15.7 mg/dL AST 18 unit/L   Creatinine 0.83 mg/dL eGFR >60 mL/min/1.73/m2   Calcium 10.3 High  mg/dL Anion Gap 8.0 mEq/L    Protein Total 7.9 gm/dL BUN/Creatinine Ratio 19   Albumin 4.1 g/dL                    Assessment:  Problem List Items Addressed This Visit       Graves disease    Relevant Orders    Comprehensive Metabolic Panel    Triple negative breast carcinoma    Relevant Orders    CBC Auto Differential    Comprehensive Metabolic Panel    Magnesium    CBC Auto Differential    Comprehensive Metabolic Panel    CBC Auto Differential    Comprehensive Metabolic Panel    Stage IV carcinoma of breast - Primary    Relevant Orders    CBC Auto Differential    Comprehensive Metabolic Panel    Magnesium    CBC Auto Differential    Comprehensive Metabolic Panel    CBC Auto Differential    Comprehensive Metabolic Panel    Hypokalemia    Relevant Orders    Comprehensive Metabolic Panel    Magnesium    Comprehensive Metabolic Panel    Comprehensive Metabolic Panel    Chemotherapy-induced neuropathy    Relevant Orders    CBC Auto Differential    Comprehensive Metabolic Panel    Magnesium    CBC Auto Differential    Comprehensive Metabolic Panel    CBC Auto Differential    Comprehensive Metabolic Panel       Orders for 12/03/2024:   Continue chemotherapy every 3 weeks   Check CBC and CMP weekly   Mid February, re-stage with contrast-enhanced CT scans of C/A/P/soft tissues of the neck  Continue Lyrica for neuropathy  Follow-up with endocrinology for management of disease  Today, check TSH and free T4 level  -start potassium chloride 20 mEq p.o. q.d. x2 weeks; no refills; in 2 weeks, recheck CMP and magnesium level    Follow-up with NP in 3 weeks   Above discussed with the patient.  All questions answered.  Discussed labs and scans and gave her copies of relevant results.  Guarded prognosis discussed once again.    She understands and agrees with this plan.  =================================      History of right breast IDC, ER negative, UT negative, HER2 positive:  -mammogram: 3.7 x 2.7 cm right breast mass  -pathology: Infiltrative ductal  carcinoma, possibly invasive lobular  -ER negative, MA negative, HER2 positive   -T2 N1 M0; palpable axillary lymphadenopathy; large palpable mass right breast)  -S/P neoadjuvant chemotherapy (according to her, she received 4 cycles of neoadjuvant chemotherapy with Adriamycin/Taxotere every 3 weeks apart x4 cycles)  -followed by lumpectomy and axillary dissection  -no residual tumor post chemotherapy; 17 axillary lymph nodes negative for tumor  -S/P adjuvant radiotherapy, 6600 cGy/35 fractions, completed 10/24/2022  -details of adjuvant chemotherapy, if any, not known  >>>  -screening mammogram 12/19/2022: BI-RADS: 2-benign  >>>  Regional, local, and metastatic recurrence, triple negative:  -echocardiogram 07/06/2023: LVEF 60%  -mammogram and ultrasound 11/27/2023:  Right breast mass 5.8 x 3 x 1.8 cm: BI-RADS: 5  -surgery consultation/follow-up 07/10/2024: Painless large supraclavicular mass, noted 4 months ago, steadily enlarging; progressive skin changes right breast not associated with lumpectomy incision, large exophytic right supraclavicular nodule/lymphadenopathy) nonmobile and fixed; overlying skin erythematous without ulceration), right lower inner quadrant periareolar ecchymosis and subcutaneous firmness without distinct mass; no palpable axillary lymphadenopathy  -ultrasound neck 05/30/2024:  Pathologic lymphadenopathy 4.5 x 3.7 x 3.2 cm at the base of the neck of the right; multiple smaller morphologically abnormal cervical chain lymph nodes on the right  -core needle biopsy right supraclavicular mass 06/27/2024:  Firm, 6 x 6 cm: Moderately to poorly-differentiated metastatic breast carcinoma  -ER negative (0%); MA negative (0%); HER2 negative (0); Ki-67 high proliferation (93.6%)  -08/08/2024: Breast examination was performed with her verbal consent, in the presence of Jani Heck LPN; entire right breast is involved with tumor; entire right breast is indurated with tumor, with conglomeration of  nodules around the central area; a small superficial ulceration is noted along the inferior medial aspect of right areolar area; a large slightly tender 6 supraclavicular masses noted; diffuse edema is noted in the right supraclavicular area and right breast area as well as infraclavicular area; left breast is normal  -tissue NGS testing (performed on right supraclavicular mass biopsy 06/27/2024):  HRD positive (CELINE-high); negative for AKT 1, BRAF, BRCA1, BRCA2, ESR 1, etc.  -08/09/2024: Liquid biopsy:  Borderline TMB  -08/08/2024:  Mild hypercalcemia: Calcium 10.6, albumin 3.9; intact PTH level 100.3, elevated; PTH related peptide normal; vitamin-D level normal  -08/08/2024:  Labs: Postmenopausal  -Hypercalcemia  -08/14/2024: Echocardiogram:  LVEF 55-60%  -08/19/2024: MediPort placed  -brain MRI 08/20/2024: No brain metastases  -staging CTs C/A/P/neck 08/20/2024: Extensive metastases  -biomarker testing on right supraclavicular mass biopsy, performed 06/27/2024: Positive for PD-L1 expression (CPS 15); rest, as prior  -genetic testing 08/09/2024:  Negative  -baseline tumor markers 08/28/2024:  Only CEA level slightly elevated, 4.64  -palliative Taxol, every 3 weeks, started 09/09/2024  (before PD-L1 results was available to us)  -staging PET-CT 09/13/2024:  (was supposed to be performed before starting chemotherapy; could only be performed 4 days after starting palliative chemotherapy with Taxol):  Bilateral breast masses; bilateral axillary, bilateral cervical, bilateral supraclavicular, right internal mammary, and mediastinal lymphadenopathy; metastatic mass in liver; metastatic retrocrural lymphadenopathy  -09/19/2024: Patient refused liver biopsy (in denial; according to her, liver lesion is just a cyst)  -Taxol every 3 weeks:  Cycle 2 on 09/30/2024, cycle 3 on 10/22/2024, cycle 4 on 11/12/2024  -restaging CTs C/A/P/neck 11/18/2024, post Taxol x4 cycles: Positive response  >>>  Plan:  -positive response post 4  cycles of Taxol  -continue palliative Taxol 175 mg per m2 IV every 3 weeks until disease progression or until unacceptable toxicity (started 09/09/2024)   -with Taxol, monitor for potential cardiovascular effects including infusion related hypotension/bradycardia/hypotension, peripheral neuropathy, edema, nausea, vomiting, diarrhea, stomatitis, cytopenias, etc.   -check CBC and CMP weekly  -re-stage with contrast-enhanced CT scans of C/A/P/soft tissues of the neck in 3 months (mid February 2025)  If and when required, in 2nd line, we will use pembrolizumab +chemotherapy (see below)  -at some point, olaparib maybe considered because of HIV positive status    -triple negative recurrent, metastatic disease   -genetic testing negative  -extensive metastatic disease  -ER 0, SD 0, HER2 0  -PD-L1 positive, CPS 15; therefore, first-line chemotherapy recommended: Pembrolizumab +chemotherapy (albumin bound paclitaxel, paclitaxel, or gemcitabine and carboplatin (category 1, Preferred)  -before PD-L1 testing result was available, we started her on Taxol given extensive metastatic disease;  -patient has been previously treated with Adriamycin/Taxotere; therefore, we will avoid Adriamycin to the extent possible  -at some point, olaparib may be considered because of HRD positive status  -she declined liver biopsy      Chemotherapy:  -Taxol 175 mg per m2 every 3 weeks until disease progression or unacceptable toxicity    Side effects/monitoring with Taxol:  -watch for cardiovascular effects including infusion related hypotension, bradycardia, hypertension, etc.  -watch out for extravasation: Taxol is an irritant with vesicle like properties  -peripheral neuropathy:  Primarily distal sensory neuropathy; a mixture of paresthesias and dysesthesias including burning, numbness, tingling, and shooting pains, typically in a stocking-glove distribution; most mild-to-moderate cases resolve several months after discontinuation but maybe  longer in patients with diabetes.  Severe neuropathy maybe irreversible in some patients  -side effects in> 10% patients:  Edema, hypotension, alopecia, nausea, vomiting, diarrhea, stomatitis, cytopenias, LFT abnormalities, peripheral neuropathy, arthralgias, myalgias, etc.  -Boxed warnings: Hypersensitivity reactions; bone marrow suppression  -premedicate with dexamethasone (20 mg orally at 12 and 6 hours prior to paclitaxel, diphenhydramine (50 mg IV 30 to 60 minutes prior to paclitaxel), and cimetidine or famotidine (IV 30 to 60 minutes prior to paclitaxel).  -do not repeat course until neutrophil count is =/> 1500 mm3 and the platelet count is =/> 100,000 mm3; reduced doses by 20% if patient experiences severe peripheral neuropathy or severe neutropenia <500 mm3 for a week or longer     -08/08/2024:  Referred to wound care for management of breast wound   -12/03/2024: Still following up with wound care; says that malignant wounds are closing up with positive response to chemotherapy    -08/08/2024:  Started Norco 5 mg every 4 hours PRN for pain  -12/03/2024: Tells me that she experiences pain in eyes and legs for a few days after Taxol infusion and needs to take narcotic as needed.    Sites of disease/recurrence/metastases:  Right supraclavicular lymphadenopathy; right breast mass, right cervical lymphadenopathy, left axillary lymphadenopathy, mediastinal and hilar lymphadenopathy, hepatic mass (metastases), left breast nodule  -B/L axillary lymphadenopathy   -Masses of both breasts   -B/L cervical lymphadenopathy   -B/L supraclavicular lymphadenopathy   -staging PET-CT 09/13/2024:  (was supposed to be performed before starting chemotherapy; could only be performed 4 days after starting palliative chemotherapy with Taxol):  Bilateral breast masses; bilateral axillary, bilateral cervical, bilateral supraclavicular, right internal mammary, and mediastinal lymphadenopathy; metastatic mass in liver; metastatic  retrocrural lymphadenopathy  -09/19/2024: Patient refused liver biopsy (apparently, in denies; according to her, liver lesion is just a cyst)      Molecular markers:  -ER negative (0%); MT negative (0%); HER2 negative (0); Ki-67 high proliferation (93.6%)  -tissue NGS testing (performed on right supraclavicular mass biopsy 06/27/2024):    Positive for PD-L1 expression (CPS 15)  HRD positive (CELINE-high); negative for AKT 1, BRAF, BRCA1, BRCA2, ESR 1, etc.  -08/09/2024: Liquid biopsy:  Borderline TMB  -genetic testing 08/09/2024:  Negative      Chemotherapy-induced peripheral neuropathy:  -as of 11/12/2024, on Lyrica 50 mg in the morning, 50 mg in the afternoon, and 100 mg q.h.s. for neuropathy in right and left feet; her podiatrist has discontinued gabapentin due to Achilles tendinitis  -12/03/2024: Peripheral neuropathy is in the form of numbness in fingertips and toes; no tingling or pain; takes gabapentin 300 mg p.o. t.i.d. as well as Lyrica 50 mg in the morning, 50 mg in the afternoon, and 100 mg p.o. q.h.s. (prescribed by her podiatrist for at least tendinitis)  >>>  Continue gabapentin and Lyrica for neuropathy      Hypercalcemia:  -08/08/2024:  Mild hypercalcemia: Calcium 10.6, albumin 3.9; intact PTH level 100.3, elevated; PTH related peptide normal; vitamin-D level normal  -09/30/2024:  Calcium 10.7 elevated; albumin 3.8; vitamin-D level 16 normal; intact PTH level 64.9 normal; ionized calcium level 5.22 normal; PTH related peptide 0.7 normal      Graves' disease:   -diagnosed 07/2023  -07/11/2023: CT soft tissues of the neck with contrast) dysphagia, acute thyrotoxicosis, goiter): Mild diffuse enlargement of the thyroid gland without displacement or mass effect of the aerodigestive tract; no suspicious cervical lymphadenopathy  -07/12/2023: Ultrasound thyroid:  Hoarseness, thyrotoxicosis:  Heterogeneous hyperemic thyroid typical of thyroiditis; small 6 mm mid pole right thyroid nodule is not suspicious and  does not warrant surveillance per TI-RADS criteria  -confirmed with TSH receptor antibodies (TRAb/TSI, i.e., thyroid-stimulating immunoglobulins)  -as of 05/21/2024, on methimazole 5 mg daily; has responded well  -endocrinologist: Roland Ross MD   -05/21/2024:  Endocrinologist plan:  Plan to complete 18 months of therapy before attempting to taper of methimazole   -[id number]: Continues on methimazole 5 mg daily; follows up with endocrinologist in [place]  >>>  -follow-up with endocrinology ([place])   -12/03/2024: Today, we will check TSH and free T4 level      Discussion & Plan   December 24, 2024      Dx:  History of right breast IDC.  ER/NV negative HER2/maría positive.  Status:  Under active treatment  Mid February, re-stage with contrast-enhanced CT scans of C/A/P/soft tissues of the neck (scheduled for February 18, 2025)  We will continue weekly CBC and CMP  Patient will be due for cycle 6 Taxol today  Patient will be due for cycle 7 on January 14, 2025  I reviewed with the patient signs and symptoms to report as it relates to treatment with Taxol  The patient reports:  Peripheral neuropathy which is chronic and stable and alopecia  The patient denies:  Hypersensitivity reactions, myalgias, arthralgias, any sign of pneumonitis or hepatic toxicity.  I reviewed with the patient results of laboratory evaluation today that is appropriate to proceed with Taxol.  Kidney function and LFTs within normal limits.  Dx:  Chemo induced peripheral neuropathy  Status:  Under active treatment.  Gabapentin 300 mg p.o. t.i.d. as well as Lyrica 50 mg in the morning 50 mg in the afternoon and 100 mg p.o. q.h.s.  According to the last note this was prescribed by the patient's podiatrist  We will continue gabapentin and Lyrica for neuropathy.  Patient tells me that her neuropathy is limited to tingling and numbness in fingertips and toes.  This is the best that she probably will ever do in this combination of  medication.  I explained to the patient that medications for peripheral neuropathy will not do anything for tingling or numbness.  They are only helpful for burning pain.  Dx:  Hypercalcemia  Status:  Ongoing  Patient calcium level = 10.3 albumin = 4.1.  Corrected calcium on this patient = 10.2   No interventions required today  Patient advised to stay away from multivitamins containing  calcium  Dx:  Grade disease  Status:  Ongoing  We will defer to Endocrine.  Dx:  Breast wound, right  Status:  Under Active treatment  Patient continues follow-up with wound care for this issue.    Dx:  Leukopenia  Status:  Improving  I reviewed with the patient results of laboratory evaluation today with a CBC shows improvement on leukopenia with a WBC = 3.4 and neutrophil count = 2.31  Patient okay to proceed with treatment.  Dx:  Hypokalemia  Status:  New diagnosis  Patient potassium = 3.3  We will replace potassium orally with potassium Chloride 20 mEq p.o. q.day.  Prescription has been sent to the patient's pharmacy on file  We will recheck CMP next week.  Encouraged the patient to consume potassium-rich foods such as bananas, oranges, potatoes, and spinach to help maintain potassium levels.  I provided the patient with a list of potassium rich foods.            Patient instructions and visit orders.     -Follow-up:  Followup NP-January 14, 2025  -Labs:  CBC, CMP-December 31, 2024  CBC, CMP-January 7, 2025.    CBC, CMP, magnesium-January 14, 2025  -Imaging:  Re-stage with contrast-enhanced CT scans of C/A/P/soft tissues of the neck (scheduled for February 18, 2025)  -Other orders:  -treatment, paclitaxel-January 14, 2025  -start potassium chloride 20 mEq p.o. q.day for potassium of 3.3  Follow-up:  Follow up in about 3 weeks (around 1/14/2025).

## 2024-12-24 NOTE — Clinical Note
-Follow-up: · Followup NP-January 14, 2025 -Labs: · CBC, CMP-December 31, 2024 · CBC, CMP-January 7, 2025.   · CBC, CMP, magnesium-January 14, 2025 -Imaging: · Re-stage with contrast-enhanced CT scans of C/A/P/soft tissues of the neck (scheduled for February 18, 2025) -Other orders: -treatment, paclitaxel-January 14, 2025

## 2025-01-07 ENCOUNTER — APPOINTMENT (OUTPATIENT)
Dept: HEMATOLOGY/ONCOLOGY | Facility: CLINIC | Age: 60
End: 2025-01-07
Payer: MEDICAID

## 2025-01-07 ENCOUNTER — TELEPHONE (OUTPATIENT)
Dept: HEMATOLOGY/ONCOLOGY | Facility: CLINIC | Age: 60
End: 2025-01-07
Payer: MEDICAID

## 2025-01-07 DIAGNOSIS — R22.1 SUBCUTANEOUS NODULE OF NECK: ICD-10-CM

## 2025-01-07 LAB
ABS NEUT CALC (OHS): 0.37 X10(3)/MCL (ref 2.1–9.2)
ALBUMIN SERPL-MCNC: 4 G/DL (ref 3.5–5)
ALBUMIN/GLOB SERPL: 1.1 RATIO (ref 1.1–2)
ALP SERPL-CCNC: 101 UNIT/L (ref 40–150)
ALT SERPL-CCNC: 15 UNIT/L (ref 0–55)
ANION GAP SERPL CALC-SCNC: 6 MEQ/L
AST SERPL-CCNC: 18 UNIT/L (ref 5–34)
BASOPHILS NFR BLD MANUAL: 0.07 X10(3)/MCL (ref 0–0.2)
BASOPHILS NFR BLD MANUAL: 4 % (ref 0–2)
BILIRUB SERPL-MCNC: 0.9 MG/DL
BUN SERPL-MCNC: 17.5 MG/DL (ref 9.8–20.1)
CALCIUM SERPL-MCNC: 9.8 MG/DL (ref 8.4–10.2)
CHLORIDE SERPL-SCNC: 107 MMOL/L (ref 98–107)
CO2 SERPL-SCNC: 29 MMOL/L (ref 22–29)
CREAT SERPL-MCNC: 0.76 MG/DL (ref 0.55–1.02)
CREAT/UREA NIT SERPL: 23
ERYTHROCYTE [DISTWIDTH] IN BLOOD BY AUTOMATED COUNT: 12.7 % (ref 11.5–17)
GFR SERPLBLD CREATININE-BSD FMLA CKD-EPI: >60 ML/MIN/1.73/M2
GLOBULIN SER-MCNC: 3.5 GM/DL (ref 2.4–3.5)
GLUCOSE SERPL-MCNC: 94 MG/DL (ref 74–100)
HCT VFR BLD AUTO: 33.4 % (ref 37–47)
HGB BLD-MCNC: 11 G/DL (ref 12–16)
LYMPHOCYTES NFR BLD MANUAL: 1.01 X10(3)/MCL
LYMPHOCYTES NFR BLD MANUAL: 62 % (ref 13–40)
MCH RBC QN AUTO: 32.6 PG (ref 27–31)
MCHC RBC AUTO-ENTMCNC: 32.9 G/DL (ref 33–36)
MCV RBC AUTO: 99.1 FL (ref 80–94)
MONOCYTES NFR BLD MANUAL: 0.18 X10(3)/MCL (ref 0.1–1.3)
MONOCYTES NFR BLD MANUAL: 11 % (ref 2–11)
NEUTROPHILS NFR BLD MANUAL: 23 % (ref 47–80)
NRBC BLD AUTO-RTO: 0 %
PLATELET # BLD AUTO: 199 X10(3)/MCL (ref 130–400)
PLATELET # BLD EST: ADEQUATE 10*3/UL
PMV BLD AUTO: 10.7 FL (ref 7.4–10.4)
POTASSIUM SERPL-SCNC: 2.9 MMOL/L (ref 3.5–5.1)
PROT SERPL-MCNC: 7.5 GM/DL (ref 6.4–8.3)
RBC # BLD AUTO: 3.37 X10(6)/MCL (ref 4.2–5.4)
SODIUM SERPL-SCNC: 142 MMOL/L (ref 136–145)
TEAR DROP CELL (OLG): SLIGHT
WBC # BLD AUTO: 1.63 X10(3)/MCL (ref 4.5–11.5)

## 2025-01-07 PROCEDURE — 80053 COMPREHEN METABOLIC PANEL: CPT

## 2025-01-07 PROCEDURE — 85027 COMPLETE CBC AUTOMATED: CPT

## 2025-01-07 PROCEDURE — 36415 COLL VENOUS BLD VENIPUNCTURE: CPT

## 2025-01-13 ENCOUNTER — TELEPHONE (OUTPATIENT)
Dept: ADMINISTRATIVE | Facility: HOSPITAL | Age: 60
End: 2025-01-13
Payer: MEDICAID

## 2025-01-13 NOTE — PROGRESS NOTES
Reason for Follow-up:  -history of right breast cancer, diagnosed , ER negative, RI negative, HER2 positive, T2 N1 M0, S/P neoadjuvant chemotherapy, lumpectomy, axillary lymph dissection, adjuvant radiotherapy (completed 10/24/2022)  -local and regional recurrence, triple negative, diagnosed on biopsy of supraclavicular lymph node 2024; on mammogram, right breast mass; supraclavicular lymphadenopathy; right cervical lymphadenopathy  -Postmenopausal   -Hypercalcemia   -High serum parathyroid hormone (PTH)   -Liver mass, right lobe   -Mediastinal lymphadenopathy   -Lung nodules   -Breast cancer metastasized to axillary lymph node, left   -Cervical lymphadenopathy   -history of Graves disease    History:  Past medical history: Anemia; arthritis; breast cancer; Graves disease (diagnosed ; started on methimazole by endocrinologist in [place], in ); hypertension; peripheral neuropathy  -2023:  Venous Doppler bilateral lower extremities (swelling):  No DVT  -2023: TTE:  LVEF 55-60%  -2023:  Limited abdominal ultrasound (elevated liver enzymes):  6.6 cm cyst within the liver near the gallbladder; cholelithiasis with minimal gallbladder wall thickening  -2014:  Pelvic ultrasound: Markedly enlarged uterus with multiple large fibroids; endometrial stripe is thickened, 1.4 cm  Procedure/surgical history: Breast lumpectomy   Social history:  .  Lives in [place].  No children.  Never wanted to have children.  Never became pregnant.  No history of tobacco, alcohol, or illicit drug abuse.  Does not work.  Family history:  Sister experienced hyperthyroidism, treated with radioiodine.  She does not know much about her family history but says that there are cancers in folks on both parents sides of family.  A female cousin on mother's side of family experienced breast cancer in her 30s and  from it.  Health maintenance:  Primary care provider in [facility] [place].  Last  "mammogram November 2023 at [facility].  No screening colonoscopy ever.  Menstrual/Ob gyn history:  Menarche at age 13.  Last menstrual cycle July 2023.  Never became pregnant.  Never wanted to become pregnant.  Took birth control pills for 2 years several years ago.  No hormone replacement therapy after menopause.          Family History   Problem Relation Name Age of Onset    Breast cancer Maternal Cousin          History of Present Illness:   Fatigue, Weakness, Hot Flashes, Cough, and OTHER (Patient stated "swelling in her left foot." Patient stated " numbness in both legs, feet and tips of her fingers since treatment. Patient stated " leg feels heavy and patient feels off balance." Patient stated " not feeling well, left side of face feels numb, sinus and chest congestion.")        Oncologic/Hematologic History:  Oncology History   Stage IV carcinoma of breast   8/27/2024 Initial Diagnosis    Stage IV carcinoma of breast     9/9/2024 -  Chemotherapy    Treatment Summary   Plan Name: OP BREAST PACLITAXEL Q3W  Treatment Goal: Palliative  Status: Active  Start Date: 9/9/2024  End Date: 2/25/2025 (Planned)  Provider: Israel Farah MD  Chemotherapy: PACLitaxeL (TAXOL) 175 mg/m2 = 366 mg in 0.9% NaCl 500 mL chemo infusion, 175 mg/m2 = 366 mg, Intravenous, Clinic/HOD 1 time, 7 of 9 cycles  Administration: 366 mg (9/9/2024), 366 mg (9/30/2024), 366 mg (10/22/2024), 366 mg (11/12/2024), 366 mg (12/3/2024), 366 mg (12/24/2024)     12/3/2024 Cancer Staged    Staging form: Breast, AJCC 8th Edition  - Clinical stage from 12/3/2024: Stage IV (rcTX, cN3c, pM1, ER-, [place]-, HER2-)     ====================================      59-year-old lady, referred by Rere Jernigan MD, surgery, with recurrent breast cancer.      07/10/2024:  Office note: Rere Jernigan MD (surgery):  Pt is a 59 y.o. female with history of right breast cancer s/p BCT in 2002 who presents with painless large supraclavicular mass.    First " noticed it 4 months ago and has steadily increased in size past month.  Last mammogram 1 year ago and new [place].  Also reports progressive skin changes of the right breast not associated with lumpectomy incision.  Receptor status unknown.       Investigations reviewed:  -no old records available   -according to her, she was diagnosed with right breast cancer in January 2022  -mammogram showed a large lobulated mass, 3.7 x 2.7 cm  -biopsy:  Infiltrative ductal carcinoma, possibly invasive lobular  -right breast mass was palpable in the upper outer quadrant; axillary lymphadenopathy was noted (3-4 palpable lymph nodes right axilla)  -T2 N1 M0 IDC right breast (she had palpable axillary lymph nodes in the right and a large palpable mass in the right breast)  -ER negative; IA negative; HER2 positive  -S/P neoadjuvant chemotherapy (according to her, she received 4 cycles of neoadjuvant chemotherapy with Adriamycin/Taxotere every 3 weeks apart x4 cycles)  -followed by lumpectomy and axillary dissection  -no residual tumor post chemotherapy; 17 axillary lymph nodes negative for tumor  -S/P adjuvant radiotherapy, 6600 cGy/35 fractions, completed 10/24/2022  -07/28/2011:  DEXA scan:  BMD normal  -12/19/2022:  Screening mammogram (comparison: 11/08/2019, etc.):  BI-RADS: 2-benign  -07/06/2023:  Echocardiogram:  LVEF 60%  -11/27/2023:  Bilateral diagnostic mammogram and limited ultrasound right breast (comparison: 12/19/2022, 12/16/2001, etc.) (history of right breast cancer with worsening right breast pain and thickening) large elongated heterogeneous mass with irregular borders outer aspect of the right breast (extending from the nipple outward towards the lateral chest wall at the 8-9 o'clock position, 5.8 x 3 x 1.8 cm although margins are difficult to accurately define), highly suspicious for recurrent malignancy; there is a separate elongated hypoechoic lesion in the upper outer quadrant right breast 11 o'clock position,  5 cm from nipple, 3 x 2.6 x 0.7 cm, near the site of previous surgery), perhaps benign scar tissue related to previous lumpectomy:  BI-RADS: 5-highly suggestive of malignancy  -07/10/2024:  Surgery Office note:  Painless large supraclavicular mass, noted 4 months ago, steadily enlarging; also reported progressive skin changes right breast not associated with lumpectomy incision; on physical exam, large exophytic right supraclavicular nodule, nonmobile and fixed, overlying skin erythematous without ulceration; right upper outer quadrant lumpectomy incision; right lower inner quadrant periareolar ecchymosis with subcutaneous firmness without distinct mass; slight nipple inversion; no drainage; no palpable axillary lymphadenopathy  -05/30/2024:  Ultrasound soft tissues of the head and neck (lymphadenopathy): Pathologic lymphadenopathy (4.5 x 3.7 x 3.2 cm) at the base of the neck on the right; multiple smaller morphologically abnormal cervical chain lymph nodes on the right which demonstrate heterogeneous echogenicity similar to the dominant mass. Findings highly suspicious for malignancy particularly in this patient with a known history of prior right breast cancer. Dominant mass lesion corresponds with palpable complaint. Recommend biopsy/tissue diagnosis.   -06/27/2024:  Right supraclavicular mass, core needle biopsy (firm 6 x 6 cm exophytic right supraclavicular mass): Metastatic carcinoma in fibroadipose tissue, moderately to poorly-differentiated, consistent with breast origin (metastatic breast carcinoma in fibroadipose tissue  -ER negative (0%); MA negative (0%); HER2 negative (0); Ki-67 high proliferation  (93.6%)    08/08/2024:   Pleasant, healthy-appearing lady who presents for initial medical oncology consultation.  In no acute discomfort.    Says that right supra clavicular mass started April 2024; it has been enlarging and fluctuating in size.  It is painful.  7/10 severity pain.  Also, pain was right  breast area.  Right breast is enlarging.  There is a small area of ulceration in the right breast.  No bleeding or discharge.  She denies fevers or chills.  Has been taking Lyrica meloxicam without the relief of pain.  We will start Norco.  Some fatigue.  However, ECOG 1.  Pain from neck down to breast area, especially at night.  No unusual headaches, focal neurological symptoms, vision impairment, gait impairment, hemoptysis, fevers, chills, any bleeding or discharge from right breast superficial ulceration, abdominal pain, nausea, vomiting, GI bleeding, etc..  No bone pains.  Appetite is fair.    Interval History 1/14/25:  Patient presented to the clinic today for a scheduled clinic visit She is due to receive C7D1 today in infusion.  She reports having numbness and tingling bilateral feet she states that the left foot is worse than the right.  She states that she was previously taking Cymbalta 30 mg by her podiatrist for another disease process.  However she has not been taking it in several weeks.  She does report having diarrhea last week in which she took nothing over-the-counter she states that she used flower in water to stop the diarrhea and it worked.  She does admit to having some depression since her diagnosis states that she has had a lot going on personally.  She declined a referral to the .  She denies fever, chills, SOB or S/S associated with infection. Denies abdominal pain, constipation, or diarrhea today. Labwork was reviewed with the patient. All future appointments were discussed with the patient.     08/28/2024:   -tissue NGS testing (performed on right supraclavicular mass biopsy 06/27/2024):  HRD positive (CELINE-high); negative for AKT 1, BRAF, BRCA1, BRCA2, ESR 1, etc.  -08/09/2024: Liquid biopsy:  Borderline TMB  -08/08/2024: Hemoglobin 11.9; .6; rest of CBC unremarkable; calcium 10.6; albumin 3.9 (mild hypercalcemia); vitamin-D level normal; PTH level 100.3, elevated; FSH  79.87, postmenopausal; estradiol level <24; ionized level 5.03 (normal, on 08/09/2024); PTH related peptide normal on 08/09/2024  -08/14/2024: Echocardiogram:  LVEF 55-60%  -08/19/2024: MediPort placed  -08/20/2024:  Staging brain MRI with and without contrast: No brain metastases  -08/20/2024: Staging CTs C/A/P with IV contrast:   1. Irregular mixed cystic and solid right breast lesion with extensive bilateral breast skin thickening, low left axillary adenopathy, and a few enlarged mediastinal/hilar lymph nodes.   2. Changes of right axillary node dissection without definite pathologic adenopathy through the region.  3. Hypodense left hepatic mass raises concern for metastatic involvement.  No other definite findings suspicious for metastasis through the abdomen and pelvis.  4. Subcentimeter right lung nodules are indeterminate given absence of comparison images to establish chronicity.  Close attention on surveillance imaging is needed.  5. Cholelithiasis without findings of acute cholecystitis or biliary obstruction.  6. Simple left upper renal cortex cyst.  7. Massively enlarged multifocal uterine leiomyoma.  (scattered noncalcified lung nodules; anterior inferior right upper lobe nodule 0.6 x 0.6 mm; superior right lower lobe nodule 0.7 x 0.7 cm; right lower lobe nodule 0.9 x 0.9 cm; inferior left hepatic lobe hypodense circumscribed lesion 2.2 x 2.8 cm; additional mm hypodense foci scattered throughout the liver, too small for more detailed characterization; scattered mediastinal lymph nodes, for example, pretracheal lymph node 1.2 cm, right hilar lymph node 1.2 cm, enlarged left axillary lymph nodes, representative left axillary lymph node 1.9 cm; scattered nonenlarged retroperitoneal lymph nodes; no pathologic abdominopelvic nicholas enlargement; extensive bilateral skin thickening of the breasts; right breast heterogeneous mixed cystic and solid mass with irregular/lobulated margins upper outer and lateral  subareolar region 6.2 x 5.9 x 4.1 cm)  -08/20/2024: CT soft tissues of the neck with contrast: Large pathologic lymph node right supraclavicular fossa with central necrosis up to 5.4 cm; 3 additional subcentimeter pathologic appearing lymph nodes right level 3; indeterminate soft tissue nodule medial right breast 1.5 x 1.2 cm  -08/23/2024:  Whole-body nuclear medicine bone scan: No bone metastases  Presents for a follow-up visit.  We discussed all labs and scans in detail.  Some fatigue.  ECOG 1.  Minor headaches.  No unusual headaches, seizures, or focal neurological symptoms.  Some nausea.  Great appetite.  No chest pain, cough, or dyspnea.  No abdominal pain, nausea, vomiting.        Review of Systems:   All systems reviewed and found to be negative except for the symptoms detailed above    Physical Examination:   VITAL SIGNS:   Vitals:    01/14/25 0948   BP: 134/74   Pulse:    Resp:    Temp:        Physical Exam  Vitals reviewed.   Constitutional:       Appearance: Normal appearance.   HENT:      Head: Normocephalic and atraumatic.      Mouth/Throat:      Mouth: Mucous membranes are moist.   Cardiovascular:      Rate and Rhythm: Normal rate and regular rhythm.      Pulses: Normal pulses.      Heart sounds: Normal heart sounds.   Pulmonary:      Effort: Pulmonary effort is normal.      Breath sounds: Normal breath sounds.   Abdominal:      General: Bowel sounds are normal.      Palpations: Abdomen is soft.   Skin:     General: Skin is warm and dry.   Neurological:      Mental Status: She is alert and oriented to person, place, and time.   Psychiatric:         Mood and Affect: Mood normal.         Behavior: Behavior normal.         Thought Content: Thought content normal.         Judgment: Judgment normal.          Assessment:  History of right breast IDC, ER negative, NC negative, HER2 positive::  -mammogram: 3.7 x 2.7 cm right breast mass  -pathology: Infiltrative ductal carcinoma, possibly invasive lobular  -ER  negative, KS negative, HER2 positive   -T2 N1 M0; palpable axillary lymphadenopathy; large palpable mass right breast)  -S/P neoadjuvant chemotherapy (according to her, she received 4 cycles of neoadjuvant chemotherapy with Adriamycin/Taxotere every 3 weeks apart x4 cycles)  -followed by lumpectomy and axillary dissection  -no residual tumor post chemotherapy; 17 axillary lymph nodes negative for tumor  -S/P adjuvant radiotherapy, 6600 cGy/35 fractions, completed 10/24/2022  >>>  -screening mammogram 12/19/2022: BI-RADS: 2-benign  >>>  Regional, local, and metastatic recurrence, triple negative:  -echocardiogram 07/06/2023: LVEF 60%  -mammogram and ultrasound 11/27/2023:  Right breast mass 5.8 x 3 x 1.8 cm: BI-RADS: 5  -surgery consultation/follow-up 07/10/2024: Painless large supraclavicular mass, noted 4 months ago, steadily enlarging; progressive skin changes right breast not associated with lumpectomy incision, large exophytic right supraclavicular nodule/lymphadenopathy) nonmobile and fixed; overlying skin erythematous without ulceration), right lower inner quadrant periareolar ecchymosis and subcutaneous firmness without distinct mass; no palpable axillary lymphadenopathy  -ultrasound neck 05/30/2024:  Pathologic lymphadenopathy 4.5 x 3.7 x 3.2 cm at the base of the neck of the right; multiple smaller morphologically abnormal cervical chain lymph nodes on the right  -core needle biopsy right supraclavicular mass 06/27/2024:  Firm, 6 x 6 cm: Moderately to poorly-differentiated metastatic breast carcinoma  -ER negative (0%); KS negative (0%); HER2 negative (0); Ki-67 high proliferation (93.6%)  -08/08/2024: Breast examination was performed with her a verbal consent, in the presence of Jani Heck LPN; entire right breast is involved with tumor; entire right breast is indurated with tumor, with conglomeration of nodules around the central area; a small superficial ulceration is noted along the inferior medial  aspect of right areolar area; a large slightly tender 6 supraclavicular masses noted; diffuse edema is noted in the right supraclavicular area and right breast area as well as infraclavicular area; left breast is normal  -tissue NGS testing (performed on right supraclavicular mass biopsy 06/27/2024):  HRD positive (CELINE-high); negative for AKT 1, BRAF, BRCA1, BRCA2, ESR 1, etc.  -08/09/2024: Liquid biopsy:  Borderline TMB  -08/08/2024:  Mild hypercalcemia: Calcium 10.6, albumin 3.9; intact PTH level 100.3, elevated; PTH related peptide normal; vitamin-D level normal  -08/08/2024:  Labs: Postmenopausal  Hypercalcemia-08/14/2024: Echocardiogram:  LVEF 55-60%  -08/19/2024: MediPort placed  -brain MRI 08/20/2024: No brain metastases  -CTs C/A/P 0 08/20/2024: Sites of disease:  6.2 cm right breast mass; left axillary lymphadenopathy; mediastinal and hilar lymphadenopathy; hepatic mass left liver lobe 2.8 cm; right lung nodules  -CT soft tissues of the neck 08/20/2024:  Right supraclavicular pathologic lymphadenopathy up to 5.4 cm; 3 additional subcentimeter pathologic lymph nodes right level 3; left breast nodule 1.5 cm  -bone scan 08/23/2024: No bone metastases      Sites of disease/recurrence:   Right supraclavicular lymphadenopathy; right breast mass, right cervical lymphadenopathy, left axillary lymphadenopathy, mediastinal and hilar lymphadenopathy, hepatic mass (metastases), left breast nodule      Molecular markers:  -ER negative (0%); DC negative (0%); HER2 negative (0); Ki-67 high proliferation (93.6%)  -tissue NGS testing (performed on right supraclavicular mass biopsy 06/27/2024):  HRD positive (CELINE-high); negative for AKT 1, BRAF, BRCA1, BRCA2, ESR 1, etc.  -08/09/2024: Liquid biopsy:  Borderline TMB      Graves' disease:   -diagnosed 07/2023  -07/11/2023: CT soft tissues of the neck with contrast) dysphagia, acute thyrotoxicosis, goiter): Mild diffuse enlargement of the thyroid gland without displacement or  mass effect of the aerodigestive tract; no suspicious cervical lymphadenopathy  -07/12/2023: Ultrasound thyroid:  Hoarseness, thyrotoxicosis:  Heterogeneous hyperemic thyroid typical of thyroiditis; small 6 mm mid pole right thyroid nodule is not suspicious and does not warrant surveillance per TI-RADS criteria  -confirmed with TSH receptor antibodies (TRAb/TSI, i.e., thyroid-stimulating immunoglobulins)  -as of 05/21/2024, on methimazole 5 mg daily; has responded well  -endocrinologist: Roland Ross MD   -05/21/2024:  Endocrinologist plan:  Plan to complete 18 months of therapy before attempting to taper of methimazole         Plan:  Triple Negative Breast Cancer:   Continue chemotherapy every 3 weeks   Taxol Q 3 weeks in infusion   C7D1 today in infusion (1/14/25)  Labwork weekly (CBC/CMP/Mag level)  Restage with contrast-enhanced CT scans of C/A/P soft tissues of neck in mid- Feb  RTC with me in 3 weeks with labs prior (CBC/CMP/Mag level) followed by infusion for Taxol 3 hour     Peripheral Neuropathy:   She discontinued Lyrica approximately a month ago.  We will resume Cymbalta 30 mg x 7 days nightly then take 60 mg nightly thereafter-new prescription sent to pharmacy today (01/14/2025)    Depression:   Prescription for Zyprexa 5 mg p.o. nightly  Rx sent to pharmacy today 01/14/2025  Refused referral to  or mental health    Nausea:   Continue Zofran 4mg PO Q6 hours PRN Nausea    Graves Disease:   Follow-up with endocrinology for Graves disease        -according to her, she was diagnosed with right breast cancer in January 2022  -mammogram showed a large lobulated mass, 3.7 x 2.7 cm  -biopsy:  Infiltrative ductal carcinoma, possibly invasive lobular  -right breast mass was palpable in the upper outer quadrant; axillary lymphadenopathy was noted (3-4 palpable lymph nodes right axilla)  -T2 N1 M0 IDC right breast (she had palpable axillary lymph nodes in the right and a large palpable mass in  the right breast)  -ER negative; MA negative; HER2 positive  -S/P neoadjuvant chemotherapy (according to her, she received 4 cycles of neoadjuvant chemotherapy with Adriamycin/Taxotere every 3 weeks apart x4 cycles)  -followed by lumpectomy and axillary dissection  -no residual tumor post chemotherapy; 17 axillary lymph nodes negative for tumor  -S/P adjuvant radiotherapy, 6600 cGy/35 fractions, completed 10/24/2022  >>>  Regional, local, and metastatic recurrence, triple negative:  -now, triple negative regional and local recurrence with 4.5 cm right supraclavicular lymph node and 5.8 cm right breast mass right lower inner quadrant with periareolar ecchymosis and subcutaneous firmness without distinct mass on physical examination  -ER negative (0%); MA negative (0%); HER2 negative (0); Ki-67 high proliferation (93.6%)  -tissue NGS testing (performed on right supraclavicular mass biopsy 06/27/2024):  HRD positive (CELINE-high); negative for AKT 1, BRAF, BRCA1, BRCA2, ESR 1, etc.  -08/09/2024: Liquid biopsy:  Borderline TMB  -08/08/2024:  Mild hypercalcemia: Calcium 10.6, albumin 3.9; intact PTH level 100.3, elevated; PTH related peptide normal; vitamin-D level normal  -08/08/2024:  Labs: Postmenopausal  Hypercalcemia  -08/14/2024: Echocardiogram:  LVEF 55-60%  -08/19/2024: MediPort placed  -brain MRI 08/20/2024: No brain metastases  -CTs C/A/P 0 08/20/2024: Sites of disease:  6.2 cm right breast mass; left axillary lymphadenopathy; mediastinal and hilar lymphadenopathy; hepatic mass left liver lobe 2.8 cm; right lung nodules  -CT soft tissues of the neck 08/20/2024:  Right supraclavicular pathologic lymphadenopathy up to 5.4 cm; 3 additional subcentimeter pathologic lymph nodes right level 3; left breast nodule 1.5 cm  -bone scan 08/23/2024: No bone metastases  >>>  -triple negative recurrent, metastatic disease   -awaiting genetic testing, FDG PET-CT, PD-L1 testing  -obviously, unresectable  -ER 0, MA 0, HER2  0  -PD-L1 testing is pending   -genetic testing is pending  -extensive metastatic disease   -need to start palliative systemic therapy pending PD-L1 testing and pending genetic testing  -will start chemotherapy with Taxol 175 mg per m2 IV every 3 weeks, to continue until progression or until unacceptable toxicity  -patient has been previously treated with Adriamycin/Taxotere; therefore, we will avoid Adriamycin to the extent possible  -at some point, olaparib maybe considered because of HRD positive status  -re-stage with contrast-enhanced CT scans of C/A/P/soft tissues of the neck a couple of months after starting Taxol  -check baseline tumor markers including CEA, CA 27.29, and CA 15-3 level, and follow every 3-4 weeks, if elevated  -we will refer to IR for biopsy of liver lesion (however, we will not wait for biopsy to start palliative systemic therapy)  -She understands that we need to start chemotherapy ASAP for extensive metastatic disease from an aggressive breast cancer recurrence, without waiting for PET-CT and biopsies which may take a few to several more weeks.    Chemotherapy:  -Taxol 175 mg per m2 every 3 weeks until disease progression or unacceptable toxicity    Side effects/monitoring with Taxol:  -watch for cardiovascular effects including infusion related hypotension, bradycardia, hypertension, etc.  -watch out for extravasation: Taxol is an irritant with vesicle like properties  -peripheral neuropathy:  Primarily distal sensory neuropathy; a mixture of paresthesias and dysesthesias including burning, numbness, tingling, and shooting pains, typically in a stocking-glove distribution; most mild-to-moderate cases resolve several months after discontinuation but maybe longer in patients with diabetes.  Severe neuropathy maybe irreversible in some patients  -side effects in> 10% patients:  Edema, hypotension, alopecia, nausea, vomiting, diarrhea, stomatitis, cytopenias, LFT abnormalities, peripheral  neuropathy, arthralgias, myalgias, etc.  -Boxed warnings: Hypersensitivity reactions; bone marrow suppression  -premedicate with dexamethasone (20 mg orally at 12 and 6 hours prior to paclitaxel, diphenhydramine (50 mg IV 30 to 60 minutes prior to paclitaxel), and cimetidine or famotidine (IV 30 to 60 minutes prior to paclitaxel).  -do not repeat course until neutrophil count is =/> 1500 mm3 and the platelet count is =/> 100,000 mm3; reduced doses by 20% if patient experiences severe peripheral neuropathy or severe neutropenia <500 mm3 for a week or longer     Pending:    Genetic testing;   FDG PET-CT  -PD-L1 testing  -bilateral breast MRI    -08/08/2024: Refer to wound care for management of breast wound   -08/08/2024:  Start Norco 5 mg every 4 hours PRN for pain    Plan:   1. If no metastases, and if recurrence deemed resectable, then:  Neoadjuvant chemotherapy; followed by mastectomy +/- axillary lymphadenectomy; followed by adjuvant radiotherapy to right supraclavicular lymph node; followed by adjuvant chemotherapy  2. If no metastases, and if recurrence deemed unresectable, then: Palliative systemic therapy  3. If metastatic disease, then:  Palliative systemic therapy    >>>  -08/28/2024: On imaging studies, found to have stage IV disease; therefore, we are going to treat her with palliative systemic therapy    History of Graves disease   -follow-up with endocrinology    Above discussed at length with the patient.  All questions answered.    Discussed labs, scans, and pathology report, and gave her copies of relevant records.  Plan of management discussed in detail.    Explained the following: That based upon imaging studies, she has stage IV, incurable disease; palliation can be attempted with systemic therapy but response can not be guaranteed; prognosis guarded.  Potential side effects of Taxol discussed.  Gave her educational materials from [employer].  She understands that we need to start chemotherapy ASAP  for extensive metastatic disease from an aggressive breast cancer recurrence, without waiting for PET-CT and biopsies which may take a few to several more weeks.  She understands and agrees with this plan.      Follow-up:  Follow up in about 3 weeks (around 2/4/2025) for Labs, With SUSY -Yesika Ruth-  CBC/CMP/Mag level .

## 2025-01-14 ENCOUNTER — OFFICE VISIT (OUTPATIENT)
Dept: HEMATOLOGY/ONCOLOGY | Facility: CLINIC | Age: 60
End: 2025-01-14
Payer: MEDICAID

## 2025-01-14 ENCOUNTER — INFUSION (OUTPATIENT)
Dept: INFUSION THERAPY | Facility: HOSPITAL | Age: 60
End: 2025-01-14
Attending: INTERNAL MEDICINE
Payer: MEDICAID

## 2025-01-14 VITALS
DIASTOLIC BLOOD PRESSURE: 74 MMHG | BODY MASS INDEX: 34.96 KG/M2 | HEIGHT: 65 IN | OXYGEN SATURATION: 100 % | WEIGHT: 209.81 LBS | SYSTOLIC BLOOD PRESSURE: 134 MMHG | RESPIRATION RATE: 16 BRPM | TEMPERATURE: 99 F | HEART RATE: 105 BPM

## 2025-01-14 VITALS — HEART RATE: 94 BPM | SYSTOLIC BLOOD PRESSURE: 147 MMHG | DIASTOLIC BLOOD PRESSURE: 84 MMHG

## 2025-01-14 DIAGNOSIS — C50.919 TRIPLE NEGATIVE BREAST CARCINOMA: Primary | ICD-10-CM

## 2025-01-14 DIAGNOSIS — C50.911 CARCINOMA OF RIGHT BREAST, STAGE 4: Primary | ICD-10-CM

## 2025-01-14 DIAGNOSIS — G62.0 PERIPHERAL NEUROPATHY DUE TO CHEMOTHERAPY: ICD-10-CM

## 2025-01-14 DIAGNOSIS — T45.1X5A PERIPHERAL NEUROPATHY DUE TO CHEMOTHERAPY: ICD-10-CM

## 2025-01-14 DIAGNOSIS — E05.00 GRAVES DISEASE: ICD-10-CM

## 2025-01-14 DIAGNOSIS — F32.A DEPRESSION, UNSPECIFIED DEPRESSION TYPE: ICD-10-CM

## 2025-01-14 DIAGNOSIS — Z51.11 CHEMOTHERAPY MANAGEMENT, ENCOUNTER FOR: ICD-10-CM

## 2025-01-14 DIAGNOSIS — Z17.421 TRIPLE NEGATIVE BREAST CARCINOMA: Primary | ICD-10-CM

## 2025-01-14 PROCEDURE — 3075F SYST BP GE 130 - 139MM HG: CPT | Mod: CPTII,,,

## 2025-01-14 PROCEDURE — 25000003 PHARM REV CODE 250

## 2025-01-14 PROCEDURE — 96367 TX/PROPH/DG ADDL SEQ IV INF: CPT

## 2025-01-14 PROCEDURE — 1159F MED LIST DOCD IN RCRD: CPT | Mod: CPTII,,,

## 2025-01-14 PROCEDURE — 96413 CHEMO IV INFUSION 1 HR: CPT

## 2025-01-14 PROCEDURE — 99214 OFFICE O/P EST MOD 30 MIN: CPT | Mod: PBBFAC,25

## 2025-01-14 PROCEDURE — 1160F RVW MEDS BY RX/DR IN RCRD: CPT | Mod: CPTII,,,

## 2025-01-14 PROCEDURE — 3008F BODY MASS INDEX DOCD: CPT | Mod: CPTII,,,

## 2025-01-14 PROCEDURE — 96375 TX/PRO/DX INJ NEW DRUG ADDON: CPT

## 2025-01-14 PROCEDURE — 96415 CHEMO IV INFUSION ADDL HR: CPT

## 2025-01-14 PROCEDURE — 3078F DIAST BP <80 MM HG: CPT | Mod: CPTII,,,

## 2025-01-14 PROCEDURE — 63600175 PHARM REV CODE 636 W HCPCS

## 2025-01-14 PROCEDURE — 99215 OFFICE O/P EST HI 40 MIN: CPT | Mod: S$PBB,,,

## 2025-01-14 RX ORDER — FAMOTIDINE 10 MG/ML
20 INJECTION INTRAVENOUS
Status: COMPLETED | OUTPATIENT
Start: 2025-01-14 | End: 2025-01-14

## 2025-01-14 RX ORDER — SODIUM CHLORIDE 0.9 % (FLUSH) 0.9 %
10 SYRINGE (ML) INJECTION
Status: DISCONTINUED | OUTPATIENT
Start: 2025-01-14 | End: 2025-01-14 | Stop reason: HOSPADM

## 2025-01-14 RX ORDER — EPINEPHRINE 1 MG/ML
0.3 INJECTION INTRAMUSCULAR; INTRAVENOUS; SUBCUTANEOUS ONCE AS NEEDED
Status: DISCONTINUED | OUTPATIENT
Start: 2025-01-14 | End: 2025-01-14 | Stop reason: HOSPADM

## 2025-01-14 RX ORDER — EPINEPHRINE 0.3 MG/.3ML
0.3 INJECTION SUBCUTANEOUS ONCE AS NEEDED
Status: CANCELLED | OUTPATIENT
Start: 2025-01-14

## 2025-01-14 RX ORDER — DULOXETIN HYDROCHLORIDE 30 MG/1
30 CAPSULE, DELAYED RELEASE ORAL NIGHTLY
COMMUNITY
Start: 2024-12-19 | End: 2025-01-14

## 2025-01-14 RX ORDER — DULOXETIN HYDROCHLORIDE 30 MG/1
30 CAPSULE, DELAYED RELEASE ORAL DAILY
Qty: 60 CAPSULE | Refills: 0 | Status: SHIPPED | OUTPATIENT
Start: 2025-01-14 | End: 2025-03-15

## 2025-01-14 RX ORDER — DIPHENHYDRAMINE HYDROCHLORIDE 50 MG/ML
50 INJECTION INTRAMUSCULAR; INTRAVENOUS
Status: CANCELLED
Start: 2025-01-14

## 2025-01-14 RX ORDER — OLANZAPINE 5 MG/1
5 TABLET ORAL NIGHTLY
Qty: 30 TABLET | Refills: 0 | Status: SHIPPED | OUTPATIENT
Start: 2025-01-14 | End: 2025-02-13

## 2025-01-14 RX ORDER — DIPHENHYDRAMINE HYDROCHLORIDE 50 MG/ML
50 INJECTION INTRAMUSCULAR; INTRAVENOUS ONCE AS NEEDED
Status: CANCELLED | OUTPATIENT
Start: 2025-01-14

## 2025-01-14 RX ORDER — HEPARIN 100 UNIT/ML
500 SYRINGE INTRAVENOUS
Status: DISCONTINUED | OUTPATIENT
Start: 2025-01-14 | End: 2025-01-14 | Stop reason: HOSPADM

## 2025-01-14 RX ORDER — DIPHENHYDRAMINE HYDROCHLORIDE 50 MG/ML
50 INJECTION INTRAMUSCULAR; INTRAVENOUS
Status: COMPLETED | OUTPATIENT
Start: 2025-01-14 | End: 2025-01-14

## 2025-01-14 RX ORDER — FAMOTIDINE 10 MG/ML
20 INJECTION INTRAVENOUS
Status: CANCELLED | OUTPATIENT
Start: 2025-01-14

## 2025-01-14 RX ORDER — HEPARIN 100 UNIT/ML
500 SYRINGE INTRAVENOUS
Status: CANCELLED | OUTPATIENT
Start: 2025-01-14

## 2025-01-14 RX ORDER — SODIUM CHLORIDE 0.9 % (FLUSH) 0.9 %
10 SYRINGE (ML) INJECTION
Status: CANCELLED | OUTPATIENT
Start: 2025-01-14

## 2025-01-14 RX ORDER — DIPHENHYDRAMINE HYDROCHLORIDE 50 MG/ML
50 INJECTION INTRAMUSCULAR; INTRAVENOUS ONCE AS NEEDED
Status: DISCONTINUED | OUTPATIENT
Start: 2025-01-14 | End: 2025-01-14 | Stop reason: HOSPADM

## 2025-01-14 RX ADMIN — SODIUM CHLORIDE: 9 INJECTION, SOLUTION INTRAVENOUS at 11:01

## 2025-01-14 RX ADMIN — HEPARIN 500 UNITS: 100 SYRINGE at 03:01

## 2025-01-14 RX ADMIN — DIPHENHYDRAMINE HYDROCHLORIDE 50 MG: 50 INJECTION INTRAMUSCULAR; INTRAVENOUS at 11:01

## 2025-01-14 RX ADMIN — DEXAMETHASONE SODIUM PHOSPHATE 20 MG: 4 INJECTION, SOLUTION INTRA-ARTICULAR; INTRALESIONAL; INTRAMUSCULAR; INTRAVENOUS; SOFT TISSUE at 11:01

## 2025-01-14 RX ADMIN — FAMOTIDINE 20 MG: 10 INJECTION, SOLUTION INTRAVENOUS at 11:01

## 2025-01-14 RX ADMIN — PACLITAXEL 366 MG: 6 INJECTION, SOLUTION INTRAVENOUS at 12:01

## 2025-01-28 ENCOUNTER — TELEPHONE (OUTPATIENT)
Dept: ENDOCRINOLOGY | Facility: CLINIC | Age: 60
End: 2025-01-28
Payer: MEDICAID

## 2025-01-28 DIAGNOSIS — E87.6 HYPOKALEMIA: ICD-10-CM

## 2025-01-28 DIAGNOSIS — C50.911 RECURRENT BREAST CANCER, RIGHT: ICD-10-CM

## 2025-01-28 RX ORDER — POTASSIUM CHLORIDE 20 MEQ/1
20 TABLET, EXTENDED RELEASE ORAL DAILY
Qty: 21 TABLET | Refills: 0 | Status: CANCELLED | OUTPATIENT
Start: 2025-01-28 | End: 2026-01-28

## 2025-01-29 ENCOUNTER — HOSPITAL ENCOUNTER (OUTPATIENT)
Dept: WOUND CARE | Facility: HOSPITAL | Age: 60
Discharge: HOME | End: 2025-01-29
Attending: INTERNAL MEDICINE
Payer: MEDICAID

## 2025-01-29 VITALS
RESPIRATION RATE: 20 BRPM | OXYGEN SATURATION: 99 % | DIASTOLIC BLOOD PRESSURE: 85 MMHG | TEMPERATURE: 98 F | HEART RATE: 95 BPM | SYSTOLIC BLOOD PRESSURE: 145 MMHG

## 2025-01-29 DIAGNOSIS — S21.001D OPEN WOUND OF RIGHT BREAST, SUBSEQUENT ENCOUNTER: Primary | ICD-10-CM

## 2025-01-29 DIAGNOSIS — C50.919 TRIPLE NEGATIVE BREAST CARCINOMA: ICD-10-CM

## 2025-01-29 DIAGNOSIS — S21.101D OPEN CHEST WOUND, RIGHT, SUBSEQUENT ENCOUNTER: ICD-10-CM

## 2025-01-29 DIAGNOSIS — C50.911 RECURRENT BREAST CANCER, RIGHT: ICD-10-CM

## 2025-01-29 DIAGNOSIS — Z17.421 TRIPLE NEGATIVE BREAST CARCINOMA: ICD-10-CM

## 2025-01-29 PROCEDURE — 99213 OFFICE O/P EST LOW 20 MIN: CPT | Mod: ,,, | Performed by: NURSE PRACTITIONER

## 2025-01-29 PROCEDURE — 99211 OFF/OP EST MAY X REQ PHY/QHP: CPT

## 2025-01-29 PROCEDURE — 27000999 HC MEDICAL RECORD PHOTO DOCUMENTATION

## 2025-01-29 RX ORDER — AZITHROMYCIN 250 MG/1
250 TABLET, FILM COATED ORAL DAILY
COMMUNITY
Start: 2025-01-27

## 2025-01-29 RX ORDER — FERROUS GLUCONATE 324(38)MG
TABLET ORAL
COMMUNITY
Start: 2023-12-15

## 2025-01-29 RX ORDER — ERGOCALCIFEROL 1.25 MG/1
CAPSULE ORAL
COMMUNITY
Start: 2023-12-15

## 2025-01-29 NOTE — PATIENT INSTRUCTIONS
Pt seen today by: Perri Estes NP    Self care DRESSING INSTRUCTIONS:    Wound location: Rt side of neck(Subclavian) :   Dry thoroughly after showering.  Clean well with Vashe.  Leave open to air.      -If traveling in the passenger seat, cover with a foam boarder dressing to avoid the seat belt rubbing.  -If out in the sun, use sun-block to protect the skin.   -If any changes, or if the wound opens up, please call us to be seen in the office.    Return visit: As Needed!     The current daily value (%DV) for protein is 50 grams per day and is meant as a general goal for most people. Further increasing your dietary protein intake is very important for wound healing. Typically one needs over 100g of protein per day to help with wound healing needs.  If you are a dialysis patient or have problems with your kidneys, talk to your Nephrologist about how much protein you can take in with your condition.  Examples of high protein items that can be added to your diet include: eggs, chicken, red meats, almonds, cottage cheese, Greek yogurt, beans, and peanut butter.  Fortified protein bars, shakes and drinks can add 15-30 additional grams of protein per serving.  Also add:   1 daily general multivitamin   Vitamin C : 500mg twice daily   Zinc 220 mg daily  Vit D : once daily    Contact [facility] wound care team at [phone] or go to the nearest Emergency department if:    You have a fever greater than 101 taken by mouth.  Your pain gets worse or does not go away, even after taking your regular pain medicine.  Your skin around your wound is red, hot, swollen, or draining pus.  You have bleeding that continues to come through the dressing after holding pressure for 10 minutes       Call our [facility] wound clinic for questions/concerns a [phone] .

## 2025-01-30 NOTE — PROGRESS NOTES
[facility]   Outpatient Wound Care     Subjective:   Patient ID: Eli Bangura is a 59 y.o. female.    Chief Complaint: Wound Care (RIGHT BREAST WOUND)      History of Present Illness:   59 y.o. Black or  female today regarding right subclavian wound and right breast wound.  Presents to clinic alone.  Ambulates without difficulty.  Currently receiving chemotherapy.  Reviewed all previous medical history since last visit of 12/18/2024.  Past medical history:  Babar was cancer free for breast cancer for 22 years.  Under the care of oncology clinic.  Followed by Lambert Mantilla APRN for PCP.     Today's visit 01/29/2025:  Reviewed previous progress notes since last visit of 12/18/2024.  Right breast fungating tumor reassess at this time.  Right subclavian fully granulated.  Discussion with today will cleansed areas with Vashe leave open air.  Informed may apply ABD pad with bra to right breast to prevent area from rubbing.  Will place her on an as needed basis.  Instructed since supplies given.  Instructed to call the office with any questions, concerns, or new skin issues.  Verbalized understanding of all instructions.      12/18/24:  Reviewed previous progress notes since last visit of 11/27/2024.  Right subclavian cleanse wound red granulating wound bed with minimal serosanguineous drainage.  Right breast fungating tumor areas have reassess at this time.  Right breast wound care discontinued at this time.  All wound care performed per nursing staff at bedside.  Right subclavian wound cleansed with Vashe, applied wet-to-dry dressing with Vashe, and cover with foam border dressing.  Instructions and supplies given.  Will return to the clinic in 1 month.  Instructed to call the office with any questions, concerns, or new skin issues.   Verbalized understanding of all instructions.    11/27/24:  Reviewed previous progress notes since last visit of 10/30/2024.  Right subclavian the wound bed red granulating wound bed with minimal serosanguineous drainage.  Right breast fungating tumors have recessed at this time. Discussion with the patient today will continue same wound care.  All wound care performed per nursing staff at bedside.  Right upper subclavian  in wound cleansed with Vashe, apply wet-to-dry Vashe dressing and cover with foam border dressing.  Right breast cleansed with wash, applied Xeroform gauze, ABD pad and will secure with sports bra.  Tolerated well.  Instructions and supplies given.  Requesting to come back in 3 weeks.  Instructed to call the office with any questions, concerns, or new skin issues.    10/30/24:  Reviewed previous progress notes since last visit of 10/07/2024.  Right subclavian wound red granulating wound bed with moderate amount of slough.  Able to mechanically debride slough.  Instructed with the patient today will continue wet-to-dry half strength Dakin's dressings daily cover with foam border dressing.  Right breast fungating tumors red granulating wound bed with minimal serosanguineous drainage dimpling and orange peel appearance significantly decreased in size since last visit.  Cleanse right breast wound using half strength Dakin's, applied Xeroform gauze, ABD pad, will secure sports bra.  Tolerated well.  Instructions and supplies given.  Will return to the clinic in 1 month.  Instructed to call the office with any questions, concerns, or new skin issues.  Verbalized understanding of all instructions.      10/07/2024:  Reviewed previous progress notes since last visit of 09/30/2024.  All dressings removed per nursing staff at bedside.  Right subclavian wound red granulating wound bed with moderate amount of slough slight odor moderate serosanguineous drainage.  Right breast red granulating wound beds with  dimpling and orange peel appearance minimal serosanguineous drainage.  Discussion today will continue same wound care.  Reinforced these are maintenance wounds.  Cleansed all wounds with half strength Dakin's will apply wet-to-dry half strength Dakin's gauze to right upper subclavian, Kerlix dressing perform daily.  Right breast cleansed with half strength Dakin's, cover with Xeroform gauze, ABD pad, secure with sport bra.  All wound care performed per nursing staff at bedside.  Instructions and supplies given.  Will return to the clinic in 2 weeks per patient request.  Instructed to call the office with any questions, concerns, or new skin issues.  Verbalized understanding of all instructions.    09/30/2024:  Reviewed previous progress notes since last visit of 09/16/2024.  Patient arrived after chemotherapy today.  Right upper subclavian wound bed 100% slough with slight odor draining moderate serosanguineous drainage.  Right breast dimpling with orange peel appearance., deformity, fungating tumor noted red granulating wound bed with minimal serosanguineous drainage.  Discussion with the patient today these or fungating tumors cancer and will change wound care to right supplies to assist with odor.  During examination in discussion with the patient today patient questions whether I can heel her wounds.  Reinforced these are fungating tumors in wound not be able to fully granulate wounds will provide Wound care to assist with smell and comfort.  Patient is refusing Home Health at this time.  Discussion with the patient today regarding current chemotherapy treatments if they were holiday for curative patient voices doctor never explained anything to her and wanted to know with chart said whether it was palliative or curative informed that it was palliative chemotherapy.  Patient voices only want to hear good new.  Reinforced not my intention to deliver bad news.  Demonstrated New wound care at bedside to right upper  cleansing twice daily with full strength Dakin's wet-to-dry dressing moistened with full strength Dakin's and cover with gauze and tape.  Right breast cleanse every-other-day with full strength Dakin's applied Xeroform gauze cover with ABD pad, and secure with tape or bra.   Instructions and supplies given.  Left chest wall Mediport.  Instructed to call the office with any questions, concerns, or new skin issues.  Verbalized understanding of all instructions.    09/16/2024:  Reviewed previous progress notes from 09/05/2024.  Presents to clinic alone.  Dressings to right breast and upper chest removed per nursing staff at bedside.  Patient has a right double aluminum PICC line currently receiving chemotherapy.  Fungating tumor to the right breast in upper chest area red granulating wound bed with moderate slough.  Instructed we will continue Dakin's solution applied Xeroform gauze to both areas and cover with foam border dressing to upper chest right subclavian area, and ABD pad secure with bra for right breast.  Reinforced that fungating tumor are to keep clean and free from infection.  Instructions and supplies given.  Will have her follow up in 2 weeks to clinic.  Instructed to call the office with any questions, concerns, or new skin issues.  Verbalized understanding of all instructions.    9/5/24:  Presents to clinic alone.  Right breast fungating tumor red granulating wound bed with slough noted moderate serosanguineous drainage.  Right supplies given wound red granulating wound bed with slough monitor serosanguineous drainage.  Discussion with the patient today will assist her with keeping fungating tumors clean and assist with smell by using Dakin's solution.  Instructed once started chemotherapy areas should decrease in size but may experienced more drainage.  Will have her just cleansed area with Dakin's solution, apply Xeroform gauze to both areas right subclavian cover foam border dressing and right  breast ABD pad secure with bra.  May perform every-other-day. Instructions and supplies given.  All wound care performed per nursing staff at bedside.  Will follow up with a clinic in 10 days.  Instructed to call the office with any questions, concerns, or new skin issues.  Verbalized understanding of all instructions.      History includes:      Past Medical History:   Diagnosis Date    Anemia     Arthritis     Breast cancer     Graves disease     Hypertension     Hyperthyroidism       Past Surgical History:   Procedure Laterality Date    BREAST LUMPECTOMY      INSERTION OF TUNNELED CENTRAL VENOUS CATHETER (CVC) WITH SUBCUTANEOUS PORT N/A 8/19/2024    Procedure: [id number]-[id number];  Surgeon: Thomas Verdin Jr., MD;  Location: [facility] [place];  Service: General;  Laterality: N/A;      Social History     Socioeconomic History    Marital status: Single   Tobacco Use    Smoking status: Never     Passive exposure: Never    Smokeless tobacco: Never   Substance and Sexual Activity    Alcohol use: Never    Drug use: Never     Social Drivers of Health     Financial Resource Strain: Patient Declined (7/11/2023)    [facility] [employer] [facility]    Overall Financial Resource Strain (CARDIA)     Difficulty of Paying Living Expenses: Patient declined   Food Insecurity: Patient Declined (7/11/2023)    [facility] [employer] [facility], [employer] [facility] and Affiliates    Hunger Vital Sign     Worried About Running Out of Food in the Last Year: Patient declined     Ran Out of Food in the Last Year: Patient declined   Transportation Needs: Patient Declined (7/11/2023)    [facility] [employer] [facility], [facility]    PRAPARE - Transportation     Lack of Transportation (Medical): Patient declined     Lack of Transportation (Non-Medical): Patient declined   Stress: Patient Declined (7/11/2023)    [facility], [facility]    [employer] - Occupational Stress Questionnaire     Feeling of Stress : Patient declined   Housing Stability: Unknown (7/11/2023)    [facility], [facility] and [facility] and [facility]ates    Housing Stability Vital Sign     Unable to Pay for Housing in the Last Year: Patient refused     Number of Places Lived in the Last Year: 1   .      Current Outpatient Medications   Medication Sig Dispense Refill    azithromycin (Z-WAYNE) 250 MG tablet Take 250 mg by mouth once daily.      ergocalciferol (ERGOCALCIFEROL) 50,000 unit Cap Vitamin D2 1,250 mcg (50,000 unit) capsule, [RxNorm: 8040314]      ferrous gluconate (FERGON) 324 MG tablet ferrous gluconate 324 mg (38 mg iron) tablet, [RxNorm: 050313]      dexAMETHasone (DECADRON) 4 MG Tab Take 20mg (5 tablets) by mouth 12 hours and then again at 6 hours prior to each chemotherapy treatment 40 tablet 1    diclofenac sodium (VOLTAREN) 1 % Gel  (Patient not taking: Reported on 1/14/2025)      DULoxetine (CYMBALTA) 30 MG capsule Take 1 capsule (30 mg total) by mouth once daily. 60 capsule 0    ergocalciferol (ERGOCALCIFEROL) 50,000 unit Cap Take 50,000 Units by mouth every 7 days. (Patient not taking: Reported on 1/14/2025)      ferrous gluconate (FERGON) 324 MG tablet TAKE 1 TABLET BY MOUTH ONCE DAILY FOR IRON      HYDROcodone-acetaminophen (NORCO) 5-325 mg  "per tablet Take 1 tablet by mouth every 4 (four) hours as needed for Pain. (Patient not taking: Reported on 1/14/2025) 84 tablet 0    meloxicam (MOBIC) 15 MG tablet  (Patient not taking: Reported on 1/14/2025)      methIMAzole (TAPAZOLE) 5 MG Tab Take 1 tablet (5 mg total) by mouth once daily. 30 tablet 11    OLANZapine (ZYPREXA) 5 MG tablet Take 1 tablet (5 mg total) by mouth every evening. 30 tablet 0    ondansetron (ZOFRAN) 4 MG tablet Take 1 tablet (4 mg total) by mouth every 6 (six) hours as needed for Nausea. (Patient not taking: Reported on 1/14/2025) 30 tablet 1    valsartan-hydrochlorothiazide (DIOVAN-HCT) 160-12.5 mg per tablet Take 1 tablet by mouth.       No current facility-administered medications for this encounter.       Review of Systems   Skin:  Positive for wound.   All other systems reviewed and are negative.         Labs Reviewed:   Chemistry:  Lab Results   Component Value Date    BUN 19.3 01/14/2025    BUN 17.5 01/07/2025    CREATININE 0.77 01/14/2025    CREATININE 0.76 01/07/2025    EGFRNORACEVR >60 01/14/2025    EGFRNORACEVR >60 01/07/2025    GLUCOSE 151 (H) 01/14/2025    AST 23 01/14/2025    AST 18 01/07/2025    ALT 17 01/14/2025    ALT 15 01/07/2025    HGBA1C 5.0 09/26/2023        Hematology:  Lab Results   Component Value Date    WBC 4.76 01/14/2025    WBC 1.63 (LL) 01/07/2025    HGB 11.4 (L) 01/14/2025    HGB 11.0 (L) 01/07/2025    HCT 34.5 (L) 01/14/2025    HCT 33.4 (L) 01/07/2025     01/14/2025     01/07/2025       Inflammatory Markers:  No results found for: "HSCRP", "SEDRATE"     Objective:        Physical Exam  Vitals reviewed.   Chest:          Comments: Right breast fungating tumor    Right upper chest fungating tumor  Skin:     General: Skin is warm and dry.      Capillary Refill: Capillary refill takes less than 2 seconds.      Findings: Wound present.             Comments: Right subclavian wound fully granulated.     Right breast fungating tumors recessed.  "   Neurological:      Mental Status: She is alert.            Wound 09/05/24 1439 Other (comment) Right Neck (Active)   09/05/24 1439 Neck   Present on Original Admission: Y   Primary Wound Type: Other   Side: Right   Orientation:    Wound Approximate Age at First Assessment (Weeks):    Wound Number:    Is this injury device related?:    Incision Type:    Closure Method:    Wound Description (Comments):    Type:    Additional Comments:    Ankle-Brachial Index:    Pulses:    Removal Indication and Assessment:    Wound Outcome:    Wound Image   01/29/25 1333   Dressing Appearance Clean;Dry 01/29/25 1333   Drainage Amount None 01/29/25 1333   Appearance Red 01/29/25 1333   Periwound Area Redness 01/29/25 1333   Wound Edges Approximated 01/29/25 1333   Wound Length (cm) 0 cm 01/29/25 1333   Wound Width (cm) 0 cm 01/29/25 1333   Wound Depth (cm) 0 cm 01/29/25 1333   Wound Volume (cm^3) 0 cm^3 01/29/25 1333   Wound Surface Area (cm^2) 0 cm^2 01/29/25 1333             Assessment:         ICD-10-CM ICD-9-CM   1. Open wound of right breast, subsequent encounter  S21.001D V58.89     879.0   2. Open chest wound, right, subsequent encounter  S21.101D V58.89     875.0   3. Recurrent breast cancer, right  C50.911 174.9   4. Triple negative breast carcinoma  C50.919 174.9    Z17.421 V86.1         Plan:   Tissue pathology and/or culture taken:  [] Yes [x] No   Sharp debridement performed:   [] Yes [x] No   Labs ordered this visit:   [] Yes [x] No   Imaging ordered this visit:   [] Yes [x] No         1. Open wound of right breast, subsequent encounter     Fully granulated.    Will cleanse daily with soap and water leave open air. May apply ABD pad to bra to protect from rubbing.     Will continue to monitor for signs of infection, watch for increased drainage, pain, fevers, chills, and swelling.   2. Open chest wound, right, subsequent encounter     Fully granulated.    Will cleansed daily with soap and water leave open air.    Will  continue to monitor for signs of infection, watch for increased drainage, pain, fever, chills, and swelling   3. Recurrent breast cancer, right     Under the care of oncology.     Currently receiving chemotherapy.   4. Triple negative breast carcinoma     Currently receiving chemotherapy.    Under the care of oncology.      The time spent including preparing to see the patient, obtaining patient history and assessment, evaluation of the plan of care, patient/caregiver counseling and education, orders, documentation, coordination of care, and other professional medical management activities for today's encounter was 20 minute.    Time spent performing procedures during today's encounter was 20 minute.    Follow up skin issues. Teaching provided on s/s to call wound clinic for promptly.  ER precautions taught for after hours and weekends.       LEONID Perez

## 2025-02-03 ENCOUNTER — TELEPHONE (OUTPATIENT)
Dept: ADMINISTRATIVE | Facility: HOSPITAL | Age: 60
End: 2025-02-03
Payer: MEDICAID

## 2025-02-03 DIAGNOSIS — E87.6 HYPOKALEMIA: ICD-10-CM

## 2025-02-03 DIAGNOSIS — Z17.421 TRIPLE NEGATIVE BREAST CARCINOMA: Primary | ICD-10-CM

## 2025-02-03 DIAGNOSIS — C50.919 TRIPLE NEGATIVE BREAST CARCINOMA: Primary | ICD-10-CM

## 2025-02-03 NOTE — PROGRESS NOTES
Reason for Follow-up:  -history of right breast cancer, diagnosed , ER negative, CT negative, HER2 positive, T2 N1 M0, S/P neoadjuvant chemotherapy, lumpectomy, axillary lymph dissection, adjuvant radiotherapy (completed 10/24/2022)  -local and regional recurrence, triple negative, diagnosed on biopsy of supraclavicular lymph node 2024; on mammogram, right breast mass; supraclavicular lymphadenopathy; right cervical lymphadenopathy  -Postmenopausal   -Hypercalcemia   -High serum parathyroid hormone (PTH)   -Liver mass, right lobe   -Mediastinal lymphadenopathy   -Lung nodules   -Breast cancer metastasized to axillary lymph node, left   -Cervical lymphadenopathy   -history of Graves disease    History:  Past medical history: Anemia; arthritis; breast cancer; Graves disease (diagnosed ; started on methimazole by endocrinologist in [place], in ); hypertension; peripheral neuropathy  -2023:  Venous Doppler bilateral lower extremities (swelling):  No DVT  -2023: TTE:  LVEF 55-60%  -2023:  Limited abdominal ultrasound (elevated liver enzymes):  6.6 cm cyst within the liver near the gallbladder; cholelithiasis with minimal gallbladder wall thickening  -2014:  Pelvic ultrasound: Markedly enlarged uterus with multiple large fibroids; endometrial stripe is thickened, 1.4 cm  Procedure/surgical history: Breast lumpectomy   Social history:  .  Lives in [place].  No children.  Never wanted to have children.  Never became pregnant.  No history of tobacco, alcohol, or illicit drug abuse.  Does not work.  Family history:  Sister experienced hyperthyroidism, treated with radioiodine.  She does not know much about her family history but says that there are cancers in folks on both parents sides of family.  A female cousin on mother's side of family experienced breast cancer in her 30s and  from it.  Health maintenance:  Primary care provider in [facility].  Last  mammogram November 2023 at [facility].  No screening colonoscopy ever.  Menstrual/Ob gyn history:  Menarche at age 13.  Last menstrual cycle July 2023.  Never became pregnant.  Never wanted to become pregnant.  Took birth control pills for 2 years several years ago.  No hormone replacement therapy after menopause.          Family History   Problem Relation Name Age of Onset    Breast cancer Maternal Cousin          History of Present Illness:   Carinoma right breast        Oncologic/Hematologic History:  Oncology History   Stage IV carcinoma of breast   8/27/2024 Initial Diagnosis    Stage IV carcinoma of breast     9/9/2024 -  Chemotherapy    Treatment Summary   Plan Name: OP BREAST PACLITAXEL Q3W  Treatment Goal: Palliative  Status: Active  Start Date: 9/9/2024  End Date: 2/25/2025 (Planned)  Provider: Israel Farah MD  Chemotherapy: PACLitaxeL (TAXOL) 175 mg/m2 = 366 mg in 0.9% NaCl 500 mL chemo infusion, 175 mg/m2 = 366 mg, Intravenous, Clinic/HOD 1 time, 7 of 9 cycles  Administration: 366 mg (9/9/2024), 366 mg (9/30/2024), 366 mg (10/22/2024), 366 mg (11/12/2024), 366 mg (12/3/2024), 366 mg (12/24/2024), 366 mg (1/14/2025)     12/3/2024 Cancer Staged    Staging form: Breast, AJCC 8th Edition  - Clinical stage from 12/3/2024: Stage IV (rcTX, cN3c, pM1, ER-, IA-, HER2-)     ====================================      59-year-old lady, referred by Rere Jernigan MD, surgery, with recurrent breast cancer.      07/10/2024:  Office note: Rere Jernigan MD (surgery):  Pt is a 59 y.o. female with history of right breast cancer s/p BCT in 2002 who presents with painless large supraclavicular mass.    First noticed it 4 months ago and has steadily increased in size past month.  Last mammogram 1 year ago and [facility] [place].  Also reports progressive skin changes of the right breast not associated with lumpectomy incision.  Receptor status unknown.       Investigations reviewed:  -no old records available    -according to her, she was diagnosed with right breast cancer in January 2022  -mammogram showed a large lobulated mass, 3.7 x 2.7 cm  -biopsy:  Infiltrative ductal carcinoma, possibly invasive lobular  -right breast mass was palpable in the upper outer quadrant; axillary lymphadenopathy was noted (3-4 palpable lymph nodes right axilla)  -T2 N1 M0 IDC right breast (she had palpable axillary lymph nodes in the right and a large palpable mass in the right breast)  -ER negative; MD negative; HER2 positive  -S/P neoadjuvant chemotherapy (according to her, she received 4 cycles of neoadjuvant chemotherapy with Adriamycin/Taxotere every 3 weeks apart x4 cycles)  -followed by lumpectomy and axillary dissection  -no residual tumor post chemotherapy; 17 axillary lymph nodes negative for tumor  -S/P adjuvant radiotherapy, 6600 cGy/35 fractions, completed 10/24/2022  -07/28/2011:  DEXA scan:  BMD normal  -12/19/2022:  Screening mammogram (comparison: 11/08/2019, etc.):  BI-RADS: 2-benign  -07/06/2023:  Echocardiogram:  LVEF 60%  -11/27/2023:  Bilateral diagnostic mammogram and limited ultrasound right breast (comparison: 12/19/2022, 12/16/2001, etc.) (history of right breast cancer with worsening right breast pain and thickening) large elongated heterogeneous mass with irregular borders outer aspect of the right breast (extending from the nipple outward towards the lateral chest wall at the 8-9 o'clock position, 5.8 x 3 x 1.8 cm although margins are difficult to accurately define), highly suspicious for recurrent malignancy; there is a separate elongated hypoechoic lesion in the upper outer quadrant right breast 11 o'clock position, 5 cm from nipple, 3 x 2.6 x 0.7 cm, near the site of previous surgery), perhaps benign scar tissue related to previous lumpectomy:  BI-RADS: 5-highly suggestive of malignancy  -07/10/2024:  Surgery Office note:  Painless large supraclavicular mass, noted 4 months ago, steadily enlarging; also  reported progressive skin changes right breast not associated with lumpectomy incision; on physical exam, large exophytic right supraclavicular nodule, nonmobile and fixed, overlying skin erythematous without ulceration; right upper outer quadrant lumpectomy incision; right lower inner quadrant periareolar ecchymosis with subcutaneous firmness without distinct mass; slight nipple inversion; no drainage; no palpable axillary lymphadenopathy  -05/30/2024:  Ultrasound soft tissues of the head and neck (lymphadenopathy): Pathologic lymphadenopathy (4.5 x 3.7 x 3.2 cm) at the base of the neck on the right; multiple smaller morphologically abnormal cervical chain lymph nodes on the right which demonstrate heterogeneous echogenicity similar to the dominant mass. Findings highly suspicious for malignancy particularly in this patient with a known history of prior right breast cancer. Dominant mass lesion corresponds with palpable complaint. Recommend biopsy/tissue diagnosis.   -06/27/2024:  Right supraclavicular mass, core needle biopsy (firm 6 x 6 cm exophytic right supraclavicular mass): Metastatic carcinoma in fibroadipose tissue, moderately to poorly-differentiated, consistent with breast origin (metastatic breast carcinoma in fibroadipose tissue  -ER negative (0%); HI negative (0%); HER2 negative (0); Ki-67 high proliferation  (93.6%)    08/08/2024:   Pleasant, healthy-appearing lady who presents for initial medical oncology consultation.  In no acute discomfort.    Says that right supra clavicular mass started April 2024; it has been enlarging and fluctuating in size.  It is painful.  7/10 severity pain.  Also, pain was right breast area.  Right breast is enlarging.  There is a small area of ulceration in the right breast.  No bleeding or discharge.  She denies fevers or chills.  Has been taking Lyrica meloxicam without the relief of pain.  We will start Norco.  Some fatigue.  However, ECOG 1.  Pain from neck down to  breast area, especially at night.  No unusual headaches, focal neurological symptoms, vision impairment, gait impairment, hemoptysis, fevers, chills, any bleeding or discharge from right breast superficial ulceration, abdominal pain, nausea, vomiting, GI bleeding, etc..  No bone pains.  Appetite is fair.    Interval History 2/4/25:  Patient presented to the clinic today accompanied by her mother for a scheduled clinic visit She is due to receive C8D1 today in infusion.  She denies any fever, chills, shortness of breath or any other signs and symptoms associated with infection after cycle 7.  She does report having leg pain approximately 2 days after treatment she rates the pain of 5 or 6/10 patient reports the only thing that makes it better is her Norco.  She denies any abdominal pain, diarrhea, constipation or any changes with appetite.  She does admit to having some insomnia in which she has tried some herbal supplements that have not seemed to help her sleep.  She does admit to taking Zyprexa once since prescribed and states that she did notice a difference.  But does admit to continued depression with some other things that she is going on personally.  She denies any suicidal or homicidal ideations.  She does admit to resuming Cymbalta 30 mg to assist with peripheral neuropathy.  She states that she tried to increase it to 60 mg but found that she was too drowsy.  Though she continued to take 30 mg nightly she does report improvement of the peripheral neuropathy.  Lab work was reviewed with the patient.  All future appointments were discussed.    08/28/2024:   -tissue NGS testing (performed on right supraclavicular mass biopsy 06/27/2024):  HRD positive (CELINE-high); negative for AKT 1, BRAF, BRCA1, BRCA2, ESR 1, etc.  -08/09/2024: Liquid biopsy:  Borderline TMB  -08/08/2024: Hemoglobin 11.9; .6; rest of CBC unremarkable; calcium 10.6; albumin 3.9 (mild hypercalcemia); vitamin-D level normal; PTH level  100.3, elevated; FSH 79.87, postmenopausal; estradiol level <24; ionized level 5.03 (normal, on 08/09/2024); PTH related peptide normal on 08/09/2024  -08/14/2024: Echocardiogram:  LVEF 55-60%  -08/19/2024: MediPort placed  -08/20/2024:  Staging brain MRI with and without contrast: No brain metastases  -08/20/2024: Staging CTs C/A/P with IV contrast:   1. Irregular mixed cystic and solid right breast lesion with extensive bilateral breast skin thickening, low left axillary adenopathy, and a few enlarged mediastinal/hilar lymph nodes.   2. Changes of right axillary node dissection without definite pathologic adenopathy through the region.  3. Hypodense left hepatic mass raises concern for metastatic involvement.  No other definite findings suspicious for metastasis through the abdomen and pelvis.  4. Subcentimeter right lung nodules are indeterminate given absence of comparison images to establish chronicity.  Close attention on surveillance imaging is needed.  5. Cholelithiasis without findings of acute cholecystitis or biliary obstruction.  6. Simple left upper renal cortex cyst.  7. Massively enlarged multifocal uterine leiomyoma.  (scattered noncalcified lung nodules; anterior inferior right upper lobe nodule 0.6 x 0.6 mm; superior right lower lobe nodule 0.7 x 0.7 cm; right lower lobe nodule 0.9 x 0.9 cm; inferior left hepatic lobe hypodense circumscribed lesion 2.2 x 2.8 cm; additional mm hypodense foci scattered throughout the liver, too small for more detailed characterization; scattered mediastinal lymph nodes, for example, pretracheal lymph node 1.2 cm, right hilar lymph node 1.2 cm, enlarged left axillary lymph nodes, representative left axillary lymph node 1.9 cm; scattered nonenlarged retroperitoneal lymph nodes; no pathologic abdominopelvic nicholas enlargement; extensive bilateral skin thickening of the breasts; right breast heterogeneous mixed cystic and solid mass with irregular/lobulated margins upper  outer and lateral subareolar region 6.2 x 5.9 x 4.1 cm)  -08/20/2024: CT soft tissues of the neck with contrast: Large pathologic lymph node right supraclavicular fossa with central necrosis up to 5.4 cm; 3 additional subcentimeter pathologic appearing lymph nodes right level 3; indeterminate soft tissue nodule medial right breast 1.5 x 1.2 cm  -08/23/2024:  Whole-body nuclear medicine bone scan: No bone metastases  Presents for a follow-up visit.  We discussed all labs and scans in detail.  Some fatigue.  ECOG 1.  Minor headaches.  No unusual headaches, seizures, or focal neurological symptoms.  Some nausea.  Great appetite.  No chest pain, cough, or dyspnea.  No abdominal pain, nausea, vomiting.    Review of Systems:   All systems reviewed and found to be negative except for the symptoms detailed above    Physical Examination:   VITAL SIGNS:   Vitals:    02/04/25 0827   BP: 138/84   Pulse: 97   Resp: 18   Temp: 97.7 °F (36.5 °C)         Physical Exam  Vitals reviewed.   Constitutional:       Appearance: Normal appearance.   HENT:      Head: Normocephalic and atraumatic.      Mouth/Throat:      Mouth: Mucous membranes are moist.   Cardiovascular:      Rate and Rhythm: Normal rate and regular rhythm.      Pulses: Normal pulses.      Heart sounds: Normal heart sounds.   Pulmonary:      Effort: Pulmonary effort is normal.      Breath sounds: Normal breath sounds.   Abdominal:      General: Bowel sounds are normal.      Palpations: Abdomen is soft.   Skin:     General: Skin is warm and dry.   Neurological:      Mental Status: She is alert and oriented to person, place, and time.   Psychiatric:         Mood and Affect: Mood normal.         Behavior: Behavior normal.         Thought Content: Thought content normal.         Judgment: Judgment normal.          Assessment:  History of right breast IDC, ER negative, VA negative, HER2 positive::  -mammogram: 3.7 x 2.7 cm right breast mass  -pathology: Infiltrative ductal  carcinoma, possibly invasive lobular  -ER negative, SC negative, HER2 positive   -T2 N1 M0; palpable axillary lymphadenopathy; large palpable mass right breast)  -S/P neoadjuvant chemotherapy (according to her, she received 4 cycles of neoadjuvant chemotherapy with Adriamycin/Taxotere every 3 weeks apart x4 cycles)  -followed by lumpectomy and axillary dissection  -no residual tumor post chemotherapy; 17 axillary lymph nodes negative for tumor  -S/P adjuvant radiotherapy, 6600 cGy/35 fractions, completed 10/24/2022  >>>  -screening mammogram 12/19/2022: BI-RADS: 2-benign  >>>  Regional, local, and metastatic recurrence, triple negative:  -echocardiogram 07/06/2023: LVEF 60%  -mammogram and ultrasound 11/27/2023:  Right breast mass 5.8 x 3 x 1.8 cm: BI-RADS: 5  -surgery consultation/follow-up 07/10/2024: Painless large supraclavicular mass, noted 4 months ago, steadily enlarging; progressive skin changes right breast not associated with lumpectomy incision, large exophytic right supraclavicular nodule/lymphadenopathy) nonmobile and fixed; overlying skin erythematous without ulceration), right lower inner quadrant periareolar ecchymosis and subcutaneous firmness without distinct mass; no palpable axillary lymphadenopathy  -ultrasound neck 05/30/2024:  Pathologic lymphadenopathy 4.5 x 3.7 x 3.2 cm at the base of the neck of the right; multiple smaller morphologically abnormal cervical chain lymph nodes on the right  -core needle biopsy right supraclavicular mass 06/27/2024:  Firm, 6 x 6 cm: Moderately to poorly-differentiated metastatic breast carcinoma  -ER negative (0%); SC negative (0%); HER2 negative (0); Ki-67 high proliferation (93.6%)  -08/08/2024: Breast examination was performed with her a verbal consent, in the presence of Jani Heck LPN; entire right breast is involved with tumor; entire right breast is indurated with tumor, with conglomeration of nodules around the central area; a small superficial  ulceration is noted along the inferior medial aspect of right areolar area; a large slightly tender 6 supraclavicular masses noted; diffuse edema is noted in the right supraclavicular area and right breast area as well as infraclavicular area; left breast is normal  -tissue NGS testing (performed on right supraclavicular mass biopsy 06/27/2024):  HRD positive (CELINE-high); negative for AKT 1, BRAF, BRCA1, BRCA2, ESR 1, etc.  -08/09/2024: Liquid biopsy:  Borderline TMB  -08/08/2024:  Mild hypercalcemia: Calcium 10.6, albumin 3.9; intact PTH level 100.3, elevated; PTH related peptide normal; vitamin-D level normal  -08/08/2024:  Labs: Postmenopausal  Hypercalcemia-08/14/2024: Echocardiogram:  LVEF 55-60%  -08/19/2024: MediPort placed  -brain MRI 08/20/2024: No brain metastases  -CTs C/A/P 0 08/20/2024: Sites of disease:  6.2 cm right breast mass; left axillary lymphadenopathy; mediastinal and hilar lymphadenopathy; hepatic mass left liver lobe 2.8 cm; right lung nodules  -CT soft tissues of the neck 08/20/2024:  Right supraclavicular pathologic lymphadenopathy up to 5.4 cm; 3 additional subcentimeter pathologic lymph nodes right level 3; left breast nodule 1.5 cm  -bone scan 08/23/2024: No bone metastases      Sites of disease/recurrence:   Right supraclavicular lymphadenopathy; right breast mass, right cervical lymphadenopathy, left axillary lymphadenopathy, mediastinal and hilar lymphadenopathy, hepatic mass (metastases), left breast nodule      Molecular markers:  -ER negative (0%); RI negative (0%); HER2 negative (0); Ki-67 high proliferation (93.6%)  -tissue NGS testing (performed on right supraclavicular mass biopsy 06/27/2024):  HRD positive (CELINE-high); negative for AKT 1, BRAF, BRCA1, BRCA2, ESR 1, etc.  -08/09/2024: Liquid biopsy:  Borderline TMB      Graves' disease:   -diagnosed 07/2023  -07/11/2023: CT soft tissues of the neck with contrast) dysphagia, acute thyrotoxicosis, goiter): Mild diffuse enlargement  of the thyroid gland without displacement or mass effect of the aerodigestive tract; no suspicious cervical lymphadenopathy  -07/12/2023: Ultrasound thyroid:  Hoarseness, thyrotoxicosis:  Heterogeneous hyperemic thyroid typical of thyroiditis; small 6 mm mid pole right thyroid nodule is not suspicious and does not warrant surveillance per TI-RADS criteria  -confirmed with TSH receptor antibodies (TRAb/TSI, i.e., thyroid-stimulating immunoglobulins)  -as of 05/21/2024, on methimazole 5 mg daily; has responded well  -endocrinologist: Roland Ross MD   -05/21/2024:  Endocrinologist plan:  Plan to complete 18 months of therapy before attempting to taper of methimazole         Plan:  Triple Negative Breast Cancer:   Continue chemotherapy every 3 weeks   Taxol Q 3 weeks in infusion   Proceed with C8D1 today in infusion (2/4/25)  Labwork weekly (CBC/CMP/Mag level)  Restage with contrast-enhanced CT scans of C/A/P soft tissues of neck in mid- Feb 18,2025  RTC with MD in 3 weeks (2/25) with labs prior (CBC/CMP/Mag level) to discuss CT scans followed by infusion for Taxol 3 hour     Peripheral Neuropathy:   She discontinued Lyrica approximately a month ago.  Continue Cymbalta 30 mg p.o. nightly     Depression:   Resume Zyprexa 5 mg p.o. nightly.   Education was provided to the patient that taking this medication 1 time is not going to help with depression.  Explained to patient that this is something that needs to be taking nightly to see any type of the FX and there will be several weeks before affects we will be able to be seen.  Rx sent to pharmacy on  1/14/2025  Refused referral to  or mental health    Insomnia:   Start over-the-counter melatonin 10 mg nightly  Instructed patient to take 30 minutes to 1 hour prior to sleep  Educated the patient to eliminate any stimulants before bed such as caffeine, exercise, TV  Also educated patient to avoid taking any naps during the daytime    -according to her,  she was diagnosed with right breast cancer in January 2022  -mammogram showed a large lobulated mass, 3.7 x 2.7 cm  -biopsy:  Infiltrative ductal carcinoma, possibly invasive lobular  -right breast mass was palpable in the upper outer quadrant; axillary lymphadenopathy was noted (3-4 palpable lymph nodes right axilla)  -T2 N1 M0 IDC right breast (she had palpable axillary lymph nodes in the right and a large palpable mass in the right breast)  -ER negative; [place] negative; HER2 positive  -S/P neoadjuvant chemotherapy (according to her, she received 4 cycles of neoadjuvant chemotherapy with Adriamycin/Taxotere every 3 weeks apart x4 cycles)  -followed by lumpectomy and axillary dissection  -no residual tumor post chemotherapy; 17 axillary lymph nodes negative for tumor  -S/P adjuvant radiotherapy, 6600 cGy/35 fractions, completed 10/24/2022  >>>  Regional, local, and metastatic recurrence, triple negative:  -now, triple negative regional and local recurrence with 4.5 cm right supraclavicular lymph node and 5.8 cm right breast mass right lower inner quadrant with periareolar ecchymosis and subcutaneous firmness without distinct mass on physical examination  -ER negative (0%); [place] negative (0%); HER2 negative (0); Ki-67 high proliferation (93.6%)  -tissue NGS testing (performed on right supraclavicular mass biopsy 06/27/2024):  HRD positive (CELINE-high); negative for AKT 1, BRAF, BRCA1, BRCA2, ESR 1, etc.  -08/09/2024: Liquid biopsy:  Borderline TMB  -08/08/2024:  Mild hypercalcemia: Calcium 10.6, albumin 3.9; intact PTH level 100.3, elevated; PTH related peptide normal; vitamin-D level normal  -08/08/2024:  Labs: Postmenopausal  Hypercalcemia  -08/14/2024: Echocardiogram:  LVEF 55-60%  -08/19/2024: MediPort placed  -brain MRI 08/20/2024: No brain metastases  -CTs C/A/P 0 08/20/2024: Sites of disease:  6.2 cm right breast mass; left axillary lymphadenopathy; mediastinal and hilar lymphadenopathy; hepatic mass left liver lobe  2.8 cm; right lung nodules  -CT soft tissues of the neck 08/20/2024:  Right supraclavicular pathologic lymphadenopathy up to 5.4 cm; 3 additional subcentimeter pathologic lymph nodes right level 3; left breast nodule 1.5 cm  -bone scan 08/23/2024: No bone metastases  >>>  -triple negative recurrent, metastatic disease   -awaiting genetic testing, FDG PET-CT, PD-L1 testing  -obviously, unresectable  -ER 0, LA 0, HER2 0  -PD-L1 testing is pending   -genetic testing is pending  -extensive metastatic disease   -need to start palliative systemic therapy pending PD-L1 testing and pending genetic testing  -will start chemotherapy with Taxol 175 mg per m2 IV every 3 weeks, to continue until progression or until unacceptable toxicity  -patient has been previously treated with Adriamycin/Taxotere; therefore, we will avoid Adriamycin to the extent possible  -at some point, olaparib maybe considered because of HRD positive status  -re-stage with contrast-enhanced CT scans of C/A/P/soft tissues of the neck a couple of months after starting Taxol  -check baseline tumor markers including CEA, CA 27.29, and CA 15-3 level, and follow every 3-4 weeks, if elevated  -we will refer to IR for biopsy of liver lesion (however, we will not wait for biopsy to start palliative systemic therapy)  -She understands that we need to start chemotherapy ASAP for extensive metastatic disease from an aggressive breast cancer recurrence, without waiting for PET-CT and biopsies which may take a few to several more weeks.    Chemotherapy:  -Taxol 175 mg per m2 every 3 weeks until disease progression or unacceptable toxicity    Side effects/monitoring with Taxol:  -watch for cardiovascular effects including infusion related hypotension, bradycardia, hypertension, etc.  -watch out for extravasation: Taxol is an irritant with vesicle like properties  -peripheral neuropathy:  Primarily distal sensory neuropathy; a mixture of paresthesias and dysesthesias  including burning, numbness, tingling, and shooting pains, typically in a stocking-glove distribution; most mild-to-moderate cases resolve several months after discontinuation but maybe longer in patients with diabetes.  Severe neuropathy maybe irreversible in some patients  -side effects in> 10% patients:  Edema, hypotension, alopecia, nausea, vomiting, diarrhea, stomatitis, cytopenias, LFT abnormalities, peripheral neuropathy, arthralgias, myalgias, etc.  -Boxed warnings: Hypersensitivity reactions; bone marrow suppression  -premedicate with dexamethasone (20 mg orally at 12 and 6 hours prior to paclitaxel, diphenhydramine (50 mg IV 30 to 60 minutes prior to paclitaxel), and cimetidine or famotidine (IV 30 to 60 minutes prior to paclitaxel).  -do not repeat course until neutrophil count is =/> 1500 mm3 and the platelet count is =/> 100,000 mm3; reduced doses by 20% if patient experiences severe peripheral neuropathy or severe neutropenia <500 mm3 for a week or longer     Pending:    Genetic testing;   FDG PET-CT  -PD-L1 testing  -bilateral breast MRI    -08/08/2024: Refer to wound care for management of breast wound   -08/08/2024:  Start Norco 5 mg every 4 hours PRN for pain    Plan:   1. If no metastases, and if recurrence deemed resectable, then:  Neoadjuvant chemotherapy; followed by mastectomy +/- axillary lymphadenectomy; followed by adjuvant radiotherapy to right supraclavicular lymph node; followed by adjuvant chemotherapy  2. If no metastases, and if recurrence deemed unresectable, then: Palliative systemic therapy  3. If metastatic disease, then:  Palliative systemic therapy    >>>  -08/28/2024: On imaging studies, found to have stage IV disease; therefore, we are going to treat her with palliative systemic therapy    History of Graves disease   -follow-up with endocrinology    Above discussed at length with the patient.  All questions answered.    Discussed labs, scans, and pathology report, and gave her  copies of relevant records.  Plan of management discussed in detail.    Explained the following: That based upon imaging studies, she has stage IV, incurable disease; palliation can be attempted with systemic therapy but response can not be guaranteed; prognosis guarded.  Potential side effects of Taxol discussed.  Gave her educational materials from [employer].  She understands that we need to start chemotherapy ASAP for extensive metastatic disease from an aggressive breast cancer recurrence, without waiting for PET-CT and biopsies which may take a few to several more weeks.  She understands and agrees with this plan.      Follow-up:  No follow-ups on file.

## 2025-02-04 ENCOUNTER — APPOINTMENT (OUTPATIENT)
Dept: HEMATOLOGY/ONCOLOGY | Facility: CLINIC | Age: 60
End: 2025-02-04
Payer: MEDICAID

## 2025-02-04 ENCOUNTER — INFUSION (OUTPATIENT)
Dept: INFUSION THERAPY | Facility: HOSPITAL | Age: 60
End: 2025-02-04
Attending: INTERNAL MEDICINE
Payer: MEDICAID

## 2025-02-04 ENCOUNTER — OFFICE VISIT (OUTPATIENT)
Dept: HEMATOLOGY/ONCOLOGY | Facility: CLINIC | Age: 60
End: 2025-02-04
Payer: MEDICAID

## 2025-02-04 VITALS
BODY MASS INDEX: 34.27 KG/M2 | DIASTOLIC BLOOD PRESSURE: 84 MMHG | HEART RATE: 97 BPM | RESPIRATION RATE: 18 BRPM | TEMPERATURE: 98 F | WEIGHT: 205.69 LBS | SYSTOLIC BLOOD PRESSURE: 138 MMHG | HEIGHT: 65 IN | OXYGEN SATURATION: 100 %

## 2025-02-04 VITALS
HEART RATE: 98 BPM | RESPIRATION RATE: 20 BRPM | WEIGHT: 205.69 LBS | OXYGEN SATURATION: 100 % | HEIGHT: 65 IN | DIASTOLIC BLOOD PRESSURE: 98 MMHG | TEMPERATURE: 98 F | SYSTOLIC BLOOD PRESSURE: 160 MMHG | BODY MASS INDEX: 34.27 KG/M2

## 2025-02-04 DIAGNOSIS — C50.911 CARCINOMA OF RIGHT BREAST, STAGE 4: Primary | ICD-10-CM

## 2025-02-04 DIAGNOSIS — G47.00 INSOMNIA, UNSPECIFIED TYPE: ICD-10-CM

## 2025-02-04 DIAGNOSIS — D84.821 IMMUNODEFICIENCY DUE TO CHEMOTHERAPY: ICD-10-CM

## 2025-02-04 DIAGNOSIS — C50.919 TRIPLE NEGATIVE BREAST CARCINOMA: Primary | ICD-10-CM

## 2025-02-04 DIAGNOSIS — F41.8 ANXIETY WITH DEPRESSION: ICD-10-CM

## 2025-02-04 DIAGNOSIS — T45.1X5A PERIPHERAL NEUROPATHY DUE TO CHEMOTHERAPY: ICD-10-CM

## 2025-02-04 DIAGNOSIS — Z17.421 TRIPLE NEGATIVE BREAST CARCINOMA: ICD-10-CM

## 2025-02-04 DIAGNOSIS — Z79.899 IMMUNODEFICIENCY DUE TO CHEMOTHERAPY: ICD-10-CM

## 2025-02-04 DIAGNOSIS — C50.919 TRIPLE NEGATIVE BREAST CARCINOMA: ICD-10-CM

## 2025-02-04 DIAGNOSIS — E87.6 HYPOKALEMIA: ICD-10-CM

## 2025-02-04 DIAGNOSIS — T45.1X5A IMMUNODEFICIENCY DUE TO CHEMOTHERAPY: ICD-10-CM

## 2025-02-04 DIAGNOSIS — G62.0 PERIPHERAL NEUROPATHY DUE TO CHEMOTHERAPY: ICD-10-CM

## 2025-02-04 DIAGNOSIS — Z17.421 TRIPLE NEGATIVE BREAST CARCINOMA: Primary | ICD-10-CM

## 2025-02-04 LAB
ALBUMIN SERPL-MCNC: 4 G/DL (ref 3.5–5)
ALBUMIN/GLOB SERPL: 1.1 RATIO (ref 1.1–2)
ALP SERPL-CCNC: 102 UNIT/L (ref 40–150)
ALT SERPL-CCNC: 16 UNIT/L (ref 0–55)
ANION GAP SERPL CALC-SCNC: 10 MEQ/L
AST SERPL-CCNC: 17 UNIT/L (ref 5–34)
BASOPHILS # BLD AUTO: 0.01 X10(3)/MCL
BASOPHILS NFR BLD AUTO: 0.3 %
BILIRUB SERPL-MCNC: 0.3 MG/DL
BUN SERPL-MCNC: 19.6 MG/DL (ref 9.8–20.1)
CALCIUM SERPL-MCNC: 10.1 MG/DL (ref 8.4–10.2)
CHLORIDE SERPL-SCNC: 106 MMOL/L (ref 98–107)
CO2 SERPL-SCNC: 25 MMOL/L (ref 22–29)
CREAT SERPL-MCNC: 0.79 MG/DL (ref 0.55–1.02)
CREAT/UREA NIT SERPL: 25
EOSINOPHIL # BLD AUTO: 0 X10(3)/MCL (ref 0–0.9)
EOSINOPHIL NFR BLD AUTO: 0 %
ERYTHROCYTE [DISTWIDTH] IN BLOOD BY AUTOMATED COUNT: 13.2 % (ref 11.5–17)
GFR SERPLBLD CREATININE-BSD FMLA CKD-EPI: >60 ML/MIN/1.73/M2
GLOBULIN SER-MCNC: 3.8 GM/DL (ref 2.4–3.5)
GLUCOSE SERPL-MCNC: 148 MG/DL (ref 74–100)
HCT VFR BLD AUTO: 35.2 % (ref 37–47)
HGB BLD-MCNC: 11.7 G/DL (ref 12–16)
IMM GRANULOCYTES # BLD AUTO: 0.07 X10(3)/MCL (ref 0–0.04)
IMM GRANULOCYTES NFR BLD AUTO: 2 %
LYMPHOCYTES # BLD AUTO: 0.97 X10(3)/MCL (ref 0.6–4.6)
LYMPHOCYTES NFR BLD AUTO: 27.2 %
MAGNESIUM SERPL-MCNC: 2.2 MG/DL (ref 1.6–2.6)
MCH RBC QN AUTO: 32.5 PG (ref 27–31)
MCHC RBC AUTO-ENTMCNC: 33.2 G/DL (ref 33–36)
MCV RBC AUTO: 97.8 FL (ref 80–94)
MONOCYTES # BLD AUTO: 0.09 X10(3)/MCL (ref 0.1–1.3)
MONOCYTES NFR BLD AUTO: 2.5 %
NEUTROPHILS # BLD AUTO: 2.43 X10(3)/MCL (ref 2.1–9.2)
NEUTROPHILS NFR BLD AUTO: 68 %
NRBC BLD AUTO-RTO: 0 %
PLATELET # BLD AUTO: 234 X10(3)/MCL (ref 130–400)
PMV BLD AUTO: 10 FL (ref 7.4–10.4)
POTASSIUM SERPL-SCNC: 3.6 MMOL/L (ref 3.5–5.1)
PROT SERPL-MCNC: 7.8 GM/DL (ref 6.4–8.3)
RBC # BLD AUTO: 3.6 X10(6)/MCL (ref 4.2–5.4)
SODIUM SERPL-SCNC: 141 MMOL/L (ref 136–145)
WBC # BLD AUTO: 3.57 X10(3)/MCL (ref 4.5–11.5)

## 2025-02-04 PROCEDURE — 96375 TX/PRO/DX INJ NEW DRUG ADDON: CPT

## 2025-02-04 PROCEDURE — 1160F RVW MEDS BY RX/DR IN RCRD: CPT | Mod: CPTII,,,

## 2025-02-04 PROCEDURE — 96413 CHEMO IV INFUSION 1 HR: CPT

## 2025-02-04 PROCEDURE — A4216 STERILE WATER/SALINE, 10 ML: HCPCS

## 2025-02-04 PROCEDURE — 3008F BODY MASS INDEX DOCD: CPT | Mod: CPTII,,,

## 2025-02-04 PROCEDURE — 85025 COMPLETE CBC W/AUTO DIFF WBC: CPT

## 2025-02-04 PROCEDURE — 3079F DIAST BP 80-89 MM HG: CPT | Mod: CPTII,,,

## 2025-02-04 PROCEDURE — 96367 TX/PROPH/DG ADDL SEQ IV INF: CPT

## 2025-02-04 PROCEDURE — 80053 COMPREHEN METABOLIC PANEL: CPT

## 2025-02-04 PROCEDURE — 25000003 PHARM REV CODE 250

## 2025-02-04 PROCEDURE — 1159F MED LIST DOCD IN RCRD: CPT | Mod: CPTII,,,

## 2025-02-04 PROCEDURE — 96415 CHEMO IV INFUSION ADDL HR: CPT

## 2025-02-04 PROCEDURE — 3075F SYST BP GE 130 - 139MM HG: CPT | Mod: CPTII,,,

## 2025-02-04 PROCEDURE — 63600175 PHARM REV CODE 636 W HCPCS

## 2025-02-04 PROCEDURE — 83735 ASSAY OF MAGNESIUM: CPT

## 2025-02-04 PROCEDURE — 99214 OFFICE O/P EST MOD 30 MIN: CPT | Mod: PBBFAC

## 2025-02-04 PROCEDURE — 99215 OFFICE O/P EST HI 40 MIN: CPT | Mod: S$PBB,,,

## 2025-02-04 PROCEDURE — 36415 COLL VENOUS BLD VENIPUNCTURE: CPT

## 2025-02-04 RX ORDER — SODIUM CHLORIDE 0.9 % (FLUSH) 0.9 %
10 SYRINGE (ML) INJECTION
Status: CANCELLED | OUTPATIENT
Start: 2025-02-04

## 2025-02-04 RX ORDER — FAMOTIDINE 10 MG/ML
20 INJECTION INTRAVENOUS
Status: CANCELLED | OUTPATIENT
Start: 2025-02-04

## 2025-02-04 RX ORDER — DIPHENHYDRAMINE HYDROCHLORIDE 50 MG/ML
50 INJECTION INTRAMUSCULAR; INTRAVENOUS
Status: COMPLETED | OUTPATIENT
Start: 2025-02-04 | End: 2025-02-04

## 2025-02-04 RX ORDER — DIPHENHYDRAMINE HYDROCHLORIDE 50 MG/ML
50 INJECTION INTRAMUSCULAR; INTRAVENOUS ONCE AS NEEDED
Status: CANCELLED | OUTPATIENT
Start: 2025-02-04

## 2025-02-04 RX ORDER — HEPARIN 100 UNIT/ML
500 SYRINGE INTRAVENOUS
Status: CANCELLED | OUTPATIENT
Start: 2025-02-04

## 2025-02-04 RX ORDER — EPINEPHRINE 0.3 MG/.3ML
0.3 INJECTION SUBCUTANEOUS ONCE AS NEEDED
Status: CANCELLED | OUTPATIENT
Start: 2025-02-04

## 2025-02-04 RX ORDER — EPINEPHRINE 1 MG/ML
0.3 INJECTION INTRAMUSCULAR; INTRAVENOUS; SUBCUTANEOUS ONCE AS NEEDED
Status: DISCONTINUED | OUTPATIENT
Start: 2025-02-04 | End: 2025-02-04 | Stop reason: HOSPADM

## 2025-02-04 RX ORDER — DIPHENHYDRAMINE HYDROCHLORIDE 50 MG/ML
50 INJECTION INTRAMUSCULAR; INTRAVENOUS
Status: CANCELLED
Start: 2025-02-04

## 2025-02-04 RX ORDER — DIPHENHYDRAMINE HYDROCHLORIDE 50 MG/ML
50 INJECTION INTRAMUSCULAR; INTRAVENOUS ONCE AS NEEDED
Status: DISCONTINUED | OUTPATIENT
Start: 2025-02-04 | End: 2025-02-04 | Stop reason: HOSPADM

## 2025-02-04 RX ORDER — FAMOTIDINE 10 MG/ML
20 INJECTION INTRAVENOUS
Status: COMPLETED | OUTPATIENT
Start: 2025-02-04 | End: 2025-02-04

## 2025-02-04 RX ORDER — HEPARIN 100 UNIT/ML
500 SYRINGE INTRAVENOUS
Status: DISCONTINUED | OUTPATIENT
Start: 2025-02-04 | End: 2025-02-04 | Stop reason: HOSPADM

## 2025-02-04 RX ORDER — SODIUM CHLORIDE 0.9 % (FLUSH) 0.9 %
10 SYRINGE (ML) INJECTION
Status: DISCONTINUED | OUTPATIENT
Start: 2025-02-04 | End: 2025-02-04 | Stop reason: HOSPADM

## 2025-02-04 RX ADMIN — PACLITAXEL 366 MG: 6 INJECTION, SOLUTION INTRAVENOUS at 10:02

## 2025-02-04 RX ADMIN — DIPHENHYDRAMINE HYDROCHLORIDE 50 MG: 50 INJECTION INTRAMUSCULAR; INTRAVENOUS at 09:02

## 2025-02-04 RX ADMIN — DEXAMETHASONE SODIUM PHOSPHATE 20 MG: 4 INJECTION, SOLUTION INTRA-ARTICULAR; INTRALESIONAL; INTRAMUSCULAR; INTRAVENOUS; SOFT TISSUE at 09:02

## 2025-02-04 RX ADMIN — FAMOTIDINE 20 MG: 10 INJECTION, SOLUTION INTRAVENOUS at 09:02

## 2025-02-04 RX ADMIN — HEPARIN 500 UNITS: 100 SYRINGE at 02:02

## 2025-02-04 RX ADMIN — Medication 10 ML: at 02:02

## 2025-02-04 RX ADMIN — SODIUM CHLORIDE: 9 INJECTION, SOLUTION INTRAVENOUS at 09:02

## 2025-02-04 NOTE — NURSING
0904  Pt did labs, saw A Carlso NP, and is here for C 8 q 3 week taxol.  Pt accomp by her mother.  Pt denies any pain or complaint.

## 2025-02-04 NOTE — Clinical Note
-proceed with cycle 8 day 1 today in infusion -weekly lab work (CBC/CMP/Mag level) 2/11 and 2/18 -keep restaging scans as scheduled on 02/18 -RTC with MD in 3 weeks (2/25) with labs prior CBC/CMP/Mag level to discuss CT scans followed by infusion for Taxol 3 hours Department of Anesthesiology  Postprocedure Note    Patient: Willie Pierce  MRN: [id number]  YOB: 1947  Date of evaluation: 7/21/2021  Time:  3:19 PM     Procedure Summary     Date: 07/21/21 Room / Location: [address] [place] [address]    Anesthesia Start: 1134 Anesthesia Stop: 1210    Procedures:       EGD W/ EMR (N/A )      ENDOSCOPIC ULTRASOUND WITH ENDOSCOPIC MUCOSAL RESECTION with 3 clips placed (N/A ) Diagnosis:       Neuroendocrine tumor      (NEUROENDOCRINE TUMOR)    Surgeons: Lan Romo MD Responsible Provider: Buster Alaniz MD    Anesthesia Type: MAC ASA Status: 3          Anesthesia Type: MAC    James Phase I: James Score: 10    James Phase II: James Score: 10    Last vitals: Reviewed and per EMR flowsheets.        Anesthesia Post Evaluation    Patient location during evaluation: PACU  Patient participation: complete - patient participated  Level of consciousness: awake and alert  Pain score: 2  Airway patency: patent  Nausea & Vomiting: no nausea and no vomiting  Complications: no  Cardiovascular status: blood pressure returned to baseline  Respiratory status: acceptable  Hydration status: euvolemic

## 2025-02-06 ENCOUNTER — TELEPHONE (OUTPATIENT)
Dept: ENDOCRINOLOGY | Facility: CLINIC | Age: 60
End: 2025-02-06
Payer: MEDICAID
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
## 2025-02-10 DIAGNOSIS — F32.A DEPRESSION, UNSPECIFIED DEPRESSION TYPE: ICD-10-CM

## 2025-02-11 DIAGNOSIS — T45.1X5A CHEMOTHERAPY-INDUCED NEUROPATHY: ICD-10-CM

## 2025-02-11 DIAGNOSIS — G62.0 CHEMOTHERAPY-INDUCED NEUROPATHY: ICD-10-CM

## 2025-02-11 DIAGNOSIS — F32.A DEPRESSION, UNSPECIFIED DEPRESSION TYPE: Primary | ICD-10-CM

## 2025-02-11 RX ORDER — OLANZAPINE 5 MG/1
5 TABLET ORAL NIGHTLY
Qty: 30 TABLET | Refills: 0 | Status: SHIPPED | OUTPATIENT
Start: 2025-02-11 | End: 2025-03-13

## 2025-02-18 ENCOUNTER — HOSPITAL ENCOUNTER (OUTPATIENT)
Dept: RADIOLOGY | Facility: HOSPITAL | Age: 60
Discharge: HOME | End: 2025-02-18
Attending: INTERNAL MEDICINE
Payer: MEDICAID

## 2025-02-18 DIAGNOSIS — C50.911 CARCINOMA OF RIGHT BREAST, STAGE 4: ICD-10-CM

## 2025-02-18 DIAGNOSIS — C50.919 TRIPLE NEGATIVE BREAST CARCINOMA: ICD-10-CM

## 2025-02-18 DIAGNOSIS — Z17.421 TRIPLE NEGATIVE BREAST CARCINOMA: ICD-10-CM

## 2025-02-18 DIAGNOSIS — R22.1 SUBCUTANEOUS NODULE OF NECK: ICD-10-CM

## 2025-02-18 PROCEDURE — 70491 CT SOFT TISSUE NECK W/DYE: CPT | Mod: TC

## 2025-02-18 PROCEDURE — 74177 CT ABD & PELVIS W/CONTRAST: CPT | Mod: TC

## 2025-02-18 PROCEDURE — 25500020 PHARM REV CODE 255

## 2025-02-18 RX ADMIN — IOHEXOL 95 ML: 350 INJECTION, SOLUTION INTRAVENOUS at 09:02

## 2025-02-24 RX ORDER — DIPHENHYDRAMINE HYDROCHLORIDE 50 MG/ML
50 INJECTION INTRAMUSCULAR; INTRAVENOUS
Status: CANCELLED
Start: 2025-02-25

## 2025-02-24 RX ORDER — FAMOTIDINE 10 MG/ML
20 INJECTION INTRAVENOUS
Status: CANCELLED | OUTPATIENT
Start: 2025-02-25

## 2025-02-24 RX ORDER — DIPHENHYDRAMINE HYDROCHLORIDE 50 MG/ML
50 INJECTION INTRAMUSCULAR; INTRAVENOUS ONCE AS NEEDED
Status: CANCELLED | OUTPATIENT
Start: 2025-02-25

## 2025-02-24 RX ORDER — HEPARIN 100 UNIT/ML
500 SYRINGE INTRAVENOUS
Status: CANCELLED | OUTPATIENT
Start: 2025-02-25

## 2025-02-24 RX ORDER — SODIUM CHLORIDE 0.9 % (FLUSH) 0.9 %
10 SYRINGE (ML) INJECTION
Status: CANCELLED | OUTPATIENT
Start: 2025-02-25

## 2025-02-24 RX ORDER — EPINEPHRINE 0.3 MG/.3ML
0.3 INJECTION SUBCUTANEOUS ONCE AS NEEDED
Status: CANCELLED | OUTPATIENT
Start: 2025-02-25

## 2025-02-25 ENCOUNTER — LAB VISIT (OUTPATIENT)
Dept: HEMATOLOGY/ONCOLOGY | Facility: CLINIC | Age: 60
End: 2025-02-25
Attending: INTERNAL MEDICINE
Payer: MEDICAID

## 2025-02-25 ENCOUNTER — INFUSION (OUTPATIENT)
Dept: INFUSION THERAPY | Facility: HOSPITAL | Age: 60
End: 2025-02-25
Attending: INTERNAL MEDICINE
Payer: MEDICAID

## 2025-02-25 VITALS
HEART RATE: 100 BPM | RESPIRATION RATE: 18 BRPM | WEIGHT: 203 LBS | HEIGHT: 65 IN | SYSTOLIC BLOOD PRESSURE: 134 MMHG | DIASTOLIC BLOOD PRESSURE: 89 MMHG | BODY MASS INDEX: 33.82 KG/M2 | TEMPERATURE: 98 F | OXYGEN SATURATION: 100 %

## 2025-02-25 VITALS
HEART RATE: 92 BPM | SYSTOLIC BLOOD PRESSURE: 140 MMHG | TEMPERATURE: 97 F | DIASTOLIC BLOOD PRESSURE: 69 MMHG | RESPIRATION RATE: 20 BRPM

## 2025-02-25 DIAGNOSIS — C50.911 RECURRENT BREAST CANCER, RIGHT: ICD-10-CM

## 2025-02-25 DIAGNOSIS — Z51.11 CHEMOTHERAPY MANAGEMENT, ENCOUNTER FOR: ICD-10-CM

## 2025-02-25 DIAGNOSIS — Z78.0 POSTMENOPAUSAL: ICD-10-CM

## 2025-02-25 DIAGNOSIS — C77.3 BREAST CANCER METASTASIZED TO AXILLARY LYMPH NODE, LEFT: ICD-10-CM

## 2025-02-25 DIAGNOSIS — Z17.421 TRIPLE NEGATIVE BREAST CARCINOMA: ICD-10-CM

## 2025-02-25 DIAGNOSIS — Z85.3 HISTORY OF RIGHT BREAST CANCER: ICD-10-CM

## 2025-02-25 DIAGNOSIS — C50.911 CARCINOMA OF RIGHT BREAST, STAGE 4: Primary | ICD-10-CM

## 2025-02-25 DIAGNOSIS — E83.52 HYPERCALCEMIA: ICD-10-CM

## 2025-02-25 DIAGNOSIS — N63.20 MASSES OF BOTH BREASTS: ICD-10-CM

## 2025-02-25 DIAGNOSIS — C50.919 TRIPLE NEGATIVE BREAST CARCINOMA: ICD-10-CM

## 2025-02-25 DIAGNOSIS — R79.89 HIGH SERUM PARATHYROID HORMONE (PTH): ICD-10-CM

## 2025-02-25 DIAGNOSIS — R59.0 MEDIASTINAL LYMPHADENOPATHY: ICD-10-CM

## 2025-02-25 DIAGNOSIS — E05.00 GRAVES DISEASE: ICD-10-CM

## 2025-02-25 DIAGNOSIS — Z98.890 HISTORY OF LUMPECTOMY OF RIGHT BREAST: ICD-10-CM

## 2025-02-25 DIAGNOSIS — R59.0 LAD (LYMPHADENOPATHY) OF RIGHT CERVICAL REGION: ICD-10-CM

## 2025-02-25 DIAGNOSIS — R59.0 CERVICAL LYMPHADENOPATHY: ICD-10-CM

## 2025-02-25 DIAGNOSIS — R22.1 SUBCUTANEOUS NODULE OF NECK: ICD-10-CM

## 2025-02-25 DIAGNOSIS — G62.0 CHEMOTHERAPY-INDUCED NEUROPATHY: Primary | ICD-10-CM

## 2025-02-25 DIAGNOSIS — R16.0 LIVER MASS, RIGHT LOBE: ICD-10-CM

## 2025-02-25 DIAGNOSIS — C77.0 NEOPLASM OF RIGHT BREAST, REGIONAL LYMPH NODE STAGING CATEGORY N3C: METASTASIS IN IPSILATERAL SUPRACLAVICULAR LYMPH NODE: ICD-10-CM

## 2025-02-25 DIAGNOSIS — N63.10 MASSES OF BOTH BREASTS: ICD-10-CM

## 2025-02-25 DIAGNOSIS — R59.0 SUPRACLAVICULAR LYMPHADENOPATHY: ICD-10-CM

## 2025-02-25 DIAGNOSIS — G62.0 CHEMOTHERAPY-INDUCED NEUROPATHY: ICD-10-CM

## 2025-02-25 DIAGNOSIS — T45.1X5A CHEMOTHERAPY-INDUCED NEUROPATHY: Primary | ICD-10-CM

## 2025-02-25 DIAGNOSIS — F32.A DEPRESSION, UNSPECIFIED DEPRESSION TYPE: ICD-10-CM

## 2025-02-25 DIAGNOSIS — R91.8 LUNG NODULES: ICD-10-CM

## 2025-02-25 DIAGNOSIS — T45.1X5A CHEMOTHERAPY-INDUCED NEUROPATHY: ICD-10-CM

## 2025-02-25 DIAGNOSIS — C50.912 BREAST CANCER METASTASIZED TO AXILLARY LYMPH NODE, LEFT: ICD-10-CM

## 2025-02-25 DIAGNOSIS — R59.0 AXILLARY LYMPHADENOPATHY: ICD-10-CM

## 2025-02-25 DIAGNOSIS — C50.911 NEOPLASM OF RIGHT BREAST, REGIONAL LYMPH NODE STAGING CATEGORY N3C: METASTASIS IN IPSILATERAL SUPRACLAVICULAR LYMPH NODE: ICD-10-CM

## 2025-02-25 DIAGNOSIS — C50.911 CARCINOMA OF RIGHT BREAST, STAGE 4: ICD-10-CM

## 2025-02-25 LAB
ALBUMIN SERPL-MCNC: 4.1 G/DL (ref 3.5–5)
ALBUMIN/GLOB SERPL: 1.1 RATIO (ref 1.1–2)
ALP SERPL-CCNC: 105 UNIT/L (ref 40–150)
ALT SERPL-CCNC: 17 UNIT/L (ref 0–55)
ANION GAP SERPL CALC-SCNC: 7 MEQ/L
AST SERPL-CCNC: 19 UNIT/L (ref 5–34)
BASOPHILS # BLD AUTO: 0.01 X10(3)/MCL
BASOPHILS NFR BLD AUTO: 0.3 %
BILIRUB SERPL-MCNC: 0.4 MG/DL
BUN SERPL-MCNC: 15.8 MG/DL (ref 9.8–20.1)
CALCIUM SERPL-MCNC: 10 MG/DL (ref 8.4–10.2)
CHLORIDE SERPL-SCNC: 106 MMOL/L (ref 98–107)
CO2 SERPL-SCNC: 28 MMOL/L (ref 22–29)
CREAT SERPL-MCNC: 0.79 MG/DL (ref 0.55–1.02)
CREAT/UREA NIT SERPL: 20
EOSINOPHIL # BLD AUTO: 0 X10(3)/MCL (ref 0–0.9)
EOSINOPHIL NFR BLD AUTO: 0 %
ERYTHROCYTE [DISTWIDTH] IN BLOOD BY AUTOMATED COUNT: 13.2 % (ref 11.5–17)
GFR SERPLBLD CREATININE-BSD FMLA CKD-EPI: >60 ML/MIN/1.73/M2
GLOBULIN SER-MCNC: 3.7 GM/DL (ref 2.4–3.5)
GLUCOSE SERPL-MCNC: 155 MG/DL (ref 74–100)
HCT VFR BLD AUTO: 34.2 % (ref 37–47)
HGB BLD-MCNC: 11.2 G/DL (ref 12–16)
IMM GRANULOCYTES # BLD AUTO: 0.04 X10(3)/MCL (ref 0–0.04)
IMM GRANULOCYTES NFR BLD AUTO: 1.1 %
LYMPHOCYTES # BLD AUTO: 0.86 X10(3)/MCL (ref 0.6–4.6)
LYMPHOCYTES NFR BLD AUTO: 22.8 %
MAGNESIUM SERPL-MCNC: 2.1 MG/DL (ref 1.6–2.6)
MCH RBC QN AUTO: 31.8 PG (ref 27–31)
MCHC RBC AUTO-ENTMCNC: 32.7 G/DL (ref 33–36)
MCV RBC AUTO: 97.2 FL (ref 80–94)
MONOCYTES # BLD AUTO: 0.09 X10(3)/MCL (ref 0.1–1.3)
MONOCYTES NFR BLD AUTO: 2.4 %
NEUTROPHILS # BLD AUTO: 2.77 X10(3)/MCL (ref 2.1–9.2)
NEUTROPHILS NFR BLD AUTO: 73.4 %
NRBC BLD AUTO-RTO: 1.1 %
PLATELET # BLD AUTO: 245 X10(3)/MCL (ref 130–400)
PMV BLD AUTO: 10 FL (ref 7.4–10.4)
POTASSIUM SERPL-SCNC: 3.6 MMOL/L (ref 3.5–5.1)
PROT SERPL-MCNC: 7.8 GM/DL (ref 6.4–8.3)
RBC # BLD AUTO: 3.52 X10(6)/MCL (ref 4.2–5.4)
SODIUM SERPL-SCNC: 141 MMOL/L (ref 136–145)
WBC # BLD AUTO: 3.77 X10(3)/MCL (ref 4.5–11.5)

## 2025-02-25 PROCEDURE — 96375 TX/PRO/DX INJ NEW DRUG ADDON: CPT

## 2025-02-25 PROCEDURE — 96367 TX/PROPH/DG ADDL SEQ IV INF: CPT

## 2025-02-25 PROCEDURE — 99214 OFFICE O/P EST MOD 30 MIN: CPT | Mod: PBBFAC | Performed by: INTERNAL MEDICINE

## 2025-02-25 PROCEDURE — 85025 COMPLETE CBC W/AUTO DIFF WBC: CPT

## 2025-02-25 PROCEDURE — 63600175 PHARM REV CODE 636 W HCPCS: Performed by: INTERNAL MEDICINE

## 2025-02-25 PROCEDURE — 83735 ASSAY OF MAGNESIUM: CPT

## 2025-02-25 PROCEDURE — 96413 CHEMO IV INFUSION 1 HR: CPT

## 2025-02-25 PROCEDURE — 80053 COMPREHEN METABOLIC PANEL: CPT

## 2025-02-25 PROCEDURE — 25000003 PHARM REV CODE 250: Performed by: INTERNAL MEDICINE

## 2025-02-25 PROCEDURE — A4216 STERILE WATER/SALINE, 10 ML: HCPCS | Performed by: INTERNAL MEDICINE

## 2025-02-25 PROCEDURE — 96415 CHEMO IV INFUSION ADDL HR: CPT

## 2025-02-25 PROCEDURE — 36415 COLL VENOUS BLD VENIPUNCTURE: CPT

## 2025-02-25 RX ORDER — FAMOTIDINE 10 MG/ML
20 INJECTION INTRAVENOUS
Status: COMPLETED | OUTPATIENT
Start: 2025-02-25 | End: 2025-02-25

## 2025-02-25 RX ORDER — DIPHENHYDRAMINE HYDROCHLORIDE 50 MG/ML
50 INJECTION INTRAMUSCULAR; INTRAVENOUS ONCE AS NEEDED
Status: DISCONTINUED | OUTPATIENT
Start: 2025-02-25 | End: 2025-02-25 | Stop reason: HOSPADM

## 2025-02-25 RX ORDER — DEXAMETHASONE 4 MG/1
TABLET ORAL
Qty: 40 TABLET | Refills: 1 | Status: SHIPPED | OUTPATIENT
Start: 2025-02-25

## 2025-02-25 RX ORDER — HEPARIN 100 UNIT/ML
500 SYRINGE INTRAVENOUS
Status: DISCONTINUED | OUTPATIENT
Start: 2025-02-25 | End: 2025-02-25 | Stop reason: HOSPADM

## 2025-02-25 RX ORDER — SODIUM CHLORIDE 0.9 % (FLUSH) 0.9 %
10 SYRINGE (ML) INJECTION
Status: DISCONTINUED | OUTPATIENT
Start: 2025-02-25 | End: 2025-02-25 | Stop reason: HOSPADM

## 2025-02-25 RX ORDER — EPINEPHRINE 1 MG/ML
0.3 INJECTION INTRAMUSCULAR; INTRAVENOUS; SUBCUTANEOUS ONCE AS NEEDED
Status: DISCONTINUED | OUTPATIENT
Start: 2025-02-25 | End: 2025-02-25 | Stop reason: HOSPADM

## 2025-02-25 RX ORDER — DIPHENHYDRAMINE HYDROCHLORIDE 50 MG/ML
50 INJECTION INTRAMUSCULAR; INTRAVENOUS
Status: COMPLETED | OUTPATIENT
Start: 2025-02-25 | End: 2025-02-25

## 2025-02-25 RX ADMIN — SODIUM CHLORIDE, PRESERVATIVE FREE 10 ML: 5 INJECTION INTRAVENOUS at 02:02

## 2025-02-25 RX ADMIN — DEXAMETHASONE SODIUM PHOSPHATE 20 MG: 4 INJECTION, SOLUTION INTRA-ARTICULAR; INTRALESIONAL; INTRAMUSCULAR; INTRAVENOUS; SOFT TISSUE at 10:02

## 2025-02-25 RX ADMIN — HEPARIN 500 UNITS: 100 SYRINGE at 02:02

## 2025-02-25 RX ADMIN — PACLITAXEL 366 MG: 6 INJECTION, SOLUTION INTRAVENOUS at 10:02

## 2025-02-25 RX ADMIN — DIPHENHYDRAMINE HYDROCHLORIDE 50 MG: 50 INJECTION INTRAMUSCULAR; INTRAVENOUS at 10:02

## 2025-02-25 RX ADMIN — FAMOTIDINE 20 MG: 10 INJECTION, SOLUTION INTRAVENOUS at 10:02

## 2025-02-25 RX ADMIN — SODIUM CHLORIDE: 9 INJECTION, SOLUTION INTRAVENOUS at 09:02

## 2025-02-25 NOTE — PROGRESS NOTES
History:  Past Medical History:   Diagnosis Date    Anemia     Arthritis     Breast cancer     Graves disease     Hypertension     Hyperthyroidism       Past Surgical History:   Procedure Laterality Date    BREAST LUMPECTOMY      INSERTION OF TUNNELED CENTRAL VENOUS CATHETER (CVC) WITH SUBCUTANEOUS PORT N/A 8/19/2024    Procedure: [id number];  Surgeon: Thomas Verdin Jr., MD;  Location: [facility];  Service: General;  Laterality: N/A;      Social History     Socioeconomic History    Marital status: Single   Tobacco Use    Smoking status: Never     Passive exposure: Never    Smokeless tobacco: Never   Substance and Sexual Activity    Alcohol use: Never    Drug use: Never     Social Drivers of Health     Financial Resource Strain: Patient Declined (7/11/2023)    [facility]    Overall Financial Resource Strain (CARDIA)     Difficulty of Paying Living Expenses: Patient declined   Food Insecurity: Patient Declined (7/11/2023)    Received from [employer] [facility] Subsidi[facility] and Affiliates    Hunger Vital Sign     Worried About Running Out of Food in the Last Year: Patient declined     Ran Out of Food in the Last Year: Patient declined   Transportation Needs: Patient Declined (7/11/2023)    [facility]    PRAPARE - Transportation     Lack of Transportation (Medical): Patient declined     Lack of Transportation (Non-Medical): Patient declined   Stress: Patient Declined (7/11/2023)    Received from [employer] [facility] and [facility]    [employer] [place] [employer] - Occupational Stress Questionnaire     Feeling of Stress : Patient declined   Housing Stability: Unknown (7/11/2023)    [facility]idi[facility] and Affiliates    Housing Stability Vital Sign     Unable to Pay for Housing in the Last Year: Patient refused     Number of Places Lived in the Last Year: 1      Family History   Problem Relation Name Age of Onset    Breast cancer Maternal Cousin          Reason for Follow-up:  -history of right breast cancer, diagnosed 2022, ER negative, MO negative, HER2 positive, T2 N1 M0, S/P neoadjuvant chemotherapy, lumpectomy, axillary lymph node dissection, adjuvant radiotherapy (completed 10/24/2022)  -local and regional recurrence, triple negative, diagnosed on biopsy of supraclavicular lymph node 06/27/2024; on mammogram, right breast mass; supraclavicular lymphadenopathy; right cervical lymphadenopathy  -Postmenopausal   -Hypercalcemia   -High serum parathyroid hormone (PTH)   -Liver mass, right lobe   -Mediastinal lymphadenopathy   -Lung nodules   -Masses of both breasts   -B/L axillary lymphadenopathy   -Breast cancer metastasized to axillary lymph node, left   -B/L cervical lymphadenopathy   -B/L supraclavicular lymphadenopathy   -Chemotherapy-induced neuropathy   -history of Graves disease    History of Present Illness:   Triple negative breast carcinoma        Oncologic/Hematologic History:  Oncology History   Stage IV carcinoma of breast   8/27/2024 Initial Diagnosis    Stage IV carcinoma of breast     9/9/2024 -  Chemotherapy    Treatment Summary   Plan Name: OP BREAST PACLITAXEL Q3W  Treatment Goal: Palliative  Status: Active  Start Date: 9/9/2024  End Date: 4/29/2025 (Planned)  Provider: Israel Farah MD  Chemotherapy: PACLitaxeL (TAXOL) 175 mg/m2 = 366 mg in 0.9% NaCl 500 mL chemo infusion, 175 mg/m2 = 366 mg, Intravenous, Clinic/HOD 1 time, 8 of 12 cycles  Administration: 366 mg (9/9/2024), 366 mg (9/30/2024), 366 mg (10/22/2024), 366 mg (11/12/2024), 366 mg (12/3/2024), 366 mg (12/24/2024), 366 mg (1/14/2025), 366 mg (2/4/2025)     12/3/2024 Cancer Staged    Staging  form: Breast, AJCC 8th Edition  - Clinical stage from 12/3/2024: Stage IV (rcTX, cN3c, pM1, ER-, VA-, HER2-)     Past medical history: Anemia; arthritis; breast cancer; Graves disease (diagnosed ; started on methimazole by endocrinologist in [place], in ); hypertension; peripheral neuropathy  -2023:  Venous Doppler bilateral lower extremities (swelling):  No DVT  -2023: TTE:  LVEF 55-60%  -2023:  Limited abdominal ultrasound (elevated liver enzymes):  6.6 cm cyst within the liver near the gallbladder; cholelithiasis with minimal gallbladder wall thickening  -2014:  Pelvic ultrasound: Markedly enlarged uterus with multiple large fibroids; endometrial stripe is thickened, 1.4 cm  Procedure/surgical history: Breast lumpectomy   Social history:  .  Lives in [place].  No children.  Never wanted to have children.  Never became pregnant.  No history of tobacco, alcohol, or illicit drug abuse.  Does not work.  Family history:  Sister experienced hyperthyroidism, treated with radioiodine.  She does not know much about her family history but says that there are cancers in folks on both parents sides of family.  A female cousin on mother's side of family experienced breast cancer in her 30s and  from it.  Health maintenance:  Primary care provider in [facility].  Last mammogram 2023 at [facility].  No screening colonoscopy ever.  Menstrual/Ob gyn history:  Menarche at age 13.  Last menstrual cycle 2023.  Never became pregnant.  Never wanted to become pregnant.  Took birth control pills for 2 years several years ago.  No hormone replacement therapy after menopause.      59-year-old lady, referred by Rere Jernigan MD, surgery, with recurrent breast cancer.      07/10/2024:  Office note: Rere Jernigan MD (surgery):  Pt is a 59 y.o. female with history of right breast cancer s/p BCT in  who presents with painless large supraclavicular mass.     First noticed it 4 months ago and has steadily increased in size past month.  Last mammogram 1 year ago and new [place].  Also reports progressive skin changes of the right breast not associated with lumpectomy incision.  Receptor status unknown.       Investigations reviewed:  -no old records available   -according to her, she was diagnosed with right breast cancer in January 2022  -mammogram showed a large lobulated mass, 3.7 x 2.7 cm  -biopsy:  Infiltrative ductal carcinoma, possibly invasive lobular  -right breast mass was palpable in the upper outer quadrant; axillary lymphadenopathy was noted (3-4 palpable lymph nodes right axilla)  -T2 N1 M0 IDC right breast (she had palpable axillary lymph nodes in the right and a large palpable mass in the right breast)  -ER negative; AR negative; HER2 positive  -S/P neoadjuvant chemotherapy (according to her, she received 4 cycles of neoadjuvant chemotherapy with Adriamycin/Taxotere every 3 weeks apart x4 cycles)  -followed by lumpectomy and axillary dissection  -no residual tumor post chemotherapy; 17 axillary lymph nodes negative for tumor  -S/P adjuvant radiotherapy, 6600 cGy/35 fractions, completed 10/24/2022  -07/28/2011:  DEXA scan:  BMD normal  -12/19/2022:  Screening mammogram (comparison: 11/08/2019, etc.):  BI-RADS: 2-benign  -07/06/2023:  Echocardiogram:  LVEF 60%  -11/27/2023:  Bilateral diagnostic mammogram and limited ultrasound right breast (comparison: 12/19/2022, 12/16/2001, etc.) (history of right breast cancer with worsening right breast pain and thickening) large elongated heterogeneous mass with irregular borders outer aspect of the right breast (extending from the nipple outward towards the lateral chest wall at the 8-9 o'clock position, 5.8 x 3 x 1.8 cm although margins are difficult to accurately define), highly suspicious for recurrent malignancy; there is a separate elongated hypoechoic lesion in the upper outer quadrant right breast 11 o'clock  position, 5 cm from nipple, 3 x 2.6 x 0.7 cm, near the site of previous surgery), perhaps benign scar tissue related to previous lumpectomy:  BI-RADS: 5-highly suggestive of malignancy  -07/10/2024:  Surgery Office note:  Painless large supraclavicular mass, noted 4 months ago, steadily enlarging; also reported progressive skin changes right breast not associated with lumpectomy incision; on physical exam, large exophytic right supraclavicular nodule, nonmobile and fixed, overlying skin erythematous without ulceration; right upper outer quadrant lumpectomy incision; right lower inner quadrant periareolar ecchymosis with subcutaneous firmness without distinct mass; slight nipple inversion; no drainage; no palpable axillary lymphadenopathy  -05/30/2024:  Ultrasound soft tissues of the head and neck (lymphadenopathy): Pathologic lymphadenopathy (4.5 x 3.7 x 3.2 cm) at the base of the neck on the right; multiple smaller morphologically abnormal cervical chain lymph nodes on the right which demonstrate heterogeneous echogenicity similar to the dominant mass. Findings highly suspicious for malignancy particularly in this patient with a known history of prior right breast cancer. Dominant mass lesion corresponds with palpable complaint. Recommend biopsy/tissue diagnosis.   -06/27/2024:  Right supraclavicular mass, core needle biopsy (firm 6 x 6 cm exophytic right supraclavicular mass): Metastatic carcinoma in fibroadipose tissue, moderately to poorly-differentiated, consistent with breast origin (metastatic breast carcinoma in fibroadipose tissue  -ER negative (0%); NE negative (0%); HER2 negative (0); Ki-67 high proliferation  (93.6%)    08/08/2024:   Pleasant, healthy-appearing lady who presents for initial medical oncology consultation.  In no acute discomfort.    Says that right supra clavicular mass started April 2024; it has been enlarging and fluctuating in size.  It is painful.  7/10 severity pain.  Also, pain was  right breast area.  Right breast is enlarging.  There is a small area of ulceration in the right breast.  No bleeding or discharge.  She denies fevers or chills.  Has been taking Lyrica meloxicam without the relief of pain.  We will start Norco.  Some fatigue.  However, ECOG 1.  Pain from neck down to breast area, especially at night.  No unusual headaches, focal neurological symptoms, vision impairment, gait impairment, hemoptysis, fevers, chills, any bleeding or discharge from right breast superficial ulceration, abdominal pain, nausea, vomiting, GI bleeding, etc..  No bone pains.  Appetite is fair.    Interval History:  INF FLUIDS   OP BREAST PACLITAXEL Q3W     02/25/2025:   -12/03/2024: TSH and free T4 normal (0.97 and 1.08, respectively  -palliative Taxol every 3 weeks:  Cycle 5 on 12/03/2025; cycle 6 on 12/24/2024; cycle 7 on 01/14/2025; cycle 8 on 02/04/2025  -02/18/2025: Restaging CTs C/A/P with contrast (post Taxol x8 cycles; comparison, none):  1. Increased nodular change at the left breast with low left axillary lymph node which has enlarged compared to prior exam.  Recommend correlating with diagnostic mammogram and/or targeted sonogram (new 8 mm low left axillary lymph node; increased nodular appearance of left breast, for example, 12 mm nodular density left upper outer quadrant; persistent skin thickening right breast approximately 9 mm, unchanged; elongated density inferior outer right breast 6 x 3 cm, previously 5 x 2 cm  2. Similar right breast mass and skin thickening  3. Unchanged hepatic lesion (2 cm)  4. See dedicated CT neck regarding right supraclavicular lesion  -02/18/2025: Restaging CT soft tissues of the neck with contrast (comparison: CT neck 11/18/2024) (post Taxol x8 cycles):  Significant decrease in size of ulcerative right supraclavicular lymph node; no new or progressive cervical lymphadenopathy (ulcerative right supraclavicular lymph node has significantly decreased in size;  residual soft tissue thickening along the skin surface measures 7 mm, previously 22 cm; smaller right upper cervical lymph nodes remain stable in size; 8 mm left thyroid nodule)  -for peripheral neuropathy, she discontinued Lyrica sometime in January 2025; currently, taking Cymbalta 30 mg p.o. q.h.s.  -started on Zyprexa 5 mg daily for depression; she refused referral to  or mental health  -02/25/2025:  WBC 3.77, hemoglobin 11.2, platelets normal, ANC 2.77, CMP unremarkable  Presents for a follow-up visit, accompanied by mother.  Overall, doing okay.  Mild nausea and vomiting.  No unusual headaches, focal neurological symptoms, chest pain, cough, dyspnea, abdominal pain, constipation, diarrhea, blood in stool, etc..  No bone pains.  Overall, has been very well with chemotherapy.  Before starting chemotherapy, she says that she had no sensation in the right upper chest wall, right shoulder, and right posterior upper back; now, sensation has returned to.  Appetite is okay.  ECOG 0.  Cymbalta 30 mg p.o. q.h.s. for peripheral neuropathy.  Neuropathy is mild.  Some numbness and tingling in fingertips and feet.  No functional impairment.  No severe cytopenias.  No recurrent fevers or chills.      Medications:  Current Outpatient Medications on File Prior to Visit   Medication Sig Dispense Refill    azithromycin (Z-WAYNE) 250 MG tablet Take 250 mg by mouth once daily.      dexAMETHasone (DECADRON) 4 MG Tab Take 20mg (5 tablets) by mouth 12 hours and then again at 6 hours prior to each chemotherapy treatment 40 tablet 1    DULoxetine (CYMBALTA) 30 MG capsule Take 1 capsule (30 mg total) by mouth once daily. 60 capsule 0    ergocalciferol (ERGOCALCIFEROL) 50,000 unit Cap Vitamin D2 1,250 mcg (50,000 unit) capsule, [RxNorm: 3699263]      ferrous gluconate (FERGON) 324 MG tablet TAKE 1 TABLET BY MOUTH ONCE DAILY FOR IRON      ferrous gluconate (FERGON) 324 MG tablet ferrous gluconate 324 mg (38 mg iron) tablet,  [RxNorm: 856934]      HYDROcodone-acetaminophen (NORCO) 5-325 mg per tablet Take 1 tablet by mouth every 4 (four) hours as needed for Pain. 84 tablet 0    meloxicam (MOBIC) 15 MG tablet       methIMAzole (TAPAZOLE) 5 MG Tab Take 1 tablet (5 mg total) by mouth once daily. 30 tablet 11    OLANZapine (ZYPREXA) 5 MG tablet Take 1 tablet (5 mg total) by mouth every evening. 30 tablet 0    ondansetron (ZOFRAN) 4 MG tablet Take 1 tablet (4 mg total) by mouth every 6 (six) hours as needed for Nausea. 30 tablet 1    valsartan-hydrochlorothiazide (DIOVAN-HCT) 160-12.5 mg per tablet Take 1 tablet by mouth.      diclofenac sodium (VOLTAREN) 1 % Gel  (Patient not taking: Reported on 1/14/2025)      ergocalciferol (ERGOCALCIFEROL) 50,000 unit Cap Take 50,000 Units by mouth every 7 days. (Patient not taking: Reported on 1/14/2025)       No current facility-administered medications on file prior to visit.     Review of Systems:   All systems reviewed and found to be negative except for the symptoms detailed above    Physical Examination:   VITAL SIGNS:   Vitals:    02/25/25 0853   BP: 134/89   Pulse: 100   Resp: 18   Temp: 97.7 °F (36.5 °C)       GENERAL:  In no apparent distress.    HEAD:  No signs of head trauma.  EYES:  Pupils are equal.  Extraocular motions intact.    EARS:  Hearing grossly intact.  MOUTH:  Oropharynx is normal.   NECK:  No adenopathy, no JVD.     CHEST:  Chest with clear breath sounds bilaterally.  No wheezes, rales, rhonchi.    CARDIAC:  Regular rate and rhythm.  S1 and S2, without murmurs, gallops, rubs.  VASCULAR:  No Edema.  Peripheral pulses normal and equal in all extremities.  ABDOMEN:  Soft, without detectable tenderness.  No sign of distention.  No   rebound or guarding, and no masses palpated.   Bowel Sounds normal.  MUSCULOSKELETAL:  Good range of motion of all major joints. Extremities without clubbing, cyanosis or edema.    NEUROLOGIC EXAM:  Alert and oriented x 3.  No focal sensory or strength  deficits.   Speech normal.  Follows commands.  PSYCHIATRIC:  Mood normal.  -08/08/2024: Breast examination was performed with a verbal consent, in the presence of Jani Heck LPN; entire right breast is involved with tumor; entire right breast is indurated with tumor, with conglomeration of nodules around the central area; a small superficial ulceration is noted along the inferior medial aspect of right areolar area; a large slightly tender 6 supraclavicular masses noted; diffuse edema is noted in the right supraclavicular area and right breast area as well as infraclavicular area; left breast is normal    No results found for this or any previous visit.  No results found for this or any previous visit.    Assessment:  Problem List Items Addressed This Visit       Graves disease    Subcutaneous nodule of neck    Recurrent breast cancer, right    Triple negative breast carcinoma    Neoplasm of right breast, regional lymph node staging category N3c: metastasis in ipsilateral supraclavicular lymph node    LAD (lymphadenopathy) of right cervical region    History of right breast cancer    History of lumpectomy of right breast    Postmenopausal    Hypercalcemia    High serum parathyroid hormone (PTH)    Liver mass, right lobe    Mediastinal lymphadenopathy    Lung nodules    Breast cancer metastasized to axillary lymph node, left    Cervical lymphadenopathy    Stage IV carcinoma of breast    Axillary lymphadenopathy    Masses of both breasts    Supraclavicular lymphadenopathy    Chemotherapy-induced neuropathy - Primary       Orders for 02/25/2025:  Continue Taxol every 3 weeks   Check CBC and CMP weekly   Middle of May, re-stage with contrast-enhanced CT scans of C/A/P/soft tissues of the neck  Diagnostic mammogram and ultrasound of left breast now  Continue Cymbalta for neuropathy  Follow-up with endocrinology for Graves disease  Follow-up with NP in 3 weeks     Above discussed with the patient.  All questions  answered.  Discussed labs and scans and gave her copies of relevant results.  Guarded prognosis discussed once again.    She understands and agrees with this plan.  =================================      # History of right breast IDC, ER negative, CA negative, HER2 positive:  -diagnosed 2022  -mammogram: 3.7 x 2.7 cm right breast mass  -pathology: Infiltrative ductal carcinoma, possibly invasive lobular  -ER negative, CA negative, HER2 positive   -T2 N1 M0; palpable axillary lymphadenopathy; large palpable mass right breast)  -S/P neoadjuvant chemotherapy (according to her, she received 4 cycles of neoadjuvant chemotherapy with Adriamycin/Taxotere every 3 weeks apart x4 cycles)  -followed by lumpectomy and axillary dissection  -no residual tumor post chemotherapy; 17 axillary lymph nodes negative for tumor  -S/P adjuvant radiotherapy, 6600 cGy/35 fractions, completed 10/24/2022  -details of adjuvant chemotherapy, if any, not known  >>>  -screening mammogram 12/19/2022: BI-RADS: 2-benign  >>>  # Regional, local, and metastatic recurrence, triple negative:  -echocardiogram 07/06/2023: LVEF 60%  -mammogram and ultrasound 11/27/2023:  Right breast mass 5.8 x 3 x 1.8 cm: BI-RADS: 5  -developed extensive local regional and metastatic disease  -triple negative extensive local regional and metastatic disease  (original breast cancer was ER negative, CA negative, HER2 positive; recurrence/metastases, triple negative)  -S/P needle core biopsy right supraclavicular mass 06/27/2024: Moderately to poorly-differentiated metastatic breast carcinoma  -ER negative (0%); CA negative (0%); HER2 negative (0); Ki-67 high proliferation (93.6%)  -tissue NGS testing (performed on right supraclavicular mass biopsy 06/27/2024):  PD-L1 positive (CPS 15); HRD positive (CELINE-high); negative for AKT1, BRAF, BRCA1, BRCA2, ESR 1, etc.  -08/09/2024: Liquid biopsy:  Borderline TMB  -08/08/2024:  Mild hypercalcemia: Calcium 10.6, albumin 3.9; intact  PTH level 100.3, elevated; PTH related peptide normal; vitamin-D level normal  -Postmenopausal per labs 08/08/2024 (LMP 07/2023)  -Hypercalcemia  -08/14/2024: Echocardiogram:  LVEF 55-60%  -08/19/2024: MediPort placed  -brain MRI 08/20/2024: No brain metastases  -staging CTs C/A/P/neck 08/20/2024: Extensive metastases  -genetic testing 08/09/2024:  Negative  -baseline tumor markers 08/28/2024:  Only CEA level slightly elevated, 4.64  -palliative Taxol, every 3 weeks, started 09/09/2024  (started before PD-L1 results was available to us)  -staging PET-CT 09/13/2024:  (was supposed to be performed before starting chemotherapy; could only be performed 4 days after starting palliative chemotherapy with Taxol):  Extensive metastases  -09/19/2024: Patient refused liver biopsy (in denial; according to her, liver lesion is 'just a cyst')  -Taxol every 3 weeks:  Cycle 2 on 09/30/2024, cycle 3 on 10/22/2024, cycle 4 on 11/12/2024  -restaging CTs C/A/P/neck 11/18/2024, post Taxol x4 cycles: Positive response  -as of 12/03/2024, malignant breast wounds are also healing, indicating positive response to chemotherapy  -palliative Taxol every 3 weeks:  Cycle 5 on 12/03/2025; cycle 6 on 12/24/2024; cycle 7 on 01/14/2025; cycle 8 on 02/04/2025  -restaging CTs C/A/P/neck 02/18/2025, post Taxol x8 cycles:  Overall, positive response; maybe, progression in left breast and axilla  -for peripheral neuropathy, taking Cymbalta 30 mg p.o. q.h.s.  -started on Zyprexa 5 mg daily for depression; she refused referral to  or mental health  >>>  Plan:  -positive response post 4 cycles of Taxol  -positive response post 8 cycles of Taxol  -continue palliative Taxol 175 mg per m2 IV every 3 weeks until disease progression or until unacceptable toxicity (started 09/09/2024)   -with Taxol, monitor for potential cardiovascular effects including infusion related hypotension/bradycardia/hypotension, peripheral neuropathy, edema, nausea,  vomiting, diarrhea, stomatitis, cytopenias, etc.   -check CBC and CMP weekly  -re-stage with contrast-enhanced CT scans of C/A/P/soft tissues of the neck in 3 months (mid May 2025)  If and when required, in 2nd line, we will use pembrolizumab +chemotherapy (see below)  -Diagnostic mammogram and ultrasound of left breast now  -at some point, olaparib maybe considered because of HRD positive status (CELINE-high)    -triple negative recurrent, metastatic disease   -genetic testing negative  -extensive metastatic disease  -ER 0, CA 0, HER2 0  -PD-L1 positive, CPS 15  >>>  Systemic therapy options in this patient:   1. First-line:   # PD-L1 CPS 15 (i.e., =/> 10), therefore: Regardless of germline BRCA mutation status:  Pembrolizumab +chemotherapy (albumin bound paclitaxel, paclitaxel, or gemcitabine and carboplatin) (category 1, Preferred) (this regimen can be used for secondary and subsequent lines of therapy if PD-1/PD-L1 inhibitor therapy has not been previously used)  2. Second-line:  # germline BRCA1 1/2 mutation negative; therefore, not a candidate for PARPi in 2nd line  # sacituzumab govitecan (category 1, Preferred)  # systemic chemotherapy or targeted agents (at some point, olaparib may be considered because of HRD positive status)  # HER2 IHC 0+, therefore:  Fam trastuzumab deruxtecan (category 1, Preferred)  3. 3rd line and beyond:  # biomarker directed targeted agents and emerging biomarker options (at some point, olaparib may be considered because of HRD positive status)  # systemic chemotherapy   >>>  -before PD-L1 testing result was available, we started her on Taxol given extensive metastatic disease;  -patient has been previously treated with Adriamycin/Taxotere; therefore, we will avoid Adriamycin to the extent possible  -she declined liver biopsy    Chemotherapy:  -Taxol 175 mg per m2 every 3 weeks until disease progression or unacceptable toxicity    Side effects/monitoring with Taxol:  -watch for  cardiovascular effects including infusion related hypotension, bradycardia, hypertension, etc.  -watch out for extravasation: Taxol is an irritant with vesicle like properties  -peripheral neuropathy:  Primarily distal sensory neuropathy; a mixture of paresthesias and dysesthesias including burning, numbness, tingling, and shooting pains, typically in a stocking-glove distribution; most mild-to-moderate cases resolve several months after discontinuation but maybe longer in patients with diabetes.  Severe neuropathy maybe irreversible in some patients  -side effects in> 10% patients:  Edema, hypotension, alopecia, nausea, vomiting, diarrhea, stomatitis, cytopenias, LFT abnormalities, peripheral neuropathy, arthralgias, myalgias, etc.  -Boxed warnings: Hypersensitivity reactions; bone marrow suppression  -premedicate with dexamethasone (20 mg orally at 12 and 6 hours prior to paclitaxel, diphenhydramine (50 mg IV 30 to 60 minutes prior to paclitaxel), and cimetidine or famotidine (IV 30 to 60 minutes prior to paclitaxel).  -do not repeat course until neutrophil count is =/> 1500 mm3 and the platelet count is =/> 100,000 mm3; reduced doses by 20% if patient experiences severe peripheral neuropathy or severe neutropenia <500 mm3 for a week or longer     -08/08/2024:  Referred to wound care for management of breast wound   -12/03/2024: Still following up with wound care; says that malignant wounds are closing up with positive response to chemotherapy    -08/08/2024:  Started Norco 5 mg every 4 hours PRN for pain  -12/03/2024: Tells me that she experiences pain in eyes and legs for a few days after Taxol infusion and needs to take narcotic as needed.    # Sites of disease/recurrence/metastases:  Right supraclavicular lymphadenopathy; right breast mass, right cervical lymphadenopathy, left axillary lymphadenopathy, mediastinal and hilar lymphadenopathy, hepatic mass (metastases), left breast nodule  -B/L axillary  lymphadenopathy   -Masses of both breasts   -B/L cervical lymphadenopathy   -B/L supraclavicular lymphadenopathy   -staging PET-CT 09/13/2024:  (was supposed to be performed before starting chemotherapy; could only be performed 4 days after starting palliative chemotherapy with Taxol):  Bilateral breast masses; bilateral axillary, bilateral cervical, bilateral supraclavicular, right internal mammary, and mediastinal lymphadenopathy; metastatic mass in liver; metastatic retrocrural lymphadenopathy  -09/19/2024: Patient refused liver biopsy (apparently, in denies; according to her, liver lesion is just a cyst)    # Molecular markers:  -ER negative (0%); NJ negative (0%); HER2 negative (0); Ki-67 high proliferation (93.6%)  -tissue NGS testing (performed on right supraclavicular mass biopsy 06/27/2024):    Positive for PD-L1 expression (CPS 15)  HRD positive (CELINE-high); negative for AKT 1, BRAF, BRCA1, BRCA2, ESR 1, etc.  -08/09/2024: Liquid biopsy:  Borderline TMB  -genetic testing 08/09/2024:  Negative    # Chemotherapy-induced peripheral neuropathy:  -as of 11/12/2024, on Lyrica 50 mg in the morning, 50 mg in the afternoon, and 100 mg q.h.s. for neuropathy in right and left feet; her podiatrist has discontinued gabapentin due to Achilles tendinitis  -12/03/2024: Peripheral neuropathy is in the form of numbness in fingertips and toes; no tingling or pain; takes gabapentin 300 mg p.o. t.i.d. as well as Lyrica 50 mg in the morning, 50 mg in the afternoon, and 100 mg p.o. q.h.s. (prescribed by her podiatrist for at least tendinitis)  -for peripheral neuropathy, taking Cymbalta 30 mg p.o. q.h.s.  >>>  Continue Cymbalta 30 mg p.o. q.h.s.; she has discontinued Lyrica    # Hypercalcemia:  -08/08/2024:  Mild hypercalcemia: Calcium 10.6, albumin 3.9; intact PTH level 100.3, elevated; PTH related peptide normal; vitamin-D level normal  -09/30/2024:  Calcium 10.7 elevated; albumin 3.8; vitamin-D level 16 normal; intact PTH  level 64.9 normal; ionized calcium level 5.22 normal; PTH related peptide 0.7 normal    # Graves' disease:   -diagnosed 07/2023  -07/11/2023: CT soft tissues of the neck with contrast (dysphagia, acute thyrotoxicosis, goiter): Mild diffuse enlargement of the thyroid gland without displacement or mass effect of the aerodigestive tract; no suspicious cervical lymphadenopathy  -07/12/2023: Ultrasound thyroid:  Hoarseness, thyrotoxicosis:  Heterogeneous hyperemic thyroid typical of thyroiditis; small 6 mm mid pole right thyroid nodule is not suspicious and does not warrant surveillance per TI-RADS criteria  -confirmed with TSH receptor antibodies (TRAb/TSI, i.e., thyroid-stimulating immunoglobulins)  -as of 05/21/2024, on methimazole 5 mg daily; has responded well  -endocrinologist: Roland Ross MD   -05/21/2024:  Endocrinologist plan:  Plan to complete 18 months of therapy before attempting to taper of methimazole   -12/03/2024:  Continues on methimazole 5 mg daily; follows up with endocrinologist in [place]  -12/03/2024: TSH and free T4 normal (0.97 and 1.08, respectively  >>>  -follow-up with endocrinology ([place])       Follow-up:  No follow-ups on file.

## 2025-02-25 NOTE — Clinical Note
Orders for 02/25/2025:  Continue Taxol every 3 weeks  Check CBC and CMP weekly  Middle of May, re-stage with contrast-enhanced CT scans of C/A/P/soft tissues of the neck Diagnostic mammogram and ultrasound of left breast now Continue Cymbalta for neuropathy Follow-up with endocrinology for Graves disease Follow-up with NP in 3 weeks

## 2025-02-25 NOTE — NURSING
Infusion Note:  Pt has an appointment today for: Labs, Dr. Farah, and Infusion    Treatment Regimen: Taxol   Cycle: 9 Day: 1   every Q3 weeks cycle;      Given via Left Mediport    UPT done: Not done  UPT exception: Age >55 y.o.    Nursing note:  Treatment parameters: were met    ERIK Heck LPN escorted pt & mother to Infusion Clinic, no complaints.    Future appts scheduled: Labs, Faraz Gonzalez FNP, and Infusion for C10 Taxol on 3/18/25.

## 2025-02-26 ENCOUNTER — HOSPITAL ENCOUNTER (OUTPATIENT)
Dept: RADIOLOGY | Facility: HOSPITAL | Age: 60
Discharge: HOME | End: 2025-02-26
Attending: INTERNAL MEDICINE
Payer: MEDICAID

## 2025-02-26 DIAGNOSIS — N63.20 MASSES OF BOTH BREASTS: ICD-10-CM

## 2025-02-26 DIAGNOSIS — C50.919 TRIPLE NEGATIVE BREAST CARCINOMA: ICD-10-CM

## 2025-02-26 DIAGNOSIS — R92.8 ABNORMAL FINDINGS ON DIAGNOSTIC IMAGING OF BREAST: ICD-10-CM

## 2025-02-26 DIAGNOSIS — C50.912 BREAST CANCER METASTASIZED TO AXILLARY LYMPH NODE, LEFT: ICD-10-CM

## 2025-02-26 DIAGNOSIS — N63.10 MASSES OF BOTH BREASTS: ICD-10-CM

## 2025-02-26 DIAGNOSIS — Z17.421 TRIPLE NEGATIVE BREAST CARCINOMA: ICD-10-CM

## 2025-02-26 DIAGNOSIS — R59.0 AXILLARY LYMPHADENOPATHY: ICD-10-CM

## 2025-02-26 DIAGNOSIS — C77.3 BREAST CANCER METASTASIZED TO AXILLARY LYMPH NODE, LEFT: ICD-10-CM

## 2025-02-26 DIAGNOSIS — N63.20 MASSES OF BOTH BREASTS: Primary | ICD-10-CM

## 2025-02-26 DIAGNOSIS — N63.10 MASSES OF BOTH BREASTS: Primary | ICD-10-CM

## 2025-02-26 PROCEDURE — 76641 ULTRASOUND BREAST COMPLETE: CPT | Mod: TC,LT

## 2025-02-26 PROCEDURE — 19084 BX BREAST ADD LESION US IMAG: CPT

## 2025-02-26 PROCEDURE — 77062 BREAST TOMOSYNTHESIS BI: CPT | Mod: 26,,, | Performed by: RADIOLOGY

## 2025-02-26 PROCEDURE — 19083 BX BREAST 1ST LESION US IMAG: CPT | Mod: LT

## 2025-02-26 PROCEDURE — 77061 BREAST TOMOSYNTHESIS UNI: CPT | Mod: TC,LT

## 2025-02-26 PROCEDURE — 38505 NEEDLE BIOPSY LYMPH NODES: CPT

## 2025-02-26 PROCEDURE — 77066 DX MAMMO INCL CAD BI: CPT | Mod: TC

## 2025-02-26 PROCEDURE — 88305 TISSUE EXAM BY PATHOLOGIST: CPT | Mod: TC,91 | Performed by: INTERNAL MEDICINE

## 2025-02-26 PROCEDURE — 77066 DX MAMMO INCL CAD BI: CPT | Mod: 26,,, | Performed by: RADIOLOGY

## 2025-02-26 PROCEDURE — 76641 ULTRASOUND BREAST COMPLETE: CPT | Mod: 26,LT,, | Performed by: RADIOLOGY

## 2025-02-27 ENCOUNTER — TELEPHONE (OUTPATIENT)
Dept: RADIOLOGY | Facility: HOSPITAL | Age: 60
End: 2025-02-27
Payer: MEDICAID

## 2025-02-28 ENCOUNTER — RESULTS FOLLOW-UP (OUTPATIENT)
Dept: RADIOLOGY | Facility: HOSPITAL | Age: 60
End: 2025-02-28
Payer: MEDICAID

## 2025-02-28 LAB
DHEA SERPL-MCNC: NORMAL
ESTROGEN SERPL-MCNC: NORMAL PG/ML
INSULIN SERPL-ACNC: NORMAL U[IU]/ML
LAB AP GROSS DESCRIPTION: NORMAL
LAB AP REPORT FOOTNOTES: NORMAL

## 2025-02-28 NOTE — PROGRESS NOTES
By phone, I notified Ms Benitez of left breast/axilla biopsy results (site 1 invasive ductal carcinoma, site 2 invasive ductal carcinoma, left axilla fibrofatty tissue with abundant high grade carcinoma, compatible with breast carcinoma) per Dr. Kaykay Yeager.  She has an appointment with oncology on March 18.  A message was sent to the Oncology Nurse Navigator, Fidelina Da Silva, notifying her of pathology results and I notified Ms Benitez of this.  Ms Benitez's questions were answered and she verbalized understanding of results.

## 2025-03-03 ENCOUNTER — RESULTS FOLLOW-UP (OUTPATIENT)
Dept: HEMATOLOGY/ONCOLOGY | Facility: CLINIC | Age: 60
End: 2025-03-03

## 2025-03-05 ENCOUNTER — LAB VISIT (OUTPATIENT)
Dept: HEMATOLOGY/ONCOLOGY | Facility: CLINIC | Age: 60
End: 2025-03-05
Attending: INTERNAL MEDICINE
Payer: MEDICAID

## 2025-03-05 DIAGNOSIS — F32.A DEPRESSION, UNSPECIFIED DEPRESSION TYPE: ICD-10-CM

## 2025-03-05 DIAGNOSIS — G62.0 CHEMOTHERAPY-INDUCED NEUROPATHY: ICD-10-CM

## 2025-03-05 DIAGNOSIS — T45.1X5A CHEMOTHERAPY-INDUCED NEUROPATHY: ICD-10-CM

## 2025-03-05 LAB
ABS NEUT CALC (OHS): 1.12 X10(3)/MCL (ref 2.1–9.2)
ALBUMIN SERPL-MCNC: 3.9 G/DL (ref 3.5–5)
ALBUMIN/GLOB SERPL: 1.2 RATIO (ref 1.1–2)
ALP SERPL-CCNC: 91 UNIT/L (ref 40–150)
ALT SERPL-CCNC: 17 UNIT/L (ref 0–55)
ANION GAP SERPL CALC-SCNC: 7 MEQ/L
ANISOCYTOSIS BLD QL SMEAR: SLIGHT
AST SERPL-CCNC: 15 UNIT/L (ref 5–34)
BILIRUB SERPL-MCNC: 0.5 MG/DL
BUN SERPL-MCNC: 24.7 MG/DL (ref 9.8–20.1)
CALCIUM SERPL-MCNC: 9.8 MG/DL (ref 8.4–10.2)
CHLORIDE SERPL-SCNC: 106 MMOL/L (ref 98–107)
CO2 SERPL-SCNC: 28 MMOL/L (ref 22–29)
CREAT SERPL-MCNC: 0.73 MG/DL (ref 0.55–1.02)
CREAT/UREA NIT SERPL: 34
EOSINOPHIL NFR BLD MANUAL: 0.05 X10(3)/MCL (ref 0–0.9)
EOSINOPHIL NFR BLD MANUAL: 2 % (ref 0–8)
ERYTHROCYTE [DISTWIDTH] IN BLOOD BY AUTOMATED COUNT: 13 % (ref 11.5–17)
GFR SERPLBLD CREATININE-BSD FMLA CKD-EPI: >60 ML/MIN/1.73/M2
GLOBULIN SER-MCNC: 3.3 GM/DL (ref 2.4–3.5)
GLUCOSE SERPL-MCNC: 94 MG/DL (ref 74–100)
HCT VFR BLD AUTO: 29.2 % (ref 37–47)
HGB BLD-MCNC: 9.7 G/DL (ref 12–16)
LYMPHOCYTES NFR BLD MANUAL: 1.07 X10(3)/MCL (ref 0.6–4.6)
LYMPHOCYTES NFR BLD MANUAL: 46 % (ref 13–40)
MAGNESIUM SERPL-MCNC: 2.2 MG/DL (ref 1.6–2.6)
MCH RBC QN AUTO: 32.9 PG (ref 27–31)
MCHC RBC AUTO-ENTMCNC: 33.2 G/DL (ref 33–36)
MCV RBC AUTO: 99 FL (ref 80–94)
MONOCYTES NFR BLD MANUAL: 0.09 X10(3)/MCL (ref 0.1–1.3)
MONOCYTES NFR BLD MANUAL: 4 % (ref 2–11)
NEUTROPHILS NFR BLD MANUAL: 48 % (ref 47–80)
NRBC BLD AUTO-RTO: 0 %
PLATELET # BLD AUTO: 195 X10(3)/MCL (ref 130–400)
PLATELET # BLD EST: ADEQUATE 10*3/UL
PMV BLD AUTO: 10.5 FL (ref 7.4–10.4)
POIKILOCYTOSIS BLD QL SMEAR: SLIGHT
POTASSIUM SERPL-SCNC: 3.5 MMOL/L (ref 3.5–5.1)
PROT SERPL-MCNC: 7.2 GM/DL (ref 6.4–8.3)
RBC # BLD AUTO: 2.95 X10(6)/MCL (ref 4.2–5.4)
SODIUM SERPL-SCNC: 141 MMOL/L (ref 136–145)
WBC # BLD AUTO: 2.33 X10(3)/MCL (ref 4.5–11.5)

## 2025-03-05 PROCEDURE — 85025 COMPLETE CBC W/AUTO DIFF WBC: CPT

## 2025-03-05 PROCEDURE — 36415 COLL VENOUS BLD VENIPUNCTURE: CPT

## 2025-03-05 PROCEDURE — 80053 COMPREHEN METABOLIC PANEL: CPT

## 2025-03-05 PROCEDURE — 83735 ASSAY OF MAGNESIUM: CPT

## 2025-03-12 ENCOUNTER — TELEPHONE (OUTPATIENT)
Dept: HEMATOLOGY/ONCOLOGY | Facility: CLINIC | Age: 60
End: 2025-03-12
Payer: MEDICAID

## 2025-03-12 ENCOUNTER — LAB VISIT (OUTPATIENT)
Dept: HEMATOLOGY/ONCOLOGY | Facility: CLINIC | Age: 60
End: 2025-03-12
Attending: INTERNAL MEDICINE
Payer: MEDICAID

## 2025-03-12 DIAGNOSIS — T45.1X5A CHEMOTHERAPY-INDUCED NEUTROPENIA: ICD-10-CM

## 2025-03-12 DIAGNOSIS — D70.1 CHEMOTHERAPY-INDUCED NEUTROPENIA: ICD-10-CM

## 2025-03-12 DIAGNOSIS — G62.0 CHEMOTHERAPY-INDUCED NEUROPATHY: ICD-10-CM

## 2025-03-12 DIAGNOSIS — E87.6 HYPOKALEMIA: Primary | ICD-10-CM

## 2025-03-12 DIAGNOSIS — T45.1X5A CHEMOTHERAPY-INDUCED NEUROPATHY: ICD-10-CM

## 2025-03-12 DIAGNOSIS — F32.A DEPRESSION, UNSPECIFIED DEPRESSION TYPE: ICD-10-CM

## 2025-03-12 LAB
ALBUMIN SERPL-MCNC: 3.8 G/DL (ref 3.5–5)
ALBUMIN/GLOB SERPL: 1.2 RATIO (ref 1.1–2)
ALP SERPL-CCNC: 103 UNIT/L (ref 40–150)
ALT SERPL-CCNC: 20 UNIT/L (ref 0–55)
ANION GAP SERPL CALC-SCNC: 4 MEQ/L
AST SERPL-CCNC: 22 UNIT/L (ref 5–34)
BASOPHILS # BLD AUTO: 0.03 X10(3)/MCL
BASOPHILS NFR BLD AUTO: 1.8 %
BILIRUB SERPL-MCNC: 0.4 MG/DL
BUN SERPL-MCNC: 20.8 MG/DL (ref 9.8–20.1)
CALCIUM SERPL-MCNC: 9.6 MG/DL (ref 8.4–10.2)
CHLORIDE SERPL-SCNC: 108 MMOL/L (ref 98–107)
CO2 SERPL-SCNC: 28 MMOL/L (ref 22–29)
CREAT SERPL-MCNC: 0.71 MG/DL (ref 0.55–1.02)
CREAT/UREA NIT SERPL: 29
EOSINOPHIL # BLD AUTO: 0.08 X10(3)/MCL (ref 0–0.9)
EOSINOPHIL NFR BLD AUTO: 4.8 %
ERYTHROCYTE [DISTWIDTH] IN BLOOD BY AUTOMATED COUNT: 13.2 % (ref 11.5–17)
GFR SERPLBLD CREATININE-BSD FMLA CKD-EPI: >60 ML/MIN/1.73/M2
GLOBULIN SER-MCNC: 3.3 GM/DL (ref 2.4–3.5)
GLUCOSE SERPL-MCNC: 97 MG/DL (ref 74–100)
HCT VFR BLD AUTO: 29.7 % (ref 37–47)
HGB BLD-MCNC: 9.9 G/DL (ref 12–16)
IMM GRANULOCYTES # BLD AUTO: 0 X10(3)/MCL (ref 0–0.04)
IMM GRANULOCYTES NFR BLD AUTO: 0 %
LYMPHOCYTES # BLD AUTO: 1.18 X10(3)/MCL (ref 0.6–4.6)
LYMPHOCYTES NFR BLD AUTO: 71.1 %
MAGNESIUM SERPL-MCNC: 2.4 MG/DL (ref 1.6–2.6)
MCH RBC QN AUTO: 33.6 PG (ref 27–31)
MCHC RBC AUTO-ENTMCNC: 33.3 G/DL (ref 33–36)
MCV RBC AUTO: 100.7 FL (ref 80–94)
MONOCYTES # BLD AUTO: 0.3 X10(3)/MCL (ref 0.1–1.3)
MONOCYTES NFR BLD AUTO: 18.1 %
NEUTROPHILS # BLD AUTO: 0.07 X10(3)/MCL (ref 2.1–9.2)
NEUTROPHILS NFR BLD AUTO: 4.2 %
NRBC BLD AUTO-RTO: 0 %
PLATELET # BLD AUTO: 204 X10(3)/MCL (ref 130–400)
PMV BLD AUTO: 9.8 FL (ref 7.4–10.4)
POTASSIUM SERPL-SCNC: 3.4 MMOL/L (ref 3.5–5.1)
PROT SERPL-MCNC: 7.1 GM/DL (ref 6.4–8.3)
RBC # BLD AUTO: 2.95 X10(6)/MCL (ref 4.2–5.4)
SODIUM SERPL-SCNC: 140 MMOL/L (ref 136–145)
WBC # BLD AUTO: 1.66 X10(3)/MCL (ref 4.5–11.5)

## 2025-03-12 PROCEDURE — 83735 ASSAY OF MAGNESIUM: CPT

## 2025-03-12 PROCEDURE — 80053 COMPREHEN METABOLIC PANEL: CPT

## 2025-03-12 PROCEDURE — 36415 COLL VENOUS BLD VENIPUNCTURE: CPT

## 2025-03-12 PROCEDURE — 85025 COMPLETE CBC W/AUTO DIFF WBC: CPT

## 2025-03-12 RX ORDER — CIPROFLOXACIN 500 MG/1
500 TABLET ORAL 2 TIMES DAILY
Qty: 14 TABLET | Refills: 0 | Status: SHIPPED | OUTPATIENT
Start: 2025-03-12 | End: 2025-03-19

## 2025-03-12 RX ORDER — POTASSIUM CHLORIDE 20 MEQ/1
20 TABLET, EXTENDED RELEASE ORAL DAILY
Qty: 14 TABLET | Refills: 0 | Status: SHIPPED | OUTPATIENT
Start: 2025-03-12 | End: 2025-03-26

## 2025-03-12 NOTE — PROGRESS NOTES
Patient presented to the clinic today for a scheduled lab visit and was found to have an ANC of 70 and a potassium level of 3.4.       PLAN:   Neutropenia:   Start Fuphilia injection daily for 5 days   Draw labs (CBC/CMP) on Monday prior to injection   Start Cipro 500mg PO BID x 7 days     Hypokalemia:   Start potassium chloride 20mEq daily x 14 days

## 2025-03-13 ENCOUNTER — INFUSION (OUTPATIENT)
Dept: INFUSION THERAPY | Facility: HOSPITAL | Age: 60
End: 2025-03-13
Attending: INTERNAL MEDICINE
Payer: MEDICAID

## 2025-03-13 VITALS
DIASTOLIC BLOOD PRESSURE: 89 MMHG | RESPIRATION RATE: 18 BRPM | OXYGEN SATURATION: 99 % | SYSTOLIC BLOOD PRESSURE: 136 MMHG | TEMPERATURE: 98 F | HEART RATE: 93 BPM

## 2025-03-13 DIAGNOSIS — C50.912 BREAST CANCER METASTASIZED TO AXILLARY LYMPH NODE, LEFT: Primary | ICD-10-CM

## 2025-03-13 DIAGNOSIS — C77.3 BREAST CANCER METASTASIZED TO AXILLARY LYMPH NODE, LEFT: Primary | ICD-10-CM

## 2025-03-13 PROCEDURE — 96372 THER/PROPH/DIAG INJ SC/IM: CPT

## 2025-03-13 PROCEDURE — 63600175 PHARM REV CODE 636 W HCPCS: Mod: JZ,JB,TB

## 2025-03-13 RX ADMIN — FILGRASTIM-SNDZ 480 MCG: 480 INJECTION, SOLUTION INTRAVENOUS; SUBCUTANEOUS at 01:03

## 2025-03-14 ENCOUNTER — DOCUMENTATION ONLY (OUTPATIENT)
Dept: INFUSION THERAPY | Facility: HOSPITAL | Age: 60
End: 2025-03-14
Payer: MEDICAID

## 2025-03-14 ENCOUNTER — DOCUMENTATION ONLY (OUTPATIENT)
Dept: HEMATOLOGY/ONCOLOGY | Facility: CLINIC | Age: 60
End: 2025-03-14
Payer: MEDICAID

## 2025-03-14 NOTE — PROGRESS NOTES
Called patient back due to her calling the clinic stating that she has been in pain since she got her injection and that she wants to cancel her appointment for today; informed her that per Faraz Gonzalez NP she needs to take Claritin while getting her injections and she needs to come get her injections. Patient stated that her back and legs are hurting too bad and she will not be able to make it and she has been taking Claritin already. I went over neutropenic precautions with patient and advised that it was very important to come get her injections. Patient stated she got a print out on neutropenic precautions yesterday from infusion and would still like to cancel apt. Faraz Gonzalez NP notified.

## 2025-03-14 NOTE — PROGRESS NOTES
Informed pt called with back & leg pain since her Zarxio injection # of 5 yesterday; spoke with NENA Gonzalez NP, called pt & explained reason why pt is getting injections - because chemo reduced her white blood cells/infection fighters and puts pt at high risk for getting an infection her body cannot fight, pt stated she understands the reason it is being given, has been taking Claritin for a few weeks and took Ibuprofen earlier today; reminded pt of noon appt for blood work on Monday  to wear a mask prior to walking in the hospital and an appt with Dr Farah on Tuesday at 0830 to discuss biopsy results; reminded pt of neutropenic precautions to stay home and away from any one who is sick ot thinks they may be; pt verbalized understanding of topics discussed & appreciated phone call.

## 2025-03-17 ENCOUNTER — TELEPHONE (OUTPATIENT)
Dept: HEMATOLOGY/ONCOLOGY | Facility: CLINIC | Age: 60
End: 2025-03-17
Payer: MEDICAID

## 2025-03-17 PROBLEM — C50.912 INVASIVE DUCTAL CARCINOMA OF LEFT BREAST: Status: ACTIVE | Noted: 2025-03-17

## 2025-03-18 ENCOUNTER — OFFICE VISIT (OUTPATIENT)
Dept: HEMATOLOGY/ONCOLOGY | Facility: CLINIC | Age: 60
End: 2025-03-18
Attending: INTERNAL MEDICINE
Payer: MEDICAID

## 2025-03-18 ENCOUNTER — INFUSION (OUTPATIENT)
Dept: INFUSION THERAPY | Facility: HOSPITAL | Age: 60
End: 2025-03-18
Attending: INTERNAL MEDICINE
Payer: MEDICAID

## 2025-03-18 VITALS
SYSTOLIC BLOOD PRESSURE: 136 MMHG | BODY MASS INDEX: 32.93 KG/M2 | TEMPERATURE: 98 F | OXYGEN SATURATION: 100 % | HEART RATE: 110 BPM | WEIGHT: 197.63 LBS | RESPIRATION RATE: 18 BRPM | HEIGHT: 65 IN | DIASTOLIC BLOOD PRESSURE: 81 MMHG

## 2025-03-18 VITALS
OXYGEN SATURATION: 100 % | BODY MASS INDEX: 32.91 KG/M2 | HEART RATE: 91 BPM | RESPIRATION RATE: 20 BRPM | TEMPERATURE: 98 F | SYSTOLIC BLOOD PRESSURE: 141 MMHG | DIASTOLIC BLOOD PRESSURE: 97 MMHG | WEIGHT: 197.56 LBS | HEIGHT: 65 IN

## 2025-03-18 DIAGNOSIS — C50.911 CARCINOMA OF RIGHT BREAST, STAGE 4: Primary | ICD-10-CM

## 2025-03-18 DIAGNOSIS — E83.52 HYPERCALCEMIA: ICD-10-CM

## 2025-03-18 DIAGNOSIS — R59.0 CERVICAL LYMPHADENOPATHY: ICD-10-CM

## 2025-03-18 DIAGNOSIS — R22.1 SUBCUTANEOUS NODULE OF NECK: ICD-10-CM

## 2025-03-18 DIAGNOSIS — C50.912 BREAST CANCER METASTASIZED TO AXILLARY LYMPH NODE, LEFT: ICD-10-CM

## 2025-03-18 DIAGNOSIS — Z51.11 CHEMOTHERAPY MANAGEMENT, ENCOUNTER FOR: ICD-10-CM

## 2025-03-18 DIAGNOSIS — C50.911 RECURRENT BREAST CANCER, RIGHT: ICD-10-CM

## 2025-03-18 DIAGNOSIS — C77.3 BREAST CANCER METASTASIZED TO AXILLARY LYMPH NODE, LEFT: ICD-10-CM

## 2025-03-18 DIAGNOSIS — N63.20 MASSES OF BOTH BREASTS: ICD-10-CM

## 2025-03-18 DIAGNOSIS — R59.0 SUPRACLAVICULAR LYMPHADENOPATHY: ICD-10-CM

## 2025-03-18 DIAGNOSIS — C50.911 CARCINOMA OF RIGHT BREAST, STAGE 4: ICD-10-CM

## 2025-03-18 DIAGNOSIS — R59.0 MEDIASTINAL LYMPHADENOPATHY: ICD-10-CM

## 2025-03-18 DIAGNOSIS — G62.0 CHEMOTHERAPY-INDUCED NEUROPATHY: ICD-10-CM

## 2025-03-18 DIAGNOSIS — F32.A DEPRESSION, UNSPECIFIED DEPRESSION TYPE: ICD-10-CM

## 2025-03-18 DIAGNOSIS — C50.911 NEOPLASM OF RIGHT BREAST, REGIONAL LYMPH NODE STAGING CATEGORY N3C: METASTASIS IN IPSILATERAL SUPRACLAVICULAR LYMPH NODE: ICD-10-CM

## 2025-03-18 DIAGNOSIS — N63.10 MASSES OF BOTH BREASTS: ICD-10-CM

## 2025-03-18 DIAGNOSIS — R91.8 LUNG NODULES: ICD-10-CM

## 2025-03-18 DIAGNOSIS — R59.0 AXILLARY LYMPHADENOPATHY: ICD-10-CM

## 2025-03-18 DIAGNOSIS — R79.89 HIGH SERUM PARATHYROID HORMONE (PTH): ICD-10-CM

## 2025-03-18 DIAGNOSIS — S21.101D OPEN CHEST WOUND, RIGHT, SUBSEQUENT ENCOUNTER: ICD-10-CM

## 2025-03-18 DIAGNOSIS — Z17.421 TRIPLE NEGATIVE BREAST CARCINOMA: ICD-10-CM

## 2025-03-18 DIAGNOSIS — C50.919 TRIPLE NEGATIVE BREAST CARCINOMA: ICD-10-CM

## 2025-03-18 DIAGNOSIS — E05.00 GRAVES DISEASE: ICD-10-CM

## 2025-03-18 DIAGNOSIS — G62.0 CHEMOTHERAPY-INDUCED NEUROPATHY: Primary | ICD-10-CM

## 2025-03-18 DIAGNOSIS — R16.0 LIVER MASS, RIGHT LOBE: ICD-10-CM

## 2025-03-18 DIAGNOSIS — Z85.3 HISTORY OF RIGHT BREAST CANCER: ICD-10-CM

## 2025-03-18 DIAGNOSIS — T45.1X5A CHEMOTHERAPY-INDUCED NEUROPATHY: Primary | ICD-10-CM

## 2025-03-18 DIAGNOSIS — R59.0 LAD (LYMPHADENOPATHY) OF RIGHT CERVICAL REGION: ICD-10-CM

## 2025-03-18 DIAGNOSIS — Z78.0 POSTMENOPAUSAL: ICD-10-CM

## 2025-03-18 DIAGNOSIS — C50.912 INVASIVE DUCTAL CARCINOMA OF LEFT BREAST: ICD-10-CM

## 2025-03-18 DIAGNOSIS — Z98.890 HISTORY OF LUMPECTOMY OF RIGHT BREAST: ICD-10-CM

## 2025-03-18 DIAGNOSIS — T45.1X5A CHEMOTHERAPY-INDUCED NEUROPATHY: ICD-10-CM

## 2025-03-18 DIAGNOSIS — C77.0 NEOPLASM OF RIGHT BREAST, REGIONAL LYMPH NODE STAGING CATEGORY N3C: METASTASIS IN IPSILATERAL SUPRACLAVICULAR LYMPH NODE: ICD-10-CM

## 2025-03-18 LAB
ALBUMIN SERPL-MCNC: 4.1 G/DL (ref 3.5–5)
ALBUMIN/GLOB SERPL: 1.1 RATIO (ref 1.1–2)
ALP SERPL-CCNC: 124 UNIT/L (ref 40–150)
ALT SERPL-CCNC: 21 UNIT/L (ref 0–55)
ANION GAP SERPL CALC-SCNC: 9 MEQ/L
AST SERPL-CCNC: 25 UNIT/L (ref 5–34)
BASOPHILS # BLD AUTO: 0.02 X10(3)/MCL
BASOPHILS NFR BLD AUTO: 0.3 %
BILIRUB SERPL-MCNC: 0.5 MG/DL
BUN SERPL-MCNC: 12.7 MG/DL (ref 9.8–20.1)
CALCIUM SERPL-MCNC: 10.1 MG/DL (ref 8.4–10.2)
CHLORIDE SERPL-SCNC: 108 MMOL/L (ref 98–107)
CO2 SERPL-SCNC: 24 MMOL/L (ref 22–29)
CREAT SERPL-MCNC: 0.83 MG/DL (ref 0.55–1.02)
CREAT/UREA NIT SERPL: 15
EOSINOPHIL # BLD AUTO: 0 X10(3)/MCL (ref 0–0.9)
EOSINOPHIL NFR BLD AUTO: 0 %
ERYTHROCYTE [DISTWIDTH] IN BLOOD BY AUTOMATED COUNT: 13.9 % (ref 11.5–17)
GFR SERPLBLD CREATININE-BSD FMLA CKD-EPI: >60 ML/MIN/1.73/M2
GLOBULIN SER-MCNC: 3.9 GM/DL (ref 2.4–3.5)
GLUCOSE SERPL-MCNC: 157 MG/DL (ref 74–100)
HCT VFR BLD AUTO: 32.9 % (ref 37–47)
HGB BLD-MCNC: 11 G/DL (ref 12–16)
IMM GRANULOCYTES # BLD AUTO: 0.09 X10(3)/MCL (ref 0–0.04)
IMM GRANULOCYTES NFR BLD AUTO: 1.5 %
LYMPHOCYTES # BLD AUTO: 0.83 X10(3)/MCL (ref 0.6–4.6)
LYMPHOCYTES NFR BLD AUTO: 13.5 %
MAGNESIUM SERPL-MCNC: 2.1 MG/DL (ref 1.6–2.6)
MCH RBC QN AUTO: 33 PG (ref 27–31)
MCHC RBC AUTO-ENTMCNC: 33.4 G/DL (ref 33–36)
MCV RBC AUTO: 98.8 FL (ref 80–94)
MONOCYTES # BLD AUTO: 0.06 X10(3)/MCL (ref 0.1–1.3)
MONOCYTES NFR BLD AUTO: 1 %
NEUTROPHILS # BLD AUTO: 5.17 X10(3)/MCL (ref 2.1–9.2)
NEUTROPHILS NFR BLD AUTO: 83.7 %
NRBC BLD AUTO-RTO: 0.5 %
PLATELET # BLD AUTO: 220 X10(3)/MCL (ref 130–400)
PMV BLD AUTO: 10 FL (ref 7.4–10.4)
POTASSIUM SERPL-SCNC: 3.8 MMOL/L (ref 3.5–5.1)
PROT SERPL-MCNC: 8 GM/DL (ref 6.4–8.3)
RBC # BLD AUTO: 3.33 X10(6)/MCL (ref 4.2–5.4)
SODIUM SERPL-SCNC: 141 MMOL/L (ref 136–145)
WBC # BLD AUTO: 6.17 X10(3)/MCL (ref 4.5–11.5)

## 2025-03-18 PROCEDURE — 96367 TX/PROPH/DG ADDL SEQ IV INF: CPT

## 2025-03-18 PROCEDURE — 25000003 PHARM REV CODE 250: Performed by: INTERNAL MEDICINE

## 2025-03-18 PROCEDURE — 36415 COLL VENOUS BLD VENIPUNCTURE: CPT

## 2025-03-18 PROCEDURE — 96413 CHEMO IV INFUSION 1 HR: CPT

## 2025-03-18 PROCEDURE — 80053 COMPREHEN METABOLIC PANEL: CPT

## 2025-03-18 PROCEDURE — 96375 TX/PRO/DX INJ NEW DRUG ADDON: CPT

## 2025-03-18 PROCEDURE — 83735 ASSAY OF MAGNESIUM: CPT

## 2025-03-18 PROCEDURE — 96415 CHEMO IV INFUSION ADDL HR: CPT

## 2025-03-18 PROCEDURE — A4216 STERILE WATER/SALINE, 10 ML: HCPCS | Performed by: INTERNAL MEDICINE

## 2025-03-18 PROCEDURE — 85025 COMPLETE CBC W/AUTO DIFF WBC: CPT

## 2025-03-18 PROCEDURE — 63600175 PHARM REV CODE 636 W HCPCS: Performed by: INTERNAL MEDICINE

## 2025-03-18 PROCEDURE — 99214 OFFICE O/P EST MOD 30 MIN: CPT | Mod: PBBFAC | Performed by: INTERNAL MEDICINE

## 2025-03-18 RX ORDER — DIPHENHYDRAMINE HYDROCHLORIDE 50 MG/ML
50 INJECTION, SOLUTION INTRAMUSCULAR; INTRAVENOUS
Status: COMPLETED | OUTPATIENT
Start: 2025-03-18 | End: 2025-03-18

## 2025-03-18 RX ORDER — FAMOTIDINE 10 MG/ML
20 INJECTION, SOLUTION INTRAVENOUS
Status: CANCELLED | OUTPATIENT
Start: 2025-03-18

## 2025-03-18 RX ORDER — EPINEPHRINE 0.3 MG/.3ML
0.3 INJECTION SUBCUTANEOUS ONCE AS NEEDED
Status: CANCELLED | OUTPATIENT
Start: 2025-03-18

## 2025-03-18 RX ORDER — FAMOTIDINE 10 MG/ML
20 INJECTION, SOLUTION INTRAVENOUS
Status: COMPLETED | OUTPATIENT
Start: 2025-03-18 | End: 2025-03-18

## 2025-03-18 RX ORDER — EPINEPHRINE 1 MG/ML
0.3 INJECTION INTRAMUSCULAR; INTRAVENOUS; SUBCUTANEOUS ONCE AS NEEDED
Status: DISCONTINUED | OUTPATIENT
Start: 2025-03-18 | End: 2025-03-18 | Stop reason: HOSPADM

## 2025-03-18 RX ORDER — DIPHENHYDRAMINE HYDROCHLORIDE 50 MG/ML
50 INJECTION, SOLUTION INTRAMUSCULAR; INTRAVENOUS
Status: CANCELLED
Start: 2025-03-18

## 2025-03-18 RX ORDER — SODIUM CHLORIDE 0.9 % (FLUSH) 0.9 %
10 SYRINGE (ML) INJECTION
Status: CANCELLED | OUTPATIENT
Start: 2025-03-18

## 2025-03-18 RX ORDER — HEPARIN 100 UNIT/ML
500 SYRINGE INTRAVENOUS
Status: DISCONTINUED | OUTPATIENT
Start: 2025-03-18 | End: 2025-03-18 | Stop reason: HOSPADM

## 2025-03-18 RX ORDER — SODIUM CHLORIDE 0.9 % (FLUSH) 0.9 %
10 SYRINGE (ML) INJECTION
Status: DISCONTINUED | OUTPATIENT
Start: 2025-03-18 | End: 2025-03-18 | Stop reason: HOSPADM

## 2025-03-18 RX ORDER — DIPHENHYDRAMINE HYDROCHLORIDE 50 MG/ML
50 INJECTION, SOLUTION INTRAMUSCULAR; INTRAVENOUS ONCE AS NEEDED
Status: CANCELLED | OUTPATIENT
Start: 2025-03-18

## 2025-03-18 RX ORDER — DIPHENHYDRAMINE HYDROCHLORIDE 50 MG/ML
50 INJECTION, SOLUTION INTRAMUSCULAR; INTRAVENOUS ONCE AS NEEDED
Status: DISCONTINUED | OUTPATIENT
Start: 2025-03-18 | End: 2025-03-18 | Stop reason: HOSPADM

## 2025-03-18 RX ORDER — HEPARIN 100 UNIT/ML
500 SYRINGE INTRAVENOUS
Status: CANCELLED | OUTPATIENT
Start: 2025-03-18

## 2025-03-18 RX ADMIN — Medication 10 ML: at 02:03

## 2025-03-18 RX ADMIN — PACLITAXEL 276 MG: 6 INJECTION, SOLUTION INTRAVENOUS at 11:03

## 2025-03-18 RX ADMIN — SODIUM CHLORIDE: 9 INJECTION, SOLUTION INTRAVENOUS at 10:03

## 2025-03-18 RX ADMIN — DEXAMETHASONE SODIUM PHOSPHATE 20 MG: 4 INJECTION, SOLUTION INTRA-ARTICULAR; INTRALESIONAL; INTRAMUSCULAR; INTRAVENOUS; SOFT TISSUE at 10:03

## 2025-03-18 RX ADMIN — DIPHENHYDRAMINE HYDROCHLORIDE 50 MG: 50 INJECTION, SOLUTION INTRAMUSCULAR; INTRAVENOUS at 10:03

## 2025-03-18 RX ADMIN — HEPARIN 500 UNITS: 100 SYRINGE at 02:03

## 2025-03-18 RX ADMIN — FAMOTIDINE 20 MG: 10 INJECTION, SOLUTION INTRAVENOUS at 10:03

## 2025-03-18 NOTE — NURSING
1010  Pt did labs, saw Dr. Farah, and is here for C 10 taxol.  Pt accomp by her mother.  Pt denies any pain.  States she is tired but she did not sleep well last night.

## 2025-03-18 NOTE — Clinical Note
Orders for 03/18/2025:  Continue chemotherapy every 3 weeks From cycle 10 of Taxol onwards, dose decreased by 25%  Check CBC and CMP weekly  Re-stage with contrast-enhanced CT scans of C/A/P/soft tissues of the neck with contrast middle of May Continue Cymbalta 30 mg p.o. q.h.s. Follow-up with endocrinology for Graves disease Follow-up with NP in 3 weeks

## 2025-03-18 NOTE — PROGRESS NOTES
History:  Past Medical History:   Diagnosis Date    Anemia     Arthritis     Breast cancer     Graves disease     Hypertension     Hyperthyroidism       Past Surgical History:   Procedure Laterality Date    BREAST LUMPECTOMY      INSERTION OF TUNNELED CENTRAL VENOUS CATHETER (CVC) WITH SUBCUTANEOUS PORT N/A 8/19/2024    Procedure: FHWGHYZGE-WCEO-I-CATH;  Surgeon: Thomas Verdin Jr., MD;  Location: [facility];  Service: General;  Laterality: N/A;      Social History     Socioeconomic History    Marital status: Single   Tobacco Use    Smoking status: Never     Passive exposure: Never    Smokeless tobacco: Never   Substance and Sexual Activity    Alcohol use: Never    Drug use: Never     Social Drivers of Health     Financial Resource Strain: Patient Declined (7/11/2023)    [facility]    Overall Financial Resource Strain (CARDIA)     Difficulty of Paying Living Expenses: Patient declined   Food Insecurity: Patient Declined (7/11/2023)    Received from [facility] Subsidi[facility] and Affiliates    Hunger Vital Sign     Worried About Running Out of Food in the Last Year: Patient declined     Ran Out of Food in the Last Year: Patient declined   Transportation Needs: Patient Declined (7/11/2023)    Received from [facility] and [facility]ates    PRAPARE - Transportation     Lack of Transportation (Medical): Patient declined     Lack of Transportation (Non-Medical): Patient declined   Stress: Patient Declined (7/11/2023)    Received from [facility] and [facility] Subsidiaries and Affiliates    [employer] [place] [employer] - Occupational Stress Questionnaire     Feeling of Stress : Patient declined   Housing Stability: Unknown (7/11/2023)    [facility] and Its Subsidiaries and Affiliates    Housing Stability Vital Sign     Unable to Pay for Housing in the Last Year: Patient refused     Number of Places Lived in the Last Year: 1      Family History   Problem Relation Name Age of Onset    Breast cancer Maternal Cousin          Reason for Follow-up:  -history of right breast cancer, diagnosed 2022, ER negative, VA negative, HER2 positive, T2 N1 M0, S/P neoadjuvant chemotherapy, lumpectomy, axillary lymph node dissection, adjuvant radiotherapy (completed 10/24/2022)  -local and regional recurrence, triple negative, diagnosed on biopsy of supraclavicular lymph node 06/27/2024; on mammogram, right breast mass; supraclavicular lymphadenopathy; right cervical lymphadenopathy  -Postmenopausal   -Hypercalcemia   -High serum parathyroid hormone (PTH)   -Liver mass, right lobe   -Mediastinal lymphadenopathy   -Lung nodules   -Masses of both breasts   -B/L axillary lymphadenopathy   -Breast cancer metastasized to axillary lymph node, left   -B/L cervical lymphadenopathy   -B/L supraclavicular lymphadenopathy   -Chemotherapy-induced neuropathy   -history of Graves disease  -02/26/2025:  1. Left breast mass # 1, needle biopsy:  IDC, overall grade 3  2. Left breast mass # 2, needle biopsy: IDC, overall grade 3  -left axillary lymph node, needle biopsy:  Positive for abundant high-grade carcinoma, with features compatible with breast carcinoma in specimens 1 and 2 (above)  [ER negative, 0%; VA negative, 0%; HER2 score 1+; Ki-67 high proliferation, 95%]      History of Present Illness:   Triple negative breast carcinoma      Oncologic/Hematologic History:  Oncology History   Stage IV carcinoma of breast   8/27/2024 Initial Diagnosis    Stage IV carcinoma of breast     9/9/2024 -  Chemotherapy    Treatment Summary   Plan Name: OP BREAST PACLITAXEL Q3W  Treatment Goal: Palliative  Status: Active  Start Date: 9/9/2024  End Date: 4/29/2025 (Planned)  Provider: [place]  MD Gagan  Chemotherapy: PACLitaxeL (TAXOL) 175 mg/m2 = 366 mg in 0.9% NaCl 500 mL chemo infusion, 175 mg/m2 = 366 mg, Intravenous, Clinic/HOD 1 time, 9 of 12 cycles  Dose modification: 131.25 mg/m2 (original dose 175 mg/m2, Cycle 10, Reason: MD Discretion, Comment: severe neutropenia post last cycle)  Administration: 366 mg (2024), 366 mg (2024), 366 mg (10/22/2024), 366 mg (2024), 366 mg (12/3/2024), 366 mg (2024), 366 mg (2025), 366 mg (2025), 366 mg (2025)     12/3/2024 Cancer Staged    Staging form: Breast, AJCC 8th Edition  - Clinical stage from 12/3/2024: Stage IV (rcTX, cN3c, pM1, ER-, CA-, HER2-)     Past medical history: Anemia; arthritis; breast cancer; Graves disease (diagnosed ; started on methimazole by endocrinologist in [place], in ); hypertension; peripheral neuropathy  -2023:  Venous Doppler bilateral lower extremities (swelling):  No DVT  -2023: TTE:  LVEF 55-60%  -2023:  Limited abdominal ultrasound (elevated liver enzymes):  6.6 cm cyst within the liver near the gallbladder; cholelithiasis with minimal gallbladder wall thickening  -2014:  Pelvic ultrasound: Markedly enlarged uterus with multiple large fibroids; endometrial stripe is thickened, 1.4 cm  Procedure/surgical history: Breast lumpectomy   Social history:  .  Lives in [place].  No children.  Never wanted to have children.  Never became pregnant.  No history of tobacco, alcohol, or illicit drug abuse.  Does not work.  Family history:  Sister experienced hyperthyroidism, treated with radioiodine.  She does not know much about her family history but says that there are cancers in folks on both parents sides of family.  A female cousin on mother's side of family experienced breast cancer in her 30s and  from it.  Health maintenance:  Primary care provider in [facility].  Last mammogram 2023 at [facility].  No screening colonoscopy  ever.  Menstrual/Ob gyn history:  Menarche at age 13.  Last menstrual cycle July 2023.  Never became pregnant.  Never wanted to become pregnant.  Took birth control pills for 2 years several years ago.  No hormone replacement therapy after menopause.      59-year-old lady, referred by Rere Jernigan MD, surgery, with recurrent breast cancer.    07/10/2024:  Office note: Rere Jernigan MD (surgery):  Pt is a 59 y.o. female with history of right breast cancer s/p BCT in 2002 who presents with painless large supraclavicular mass.    First noticed it 4 months ago and has steadily increased in size past month.  Last mammogram 1 year ago and new [place].  Also reports progressive skin changes of the right breast not associated with lumpectomy incision.  Receptor status unknown.       Investigations reviewed:  -no old records available   -according to her, she was diagnosed with right breast cancer in January 2022  -mammogram showed a large lobulated mass, 3.7 x 2.7 cm  -biopsy:  Infiltrative ductal carcinoma, possibly invasive lobular  -right breast mass was palpable in the upper outer quadrant; axillary lymphadenopathy was noted (3-4 palpable lymph nodes right axilla)  -T2 N1 M0 IDC right breast (she had palpable axillary lymph nodes in the right and a large palpable mass in the right breast)  -ER negative; MA negative; HER2 positive  -S/P neoadjuvant chemotherapy (according to her, she received 4 cycles of neoadjuvant chemotherapy with Adriamycin/Taxotere every 3 weeks apart x4 cycles)  -followed by lumpectomy and axillary dissection  -no residual tumor post chemotherapy; 17 axillary lymph nodes negative for tumor  -S/P adjuvant radiotherapy, 6600 cGy/35 fractions, completed 10/24/2022  -07/28/2011:  DEXA scan:  BMD normal  -12/19/2022:  Screening mammogram (comparison: 11/08/2019, etc.):  BI-RADS: 2-benign  -07/06/2023:  Echocardiogram:  LVEF 60%  -11/27/2023:  Bilateral diagnostic mammogram and limited  ultrasound right breast (comparison: 12/19/2022, 12/16/2001, etc.) (history of right breast cancer with worsening right breast pain and thickening) large elongated heterogeneous mass with irregular borders outer aspect of the right breast (extending from the nipple outward towards the lateral chest wall at the 8-9 o'clock position, 5.8 x 3 x 1.8 cm although margins are difficult to accurately define), highly suspicious for recurrent malignancy; there is a separate elongated hypoechoic lesion in the upper outer quadrant right breast 11 o'clock position, 5 cm from nipple, 3 x 2.6 x 0.7 cm, near the site of previous surgery), perhaps benign scar tissue related to previous lumpectomy:  BI-RADS: 5-highly suggestive of malignancy  -07/10/2024:  Surgery Office note:  Painless large supraclavicular mass, noted 4 months ago, steadily enlarging; also reported progressive skin changes right breast not associated with lumpectomy incision; on physical exam, large exophytic right supraclavicular nodule, nonmobile and fixed, overlying skin erythematous without ulceration; right upper outer quadrant lumpectomy incision; right lower inner quadrant periareolar ecchymosis with subcutaneous firmness without distinct mass; slight nipple inversion; no drainage; no palpable axillary lymphadenopathy  -05/30/2024:  Ultrasound soft tissues of the head and neck (lymphadenopathy): Pathologic lymphadenopathy (4.5 x 3.7 x 3.2 cm) at the base of the neck on the right; multiple smaller morphologically abnormal cervical chain lymph nodes on the right which demonstrate heterogeneous echogenicity similar to the dominant mass. Findings highly suspicious for malignancy particularly in this patient with a known history of prior right breast cancer. Dominant mass lesion corresponds with palpable complaint. Recommend biopsy/tissue diagnosis.   -06/27/2024:  Right supraclavicular mass, core needle biopsy (firm 6 x 6 cm exophytic right supraclavicular  mass): Metastatic carcinoma in fibroadipose tissue, moderately to poorly-differentiated, consistent with breast origin (metastatic breast carcinoma in fibroadipose tissue  -ER negative (0%); IL negative (0%); HER2 negative (0); Ki-67 high proliferation  (93.6%)    08/08/2024:   Pleasant, healthy-appearing lady who presents for initial medical oncology consultation.  In no acute discomfort.    Says that right supra clavicular mass started April 2024; it has been enlarging and fluctuating in size.  It is painful.  7/10 severity pain.  Also, pain was right breast area.  Right breast is enlarging.  There is a small area of ulceration in the right breast.  No bleeding or discharge.  She denies fevers or chills.  Has been taking Lyrica meloxicam without the relief of pain.  We will start Norco.  Some fatigue.  However, ECOG 1.  Pain from neck down to breast area, especially at night.  No unusual headaches, focal neurological symptoms, vision impairment, gait impairment, hemoptysis, fevers, chills, any bleeding or discharge from right breast superficial ulceration, abdominal pain, nausea, vomiting, GI bleeding, etc..  No bone pains.  Appetite is fair.    Interval History:  INF FLUIDS   OP BREAST PACLITAXEL Q3W     03/18/2025:   -Taxol # 9 on 02/25/2025  -02/26/2025:  1. Left breast mass # 1, needle biopsy:  IDC, overall grade 3  2. Left breast mass # 2, needle biopsy: IDC, overall grade 3  -left axillary lymph node, needle biopsy:  Positive for abundant high-grade carcinoma, with features compatible with breast carcinoma in specimens 1 and 2 (above)  [ER negative, 0%; IL negative, 0%; HER2 score 1+; Ki-67 high proliferation, 95%]    -02/27/2025:  Bilateral diagnostic mammogram, complete ultrasound left breast: Overall study BI-RADS: 5-highly suggestive of malignancy (1.2 cm lymph node left axilla; 1.6 cm left breast mass; 1.0 cm left breast mass; several additional subcentimeter masses left breast highly suggestive of  malignancy; right breast 8.4 cm known triple negative invasive malignancy; right breast is contracted and there is diffuse skin and trabecular thickening)  -03/12/2025: ANC down to 0.07 (15 days post cycle 9 of Taxol)  (from cycle 10 of Taxol onwards, we will decrease dose by 25%)  -03/18/2025: WBC and differential normal, hemoglobin 11.0 stable, platelets normal, CMP reviewed  Presents for a follow-up visit, accompanied by mother.  In no acute discomfort.  Says that she experienced considerable back pain with Neulasta shot.  She says that she already has back problem.  Now, the pains are gone.  She did not develop fevers or chills while neutropenic.  Some numbness in left leg.  No weakness in left leg.  No saddle anesthesia.  No change in bowel or bladder continence.  Mild generalized weakness.  ECOG 1.  No unusual headaches or focal neurological symptoms.  No chest pain, cough, or dyspnea.  No abdominal pain, nausea, vomiting, or GI bleeding.  Fair appetite.  For peripheral neuropathy, she discontinued Lyrica sometime in January 2025; currently on Cymbalta 30 mg p.o. q.h.s., with reasonable relief.  Neuropathy is not bothering her much.  Before starting chemotherapy, she had no sensation in the right upper chest wall, right shoulder, and right posterior upper back; with chemotherapy, the sensation has returned.        There is no immunization history on file for this patient.    Review of patient's allergies indicates:  No Known Allergies    Medications:  Current Outpatient Medications on File Prior to Visit   Medication Sig Dispense Refill    azithromycin (Z-WAYNE) 250 MG tablet Take 250 mg by mouth once daily.      ciprofloxacin HCl (CIPRO) 500 MG tablet Take 1 tablet (500 mg total) by mouth 2 (two) times daily. for 7 days 14 tablet 0    dexAMETHasone (DECADRON) 4 MG Tab Take 20mg (5 tablets) by mouth 12 hours and then again at 6 hours prior to each chemotherapy treatment 40 tablet 1    ergocalciferol  (ERGOCALCIFEROL) 50,000 unit Cap Vitamin D2 1,250 mcg (50,000 unit) capsule, [RxNorm: 8179900]      ferrous gluconate (FERGON) 324 MG tablet TAKE 1 TABLET BY MOUTH ONCE DAILY FOR IRON      ferrous gluconate (FERGON) 324 MG tablet ferrous gluconate 324 mg (38 mg iron) tablet, [RxNorm: 150160]      HYDROcodone-acetaminophen (NORCO) 5-325 mg per tablet Take 1 tablet by mouth every 4 (four) hours as needed for Pain. 84 tablet 0    meloxicam (MOBIC) 15 MG tablet       methIMAzole (TAPAZOLE) 5 MG Tab Take 1 tablet (5 mg total) by mouth once daily. 30 tablet 11    ondansetron (ZOFRAN) 4 MG tablet Take 1 tablet (4 mg total) by mouth every 6 (six) hours as needed for Nausea. 30 tablet 1    potassium chloride SA (K-DUR,KLOR-CON) 20 MEQ tablet Take 1 tablet (20 mEq total) by mouth once daily. for 14 days 14 tablet 0    valsartan-hydrochlorothiazide (DIOVAN-HCT) 160-12.5 mg per tablet Take 1 tablet by mouth.      diclofenac sodium (VOLTAREN) 1 % Gel  (Patient not taking: Reported on 3/18/2025)      DULoxetine (CYMBALTA) 30 MG capsule Take 1 capsule (30 mg total) by mouth once daily. 60 capsule 0    ergocalciferol (ERGOCALCIFEROL) 50,000 unit Cap Take 50,000 Units by mouth every 7 days. (Patient not taking: Reported on 3/18/2025)      OLANZapine (ZYPREXA) 5 MG tablet Take 1 tablet (5 mg total) by mouth every evening. 30 tablet 0     No current facility-administered medications on file prior to visit.     Review of Systems:   All systems reviewed and found to be negative except for the symptoms detailed above    Physical Examination:   VITAL SIGNS:   Vitals:    03/18/25 0915   BP: 136/81   Pulse: 110   Resp: 18   Temp: 97.9 °F (36.6 °C)       GENERAL:  In no apparent distress.    HEAD:  No signs of head trauma.  EYES:  Pupils are equal.  Extraocular motions intact.    EARS:  Hearing grossly intact.  MOUTH:  Oropharynx is normal.   NECK:  No adenopathy, no JVD.     CHEST:  Chest with clear breath sounds bilaterally.  No wheezes,  rales, rhonchi.    CARDIAC:  Regular rate and rhythm.  S1 and S2, without murmurs, gallops, rubs.  VASCULAR:  No Edema.  Peripheral pulses normal and equal in all extremities.  ABDOMEN:  Soft, without detectable tenderness.  No sign of distention.  No   rebound or guarding, and no masses palpated.   Bowel Sounds normal.  MUSCULOSKELETAL:  Good range of motion of all major joints. Extremities without clubbing, cyanosis or edema.    NEUROLOGIC EXAM:  Alert and oriented x 3.  No focal sensory or strength deficits.   Speech normal.  Follows commands.  PSYCHIATRIC:  Mood normal.  -08/08/2024: Breast examination was performed with a verbal consent, in the presence of Jani Heck LPN; entire right breast is involved with tumor; entire right breast is indurated with tumor, with conglomeration of nodules around the central area; a small superficial ulceration is noted along the inferior medial aspect of right areolar area; a large slightly tender 6 supraclavicular masses noted; diffuse edema is noted in the right supraclavicular area and right breast area as well as infraclavicular area; left breast is normal    No results found for this or any previous visit.  No results found for this or any previous visit.    Assessment:  Problem List Items Addressed This Visit       Graves disease    Subcutaneous nodule of neck    Recurrent breast cancer, right    Triple negative breast carcinoma    Neoplasm of right breast, regional lymph node staging category N3c: metastasis in ipsilateral supraclavicular lymph node    LAD (lymphadenopathy) of right cervical region    History of right breast cancer    History of lumpectomy of right breast    Postmenopausal    Hypercalcemia    High serum parathyroid hormone (PTH)    Liver mass, right lobe    Mediastinal lymphadenopathy    Lung nodules    Breast cancer metastasized to axillary lymph node, left    Cervical lymphadenopathy    Stage IV carcinoma of breast    Open chest wound, right,  subsequent encounter    Axillary lymphadenopathy    Masses of both breasts    Supraclavicular lymphadenopathy    Chemotherapy-induced neuropathy - Primary    Invasive ductal carcinoma of left breast     Orders for 03/18/2025:   Continue chemotherapy every 3 weeks  From cycle 10 of Taxol onwards, dose decreased by 25%   Check CBC and CMP weekly   Re-stage with contrast-enhanced CT scans of C/A/P/soft tissues of the neck with contrast middle of May  Continue Cymbalta 30 mg p.o. q.h.s.  Follow-up with endocrinology for Graves disease  Follow-up with NP in 3 weeks    Above discussed at length with the patient.  All questions answered.    Discussed labs and scans and gave her copies of relevant results.    Pathology report from left breast biopsy discussed.  Gave her copy of results.  She understands and agrees with this plan.    She also understands that her long-term prognosis guarded.  ==============================================      # History of right breast IDC, ER negative, MI negative, HER2 positive:  -diagnosed 2022  -mammogram: 3.7 x 2.7 cm right breast mass  -pathology: Infiltrative ductal carcinoma, possibly invasive lobular  -ER negative, MI negative, HER2 positive   -T2 N1 M0; palpable axillary lymphadenopathy; large palpable mass right breast)  -S/P neoadjuvant chemotherapy (according to her, she received 4 cycles of neoadjuvant chemotherapy with Adriamycin/Taxotere every 3 weeks apart x4 cycles)  -followed by lumpectomy and axillary dissection  -no residual tumor post chemotherapy; 17 axillary lymph nodes negative for tumor  -S/P adjuvant radiotherapy, 6600 cGy/35 fractions, completed 10/24/2022  -details of adjuvant chemotherapy, if any, not known  >>>  -screening mammogram 12/19/2022: BI-RADS: 2-benign  >>>  # Regional, local, and metastatic recurrence, triple negative:  -echocardiogram 07/06/2023: LVEF 60%  -mammogram and ultrasound 11/27/2023:  Right breast mass 5.8 x 3 x 1.8 cm: BI-RADS:  5  -developed extensive local regional and metastatic disease  -triple negative extensive local regional and metastatic disease  (original breast cancer was ER negative, CT negative, HER2 positive; recurrence/metastases, triple negative)  -S/P needle core biopsy right supraclavicular mass 06/27/2024: Moderately to poorly-differentiated metastatic breast carcinoma  -ER negative (0%); CT negative (0%); HER2 negative (0); Ki-67 high proliferation (93.6%)  -tissue NGS testing (performed on right supraclavicular mass biopsy 06/27/2024):  PD-L1 positive (CPS 15); HRD positive (CELINE-high); negative for AKT1, BRAF, BRCA1, BRCA2, ESR 1, etc.  -08/09/2024: Liquid biopsy:  Borderline TMB  -08/08/2024:  Mild hypercalcemia: Calcium 10.6, albumin 3.9; intact PTH level 100.3, elevated; PTH related peptide normal; vitamin-D level normal  -Postmenopausal per labs 08/08/2024 (LMP 07/2023)  -Hypercalcemia  -08/14/2024: Echocardiogram:  LVEF 55-60%  -08/19/2024: MediPort placed  -brain MRI 08/20/2024: No brain metastases  -staging CTs C/A/P/neck 08/20/2024: Extensive metastases  -genetic testing 08/09/2024:  Negative  -baseline tumor markers 08/28/2024:  Only CEA level slightly elevated, 4.64  -palliative Taxol, every 3 weeks, started 09/09/2024  (started before PD-L1 results was available to us)  -staging PET-CT 09/13/2024:  (was supposed to be performed before starting chemotherapy; could only be performed 4 days after starting palliative chemotherapy with Taxol):  Extensive metastases  -09/19/2024: Patient refused liver biopsy (in denial; according to her, liver lesion is 'just a cyst')  -Taxol every 3 weeks:  Cycle 2 on 09/30/2024, cycle 3 on 10/22/2024, cycle 4 on 11/12/2024  -restaging CTs C/A/P/neck 11/18/2024, post Taxol x4 cycles: Positive response  -as of 12/03/2024, malignant breast wounds are also healing, indicating positive response to chemotherapy  -palliative Taxol every 3 weeks:  Cycle 5 on 12/03/2025; cycle 6 on  12/24/2024; cycle 7 on 01/14/2025; cycle 8 on 02/04/2025  -restaging CTs C/A/P/neck 02/18/2025, post Taxol x8 cycles:  Overall, positive response; maybe, progression in left breast and axilla  -for peripheral neuropathy, taking Cymbalta 30 mg p.o. q.h.s.  -started on Zyprexa 5 mg daily for depression; she refused referral to  or mental health-Taxol # 9 on 02/25/2025  -02/26/2025:  1. Left breast mass # 1, needle biopsy:  IDC, overall grade 3  2. Left breast mass # 2, needle biopsy: IDC, overall grade 3  -left axillary lymph node, needle biopsy:  Positive for abundant high-grade carcinoma, with features compatible with breast carcinoma in specimens 1 and 2 (above)  [ER negative, 0%; OH negative, 0%; HER2 score 1+; Ki-67 high proliferation, 95%]    -02/27/2025:  Bilateral diagnostic mammogram, complete ultrasound left breast: Overall study BI-RADS: 5-highly suggestive of malignancy (1.2 cm lymph node left axilla; 1.6 cm left breast mass; 1.0 cm left breast mass; several additional subcentimeter masses left breast highly suggestive of malignancy; right breast 8.4 cm known triple negative invasive malignancy; right breast is contracted and there is diffuse skin and trabecular thickening)  -03/12/2025: ANC down to 0.07 (15 days post cycle 9 of Taxol)  (from cycle 10 of chemotherapy onwards, Taxol dose decreased by 25%)  >>>  Plan:  -positive response post 4 cycles of Taxol  -positive response post 8 cycles of Taxol  -continue palliative Taxol 175 mg per m2 IV every 3 weeks until disease progression or until unacceptable toxicity (started 09/09/2024)  -severe neutropenia, 15 days post cycle 9 of Taxol; from cycle 10 of chemotherapy onwards, Taxol dose reduced by 25%  -decreased Taxol dose per pharmacy protocol  -with Taxol, monitor for potential cardiovascular effects including infusion related hypotension/bradycardia/hypotension, peripheral neuropathy, edema, nausea, vomiting, diarrhea, stomatitis,  cytopenias, etc.   -check CBC and CMP weekly  -re-stage with contrast-enhanced CT scans of C/A/P/soft tissues of the neck in 3 months (mid May 2025)  -if and when required, in 2nd line, we will use pembrolizumab +chemotherapy (see below)  -at some point, olaparib maybe considered because of HRD positive status (CELINE-high)    IDC left breast (ER negative, FL negative, HER2 1+):  -02/26/2025:  1. Left breast mass # 1, needle biopsy:  IDC, overall grade 3  2. Left breast mass # 2, needle biopsy: IDC, overall grade 3  -left axillary lymph node, needle biopsy:  Positive for abundant high-grade carcinoma, with features compatible with breast carcinoma in specimens 1 and 2 (above)  [ER negative, 0%; FL negative, 0%; HER2 score 1+; Ki-67 high proliferation, 95%]    -02/27/2025:  Bilateral diagnostic mammogram, complete ultrasound left breast: Overall study BI-RADS: 5-highly suggestive of malignancy (1.2 cm lymph node left axilla; 1.6 cm left breast mass; 1.0 cm left breast mass; several additional subcentimeter masses left breast highly suggestive of malignancy; right breast 8.4 cm known triple negative invasive malignancy; right breast is contracted and there is diffuse skin and trabecular thickening)    Discussion:   -triple negative recurrent, metastatic disease   -genetic testing negative  -extensive metastatic disease  -ER 0, FL 0, HER2 0  -PD-L1 positive, CPS 15  -02/26/2025:  Left breast: IDC, overall grade 3; left axillary lymph node biopsy positive as well; ER 0, FL 0, HER2 1+  >>>  Systemic therapy options in this patient:   1. First-line:   # PD-L1 CPS 15 (i.e., =/> 10), therefore: Regardless of germline BRCA mutation status:  Pembrolizumab +chemotherapy (albumin bound paclitaxel, paclitaxel, or gemcitabine and carboplatin) (category 1, Preferred) (this regimen can be used for secondary and subsequent lines of therapy if PD-1/PD-L1 inhibitor therapy has not been previously used)  2. Second-line:  # germline BRCA1  1/2 mutation negative; therefore, not a candidate for PARPi in 2nd line  # sacituzumab govitecan (category 1, Preferred)  # systemic chemotherapy or targeted agents (at some point, olaparib may be considered because of HRD positive status)  # need to clarify if her tumor is HER2 IHC 0 or HER2 IHC 0+ [if HER2 IHC 0+, then, Fam trastuzumab deruxtecan (category 1, Preferred)]  # left breast biopsy ER 0, MT 0, HER2 1+; therefore, in second-line, can consider treatment with Fam trastuzumab deruxtecan (category 1, Preferred)  3. 3rd line and beyond:  # biomarker directed targeted agents and emerging biomarker options (at some point, olaparib may be considered because of HRD positive status)  # systemic chemotherapy   >>>  -before PD-L1 testing result was available, we started her on Taxol given extensive metastatic disease;  -patient has been previously treated with Adriamycin/Taxotere; therefore, we will avoid Adriamycin to the extent possible  -she declined liver biopsy    Chemotherapy:  -Taxol 175 mg per m2 every 3 weeks until disease progression or unacceptable toxicity    Side effects/monitoring with Taxol:  -watch for cardiovascular effects including infusion related hypotension, bradycardia, hypertension, etc.  -watch out for extravasation: Taxol is an irritant with vesicle like properties  -peripheral neuropathy:  Primarily distal sensory neuropathy; a mixture of paresthesias and dysesthesias including burning, numbness, tingling, and shooting pains, typically in a stocking-glove distribution; most mild-to-moderate cases resolve several months after discontinuation but maybe longer in patients with diabetes.  Severe neuropathy maybe irreversible in some patients  -side effects in> 10% patients:  Edema, hypotension, alopecia, nausea, vomiting, diarrhea, stomatitis, cytopenias, LFT abnormalities, peripheral neuropathy, arthralgias, myalgias, etc.  -Boxed warnings: Hypersensitivity reactions; bone marrow  suppression  -premedicate with dexamethasone (20 mg orally at 12 and 6 hours prior to paclitaxel, diphenhydramine (50 mg IV 30 to 60 minutes prior to paclitaxel), and cimetidine or famotidine (IV 30 to 60 minutes prior to paclitaxel).  -do not repeat course until neutrophil count is =/> 1500 mm3 and the platelet count is =/> 100,000 mm3; reduced doses by 20% if patient experiences severe peripheral neuropathy or severe neutropenia <500 mm3 for a week or longer     -08/08/2024:  Referred to wound care for management of breast wound   -12/03/2024: Still following up with wound care; says that malignant wounds are closing up with positive response to chemotherapy    -08/08/2024:  Started Norco 5 mg every 4 hours PRN for pain  -12/03/2024: Tells me that she experiences pain in eyes and legs for a few days after Taxol infusion and needs to take narcotic as needed.    # Sites of disease/recurrence/metastases:  Right supraclavicular lymphadenopathy; right breast mass, right cervical lymphadenopathy, left axillary lymphadenopathy, mediastinal and hilar lymphadenopathy, hepatic mass (metastases), left breast nodule  -B/L axillary lymphadenopathy   -Masses of both breasts   -B/L cervical lymphadenopathy   -B/L supraclavicular lymphadenopathy   -staging PET-CT 09/13/2024:  (was supposed to be performed before starting chemotherapy; could only be performed 4 days after starting palliative chemotherapy with Taxol):  Bilateral breast masses; bilateral axillary, bilateral cervical, bilateral supraclavicular, right internal mammary, and mediastinal lymphadenopathy; metastatic mass in liver; metastatic retrocrural lymphadenopathy  -09/19/2024: Patient refused liver biopsy (apparently, in denies; according to her, liver lesion is just a cyst)  -------------------------------------  -02/26/2025:  IDC left breast:  1. Left breast mass # 1, needle biopsy:  IDC, overall grade 3  2. Left breast mass # 2, needle biopsy: IDC, overall grade  3  -left axillary lymph node, needle biopsy:  Positive for abundant high-grade carcinoma, with features compatible with breast carcinoma in specimens 1 and 2 (above)  [ER negative, 0%; ID negative, 0%; HER2 score 1+; Ki-67 high proliferation, 95%]    -02/27/2025:  Bilateral diagnostic mammogram, complete ultrasound left breast: Overall study BI-RADS: 5-highly suggestive of malignancy (1.2 cm lymph node left axilla; 1.6 cm left breast mass; 1.0 cm left breast mass; several additional subcentimeter masses left breast highly suggestive of malignancy; right breast 8.4 cm known triple negative invasive malignancy; right breast is contracted and there is diffuse skin and trabecular thickening)    # Molecular markers:  -ER negative (0%); ID negative (0%); HER2 negative (0); Ki-67 high proliferation (93.6%)  -tissue NGS testing (performed on right supraclavicular mass biopsy 06/27/2024):    Positive for PD-L1 expression (CPS 15)  HRD positive (CELINE-high); negative for AKT 1, BRAF, BRCA1, BRCA2, ESR 1, etc.  -08/09/2024: Liquid biopsy:  Borderline TMB  -genetic testing 08/09/2024:  Negative  ---------------------------------------------------  -IDC left breast (S/P left breast mass and axillary lymph node biopsies 02/26/2025):  ER 0, ID 0, HER2 1+    # Chemotherapy-induced peripheral neuropathy:  -as of 11/12/2024, on Lyrica 50 mg in the morning, 50 mg in the afternoon, and 100 mg q.h.s. for neuropathy in right and left feet; her podiatrist has discontinued gabapentin due to Achilles tendinitis  -12/03/2024: Peripheral neuropathy is in the form of numbness in fingertips and toes; no tingling or pain; takes gabapentin 300 mg p.o. t.i.d. as well as Lyrica 50 mg in the morning, 50 mg in the afternoon, and 100 mg p.o. q.h.s. (prescribed by her podiatrist for at least tendinitis)  -for peripheral neuropathy, taking Cymbalta 30 mg p.o. q.h.s.  >>>  Continue Cymbalta 30 mg p.o. q.h.s.; she has discontinued Lyrica    #  Hypercalcemia:  -08/08/2024:  Mild hypercalcemia: Calcium 10.6, albumin 3.9; intact PTH level 100.3, elevated; PTH related peptide normal; vitamin-D level normal  -09/30/2024:  Calcium 10.7 elevated; albumin 3.8; vitamin-D level 16 normal; intact PTH level 64.9 normal; ionized calcium level 5.22 normal; PTH related peptide 0.7 normal    # Graves' disease:   -diagnosed 07/2023  -07/11/2023: CT soft tissues of the neck with contrast (dysphagia, acute thyrotoxicosis, goiter): Mild diffuse enlargement of the thyroid gland without displacement or mass effect of the aerodigestive tract; no suspicious cervical lymphadenopathy  -07/12/2023: Ultrasound thyroid:  Hoarseness, thyrotoxicosis:  Heterogeneous hyperemic thyroid typical of thyroiditis; small 6 mm mid pole right thyroid nodule is not suspicious and does not warrant surveillance per TI-RADS criteria  -confirmed with TSH receptor antibodies (TRAb/TSI, i.e., thyroid-stimulating immunoglobulins)  -as of 05/21/2024, on methimazole 5 mg daily; has responded well  -endocrinologist: Roland Ross MD   -05/21/2024:  Endocrinologist plan:  Plan to complete 18 months of therapy before attempting to taper of methimazole   -12/03/2024:  Continues on methimazole 5 mg daily; follows up with endocrinologist in [place]  -12/03/2024: TSH and free T4 normal (0.97 and 1.08, respectively  >>>  -follow-up with endocrinology ([place])       Follow-up:  No follow-ups on file.

## 2025-03-25 ENCOUNTER — LAB VISIT (OUTPATIENT)
Dept: HEMATOLOGY/ONCOLOGY | Facility: CLINIC | Age: 60
End: 2025-03-25
Attending: INTERNAL MEDICINE
Payer: MEDICAID

## 2025-03-25 DIAGNOSIS — T45.1X5A CHEMOTHERAPY-INDUCED NEUROPATHY: ICD-10-CM

## 2025-03-25 DIAGNOSIS — G62.0 CHEMOTHERAPY-INDUCED NEUROPATHY: ICD-10-CM

## 2025-03-25 DIAGNOSIS — F32.A DEPRESSION, UNSPECIFIED DEPRESSION TYPE: ICD-10-CM

## 2025-03-25 LAB
ALBUMIN SERPL-MCNC: 3.8 G/DL (ref 3.5–5)
ALBUMIN/GLOB SERPL: 1.2 RATIO (ref 1.1–2)
ALP SERPL-CCNC: 101 UNIT/L (ref 40–150)
ALT SERPL-CCNC: 18 UNIT/L (ref 0–55)
ANION GAP SERPL CALC-SCNC: 8 MEQ/L
AST SERPL-CCNC: 18 UNIT/L (ref 11–45)
BASOPHILS # BLD AUTO: 0.02 X10(3)/MCL
BASOPHILS NFR BLD AUTO: 0.5 %
BILIRUB SERPL-MCNC: 0.9 MG/DL
BUN SERPL-MCNC: 18.9 MG/DL (ref 9.8–20.1)
CALCIUM SERPL-MCNC: 9.6 MG/DL (ref 8.4–10.2)
CHLORIDE SERPL-SCNC: 107 MMOL/L (ref 98–107)
CO2 SERPL-SCNC: 27 MMOL/L (ref 22–29)
CREAT SERPL-MCNC: 0.77 MG/DL (ref 0.55–1.02)
CREAT/UREA NIT SERPL: 25
EOSINOPHIL # BLD AUTO: 0.1 X10(3)/MCL (ref 0–0.9)
EOSINOPHIL NFR BLD AUTO: 2.7 %
ERYTHROCYTE [DISTWIDTH] IN BLOOD BY AUTOMATED COUNT: 13.4 % (ref 11.5–17)
GFR SERPLBLD CREATININE-BSD FMLA CKD-EPI: >60 ML/MIN/1.73/M2
GLOBULIN SER-MCNC: 3.3 GM/DL (ref 2.4–3.5)
GLUCOSE SERPL-MCNC: 90 MG/DL (ref 74–100)
HCT VFR BLD AUTO: 30 % (ref 37–47)
HGB BLD-MCNC: 10 G/DL (ref 12–16)
IMM GRANULOCYTES # BLD AUTO: 0.02 X10(3)/MCL (ref 0–0.04)
IMM GRANULOCYTES NFR BLD AUTO: 0.5 %
LYMPHOCYTES # BLD AUTO: 1.32 X10(3)/MCL (ref 0.6–4.6)
LYMPHOCYTES NFR BLD AUTO: 35.1 %
MCH RBC QN AUTO: 33.1 PG (ref 27–31)
MCHC RBC AUTO-ENTMCNC: 33.3 G/DL (ref 33–36)
MCV RBC AUTO: 99.3 FL (ref 80–94)
MONOCYTES # BLD AUTO: 0.18 X10(3)/MCL (ref 0.1–1.3)
MONOCYTES NFR BLD AUTO: 4.8 %
NEUTROPHILS # BLD AUTO: 2.12 X10(3)/MCL (ref 2.1–9.2)
NEUTROPHILS NFR BLD AUTO: 56.4 %
NRBC BLD AUTO-RTO: 0 %
PLATELET # BLD AUTO: 209 X10(3)/MCL (ref 130–400)
PMV BLD AUTO: 10.2 FL (ref 7.4–10.4)
POTASSIUM SERPL-SCNC: 3.6 MMOL/L (ref 3.5–5.1)
PROT SERPL-MCNC: 7.1 GM/DL (ref 6.4–8.3)
RBC # BLD AUTO: 3.02 X10(6)/MCL (ref 4.2–5.4)
SODIUM SERPL-SCNC: 142 MMOL/L (ref 136–145)
WBC # BLD AUTO: 3.76 X10(3)/MCL (ref 4.5–11.5)

## 2025-03-25 PROCEDURE — 80053 COMPREHEN METABOLIC PANEL: CPT

## 2025-03-25 PROCEDURE — 85025 COMPLETE CBC W/AUTO DIFF WBC: CPT

## 2025-03-25 PROCEDURE — 36415 COLL VENOUS BLD VENIPUNCTURE: CPT

## 2025-03-26 ENCOUNTER — TELEPHONE (OUTPATIENT)
Dept: HEMATOLOGY/ONCOLOGY | Facility: CLINIC | Age: 60
End: 2025-03-26
Payer: MEDICAID

## 2025-04-01 ENCOUNTER — LAB VISIT (OUTPATIENT)
Dept: HEMATOLOGY/ONCOLOGY | Facility: CLINIC | Age: 60
End: 2025-04-01
Attending: INTERNAL MEDICINE
Payer: MEDICAID

## 2025-04-01 DIAGNOSIS — T45.1X5A CHEMOTHERAPY-INDUCED NEUROPATHY: ICD-10-CM

## 2025-04-01 DIAGNOSIS — F32.A DEPRESSION, UNSPECIFIED DEPRESSION TYPE: ICD-10-CM

## 2025-04-01 DIAGNOSIS — G62.0 CHEMOTHERAPY-INDUCED NEUROPATHY: ICD-10-CM

## 2025-04-01 LAB
ABS NEUT CALC (OHS): 1.11 X10(3)/MCL (ref 2.1–9.2)
ALBUMIN SERPL-MCNC: 4 G/DL (ref 3.5–5)
ALBUMIN/GLOB SERPL: 1.1 RATIO (ref 1.1–2)
ALP SERPL-CCNC: 105 UNIT/L (ref 40–150)
ALT SERPL-CCNC: 12 UNIT/L (ref 0–55)
ANION GAP SERPL CALC-SCNC: 5 MEQ/L
AST SERPL-CCNC: 15 UNIT/L (ref 11–45)
BASOPHILS NFR BLD MANUAL: 0.02 X10(3)/MCL (ref 0–0.2)
BASOPHILS NFR BLD MANUAL: 1 % (ref 0–2)
BILIRUB SERPL-MCNC: 0.7 MG/DL
BUN SERPL-MCNC: 16.3 MG/DL (ref 9.8–20.1)
CALCIUM SERPL-MCNC: 9.6 MG/DL (ref 8.4–10.2)
CHLORIDE SERPL-SCNC: 108 MMOL/L (ref 98–107)
CO2 SERPL-SCNC: 29 MMOL/L (ref 22–29)
CREAT SERPL-MCNC: 0.75 MG/DL (ref 0.55–1.02)
CREAT/UREA NIT SERPL: 22
EOSINOPHIL NFR BLD MANUAL: 0.07 X10(3)/MCL (ref 0–0.9)
EOSINOPHIL NFR BLD MANUAL: 3 % (ref 0–8)
ERYTHROCYTE [DISTWIDTH] IN BLOOD BY AUTOMATED COUNT: 13.6 % (ref 11.5–17)
GFR SERPLBLD CREATININE-BSD FMLA CKD-EPI: >60 ML/MIN/1.73/M2
GLOBULIN SER-MCNC: 3.5 GM/DL (ref 2.4–3.5)
GLUCOSE SERPL-MCNC: 92 MG/DL (ref 74–100)
HCT VFR BLD AUTO: 31.2 % (ref 37–47)
HGB BLD-MCNC: 10.3 G/DL (ref 12–16)
LYMPHOCYTES NFR BLD MANUAL: 0.78 X10(3)/MCL (ref 0.6–4.6)
LYMPHOCYTES NFR BLD MANUAL: 35 % (ref 13–40)
MCH RBC QN AUTO: 32.9 PG (ref 27–31)
MCHC RBC AUTO-ENTMCNC: 33 G/DL (ref 33–36)
MCV RBC AUTO: 99.7 FL (ref 80–94)
MONOCYTES NFR BLD MANUAL: 0.25 X10(3)/MCL (ref 0.1–1.3)
MONOCYTES NFR BLD MANUAL: 11 % (ref 2–11)
NEUTROPHILS NFR BLD MANUAL: 50 % (ref 47–80)
NRBC BLD AUTO-RTO: 0 %
PLATELET # BLD AUTO: 264 X10(3)/MCL (ref 130–400)
PLATELET # BLD EST: ADEQUATE 10*3/UL
PMV BLD AUTO: 9.9 FL (ref 7.4–10.4)
POTASSIUM SERPL-SCNC: 3.3 MMOL/L (ref 3.5–5.1)
PROT SERPL-MCNC: 7.5 GM/DL (ref 6.4–8.3)
RBC # BLD AUTO: 3.13 X10(6)/MCL (ref 4.2–5.4)
RBC MORPH BLD: NORMAL
SODIUM SERPL-SCNC: 142 MMOL/L (ref 136–145)
WBC # BLD AUTO: 2.23 X10(3)/MCL (ref 4.5–11.5)

## 2025-04-01 PROCEDURE — 85025 COMPLETE CBC W/AUTO DIFF WBC: CPT

## 2025-04-01 PROCEDURE — 36415 COLL VENOUS BLD VENIPUNCTURE: CPT

## 2025-04-01 PROCEDURE — 80053 COMPREHEN METABOLIC PANEL: CPT

## 2025-04-03 ENCOUNTER — TELEPHONE (OUTPATIENT)
Dept: HEMATOLOGY/ONCOLOGY | Facility: CLINIC | Age: 60
End: 2025-04-03
Payer: MEDICAID

## 2025-04-03 DIAGNOSIS — C50.911 RECURRENT BREAST CANCER, RIGHT: Primary | ICD-10-CM

## 2025-04-08 ENCOUNTER — LAB VISIT (OUTPATIENT)
Dept: HEMATOLOGY/ONCOLOGY | Facility: CLINIC | Age: 60
End: 2025-04-08
Attending: INTERNAL MEDICINE
Payer: MEDICAID

## 2025-04-08 ENCOUNTER — INFUSION (OUTPATIENT)
Dept: INFUSION THERAPY | Facility: HOSPITAL | Age: 60
End: 2025-04-08
Attending: INTERNAL MEDICINE
Payer: MEDICAID

## 2025-04-08 VITALS
HEIGHT: 65 IN | BODY MASS INDEX: 33.59 KG/M2 | SYSTOLIC BLOOD PRESSURE: 153 MMHG | WEIGHT: 201.63 LBS | HEART RATE: 85 BPM | OXYGEN SATURATION: 96 % | RESPIRATION RATE: 16 BRPM | TEMPERATURE: 99 F | DIASTOLIC BLOOD PRESSURE: 72 MMHG

## 2025-04-08 VITALS
OXYGEN SATURATION: 100 % | TEMPERATURE: 98 F | SYSTOLIC BLOOD PRESSURE: 137 MMHG | DIASTOLIC BLOOD PRESSURE: 94 MMHG | WEIGHT: 201.5 LBS | HEIGHT: 65 IN | HEART RATE: 83 BPM | BODY MASS INDEX: 33.57 KG/M2 | RESPIRATION RATE: 20 BRPM

## 2025-04-08 DIAGNOSIS — T45.1X5A PERIPHERAL NEUROPATHY DUE TO CHEMOTHERAPY: ICD-10-CM

## 2025-04-08 DIAGNOSIS — G62.0 CHEMOTHERAPY-INDUCED NEUROPATHY: ICD-10-CM

## 2025-04-08 DIAGNOSIS — D84.821 IMMUNODEFICIENCY DUE TO CHEMOTHERAPY: ICD-10-CM

## 2025-04-08 DIAGNOSIS — C50.911 RECURRENT BREAST CANCER, RIGHT: Primary | ICD-10-CM

## 2025-04-08 DIAGNOSIS — T45.1X5A CHEMOTHERAPY-INDUCED NEUROPATHY: ICD-10-CM

## 2025-04-08 DIAGNOSIS — C50.911 CARCINOMA OF RIGHT BREAST, STAGE 4: Primary | ICD-10-CM

## 2025-04-08 DIAGNOSIS — C50.911 RECURRENT BREAST CANCER, RIGHT: ICD-10-CM

## 2025-04-08 DIAGNOSIS — Z79.69 IMMUNODEFICIENCY DUE TO CHEMOTHERAPY: ICD-10-CM

## 2025-04-08 DIAGNOSIS — G62.0 PERIPHERAL NEUROPATHY DUE TO CHEMOTHERAPY: ICD-10-CM

## 2025-04-08 DIAGNOSIS — L98.9 SKIN LESION OF NECK: ICD-10-CM

## 2025-04-08 DIAGNOSIS — T45.1X5A IMMUNODEFICIENCY DUE TO CHEMOTHERAPY: ICD-10-CM

## 2025-04-08 LAB
ALBUMIN SERPL-MCNC: 3.9 G/DL (ref 3.5–5)
ALBUMIN/GLOB SERPL: 1.1 RATIO (ref 1.1–2)
ALP SERPL-CCNC: 118 UNIT/L (ref 40–150)
ALT SERPL-CCNC: 15 UNIT/L (ref 0–55)
ANION GAP SERPL CALC-SCNC: 7 MEQ/L
AST SERPL-CCNC: 22 UNIT/L (ref 11–45)
BASOPHILS # BLD AUTO: 0.01 X10(3)/MCL
BASOPHILS NFR BLD AUTO: 0.3 %
BILIRUB SERPL-MCNC: 0.4 MG/DL
BUN SERPL-MCNC: 14.3 MG/DL (ref 9.8–20.1)
CALCIUM SERPL-MCNC: 9.9 MG/DL (ref 8.4–10.2)
CHLORIDE SERPL-SCNC: 107 MMOL/L (ref 98–107)
CO2 SERPL-SCNC: 27 MMOL/L (ref 22–29)
CREAT SERPL-MCNC: 0.89 MG/DL (ref 0.55–1.02)
CREAT/UREA NIT SERPL: 16
EOSINOPHIL # BLD AUTO: 0 X10(3)/MCL (ref 0–0.9)
EOSINOPHIL NFR BLD AUTO: 0 %
ERYTHROCYTE [DISTWIDTH] IN BLOOD BY AUTOMATED COUNT: 13.9 % (ref 11.5–17)
GFR SERPLBLD CREATININE-BSD FMLA CKD-EPI: >60 ML/MIN/1.73/M2
GLOBULIN SER-MCNC: 3.6 GM/DL (ref 2.4–3.5)
GLUCOSE SERPL-MCNC: 140 MG/DL (ref 74–100)
HCT VFR BLD AUTO: 33.6 % (ref 37–47)
HGB BLD-MCNC: 11 G/DL (ref 12–16)
IMM GRANULOCYTES # BLD AUTO: 0.01 X10(3)/MCL (ref 0–0.04)
IMM GRANULOCYTES NFR BLD AUTO: 0.3 %
LYMPHOCYTES # BLD AUTO: 0.62 X10(3)/MCL (ref 0.6–4.6)
LYMPHOCYTES NFR BLD AUTO: 17.9 %
MAGNESIUM SERPL-MCNC: 2.1 MG/DL (ref 1.6–2.6)
MCH RBC QN AUTO: 33.4 PG (ref 27–31)
MCHC RBC AUTO-ENTMCNC: 32.7 G/DL (ref 33–36)
MCV RBC AUTO: 102.1 FL (ref 80–94)
MONOCYTES # BLD AUTO: 0.05 X10(3)/MCL (ref 0.1–1.3)
MONOCYTES NFR BLD AUTO: 1.4 %
NEUTROPHILS # BLD AUTO: 2.77 X10(3)/MCL (ref 2.1–9.2)
NEUTROPHILS NFR BLD AUTO: 80.1 %
NRBC BLD AUTO-RTO: 0 %
PLATELET # BLD AUTO: 278 X10(3)/MCL (ref 130–400)
PMV BLD AUTO: 9.6 FL (ref 7.4–10.4)
POTASSIUM SERPL-SCNC: 4 MMOL/L (ref 3.5–5.1)
PROT SERPL-MCNC: 7.5 GM/DL (ref 6.4–8.3)
RBC # BLD AUTO: 3.29 X10(6)/MCL (ref 4.2–5.4)
SODIUM SERPL-SCNC: 141 MMOL/L (ref 136–145)
WBC # BLD AUTO: 3.46 X10(3)/MCL (ref 4.5–11.5)

## 2025-04-08 PROCEDURE — 1159F MED LIST DOCD IN RCRD: CPT | Mod: CPTII,,,

## 2025-04-08 PROCEDURE — 3077F SYST BP >= 140 MM HG: CPT | Mod: CPTII,,,

## 2025-04-08 PROCEDURE — 96413 CHEMO IV INFUSION 1 HR: CPT

## 2025-04-08 PROCEDURE — 80053 COMPREHEN METABOLIC PANEL: CPT

## 2025-04-08 PROCEDURE — 25000003 PHARM REV CODE 250

## 2025-04-08 PROCEDURE — 63600175 PHARM REV CODE 636 W HCPCS

## 2025-04-08 PROCEDURE — 1160F RVW MEDS BY RX/DR IN RCRD: CPT | Mod: CPTII,,,

## 2025-04-08 PROCEDURE — 3008F BODY MASS INDEX DOCD: CPT | Mod: CPTII,,,

## 2025-04-08 PROCEDURE — 85025 COMPLETE CBC W/AUTO DIFF WBC: CPT

## 2025-04-08 PROCEDURE — 96367 TX/PROPH/DG ADDL SEQ IV INF: CPT

## 2025-04-08 PROCEDURE — A4216 STERILE WATER/SALINE, 10 ML: HCPCS

## 2025-04-08 PROCEDURE — 36415 COLL VENOUS BLD VENIPUNCTURE: CPT

## 2025-04-08 PROCEDURE — 99214 OFFICE O/P EST MOD 30 MIN: CPT | Mod: PBBFAC

## 2025-04-08 PROCEDURE — 96375 TX/PRO/DX INJ NEW DRUG ADDON: CPT

## 2025-04-08 PROCEDURE — 83735 ASSAY OF MAGNESIUM: CPT

## 2025-04-08 PROCEDURE — 99215 OFFICE O/P EST HI 40 MIN: CPT | Mod: S$PBB,,,

## 2025-04-08 PROCEDURE — 3078F DIAST BP <80 MM HG: CPT | Mod: CPTII,,,

## 2025-04-08 PROCEDURE — 96415 CHEMO IV INFUSION ADDL HR: CPT

## 2025-04-08 RX ORDER — FAMOTIDINE 10 MG/ML
20 INJECTION, SOLUTION INTRAVENOUS
Status: CANCELLED | OUTPATIENT
Start: 2025-04-08

## 2025-04-08 RX ORDER — SODIUM CHLORIDE 0.9 % (FLUSH) 0.9 %
10 SYRINGE (ML) INJECTION
Status: DISCONTINUED | OUTPATIENT
Start: 2025-04-08 | End: 2025-04-08 | Stop reason: HOSPADM

## 2025-04-08 RX ORDER — EPINEPHRINE 0.3 MG/.3ML
0.3 INJECTION SUBCUTANEOUS ONCE AS NEEDED
Status: CANCELLED | OUTPATIENT
Start: 2025-04-08

## 2025-04-08 RX ORDER — EPINEPHRINE 0.3 MG/.3ML
0.3 INJECTION SUBCUTANEOUS ONCE AS NEEDED
Status: DISCONTINUED | OUTPATIENT
Start: 2025-04-08 | End: 2025-04-08 | Stop reason: HOSPADM

## 2025-04-08 RX ORDER — DIPHENHYDRAMINE HYDROCHLORIDE 50 MG/ML
50 INJECTION, SOLUTION INTRAMUSCULAR; INTRAVENOUS
Status: COMPLETED | OUTPATIENT
Start: 2025-04-08 | End: 2025-04-08

## 2025-04-08 RX ORDER — MUPIROCIN 20 MG/G
OINTMENT TOPICAL 3 TIMES DAILY
Qty: 22 G | Refills: 0 | Status: SHIPPED | OUTPATIENT
Start: 2025-04-08

## 2025-04-08 RX ORDER — HEPARIN 100 UNIT/ML
500 SYRINGE INTRAVENOUS
Status: DISCONTINUED | OUTPATIENT
Start: 2025-04-08 | End: 2025-04-08 | Stop reason: HOSPADM

## 2025-04-08 RX ORDER — DIPHENHYDRAMINE HYDROCHLORIDE 50 MG/ML
50 INJECTION, SOLUTION INTRAMUSCULAR; INTRAVENOUS ONCE AS NEEDED
Status: DISCONTINUED | OUTPATIENT
Start: 2025-04-08 | End: 2025-04-08 | Stop reason: HOSPADM

## 2025-04-08 RX ORDER — FAMOTIDINE 10 MG/ML
20 INJECTION, SOLUTION INTRAVENOUS
Status: COMPLETED | OUTPATIENT
Start: 2025-04-08 | End: 2025-04-08

## 2025-04-08 RX ORDER — DIPHENHYDRAMINE HYDROCHLORIDE 50 MG/ML
50 INJECTION, SOLUTION INTRAMUSCULAR; INTRAVENOUS ONCE AS NEEDED
Status: CANCELLED | OUTPATIENT
Start: 2025-04-08

## 2025-04-08 RX ORDER — SODIUM CHLORIDE 0.9 % (FLUSH) 0.9 %
10 SYRINGE (ML) INJECTION
Status: CANCELLED | OUTPATIENT
Start: 2025-04-08

## 2025-04-08 RX ORDER — HEPARIN 100 UNIT/ML
500 SYRINGE INTRAVENOUS
Status: CANCELLED | OUTPATIENT
Start: 2025-04-08

## 2025-04-08 RX ORDER — LEVOFLOXACIN 500 MG/1
500 TABLET, FILM COATED ORAL
COMMUNITY
Start: 2025-02-11

## 2025-04-08 RX ORDER — DIPHENHYDRAMINE HYDROCHLORIDE 50 MG/ML
50 INJECTION, SOLUTION INTRAMUSCULAR; INTRAVENOUS
Status: CANCELLED
Start: 2025-04-08

## 2025-04-08 RX ADMIN — HEPARIN 500 UNITS: 100 SYRINGE at 02:04

## 2025-04-08 RX ADMIN — PACLITAXEL 276 MG: 6 INJECTION, SOLUTION INTRAVENOUS at 12:04

## 2025-04-08 RX ADMIN — DEXAMETHASONE SODIUM PHOSPHATE 20 MG: 4 INJECTION, SOLUTION INTRA-ARTICULAR; INTRALESIONAL; INTRAMUSCULAR; INTRAVENOUS; SOFT TISSUE at 11:04

## 2025-04-08 RX ADMIN — DIPHENHYDRAMINE HYDROCHLORIDE 50 MG: 50 INJECTION INTRAMUSCULAR; INTRAVENOUS at 11:04

## 2025-04-08 RX ADMIN — FAMOTIDINE 20 MG: 10 INJECTION, SOLUTION INTRAVENOUS at 11:04

## 2025-04-08 RX ADMIN — Medication 10 ML: at 02:04

## 2025-04-08 RX ADMIN — SODIUM CHLORIDE: 9 INJECTION, SOLUTION INTRAVENOUS at 11:04

## 2025-04-08 NOTE — PROGRESS NOTES
Reason for Follow-up:  -history of right breast cancer, diagnosed , ER negative, CO negative, HER2 positive, T2 N1 M0, S/P neoadjuvant chemotherapy, lumpectomy, axillary lymph dissection, adjuvant radiotherapy (completed 10/24/2022)  -local and regional recurrence, triple negative, diagnosed on biopsy of supraclavicular lymph node 2024; on mammogram, right breast mass; supraclavicular lymphadenopathy; right cervical lymphadenopathy  -Postmenopausal   -Hypercalcemia   -High serum parathyroid hormone (PTH)   -Liver mass, right lobe   -Mediastinal lymphadenopathy   -Lung nodules   -Breast cancer metastasized to axillary lymph node, left   -Cervical lymphadenopathy   -history of Graves disease    History:  Past medical history: Anemia; arthritis; breast cancer; Graves disease (diagnosed ; started on methimazole by endocrinologist in [place], in ); hypertension; peripheral neuropathy  -2023:  Venous Doppler bilateral lower extremities (swelling):  No DVT  -2023: TTE:  LVEF 55-60%  -2023:  Limited abdominal ultrasound (elevated liver enzymes):  6.6 cm cyst within the liver near the gallbladder; cholelithiasis with minimal gallbladder wall thickening  -2014:  Pelvic ultrasound: Markedly enlarged uterus with multiple large fibroids; endometrial stripe is thickened, 1.4 cm  Procedure/surgical history: Breast lumpectomy   Social history:  .  Lives in [place].  No children.  Never wanted to have children.  Never became pregnant.  No history of tobacco, alcohol, or illicit drug abuse.  Does not work.  Family history:  Sister experienced hyperthyroidism, treated with radioiodine.  She does not know much about her family history but says that there are cancers in folks on both parents sides of family.  A female cousin on mother's side of family experienced breast cancer in her 30s and  from it.  Health maintenance:  Primary care provider in [facility].  Last  mammogram November 2023 at [facility].  No screening colonoscopy ever.  Menstrual/Ob gyn history:  Menarche at age 13.  Last menstrual cycle July 2023.  Never became pregnant.  Never wanted to become pregnant.  Took birth control pills for 2 years several years ago.  No hormone replacement therapy after menopause.          Family History   Problem Relation Name Age of Onset    Breast cancer Maternal Cousin          History of Present Illness:   Follow-up (Patient reported left breast swollen, swelling in hands and arm, numbness in hands and feet worst in left.  Patient stated weakness in legs especially left leg, bump on neck right side, balance off, nausea, hot flashes, sinus headaches.)        Oncologic/Hematologic History:  Oncology History   Stage IV carcinoma of breast   8/27/2024 Initial Diagnosis    Stage IV carcinoma of breast     9/9/2024 -  Chemotherapy    Treatment Summary   Plan Name: OP BREAST PACLITAXEL Q3W  Treatment Goal: Palliative  Status: Active  Start Date: 9/9/2024  End Date: 5/20/2025 (Planned)  Provider: Israel Farah MD  Chemotherapy: PACLitaxeL (TAXOL) 175 mg/m2 = 366 mg in 0.9% NaCl 500 mL chemo infusion, 175 mg/m2 = 366 mg, Intravenous, Clinic/HOD 1 time, 10 of 13 cycles  Dose modification: 131.25 mg/m2 (original dose 175 mg/m2, Cycle 10, Reason: MD Discretion, Comment: severe neutropenia post last cycle)  Administration: 366 mg (9/9/2024), 366 mg (9/30/2024), 366 mg (10/22/2024), 366 mg (11/12/2024), 366 mg (12/3/2024), 366 mg (12/24/2024), 366 mg (1/14/2025), 366 mg (2/4/2025), 366 mg (2/25/2025), 276 mg (3/18/2025)     12/3/2024 Cancer Staged    Staging form: Breast, AJCC 8th Edition  - Clinical stage from 12/3/2024: Stage IV (rcTX, cN3c, pM1, ER-, WA-, HER2-)     ====================================      59-year-old lady, referred by Rere Jernigan MD, surgery, with recurrent breast cancer.      07/10/2024:  Office note: Rere Jernigan MD (surgery):  Pt is a 59  y.o. female with history of right breast cancer s/p BCT in 2002 who presents with painless large supraclavicular mass.    First noticed it 4 months ago and has steadily increased in size past month.  Last mammogram 1 year ago and new [place].  Also reports progressive skin changes of the right breast not associated with lumpectomy incision.  Receptor status unknown.       Investigations reviewed:  -no old records available   -according to her, she was diagnosed with right breast cancer in January 2022  -mammogram showed a large lobulated mass, 3.7 x 2.7 cm  -biopsy:  Infiltrative ductal carcinoma, possibly invasive lobular  -right breast mass was palpable in the upper outer quadrant; axillary lymphadenopathy was noted (3-4 palpable lymph nodes right axilla)  -T2 N1 M0 IDC right breast (she had palpable axillary lymph nodes in the right and a large palpable mass in the right breast)  -ER negative; WA negative; HER2 positive  -S/P neoadjuvant chemotherapy (according to her, she received 4 cycles of neoadjuvant chemotherapy with Adriamycin/Taxotere every 3 weeks apart x4 cycles)  -followed by lumpectomy and axillary dissection  -no residual tumor post chemotherapy; 17 axillary lymph nodes negative for tumor  -S/P adjuvant radiotherapy, 6600 cGy/35 fractions, completed 10/24/2022  -07/28/2011:  DEXA scan:  BMD normal  -12/19/2022:  Screening mammogram (comparison: 11/08/2019, etc.):  BI-RADS: 2-benign  -07/06/2023:  Echocardiogram:  LVEF 60%  -11/27/2023:  Bilateral diagnostic mammogram and limited ultrasound right breast (comparison: 12/19/2022, 12/16/2001, etc.) (history of right breast cancer with worsening right breast pain and thickening) large elongated heterogeneous mass with irregular borders outer aspect of the right breast (extending from the nipple outward towards the lateral chest wall at the 8-9 o'clock position, 5.8 x 3 x 1.8 cm although margins are difficult to accurately define), highly suspicious for  recurrent malignancy; there is a separate elongated hypoechoic lesion in the upper outer quadrant right breast 11 o'clock position, 5 cm from nipple, 3 x 2.6 x 0.7 cm, near the site of previous surgery), perhaps benign scar tissue related to previous lumpectomy:  BI-RADS: 5-highly suggestive of malignancy  -07/10/2024:  Surgery Office note:  Painless large supraclavicular mass, noted 4 months ago, steadily enlarging; also reported progressive skin changes right breast not associated with lumpectomy incision; on physical exam, large exophytic right supraclavicular nodule, nonmobile and fixed, overlying skin erythematous without ulceration; right upper outer quadrant lumpectomy incision; right lower inner quadrant periareolar ecchymosis with subcutaneous firmness without distinct mass; slight nipple inversion; no drainage; no palpable axillary lymphadenopathy  -05/30/2024:  Ultrasound soft tissues of the head and neck (lymphadenopathy): Pathologic lymphadenopathy (4.5 x 3.7 x 3.2 cm) at the base of the neck on the right; multiple smaller morphologically abnormal cervical chain lymph nodes on the right which demonstrate heterogeneous echogenicity similar to the dominant mass. Findings highly suspicious for malignancy particularly in this patient with a known history of prior right breast cancer. Dominant mass lesion corresponds with palpable complaint. Recommend biopsy/tissue diagnosis.   -06/27/2024:  Right supraclavicular mass, core needle biopsy (firm 6 x 6 cm exophytic right supraclavicular mass): Metastatic carcinoma in fibroadipose tissue, moderately to poorly-differentiated, consistent with breast origin (metastatic breast carcinoma in fibroadipose tissue  -ER negative (0%); PA negative (0%); HER2 negative (0); Ki-67 high proliferation  (93.6%)    08/08/2024:   Pleasant, healthy-appearing lady who presents for initial medical oncology consultation.  In no acute discomfort.    Says that right supra clavicular  mass started April 2024; it has been enlarging and fluctuating in size.  It is painful.  7/10 severity pain.  Also, pain was right breast area.  Right breast is enlarging.  There is a small area of ulceration in the right breast.  No bleeding or discharge.  She denies fevers or chills.  Has been taking Lyrica meloxicam without the relief of pain.  We will start Norco.  Some fatigue.  However, ECOG 1.  Pain from neck down to breast area, especially at night.  No unusual headaches, focal neurological symptoms, vision impairment, gait impairment, hemoptysis, fevers, chills, any bleeding or discharge from right breast superficial ulceration, abdominal pain, nausea, vomiting, GI bleeding, etc..  No bone pains.  Appetite is fair.    Interval History 4/8/25:  Patient presented to the clinic today accompanied by her mother for a scheduled clinic visit She is due to receive C11D1 today in infusion. She reports compliance with her potassium supplements and Zyprexa daily. She denies any fever, chills, shortness of breath or any other signs and symptoms associated with infection. She reports having some swelling in bilateral arms.  She also reports that she has a growth on the right side of her neck that has grown over the past several days. She denies any abdominal pain, diarrhea, constipation or any changes with appetite.  Denies having any acute pain.  Lab work was reviewed with the patient.  All future appointments were discussed.    08/28/2024:   -tissue NGS testing (performed on right supraclavicular mass biopsy 06/27/2024):  HRD positive (CELINE-high); negative for AKT 1, BRAF, BRCA1, BRCA2, ESR 1, etc.  -08/09/2024: Liquid biopsy:  Borderline TMB  -08/08/2024: Hemoglobin 11.9; .6; rest of CBC unremarkable; calcium 10.6; albumin 3.9 (mild hypercalcemia); vitamin-D level normal; PTH level 100.3, elevated; FSH 79.87, postmenopausal; estradiol level <24; ionized level 5.03 (normal, on 08/09/2024); PTH related peptide  normal on 08/09/2024  -08/14/2024: Echocardiogram:  LVEF 55-60%  -08/19/2024: MediPort placed  -08/20/2024:  Staging brain MRI with and without contrast: No brain metastases  -08/20/2024: Staging CTs C/A/P with IV contrast:   1. Irregular mixed cystic and solid right breast lesion with extensive bilateral breast skin thickening, low left axillary adenopathy, and a few enlarged mediastinal/hilar lymph nodes.   2. Changes of right axillary node dissection without definite pathologic adenopathy through the region.  3. Hypodense left hepatic mass raises concern for metastatic involvement.  No other definite findings suspicious for metastasis through the abdomen and pelvis.  4. Subcentimeter right lung nodules are indeterminate given absence of comparison images to establish chronicity.  Close attention on surveillance imaging is needed.  5. Cholelithiasis without findings of acute cholecystitis or biliary obstruction.  6. Simple left upper renal cortex cyst.  7. Massively enlarged multifocal uterine leiomyoma.  (scattered noncalcified lung nodules; anterior inferior right upper lobe nodule 0.6 x 0.6 mm; superior right lower lobe nodule 0.7 x 0.7 cm; right lower lobe nodule 0.9 x 0.9 cm; inferior left hepatic lobe hypodense circumscribed lesion 2.2 x 2.8 cm; additional mm hypodense foci scattered throughout the liver, too small for more detailed characterization; scattered mediastinal lymph nodes, for example, pretracheal lymph node 1.2 cm, right hilar lymph node 1.2 cm, enlarged left axillary lymph nodes, representative left axillary lymph node 1.9 cm; scattered nonenlarged retroperitoneal lymph nodes; no pathologic abdominopelvic nicholas enlargement; extensive bilateral skin thickening of the breasts; right breast heterogeneous mixed cystic and solid mass with irregular/lobulated margins upper outer and lateral subareolar region 6.2 x 5.9 x 4.1 cm)  -08/20/2024: CT soft tissues of the neck with contrast: Large pathologic  lymph node right supraclavicular fossa with central necrosis up to 5.4 cm; 3 additional subcentimeter pathologic appearing lymph nodes right level 3; indeterminate soft tissue nodule medial right breast 1.5 x 1.2 cm  -08/23/2024:  Whole-body nuclear medicine bone scan: No bone metastases  Presents for a follow-up visit.  We discussed all labs and scans in detail.  Some fatigue.  ECOG 1.  Minor headaches.  No unusual headaches, seizures, or focal neurological symptoms.  Some nausea.  Great appetite.  No chest pain, cough, or dyspnea.  No abdominal pain, nausea, vomiting.    Review of Systems:   All systems reviewed and found to be negative except for the symptoms detailed above    Physical Examination:   VITAL SIGNS:   Vitals:    04/08/25 1033   BP: (!) 153/72   Pulse: 85   Resp: 16   Temp: 98.6 °F (37 °C)         Physical Exam  Vitals reviewed.   Constitutional:       Appearance: Normal appearance.   HENT:      Head: Normocephalic and atraumatic.      Mouth/Throat:      Mouth: Mucous membranes are moist.   Cardiovascular:      Rate and Rhythm: Normal rate and regular rhythm.      Pulses: Normal pulses.      Heart sounds: Normal heart sounds.   Pulmonary:      Effort: Pulmonary effort is normal.      Breath sounds: Normal breath sounds.   Abdominal:      General: Bowel sounds are normal.      Palpations: Abdomen is soft.   Skin:     General: Skin is warm and dry.   Neurological:      Mental Status: She is alert and oriented to person, place, and time.   Psychiatric:         Mood and Affect: Mood normal.         Behavior: Behavior normal.         Thought Content: Thought content normal.         Judgment: Judgment normal.        Assessment:  History of right breast IDC, ER negative, CA negative, HER2 positive::  -mammogram: 3.7 x 2.7 cm right breast mass  -pathology: Infiltrative ductal carcinoma, possibly invasive lobular  -ER negative, CA negative, HER2 positive   -T2 N1 M0; palpable axillary lymphadenopathy; large  palpable mass right breast)  -S/P neoadjuvant chemotherapy (according to her, she received 4 cycles of neoadjuvant chemotherapy with Adriamycin/Taxotere every 3 weeks apart x4 cycles)  -followed by lumpectomy and axillary dissection  -no residual tumor post chemotherapy; 17 axillary lymph nodes negative for tumor  -S/P adjuvant radiotherapy, 6600 cGy/35 fractions, completed 10/24/2022  >>>  -screening mammogram 12/19/2022: BI-RADS: 2-benign  >>>  Regional, local, and metastatic recurrence, triple negative:  -echocardiogram 07/06/2023: LVEF 60%  -mammogram and ultrasound 11/27/2023:  Right breast mass 5.8 x 3 x 1.8 cm: BI-RADS: 5  -surgery consultation/follow-up 07/10/2024: Painless large supraclavicular mass, noted 4 months ago, steadily enlarging; progressive skin changes right breast not associated with lumpectomy incision, large exophytic right supraclavicular nodule/lymphadenopathy) nonmobile and fixed; overlying skin erythematous without ulceration), right lower inner quadrant periareolar ecchymosis and subcutaneous firmness without distinct mass; no palpable axillary lymphadenopathy  -ultrasound neck 05/30/2024:  Pathologic lymphadenopathy 4.5 x 3.7 x 3.2 cm at the base of the neck of the right; multiple smaller morphologically abnormal cervical chain lymph nodes on the right  -core needle biopsy right supraclavicular mass 06/27/2024:  Firm, 6 x 6 cm: Moderately to poorly-differentiated metastatic breast carcinoma  -ER negative (0%); RI negative (0%); HER2 negative (0); Ki-67 high proliferation (93.6%)  -08/08/2024: Breast examination was performed with her a verbal consent, in the presence of Jani Heck LPN; entire right breast is involved with tumor; entire right breast is indurated with tumor, with conglomeration of nodules around the central area; a small superficial ulceration is noted along the inferior medial aspect of right areolar area; a large slightly tender 6 supraclavicular masses noted;  diffuse edema is noted in the right supraclavicular area and right breast area as well as infraclavicular area; left breast is normal  -tissue NGS testing (performed on right supraclavicular mass biopsy 06/27/2024):  HRD positive (CELINE-high); negative for AKT 1, BRAF, BRCA1, BRCA2, ESR 1, etc.  -08/09/2024: Liquid biopsy:  Borderline TMB  -08/08/2024:  Mild hypercalcemia: Calcium 10.6, albumin 3.9; intact PTH level 100.3, elevated; PTH related peptide normal; vitamin-D level normal  -08/08/2024:  Labs: Postmenopausal  Hypercalcemia-08/14/2024: Echocardiogram:  LVEF 55-60%  -08/19/2024: MediPort placed  -brain MRI 08/20/2024: No brain metastases  -CTs C/A/P 0 08/20/2024: Sites of disease:  6.2 cm right breast mass; left axillary lymphadenopathy; mediastinal and hilar lymphadenopathy; hepatic mass left liver lobe 2.8 cm; right lung nodules  -CT soft tissues of the neck 08/20/2024:  Right supraclavicular pathologic lymphadenopathy up to 5.4 cm; 3 additional subcentimeter pathologic lymph nodes right level 3; left breast nodule 1.5 cm  -bone scan 08/23/2024: No bone metastases      Sites of disease/recurrence:   Right supraclavicular lymphadenopathy; right breast mass, right cervical lymphadenopathy, left axillary lymphadenopathy, mediastinal and hilar lymphadenopathy, hepatic mass (metastases), left breast nodule      Molecular markers:  -ER negative (0%); [place] negative (0%); HER2 negative (0); Ki-67 high proliferation (93.6%)  -tissue NGS testing (performed on right supraclavicular mass biopsy 06/27/2024):  HRD positive (CELINE-high); negative for AKT 1, BRAF, BRCA1, BRCA2, ESR 1, etc.  -08/09/2024: Liquid biopsy:  Borderline TMB      Graves' disease:   -diagnosed 07/2023  -07/11/2023: CT soft tissues of the neck with contrast) dysphagia, acute thyrotoxicosis, goiter): Mild diffuse enlargement of the thyroid gland without displacement or mass effect of the aerodigestive tract; no suspicious cervical  lymphadenopathy  -07/12/2023: Ultrasound thyroid:  Hoarseness, thyrotoxicosis:  Heterogeneous hyperemic thyroid typical of thyroiditis; small 6 mm mid pole right thyroid nodule is not suspicious and does not warrant surveillance per TI-RADS criteria  -confirmed with TSH receptor antibodies (TRAb/TSI, i.e., thyroid-stimulating immunoglobulins)  -as of 05/21/2024, on methimazole 5 mg daily; has responded well  -endocrinologist: Roland Ross MD   -05/21/2024:  Endocrinologist plan:  Plan to complete 18 months of therapy before attempting to taper of methimazole         Plan:  Triple Negative Breast Cancer:   Continue palliative chemotherapy every 3 weeks   From Cycle 10 onward will dose reduce by 10% for a total of 25% dose reduction   Taxol Q 3 weeks in infusion   Proceed with C11D1 today in infusion  Labwork weekly (CBC/CMP/Mag level) (4/15 & 4/22)   Restage with contrast-enhanced CT scans of C/A/P soft tissues of neck in mid May  RTC with me in 3 weeks (4/29) with labs prior (CBC/CMP/Mag level) followed by infusion for Taxol 3 hour     Lymphedema:   Refer to lymphedema therapy near her home     Skin Lesion:   Start mupirocin topical ointment   Refer to surgery clinic for evaluation   See picture below     Peripheral Neuropathy:   Continue Cymbalta 30 mg p.o. nightly     Depression:   Continue Zyprexa 5 mg p.o. nightly.     Insomnia:   Continue melatonin 10 mg nightly      -according to her, she was diagnosed with right breast cancer in January 2022  -mammogram showed a large lobulated mass, 3.7 x 2.7 cm  -biopsy:  Infiltrative ductal carcinoma, possibly invasive lobular  -right breast mass was palpable in the upper outer quadrant; axillary lymphadenopathy was noted (3-4 palpable lymph nodes right axilla)  -T2 N1 M0 IDC right breast (she had palpable axillary lymph nodes in the right and a large palpable mass in the right breast)  -ER negative; [place] negative; HER2 positive  -S/P neoadjuvant chemotherapy  (according to her, she received 4 cycles of neoadjuvant chemotherapy with Adriamycin/Taxotere every 3 weeks apart x4 cycles)  -followed by lumpectomy and axillary dissection  -no residual tumor post chemotherapy; 17 axillary lymph nodes negative for tumor  -S/P adjuvant radiotherapy, 6600 cGy/35 fractions, completed 10/24/2022  >>>  Regional, local, and metastatic recurrence, triple negative:  -now, triple negative regional and local recurrence with 4.5 cm right supraclavicular lymph node and 5.8 cm right breast mass right lower inner quadrant with periareolar ecchymosis and subcutaneous firmness without distinct mass on physical examination  -ER negative (0%); [place] negative (0%); HER2 negative (0); Ki-67 high proliferation (93.6%)  -tissue NGS testing (performed on right supraclavicular mass biopsy 06/27/2024):  HRD positive (CELINE-high); negative for AKT 1, BRAF, BRCA1, BRCA2, ESR 1, etc.  -08/09/2024: Liquid biopsy:  Borderline TMB  -08/08/2024:  Mild hypercalcemia: Calcium 10.6, albumin 3.9; intact PTH level 100.3, elevated; PTH related peptide normal; vitamin-D level normal  -08/08/2024:  Labs: Postmenopausal  Hypercalcemia  -08/14/2024: Echocardiogram:  LVEF 55-60%  -08/19/2024: MediPort placed  -brain MRI 08/20/2024: No brain metastases  -CTs C/A/P 0 08/20/2024: Sites of disease:  6.2 cm right breast mass; left axillary lymphadenopathy; mediastinal and hilar lymphadenopathy; hepatic mass left liver lobe 2.8 cm; right lung nodules  -CT soft tissues of the neck 08/20/2024:  Right supraclavicular pathologic lymphadenopathy up to 5.4 cm; 3 additional subcentimeter pathologic lymph nodes right level 3; left breast nodule 1.5 cm  -bone scan 08/23/2024: No bone metastases  >>>  -triple negative recurrent, metastatic disease   -awaiting genetic testing, FDG PET-CT, PD-L1 testing  -obviously, unresectable  -ER 0, [place] 0, HER2 0  -PD-L1 testing is pending   -genetic testing is pending  -extensive metastatic disease   -need  to start palliative systemic therapy pending PD-L1 testing and pending genetic testing  -will start chemotherapy with Taxol 175 mg per m2 IV every 3 weeks, to continue until progression or until unacceptable toxicity  -patient has been previously treated with Adriamycin/Taxotere; therefore, we will avoid Adriamycin to the extent possible  -at some point, olaparib maybe considered because of HRD positive status  -re-stage with contrast-enhanced CT scans of C/A/P/soft tissues of the neck a couple of months after starting Taxol  -check baseline tumor markers including CEA, CA 27.29, and CA 15-3 level, and follow every 3-4 weeks, if elevated  -we will refer to IR for biopsy of liver lesion (however, we will not wait for biopsy to start palliative systemic therapy)  -She understands that we need to start chemotherapy ASAP for extensive metastatic disease from an aggressive breast cancer recurrence, without waiting for PET-CT and biopsies which may take a few to several more weeks.    Chemotherapy:  -Taxol 175 mg per m2 every 3 weeks until disease progression or unacceptable toxicity    Side effects/monitoring with Taxol:  -watch for cardiovascular effects including infusion related hypotension, bradycardia, hypertension, etc.  -watch out for extravasation: Taxol is an irritant with vesicle like properties  -peripheral neuropathy:  Primarily distal sensory neuropathy; a mixture of paresthesias and dysesthesias including burning, numbness, tingling, and shooting pains, typically in a stocking-glove distribution; most mild-to-moderate cases resolve several months after discontinuation but maybe longer in patients with diabetes.  Severe neuropathy maybe irreversible in some patients  -side effects in> 10% patients:  Edema, hypotension, alopecia, nausea, vomiting, diarrhea, stomatitis, cytopenias, LFT abnormalities, peripheral neuropathy, arthralgias, myalgias, etc.  -Boxed warnings: Hypersensitivity reactions; bone marrow  suppression  -premedicate with dexamethasone (20 mg orally at 12 and 6 hours prior to paclitaxel, diphenhydramine (50 mg IV 30 to 60 minutes prior to paclitaxel), and cimetidine or famotidine (IV 30 to 60 minutes prior to paclitaxel).  -do not repeat course until neutrophil count is =/> 1500 mm3 and the platelet count is =/> 100,000 mm3; reduced doses by 20% if patient experiences severe peripheral neuropathy or severe neutropenia <500 mm3 for a week or longer     Pending:    Genetic testing;   FDG PET-CT  -PD-L1 testing  -bilateral breast MRI    -08/08/2024: Refer to wound care for management of breast wound   -08/08/2024:  Start Norco 5 mg every 4 hours PRN for pain    Plan:   1. If no metastases, and if recurrence deemed resectable, then:  Neoadjuvant chemotherapy; followed by mastectomy +/- axillary lymphadenectomy; followed by adjuvant radiotherapy to right supraclavicular lymph node; followed by adjuvant chemotherapy  2. If no metastases, and if recurrence deemed unresectable, then: Palliative systemic therapy  3. If metastatic disease, then:  Palliative systemic therapy    >>>  -08/28/2024: On imaging studies, found to have stage IV disease; therefore, we are going to treat her with palliative systemic therapy    History of Graves disease   -follow-up with endocrinology    Above discussed at length with the patient.  All questions answered.    Discussed labs, scans, and pathology report, and gave her copies of relevant records.  Plan of management discussed in detail.    Explained the following: That based upon imaging studies, she has stage IV, incurable disease; palliation can be attempted with systemic therapy but response can not be guaranteed; prognosis guarded.  Potential side effects of Taxol discussed.  Gave her educational materials from [employer].  She understands that we need to start chemotherapy ASAP for extensive metastatic disease from an aggressive breast cancer recurrence, without waiting for  PET-CT and biopsies which may take a few to several more weeks.  She understands and agrees with this plan.      Follow-up:  No follow-ups on file.

## 2025-04-08 NOTE — Clinical Note
· Continue palliative chemotherapy every 3 weeks  · From Cycle 10 onward will dose reduce by 10% for a total of 25% dose reduction  · Taxol Q 3 weeks in infusion  · Proceed with C11D1 today in infusion · Labwork weekly (CBC/CMP/Mag level) (4/15 & 4/22)  RTC with me in 3 weeks (4/29) with labs prior (CBC/CMP/Mag level) followed by infusion for Taxol 3 hour

## 2025-04-08 NOTE — NURSING
1111  Pt did labs, saw A Carlos NP, and is here for C 11 taxol.  Pt accomp by her elderly mother.  Pt denies any pain.  Pt does report numbness to hands and feet.

## 2025-04-10 ENCOUNTER — TELEPHONE (OUTPATIENT)
Dept: HEMATOLOGY/ONCOLOGY | Facility: CLINIC | Age: 60
End: 2025-04-10
Payer: MEDICAID

## 2025-04-10 NOTE — NURSING
Follow up with phone contact with patient regarding lymphedema therapy. Patient had requested therapy in [place] area. Unable to locate therapy facility in [place] that is in network with patient's insurance. [facility] is in-network with patient's insurance. Patient informed of above and agreed for referral to [facility].

## 2025-04-24 DIAGNOSIS — C50.911 RECURRENT BREAST CANCER, RIGHT: Primary | ICD-10-CM

## 2025-04-28 ENCOUNTER — TELEPHONE (OUTPATIENT)
Dept: HEMATOLOGY/ONCOLOGY | Facility: CLINIC | Age: 60
End: 2025-04-28
Payer: MEDICAID

## 2025-04-29 ENCOUNTER — LAB VISIT (OUTPATIENT)
Dept: HEMATOLOGY/ONCOLOGY | Facility: CLINIC | Age: 60
End: 2025-04-29
Payer: MEDICAID

## 2025-04-29 ENCOUNTER — OFFICE VISIT (OUTPATIENT)
Dept: HEMATOLOGY/ONCOLOGY | Facility: CLINIC | Age: 60
End: 2025-04-29
Payer: MEDICAID

## 2025-04-29 ENCOUNTER — INFUSION (OUTPATIENT)
Dept: INFUSION THERAPY | Facility: HOSPITAL | Age: 60
End: 2025-04-29
Attending: INTERNAL MEDICINE
Payer: MEDICAID

## 2025-04-29 VITALS
HEIGHT: 65 IN | TEMPERATURE: 99 F | SYSTOLIC BLOOD PRESSURE: 141 MMHG | DIASTOLIC BLOOD PRESSURE: 88 MMHG | HEART RATE: 98 BPM | OXYGEN SATURATION: 98 % | BODY MASS INDEX: 33.05 KG/M2 | WEIGHT: 198.38 LBS | RESPIRATION RATE: 14 BRPM

## 2025-04-29 DIAGNOSIS — D84.821 IMMUNODEFICIENCY DUE TO CHEMOTHERAPY: ICD-10-CM

## 2025-04-29 DIAGNOSIS — Z17.421 TRIPLE NEGATIVE BREAST CARCINOMA: Primary | ICD-10-CM

## 2025-04-29 DIAGNOSIS — T45.1X5A IMMUNODEFICIENCY DUE TO CHEMOTHERAPY: ICD-10-CM

## 2025-04-29 DIAGNOSIS — L98.9 SKIN LESION OF NECK: ICD-10-CM

## 2025-04-29 DIAGNOSIS — G62.0 CHEMOTHERAPY-INDUCED NEUROPATHY: ICD-10-CM

## 2025-04-29 DIAGNOSIS — F32.A DEPRESSION, UNSPECIFIED DEPRESSION TYPE: ICD-10-CM

## 2025-04-29 DIAGNOSIS — C50.919 TRIPLE NEGATIVE BREAST CARCINOMA: Primary | ICD-10-CM

## 2025-04-29 DIAGNOSIS — G47.00 INSOMNIA, UNSPECIFIED TYPE: ICD-10-CM

## 2025-04-29 DIAGNOSIS — T45.1X5A CHEMOTHERAPY-INDUCED NEUROPATHY: ICD-10-CM

## 2025-04-29 DIAGNOSIS — Z79.69 IMMUNODEFICIENCY DUE TO CHEMOTHERAPY: ICD-10-CM

## 2025-04-29 DIAGNOSIS — C50.911 RECURRENT BREAST CANCER, RIGHT: ICD-10-CM

## 2025-04-29 LAB
ALBUMIN SERPL-MCNC: 3.6 G/DL (ref 3.5–5)
ALBUMIN/GLOB SERPL: 1 RATIO (ref 1.1–2)
ALP SERPL-CCNC: 118 UNIT/L (ref 40–150)
ALT SERPL-CCNC: 20 UNIT/L (ref 0–55)
ANION GAP SERPL CALC-SCNC: 11 MEQ/L
AST SERPL-CCNC: 27 UNIT/L (ref 11–45)
BASOPHILS # BLD AUTO: 0.01 X10(3)/MCL
BASOPHILS NFR BLD AUTO: 0.2 %
BILIRUB SERPL-MCNC: 0.4 MG/DL
BUN SERPL-MCNC: 18.7 MG/DL (ref 9.8–20.1)
CALCIUM SERPL-MCNC: 9.8 MG/DL (ref 8.4–10.2)
CHLORIDE SERPL-SCNC: 107 MMOL/L (ref 98–107)
CO2 SERPL-SCNC: 24 MMOL/L (ref 22–29)
CREAT SERPL-MCNC: 0.87 MG/DL (ref 0.55–1.02)
CREAT/UREA NIT SERPL: 21
EOSINOPHIL # BLD AUTO: 0.01 X10(3)/MCL (ref 0–0.9)
EOSINOPHIL NFR BLD AUTO: 0.2 %
ERYTHROCYTE [DISTWIDTH] IN BLOOD BY AUTOMATED COUNT: 12.9 % (ref 11.5–17)
GFR SERPLBLD CREATININE-BSD FMLA CKD-EPI: >60 ML/MIN/1.73/M2
GLOBULIN SER-MCNC: 3.7 GM/DL (ref 2.4–3.5)
GLUCOSE SERPL-MCNC: 129 MG/DL (ref 74–100)
HCT VFR BLD AUTO: 33.9 % (ref 37–47)
HGB BLD-MCNC: 11.1 G/DL (ref 12–16)
IMM GRANULOCYTES # BLD AUTO: 0.04 X10(3)/MCL (ref 0–0.04)
IMM GRANULOCYTES NFR BLD AUTO: 0.6 %
LYMPHOCYTES # BLD AUTO: 0.82 X10(3)/MCL (ref 0.6–4.6)
LYMPHOCYTES NFR BLD AUTO: 13 %
MAGNESIUM SERPL-MCNC: 1.9 MG/DL (ref 1.6–2.6)
MCH RBC QN AUTO: 31.9 PG (ref 27–31)
MCHC RBC AUTO-ENTMCNC: 32.7 G/DL (ref 33–36)
MCV RBC AUTO: 97.4 FL (ref 80–94)
MONOCYTES # BLD AUTO: 0.09 X10(3)/MCL (ref 0.1–1.3)
MONOCYTES NFR BLD AUTO: 1.4 %
NEUTROPHILS # BLD AUTO: 5.35 X10(3)/MCL (ref 2.1–9.2)
NEUTROPHILS NFR BLD AUTO: 84.6 %
NRBC BLD AUTO-RTO: 0 %
PLATELET # BLD AUTO: 324 X10(3)/MCL (ref 130–400)
PMV BLD AUTO: 9.5 FL (ref 7.4–10.4)
POTASSIUM SERPL-SCNC: 4 MMOL/L (ref 3.5–5.1)
PROT SERPL-MCNC: 7.3 GM/DL (ref 6.4–8.3)
RBC # BLD AUTO: 3.48 X10(6)/MCL (ref 4.2–5.4)
SODIUM SERPL-SCNC: 142 MMOL/L (ref 136–145)
WBC # BLD AUTO: 6.32 X10(3)/MCL (ref 4.5–11.5)

## 2025-04-29 PROCEDURE — 85025 COMPLETE CBC W/AUTO DIFF WBC: CPT

## 2025-04-29 PROCEDURE — 1160F RVW MEDS BY RX/DR IN RCRD: CPT | Mod: CPTII,,,

## 2025-04-29 PROCEDURE — 83735 ASSAY OF MAGNESIUM: CPT

## 2025-04-29 PROCEDURE — 3008F BODY MASS INDEX DOCD: CPT | Mod: CPTII,,,

## 2025-04-29 PROCEDURE — 99214 OFFICE O/P EST MOD 30 MIN: CPT | Mod: PBBFAC

## 2025-04-29 PROCEDURE — 99215 OFFICE O/P EST HI 40 MIN: CPT | Mod: S$PBB,,,

## 2025-04-29 PROCEDURE — 80053 COMPREHEN METABOLIC PANEL: CPT

## 2025-04-29 PROCEDURE — 3079F DIAST BP 80-89 MM HG: CPT | Mod: CPTII,,,

## 2025-04-29 PROCEDURE — 3077F SYST BP >= 140 MM HG: CPT | Mod: CPTII,,,

## 2025-04-29 PROCEDURE — 1159F MED LIST DOCD IN RCRD: CPT | Mod: CPTII,,,

## 2025-04-29 RX ORDER — DEXAMETHASONE 4 MG/1
TABLET ORAL
Qty: 40 TABLET | Refills: 1 | Status: SHIPPED | OUTPATIENT
Start: 2025-04-29

## 2025-04-29 NOTE — Clinical Note
HOLD chemotherapy today due to worsening peripheral neuropathy RTC with me in 1 week with labs prior (CBC/CMP/Mag level) followed by treatment in infusion

## 2025-04-29 NOTE — PROGRESS NOTES
Reason for Follow-up:  -history of right breast cancer, diagnosed , ER negative, ME negative, HER2 positive, T2 N1 M0, S/P neoadjuvant chemotherapy, lumpectomy, axillary lymph dissection, adjuvant radiotherapy (completed 10/24/2022)  -local and regional recurrence, triple negative, diagnosed on biopsy of supraclavicular lymph node 2024; on mammogram, right breast mass; supraclavicular lymphadenopathy; right cervical lymphadenopathy  -Postmenopausal   -Hypercalcemia   -High serum parathyroid hormone (PTH)   -Liver mass, right lobe   -Mediastinal lymphadenopathy   -Lung nodules   -Breast cancer metastasized to axillary lymph node, left   -Cervical lymphadenopathy   -history of Graves disease    History:  Past medical history: Anemia; arthritis; breast cancer; Graves disease (diagnosed ; started on methimazole by endocrinologist in [place], in ); hypertension; peripheral neuropathy  -2023:  Venous Doppler bilateral lower extremities (swelling):  No DVT  -2023: TTE:  LVEF 55-60%  -2023:  Limited abdominal ultrasound (elevated liver enzymes):  6.6 cm cyst within the liver near the gallbladder; cholelithiasis with minimal gallbladder wall thickening  -2014:  Pelvic ultrasound: Markedly enlarged uterus with multiple large fibroids; endometrial stripe is thickened, 1.4 cm  Procedure/surgical history: Breast lumpectomy   Social history:  .  Lives in [place].  No children.  Never wanted to have children.  Never became pregnant.  No history of tobacco, alcohol, or illicit drug abuse.  Does not work.  Family history:  Sister experienced hyperthyroidism, treated with radioiodine.  She does not know much about her family history but says that there are cancers in folks on both parents sides of family.  A female cousin on mother's side of family experienced breast cancer in her 30s and  from it.  Health maintenance:  Primary care provider in [facility].  Last  mammogram November 2023 at [facility].  No screening colonoscopy ever.  Menstrual/Ob gyn history:  Menarche at age 13.  Last menstrual cycle July 2023.  Never became pregnant.  Never wanted to become pregnant.  Took birth control pills for 2 years several years ago.  No hormone replacement therapy after menopause.          Family History   Problem Relation Name Age of Onset    Breast cancer Maternal Cousin          History of Present Illness:   Breast Cancer (Triple negative breast cancer)        Oncologic/Hematologic History:  Oncology History   Stage IV carcinoma of breast   8/27/2024 Initial Diagnosis    Stage IV carcinoma of breast     9/9/2024 -  Chemotherapy    Treatment Summary   Plan Name: OP BREAST PACLITAXEL Q3W  Treatment Goal: Palliative  Status: Active  Start Date: 9/9/2024  End Date: 6/10/2025 (Planned)  Provider: Israel Farah MD  Chemotherapy: PACLitaxeL (TAXOL) 175 mg/m2 = 366 mg in 0.9% NaCl 500 mL chemo infusion, 175 mg/m2 = 366 mg, Intravenous, Clinic/HOD 1 time, 11 of 14 cycles  Dose modification: 131.25 mg/m2 (original dose 175 mg/m2, Cycle 10, Reason: MD Discretion, Comment: severe neutropenia post last cycle)  Administration: 366 mg (9/9/2024), 366 mg (9/30/2024), 366 mg (10/22/2024), 366 mg (11/12/2024), 366 mg (12/3/2024), 366 mg (12/24/2024), 366 mg (1/14/2025), 366 mg (2/4/2025), 366 mg (2/25/2025), 276 mg (3/18/2025), 276 mg (4/8/2025)     12/3/2024 Cancer Staged    Staging form: Breast, AJCC 8th Edition  - Clinical stage from 12/3/2024: Stage IV (rcTX, cN3c, pM1, ER-, OR-, HER2-)     ====================================      59-year-old lady, referred by Rere Jernigan MD, surgery, with recurrent breast cancer.      07/10/2024:  Office note: Rere Jernigan MD (surgery):  Pt is a 59 y.o. female with history of right breast cancer s/p BCT in 2002 who presents with painless large supraclavicular mass.    First noticed it 4 months ago and has steadily increased in size  past month.  Last mammogram 1 year ago and new [place].  Also reports progressive skin changes of the right breast not associated with lumpectomy incision.  Receptor status unknown.       Investigations reviewed:  -no old records available   -according to her, she was diagnosed with right breast cancer in January 2022  -mammogram showed a large lobulated mass, 3.7 x 2.7 cm  -biopsy:  Infiltrative ductal carcinoma, possibly invasive lobular  -right breast mass was palpable in the upper outer quadrant; axillary lymphadenopathy was noted (3-4 palpable lymph nodes right axilla)  -T2 N1 M0 IDC right breast (she had palpable axillary lymph nodes in the right and a large palpable mass in the right breast)  -ER negative; DC negative; HER2 positive  -S/P neoadjuvant chemotherapy (according to her, she received 4 cycles of neoadjuvant chemotherapy with Adriamycin/Taxotere every 3 weeks apart x4 cycles)  -followed by lumpectomy and axillary dissection  -no residual tumor post chemotherapy; 17 axillary lymph nodes negative for tumor  -S/P adjuvant radiotherapy, 6600 cGy/35 fractions, completed 10/24/2022  -07/28/2011:  DEXA scan:  BMD normal  -12/19/2022:  Screening mammogram (comparison: 11/08/2019, etc.):  BI-RADS: 2-benign  -07/06/2023:  Echocardiogram:  LVEF 60%  -11/27/2023:  Bilateral diagnostic mammogram and limited ultrasound right breast (comparison: 12/19/2022, 12/16/2001, etc.) (history of right breast cancer with worsening right breast pain and thickening) large elongated heterogeneous mass with irregular borders outer aspect of the right breast (extending from the nipple outward towards the lateral chest wall at the 8-9 o'clock position, 5.8 x 3 x 1.8 cm although margins are difficult to accurately define), highly suspicious for recurrent malignancy; there is a separate elongated hypoechoic lesion in the upper outer quadrant right breast 11 o'clock position, 5 cm from nipple, 3 x 2.6 x 0.7 cm, near the site of  previous surgery), perhaps benign scar tissue related to previous lumpectomy:  BI-RADS: 5-highly suggestive of malignancy  -07/10/2024:  Surgery Office note:  Painless large supraclavicular mass, noted 4 months ago, steadily enlarging; also reported progressive skin changes right breast not associated with lumpectomy incision; on physical exam, large exophytic right supraclavicular nodule, nonmobile and fixed, overlying skin erythematous without ulceration; right upper outer quadrant lumpectomy incision; right lower inner quadrant periareolar ecchymosis with subcutaneous firmness without distinct mass; slight nipple inversion; no drainage; no palpable axillary lymphadenopathy  -05/30/2024:  Ultrasound soft tissues of the head and neck (lymphadenopathy): Pathologic lymphadenopathy (4.5 x 3.7 x 3.2 cm) at the base of the neck on the right; multiple smaller morphologically abnormal cervical chain lymph nodes on the right which demonstrate heterogeneous echogenicity similar to the dominant mass. Findings highly suspicious for malignancy particularly in this patient with a known history of prior right breast cancer. Dominant mass lesion corresponds with palpable complaint. Recommend biopsy/tissue diagnosis.   -06/27/2024:  Right supraclavicular mass, core needle biopsy (firm 6 x 6 cm exophytic right supraclavicular mass): Metastatic carcinoma in fibroadipose tissue, moderately to poorly-differentiated, consistent with breast origin (metastatic breast carcinoma in fibroadipose tissue  -ER negative (0%); NC negative (0%); HER2 negative (0); Ki-67 high proliferation  (93.6%)    08/08/2024:   Pleasant, healthy-appearing lady who presents for initial medical oncology consultation.  In no acute discomfort.    Says that right supra clavicular mass started April 2024; it has been enlarging and fluctuating in size.  It is painful.  7/10 severity pain.  Also, pain was right breast area.  Right breast is enlarging.  There is a  small area of ulceration in the right breast.  No bleeding or discharge.  She denies fevers or chills.  Has been taking Lyrica meloxicam without the relief of pain.  We will start Norco.  Some fatigue.  However, ECOG 1.  Pain from neck down to breast area, especially at night.  No unusual headaches, focal neurological symptoms, vision impairment, gait impairment, hemoptysis, fevers, chills, any bleeding or discharge from right breast superficial ulceration, abdominal pain, nausea, vomiting, GI bleeding, etc..  No bone pains.  Appetite is fair.    Interval History 4/29/25:  Patient presented to the clinic today accompanied by her mother for a scheduled clinic visit She is due to receive C12 today in infusion. She reports compliance with her potassium supplements and Zyprexa daily. She denies any fever, chills, shortness of breath or any other signs and symptoms associated with infection. She continues to report having some swelling in bilateral arms.  She also reports that she has a growth on the right side of her neck that She did not apply the mupirocin cream or FU with surgery clinic about.  She reports utilizing a cane to walk due to her feeling unsteady while walking.  She also states that she has bilateral numbness and tingling in bilateral feet any hands.  She states that she is dropping objects and finding it difficult to pick objects up.  She was instructed to take Cymbalta 30 mg by mouth for 7 days nightly and increase to 60 mg by mouth thereafter however patient stated that she did not increase to the 60 mg after one-week.  She states she stopped taking her gabapentin because she found that it was not helping her.  Patient stated that she did not want to bring these concerns for because she did want her treatment to be held.  She denies having any abdominal pain, constipation, diarrhea or any changes with appetite.  Lab work was reviewed with the patient.  All future appointments were  discussed.      08/28/2024:   -tissue NGS testing (performed on right supraclavicular mass biopsy 06/27/2024):  HRD positive (CELINE-high); negative for AKT 1, BRAF, BRCA1, BRCA2, ESR 1, etc.  -08/09/2024: Liquid biopsy:  Borderline TMB  -08/08/2024: Hemoglobin 11.9; .6; rest of CBC unremarkable; calcium 10.6; albumin 3.9 (mild hypercalcemia); vitamin-D level normal; PTH level 100.3, elevated; FSH 79.87, postmenopausal; estradiol level <24; ionized level 5.03 (normal, on 08/09/2024); PTH related peptide normal on 08/09/2024  -08/14/2024: Echocardiogram:  LVEF 55-60%  -08/19/2024: MediPort placed  -08/20/2024:  Staging brain MRI with and without contrast: No brain metastases  -08/20/2024: Staging CTs C/A/P with IV contrast:   1. Irregular mixed cystic and solid right breast lesion with extensive bilateral breast skin thickening, low left axillary adenopathy, and a few enlarged mediastinal/hilar lymph nodes.   2. Changes of right axillary node dissection without definite pathologic adenopathy through the region.  3. Hypodense left hepatic mass raises concern for metastatic involvement.  No other definite findings suspicious for metastasis through the abdomen and pelvis.  4. Subcentimeter right lung nodules are indeterminate given absence of comparison images to establish chronicity.  Close attention on surveillance imaging is needed.  5. Cholelithiasis without findings of acute cholecystitis or biliary obstruction.  6. Simple left upper renal cortex cyst.  7. Massively enlarged multifocal uterine leiomyoma.  (scattered noncalcified lung nodules; anterior inferior right upper lobe nodule 0.6 x 0.6 mm; superior right lower lobe nodule 0.7 x 0.7 cm; right lower lobe nodule 0.9 x 0.9 cm; inferior left hepatic lobe hypodense circumscribed lesion 2.2 x 2.8 cm; additional mm hypodense foci scattered throughout the liver, too small for more detailed characterization; scattered mediastinal lymph nodes, for example,  pretracheal lymph node 1.2 cm, right hilar lymph node 1.2 cm, enlarged left axillary lymph nodes, representative left axillary lymph node 1.9 cm; scattered nonenlarged retroperitoneal lymph nodes; no pathologic abdominopelvic nicholas enlargement; extensive bilateral skin thickening of the breasts; right breast heterogeneous mixed cystic and solid mass with irregular/lobulated margins upper outer and lateral subareolar region 6.2 x 5.9 x 4.1 cm)  -08/20/2024: CT soft tissues of the neck with contrast: Large pathologic lymph node right supraclavicular fossa with central necrosis up to 5.4 cm; 3 additional subcentimeter pathologic appearing lymph nodes right level 3; indeterminate soft tissue nodule medial right breast 1.5 x 1.2 cm  -08/23/2024:  Whole-body nuclear medicine bone scan: No bone metastases  Presents for a follow-up visit.  We discussed all labs and scans in detail.  Some fatigue.  ECOG 1.  Minor headaches.  No unusual headaches, seizures, or focal neurological symptoms.  Some nausea.  Great appetite.  No chest pain, cough, or dyspnea.  No abdominal pain, nausea, vomiting.    Review of Systems:   All systems reviewed and found to be negative except for the symptoms detailed above    Physical Examination:   VITAL SIGNS:   Vitals:    04/29/25 0944   BP: (!) 141/88   Pulse: 98   Resp: 14   Temp: 98.7 °F (37.1 °C)         Physical Exam  Vitals reviewed.   Constitutional:       Appearance: Normal appearance.   HENT:      Head: Normocephalic and atraumatic.      Mouth/Throat:      Mouth: Mucous membranes are moist.   Cardiovascular:      Rate and Rhythm: Normal rate and regular rhythm.      Pulses: Normal pulses.      Heart sounds: Normal heart sounds.   Pulmonary:      Effort: Pulmonary effort is normal.      Breath sounds: Normal breath sounds.   Abdominal:      General: Bowel sounds are normal.      Palpations: Abdomen is soft.   Skin:     General: Skin is warm and dry.   Neurological:      Mental Status: She  is alert and oriented to person, place, and time.   Psychiatric:         Mood and Affect: Mood normal.         Behavior: Behavior normal.         Thought Content: Thought content normal.         Judgment: Judgment normal.        Assessment:  History of right breast IDC, ER negative, VA negative, HER2 positive::  -mammogram: 3.7 x 2.7 cm right breast mass  -pathology: Infiltrative ductal carcinoma, possibly invasive lobular  -ER negative, VA negative, HER2 positive   -T2 N1 M0; palpable axillary lymphadenopathy; large palpable mass right breast)  -S/P neoadjuvant chemotherapy (according to her, she received 4 cycles of neoadjuvant chemotherapy with Adriamycin/Taxotere every 3 weeks apart x4 cycles)  -followed by lumpectomy and axillary dissection  -no residual tumor post chemotherapy; 17 axillary lymph nodes negative for tumor  -S/P adjuvant radiotherapy, 6600 cGy/35 fractions, completed 10/24/2022  >>>  -screening mammogram 12/19/2022: BI-RADS: 2-benign  >>>  Regional, local, and metastatic recurrence, triple negative:  -echocardiogram 07/06/2023: LVEF 60%  -mammogram and ultrasound 11/27/2023:  Right breast mass 5.8 x 3 x 1.8 cm: BI-RADS: 5  -surgery consultation/follow-up 07/10/2024: Painless large supraclavicular mass, noted 4 months ago, steadily enlarging; progressive skin changes right breast not associated with lumpectomy incision, large exophytic right supraclavicular nodule/lymphadenopathy) nonmobile and fixed; overlying skin erythematous without ulceration), right lower inner quadrant periareolar ecchymosis and subcutaneous firmness without distinct mass; no palpable axillary lymphadenopathy  -ultrasound neck 05/30/2024:  Pathologic lymphadenopathy 4.5 x 3.7 x 3.2 cm at the base of the neck of the right; multiple smaller morphologically abnormal cervical chain lymph nodes on the right  -core needle biopsy right supraclavicular mass 06/27/2024:  Firm, 6 x 6 cm: Moderately to poorly-differentiated metastatic  breast carcinoma  -ER negative (0%); NM negative (0%); HER2 negative (0); Ki-67 high proliferation (93.6%)  -08/08/2024: Breast examination was performed with her a verbal consent, in the presence of Jani Heck LPN; entire right breast is involved with tumor; entire right breast is indurated with tumor, with conglomeration of nodules around the central area; a small superficial ulceration is noted along the inferior medial aspect of right areolar area; a large slightly tender 6 supraclavicular masses noted; diffuse edema is noted in the right supraclavicular area and right breast area as well as infraclavicular area; left breast is normal  -tissue NGS testing (performed on right supraclavicular mass biopsy 06/27/2024):  HRD positive (CELINE-high); negative for AKT 1, BRAF, BRCA1, BRCA2, ESR 1, etc.  -08/09/2024: Liquid biopsy:  Borderline TMB  -08/08/2024:  Mild hypercalcemia: Calcium 10.6, albumin 3.9; intact PTH level 100.3, elevated; PTH related peptide normal; vitamin-D level normal  -08/08/2024:  Labs: Postmenopausal  Hypercalcemia-08/14/2024: Echocardiogram:  LVEF 55-60%  -08/19/2024: MediPort placed  -brain MRI 08/20/2024: No brain metastases  -CTs C/A/P 0 08/20/2024: Sites of disease:  6.2 cm right breast mass; left axillary lymphadenopathy; mediastinal and hilar lymphadenopathy; hepatic mass left liver lobe 2.8 cm; right lung nodules  -CT soft tissues of the neck 08/20/2024:  Right supraclavicular pathologic lymphadenopathy up to 5.4 cm; 3 additional subcentimeter pathologic lymph nodes right level 3; left breast nodule 1.5 cm  -bone scan 08/23/2024: No bone metastases      Sites of disease/recurrence:   Right supraclavicular lymphadenopathy; right breast mass, right cervical lymphadenopathy, left axillary lymphadenopathy, mediastinal and hilar lymphadenopathy, hepatic mass (metastases), left breast nodule      Molecular markers:  -ER negative (0%); NM negative (0%); HER2 negative (0); Ki-67 high  proliferation (93.6%)  -tissue NGS testing (performed on right supraclavicular mass biopsy 06/27/2024):  HRD positive (CELINE-high); negative for AKT 1, BRAF, BRCA1, BRCA2, ESR 1, etc.  -08/09/2024: Liquid biopsy:  Borderline TMB      Graves' disease:   -diagnosed 07/2023  -07/11/2023: CT soft tissues of the neck with contrast) dysphagia, acute thyrotoxicosis, goiter): Mild diffuse enlargement of the thyroid gland without displacement or mass effect of the aerodigestive tract; no suspicious cervical lymphadenopathy  -07/12/2023: Ultrasound thyroid:  Hoarseness, thyrotoxicosis:  Heterogeneous hyperemic thyroid typical of thyroiditis; small 6 mm mid pole right thyroid nodule is not suspicious and does not warrant surveillance per TI-RADS criteria  -confirmed with TSH receptor antibodies (TRAb/TSI, i.e., thyroid-stimulating immunoglobulins)  -as of 05/21/2024, on methimazole 5 mg daily; has responded well  -endocrinologist: Roland Ross MD   -05/21/2024:  Endocrinologist plan:  Plan to complete 18 months of therapy before attempting to taper of methimazole         Plan:  Triple Negative Breast Cancer:   Continue palliative chemotherapy every 3 weeks   From Cycle 10 onward will dose reduce by 10% for a total of 25% dose reduction   Taxol Q 3 weeks in infusion   Hold C12 today in infusion  RTC with me in one-week with labs prior (CBC/CMP/Mag level) followed by possible treatment in infusion  Restage with contrast-enhanced CT scans of C/A/P soft tissues of neck in mid May  Refill was sent for Decadron 4 mg    Lymphedema:   Refer to lymphedema therapy near her home   Patient states that she was contacted by Physical therapy and they told her they have 4-6 week waiting list.  Patient is not willing to try another facility.  Patient reports that she has exercises that she is doing at home  Education was provided to the patient regarding elevating the extremity while at rest.    Skin Lesion:   Reiterated the need to Start  mupirocin topical ointment   Educated the patient that she needed to call  surgery clinic to be rescheduled for evaluation.    Peripheral Neuropathy:   Re-educated the patient to increase Cymbalta to 60 mg p.o. nightly  Education was also provided to the patient that she needs to restart gabapentin  Held treatment today cycle 12  We will dose reduce all previous cycles starting with cycle 12 we will dose reduced by 25%    Depression:   Continue Zyprexa 5 mg p.o. nightly.     Insomnia:   Continue melatonin 10 mg nightly      -according to her, she was diagnosed with right breast cancer in January 2022  -mammogram showed a large lobulated mass, 3.7 x 2.7 cm  -biopsy:  Infiltrative ductal carcinoma, possibly invasive lobular  -right breast mass was palpable in the upper outer quadrant; axillary lymphadenopathy was noted (3-4 palpable lymph nodes right axilla)  -T2 N1 M0 IDC right breast (she had palpable axillary lymph nodes in the right and a large palpable mass in the right breast)  -ER negative; PA negative; HER2 positive  -S/P neoadjuvant chemotherapy (according to her, she received 4 cycles of neoadjuvant chemotherapy with Adriamycin/Taxotere every 3 weeks apart x4 cycles)  -followed by lumpectomy and axillary dissection  -no residual tumor post chemotherapy; 17 axillary lymph nodes negative for tumor  -S/P adjuvant radiotherapy, 6600 cGy/35 fractions, completed 10/24/2022  >>>  Regional, local, and metastatic recurrence, triple negative:  -now, triple negative regional and local recurrence with 4.5 cm right supraclavicular lymph node and 5.8 cm right breast mass right lower inner quadrant with periareolar ecchymosis and subcutaneous firmness without distinct mass on physical examination  -ER negative (0%); PA negative (0%); HER2 negative (0); Ki-67 high proliferation (93.6%)  -tissue NGS testing (performed on right supraclavicular mass biopsy 06/27/2024):  HRD positive (CELINE-high); negative for AKT 1, BRAF, BRCA1,  BRCA2, ESR 1, etc.  -08/09/2024: Liquid biopsy:  Borderline TMB  -08/08/2024:  Mild hypercalcemia: Calcium 10.6, albumin 3.9; intact PTH level 100.3, elevated; PTH related peptide normal; vitamin-D level normal  -08/08/2024:  Labs: Postmenopausal  Hypercalcemia  -08/14/2024: Echocardiogram:  LVEF 55-60%  -08/19/2024: MediPort placed  -brain MRI 08/20/2024: No brain metastases  -CTs C/A/P 0 08/20/2024: Sites of disease:  6.2 cm right breast mass; left axillary lymphadenopathy; mediastinal and hilar lymphadenopathy; hepatic mass left liver lobe 2.8 cm; right lung nodules  -CT soft tissues of the neck 08/20/2024:  Right supraclavicular pathologic lymphadenopathy up to 5.4 cm; 3 additional subcentimeter pathologic lymph nodes right level 3; left breast nodule 1.5 cm  -bone scan 08/23/2024: No bone metastases  >>>  -triple negative recurrent, metastatic disease   -awaiting genetic testing, FDG PET-CT, PD-L1 testing  -obviously, unresectable  -ER 0, PA 0, HER2 0  -PD-L1 testing is pending   -genetic testing is pending  -extensive metastatic disease   -need to start palliative systemic therapy pending PD-L1 testing and pending genetic testing  -will start chemotherapy with Taxol 175 mg per m2 IV every 3 weeks, to continue until progression or until unacceptable toxicity  -patient has been previously treated with Adriamycin/Taxotere; therefore, we will avoid Adriamycin to the extent possible  -at some point, olaparib maybe considered because of HRD positive status  -re-stage with contrast-enhanced CT scans of C/A/P/soft tissues of the neck a couple of months after starting Taxol  -check baseline tumor markers including CEA, CA 27.29, and CA 15-3 level, and follow every 3-4 weeks, if elevated  -we will refer to IR for biopsy of liver lesion (however, we will not wait for biopsy to start palliative systemic therapy)  -She understands that we need to start chemotherapy ASAP for extensive metastatic disease from an aggressive  breast cancer recurrence, without waiting for PET-CT and biopsies which may take a few to several more weeks.    Chemotherapy:  -Taxol 175 mg per m2 every 3 weeks until disease progression or unacceptable toxicity    Side effects/monitoring with Taxol:  -watch for cardiovascular effects including infusion related hypotension, bradycardia, hypertension, etc.  -watch out for extravasation: Taxol is an irritant with vesicle like properties  -peripheral neuropathy:  Primarily distal sensory neuropathy; a mixture of paresthesias and dysesthesias including burning, numbness, tingling, and shooting pains, typically in a stocking-glove distribution; most mild-to-moderate cases resolve several months after discontinuation but maybe longer in patients with diabetes.  Severe neuropathy maybe irreversible in some patients  -side effects in> 10% patients:  Edema, hypotension, alopecia, nausea, vomiting, diarrhea, stomatitis, cytopenias, LFT abnormalities, peripheral neuropathy, arthralgias, myalgias, etc.  -Boxed warnings: Hypersensitivity reactions; bone marrow suppression  -premedicate with dexamethasone (20 mg orally at 12 and 6 hours prior to paclitaxel, diphenhydramine (50 mg IV 30 to 60 minutes prior to paclitaxel), and cimetidine or famotidine (IV 30 to 60 minutes prior to paclitaxel).  -do not repeat course until neutrophil count is =/> 1500 mm3 and the platelet count is =/> 100,000 mm3; reduced doses by 20% if patient experiences severe peripheral neuropathy or severe neutropenia <500 mm3 for a week or longer     Pending:    Genetic testing;   FDG PET-CT  -PD-L1 testing  -bilateral breast MRI    -08/08/2024: Refer to wound care for management of breast wound   -08/08/2024:  Start Norco 5 mg every 4 hours PRN for pain    Plan:   1. If no metastases, and if recurrence deemed resectable, then:  Neoadjuvant chemotherapy; followed by mastectomy +/- axillary lymphadenectomy; followed by adjuvant radiotherapy to right  supraclavicular lymph node; followed by adjuvant chemotherapy  2. If no metastases, and if recurrence deemed unresectable, then: Palliative systemic therapy  3. If metastatic disease, then:  Palliative systemic therapy    >>>  -08/28/2024: On imaging studies, found to have stage IV disease; therefore, we are going to treat her with palliative systemic therapy    History of Graves disease   -follow-up with endocrinology    Above discussed at length with the patient.  All questions answered.    Discussed labs, scans, and pathology report, and gave her copies of relevant records.  Plan of management discussed in detail.    Explained the following: That based upon imaging studies, she has stage IV, incurable disease; palliation can be attempted with systemic therapy but response can not be guaranteed; prognosis guarded.  Potential side effects of Taxol discussed.  Gave her educational materials from [employer].  She understands that we need to start chemotherapy ASAP for extensive metastatic disease from an aggressive breast cancer recurrence, without waiting for PET-CT and biopsies which may take a few to several more weeks.  She understands and agrees with this plan.      Follow-up:  No follow-ups on file.

## 2025-05-02 DIAGNOSIS — Z17.421 TRIPLE NEGATIVE BREAST CARCINOMA: Primary | ICD-10-CM

## 2025-05-02 DIAGNOSIS — C50.919 TRIPLE NEGATIVE BREAST CARCINOMA: Primary | ICD-10-CM

## 2025-05-05 ENCOUNTER — TELEPHONE (OUTPATIENT)
Dept: HEMATOLOGY/ONCOLOGY | Facility: CLINIC | Age: 60
End: 2025-05-05
Payer: MEDICAID

## 2025-05-05 NOTE — TELEPHONE ENCOUNTER
Called patient to confirm appointment for 5/6/25. Patient did not answer the phone left a voice message.

## 2025-05-06 ENCOUNTER — LAB VISIT (OUTPATIENT)
Dept: HEMATOLOGY/ONCOLOGY | Facility: CLINIC | Age: 60
End: 2025-05-06
Payer: MEDICAID

## 2025-05-06 ENCOUNTER — OFFICE VISIT (OUTPATIENT)
Dept: HEMATOLOGY/ONCOLOGY | Facility: CLINIC | Age: 60
End: 2025-05-06
Payer: MEDICAID

## 2025-05-06 ENCOUNTER — INFUSION (OUTPATIENT)
Dept: INFUSION THERAPY | Facility: HOSPITAL | Age: 60
End: 2025-05-06
Attending: INTERNAL MEDICINE
Payer: MEDICAID

## 2025-05-06 VITALS
TEMPERATURE: 99 F | RESPIRATION RATE: 16 BRPM | SYSTOLIC BLOOD PRESSURE: 110 MMHG | BODY MASS INDEX: 33.46 KG/M2 | HEART RATE: 101 BPM | HEIGHT: 65 IN | WEIGHT: 200.81 LBS | OXYGEN SATURATION: 100 % | DIASTOLIC BLOOD PRESSURE: 81 MMHG

## 2025-05-06 VITALS
DIASTOLIC BLOOD PRESSURE: 98 MMHG | HEIGHT: 65 IN | BODY MASS INDEX: 33.43 KG/M2 | WEIGHT: 200.63 LBS | RESPIRATION RATE: 20 BRPM | SYSTOLIC BLOOD PRESSURE: 147 MMHG | OXYGEN SATURATION: 95 % | TEMPERATURE: 98 F | HEART RATE: 85 BPM

## 2025-05-06 DIAGNOSIS — G62.0 CHEMOTHERAPY-INDUCED NEUROPATHY: ICD-10-CM

## 2025-05-06 DIAGNOSIS — G47.00 INSOMNIA, UNSPECIFIED TYPE: ICD-10-CM

## 2025-05-06 DIAGNOSIS — L98.9 SKIN LESION OF NECK: ICD-10-CM

## 2025-05-06 DIAGNOSIS — C50.919 TRIPLE NEGATIVE BREAST CARCINOMA: ICD-10-CM

## 2025-05-06 DIAGNOSIS — F32.A DEPRESSION, UNSPECIFIED DEPRESSION TYPE: ICD-10-CM

## 2025-05-06 DIAGNOSIS — C50.911 CARCINOMA OF RIGHT BREAST, STAGE 4: Primary | ICD-10-CM
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
## 2025-05-06 DIAGNOSIS — Z17.421 TRIPLE NEGATIVE BREAST CARCINOMA: ICD-10-CM

## 2025-05-06 DIAGNOSIS — T45.1X5A CHEMOTHERAPY-INDUCED NEUROPATHY: ICD-10-CM

## 2025-05-06 DIAGNOSIS — Z17.421 TRIPLE NEGATIVE BREAST CARCINOMA: Primary | ICD-10-CM

## 2025-05-06 DIAGNOSIS — Z79.69 IMMUNODEFICIENCY DUE TO CHEMOTHERAPY: ICD-10-CM

## 2025-05-06 DIAGNOSIS — D84.821 IMMUNODEFICIENCY DUE TO CHEMOTHERAPY: ICD-10-CM

## 2025-05-06 DIAGNOSIS — C50.919 TRIPLE NEGATIVE BREAST CARCINOMA: Primary | ICD-10-CM

## 2025-05-06 DIAGNOSIS — T45.1X5A IMMUNODEFICIENCY DUE TO CHEMOTHERAPY: ICD-10-CM

## 2025-05-06 LAB
ALBUMIN SERPL-MCNC: 3.5 G/DL (ref 3.5–5)
ALBUMIN/GLOB SERPL: 0.9 RATIO (ref 1.1–2)
ALP SERPL-CCNC: 119 UNIT/L (ref 40–150)
ALT SERPL-CCNC: 22 UNIT/L (ref 0–55)
ANION GAP SERPL CALC-SCNC: 10 MEQ/L
AST SERPL-CCNC: 26 UNIT/L (ref 11–45)
BASOPHILS # BLD AUTO: 0.01 X10(3)/MCL
BASOPHILS NFR BLD AUTO: 0.1 %
BILIRUB SERPL-MCNC: 0.3 MG/DL
BUN SERPL-MCNC: 23.9 MG/DL (ref 9.8–20.1)
CALCIUM SERPL-MCNC: 9.8 MG/DL (ref 8.4–10.2)
CHLORIDE SERPL-SCNC: 109 MMOL/L (ref 98–107)
CO2 SERPL-SCNC: 22 MMOL/L (ref 22–29)
CREAT SERPL-MCNC: 0.77 MG/DL (ref 0.55–1.02)
CREAT/UREA NIT SERPL: 31
EOSINOPHIL # BLD AUTO: 0 X10(3)/MCL (ref 0–0.9)
EOSINOPHIL NFR BLD AUTO: 0 %
ERYTHROCYTE [DISTWIDTH] IN BLOOD BY AUTOMATED COUNT: 12.9 % (ref 11.5–17)
GFR SERPLBLD CREATININE-BSD FMLA CKD-EPI: >60 ML/MIN/1.73/M2
GLOBULIN SER-MCNC: 3.7 GM/DL (ref 2.4–3.5)
GLUCOSE SERPL-MCNC: 132 MG/DL (ref 74–100)
HCT VFR BLD AUTO: 34.3 % (ref 37–47)
HGB BLD-MCNC: 10.9 G/DL (ref 12–16)
IMM GRANULOCYTES # BLD AUTO: 0.04 X10(3)/MCL (ref 0–0.04)
IMM GRANULOCYTES NFR BLD AUTO: 0.4 %
LYMPHOCYTES # BLD AUTO: 0.69 X10(3)/MCL (ref 0.6–4.6)
LYMPHOCYTES NFR BLD AUTO: 7.5 %
MAGNESIUM SERPL-MCNC: 2.1 MG/DL (ref 1.6–2.6)
MCH RBC QN AUTO: 32.8 PG (ref 27–31)
MCHC RBC AUTO-ENTMCNC: 31.8 G/DL (ref 33–36)
MCV RBC AUTO: 103.3 FL (ref 80–94)
MONOCYTES # BLD AUTO: 0.05 X10(3)/MCL (ref 0.1–1.3)
MONOCYTES NFR BLD AUTO: 0.5 %
NEUTROPHILS # BLD AUTO: 8.38 X10(3)/MCL (ref 2.1–9.2)
NEUTROPHILS NFR BLD AUTO: 91.5 %
NRBC BLD AUTO-RTO: 0 %
PLATELET # BLD AUTO: 229 X10(3)/MCL (ref 130–400)
PMV BLD AUTO: 10.6 FL (ref 7.4–10.4)
POTASSIUM SERPL-SCNC: 4 MMOL/L (ref 3.5–5.1)
PROT SERPL-MCNC: 7.2 GM/DL (ref 6.4–8.3)
RBC # BLD AUTO: 3.32 X10(6)/MCL (ref 4.2–5.4)
SODIUM SERPL-SCNC: 141 MMOL/L (ref 136–145)
WBC # BLD AUTO: 9.17 X10(3)/MCL (ref 4.5–11.5)

## 2025-05-06 PROCEDURE — 99215 OFFICE O/P EST HI 40 MIN: CPT | Mod: PBBFAC

## 2025-05-06 PROCEDURE — 96413 CHEMO IV INFUSION 1 HR: CPT

## 2025-05-06 PROCEDURE — 96415 CHEMO IV INFUSION ADDL HR: CPT

## 2025-05-06 PROCEDURE — 63600175 PHARM REV CODE 636 W HCPCS

## 2025-05-06 PROCEDURE — 83735 ASSAY OF MAGNESIUM: CPT

## 2025-05-06 PROCEDURE — 96367 TX/PROPH/DG ADDL SEQ IV INF: CPT

## 2025-05-06 PROCEDURE — 80053 COMPREHEN METABOLIC PANEL: CPT

## 2025-05-06 PROCEDURE — 25000003 PHARM REV CODE 250

## 2025-05-06 PROCEDURE — A4216 STERILE WATER/SALINE, 10 ML: HCPCS

## 2025-05-06 PROCEDURE — 96375 TX/PRO/DX INJ NEW DRUG ADDON: CPT

## 2025-05-06 PROCEDURE — 85025 COMPLETE CBC W/AUTO DIFF WBC: CPT

## 2025-05-06 RX ORDER — DIPHENHYDRAMINE HYDROCHLORIDE 50 MG/ML
50 INJECTION, SOLUTION INTRAMUSCULAR; INTRAVENOUS ONCE AS NEEDED
Status: DISCONTINUED | OUTPATIENT
Start: 2025-05-06 | End: 2025-05-06 | Stop reason: HOSPADM

## 2025-05-06 RX ORDER — SODIUM CHLORIDE 0.9 % (FLUSH) 0.9 %
10 SYRINGE (ML) INJECTION
Status: CANCELLED | OUTPATIENT
Start: 2025-05-06

## 2025-05-06 RX ORDER — EPINEPHRINE 1 MG/ML
0.3 INJECTION INTRAMUSCULAR; INTRAVENOUS; SUBCUTANEOUS ONCE AS NEEDED
Status: DISCONTINUED | OUTPATIENT
Start: 2025-05-06 | End: 2025-05-06 | Stop reason: HOSPADM

## 2025-05-06 RX ORDER — HEPARIN 100 UNIT/ML
500 SYRINGE INTRAVENOUS
Status: DISCONTINUED | OUTPATIENT
Start: 2025-05-06 | End: 2025-05-06 | Stop reason: HOSPADM

## 2025-05-06 RX ORDER — HEPARIN 100 UNIT/ML
500 SYRINGE INTRAVENOUS
Status: CANCELLED | OUTPATIENT
Start: 2025-05-06

## 2025-05-06 RX ORDER — DIPHENHYDRAMINE HYDROCHLORIDE 50 MG/ML
50 INJECTION, SOLUTION INTRAMUSCULAR; INTRAVENOUS ONCE AS NEEDED
Status: CANCELLED | OUTPATIENT
Start: 2025-05-06

## 2025-05-06 RX ORDER — DIPHENHYDRAMINE HYDROCHLORIDE 50 MG/ML
50 INJECTION, SOLUTION INTRAMUSCULAR; INTRAVENOUS
Status: COMPLETED | OUTPATIENT
Start: 2025-05-06 | End: 2025-05-06

## 2025-05-06 RX ORDER — EPINEPHRINE 0.3 MG/.3ML
0.3 INJECTION SUBCUTANEOUS ONCE AS NEEDED
Status: CANCELLED | OUTPATIENT
Start: 2025-05-06

## 2025-05-06 RX ORDER — DIPHENHYDRAMINE HYDROCHLORIDE 50 MG/ML
50 INJECTION, SOLUTION INTRAMUSCULAR; INTRAVENOUS
Status: CANCELLED
Start: 2025-05-06

## 2025-05-06 RX ORDER — FAMOTIDINE 10 MG/ML
20 INJECTION, SOLUTION INTRAVENOUS
Status: COMPLETED | OUTPATIENT
Start: 2025-05-06 | End: 2025-05-06

## 2025-05-06 RX ORDER — GABAPENTIN 300 MG/1
300 CAPSULE ORAL DAILY
COMMUNITY

## 2025-05-06 RX ORDER — FAMOTIDINE 10 MG/ML
20 INJECTION, SOLUTION INTRAVENOUS
Status: CANCELLED | OUTPATIENT
Start: 2025-05-06

## 2025-05-06 RX ORDER — SODIUM CHLORIDE 0.9 % (FLUSH) 0.9 %
10 SYRINGE (ML) INJECTION
Status: DISCONTINUED | OUTPATIENT
Start: 2025-05-06 | End: 2025-05-06 | Stop reason: HOSPADM

## 2025-05-06 RX ADMIN — DIPHENHYDRAMINE HYDROCHLORIDE 50 MG: 50 INJECTION INTRAMUSCULAR; INTRAVENOUS at 11:05

## 2025-05-06 RX ADMIN — Medication 10 ML: at 03:05

## 2025-05-06 RX ADMIN — PACLITAXEL 216 MG: 6 INJECTION, SOLUTION INTRAVENOUS at 11:05

## 2025-05-06 RX ADMIN — HEPARIN 500 UNITS: 100 SYRINGE at 03:05

## 2025-05-06 RX ADMIN — FAMOTIDINE 20 MG: 10 INJECTION, SOLUTION INTRAVENOUS at 11:05

## 2025-05-06 RX ADMIN — SODIUM CHLORIDE: 9 INJECTION, SOLUTION INTRAVENOUS at 10:05

## 2025-05-06 RX ADMIN — DEXAMETHASONE SODIUM PHOSPHATE 20 MG: 4 INJECTION, SOLUTION INTRA-ARTICULAR; INTRALESIONAL; INTRAMUSCULAR; INTRAVENOUS; SOFT TISSUE at 11:05

## 2025-05-06 NOTE — Clinical Note
Proceed with chemotherapy today and infusion with an additional dosage reduction Schedule brain MRI the same day as other CTs on 05/16 RTC with MD in 3 weeks (5/27) with labs prior (CBC/CMP/Mag level) to discuss MRI of the brain in CTs followed by infusion

## 2025-05-06 NOTE — PROGRESS NOTES
Reason for Follow-up:  -history of right breast cancer, diagnosed , ER negative, DE negative, HER2 positive, T2 N1 M0, S/P neoadjuvant chemotherapy, lumpectomy, axillary lymph dissection, adjuvant radiotherapy (completed 10/24/2022)  -local and regional recurrence, triple negative, diagnosed on biopsy of supraclavicular lymph node 2024; on mammogram, right breast mass; supraclavicular lymphadenopathy; right cervical lymphadenopathy  -Postmenopausal   -Hypercalcemia   -High serum parathyroid hormone (PTH)   -Liver mass, right lobe   -Mediastinal lymphadenopathy   -Lung nodules   -Breast cancer metastasized to axillary lymph node, left   -Cervical lymphadenopathy   -history of Graves disease    History:  Past medical history: Anemia; arthritis; breast cancer; Graves disease (diagnosed ; started on methimazole by endocrinologist in [place], [place] ); hypertension; peripheral neuropathy  -2023:  Venous Doppler bilateral lower extremities (swelling):  No DVT  -2023: TTE:  LVEF 55-60%  -2023:  Limited abdominal ultrasound (elevated liver enzymes):  6.6 cm cyst within the liver near the gallbladder; cholelithiasis with minimal gallbladder wall thickening  -2014:  Pelvic ultrasound: Markedly enlarged uterus with multiple large fibroids; endometrial stripe is thickened, 1.4 cm  Procedure/surgical history: Breast lumpectomy   Social history:  .  Lives in [place].  No children.  Never wanted to have children.  Never became pregnant.  No history of tobacco, alcohol, or illicit drug abuse.  Does not work.  Family history:  Sister experienced hyperthyroidism, treated with radioiodine.  She does not know much about her family history but says that there are cancers in folks on both parents sides of family.  A female cousin on mother's side of family experienced breast cancer in her 30s and  from it.  Health maintenance:  Primary care provider in [facility].  Last  mammogram November 2023 at .  No screening colonoscopy ever.  Menstrual/Ob gyn history:  Menarche at age 13.  Last menstrual cycle July 2023.  Never became pregnant.  Never wanted to become pregnant.  Took birth control pills for 2 years several years ago.  No hormone replacement therapy after menopause.          Family History   Problem Relation Name Age of Onset    Breast cancer Maternal Cousin          History of Present Illness:   Follow-up (Patient reported weakness in legs, more in left leg, hot flashes, swelling in left ankle, numbness and tingling in hands and feet.Patient stated she lost her balance and fell on 5/5/25 left leg bruised but patient did not go to the ER.)        Oncologic/Hematologic History:  Oncology History   Stage IV carcinoma of breast   8/27/2024 Initial Diagnosis    Stage IV carcinoma of breast     9/9/2024 -  Chemotherapy    Treatment Summary   Plan Name: OP BREAST PACLITAXEL Q3W  Treatment Goal: Palliative  Status: Active  Start Date: 9/9/2024  End Date: 6/10/2025 (Planned)  Provider: Israel Farah MD  Chemotherapy: PACLitaxeL (TAXOL) 175 mg/m2 = 366 mg in 0.9% NaCl 500 mL chemo infusion, 175 mg/m2 = 366 mg, Intravenous, Clinic/HOD 1 time, 11 of 14 cycles  Dose modification: 131.25 mg/m2 (original dose 175 mg/m2, Cycle 10, Reason: MD Discretion, Comment: severe neutropenia post last cycle)  Administration: 366 mg (9/9/2024), 366 mg (9/30/2024), 366 mg (10/22/2024), 366 mg (11/12/2024), 366 mg (12/3/2024), 366 mg (12/24/2024), 366 mg (1/14/2025), 366 mg (2/4/2025), 366 mg (2/25/2025), 276 mg (3/18/2025), 276 mg (4/8/2025)     12/3/2024 Cancer Staged    Staging form: Breast, AJCC 8th Edition  - Clinical stage from 12/3/2024: Stage IV (rcTX, cN3c, pM1, ER-, CT-, HER2-)     ====================================      59-year-old lady, referred by Rere Jernigan MD, surgery, with recurrent breast cancer.      07/10/2024:  Office note: Rere Jernigan MD  (surgery):  Pt is a 59 y.o. female with history of right breast cancer s/p BCT in 2002 who presents with painless large supraclavicular mass.    First noticed it 4 months ago and has steadily increased in size past month.  Last mammogram 1 year ago and new [place].  Also reports progressive skin changes of the right breast not associated with lumpectomy incision.  Receptor status unknown.       Investigations reviewed:  -no old records available   -according to her, she was diagnosed with right breast cancer in January 2022  -mammogram showed a large lobulated mass, 3.7 x 2.7 cm  -biopsy:  Infiltrative ductal carcinoma, possibly invasive lobular  -right breast mass was palpable in the upper outer quadrant; axillary lymphadenopathy was noted (3-4 palpable lymph nodes right axilla)  -T2 N1 M0 IDC right breast (she had palpable axillary lymph nodes in the right and a large palpable mass in the right breast)  -ER negative; AR negative; HER2 positive  -S/P neoadjuvant chemotherapy (according to her, she received 4 cycles of neoadjuvant chemotherapy with Adriamycin/Taxotere every 3 weeks apart x4 cycles)  -followed by lumpectomy and axillary dissection  -no residual tumor post chemotherapy; 17 axillary lymph nodes negative for tumor  -S/P adjuvant radiotherapy, 6600 cGy/35 fractions, completed 10/24/2022  -07/28/2011:  DEXA scan:  BMD normal  -12/19/2022:  Screening mammogram (comparison: 11/08/2019, etc.):  BI-RADS: 2-benign  -07/06/2023:  Echocardiogram:  LVEF 60%  -11/27/2023:  Bilateral diagnostic mammogram and limited ultrasound right breast (comparison: 12/19/2022, 12/16/2001, etc.) (history of right breast cancer with worsening right breast pain and thickening) large elongated heterogeneous mass with irregular borders outer aspect of the right breast (extending from the nipple outward towards the lateral chest wall at the 8-9 o'clock position, 5.8 x 3 x 1.8 cm although margins are difficult to accurately define),  highly suspicious for recurrent malignancy; there is a separate elongated hypoechoic lesion in the upper outer quadrant right breast 11 o'clock position, 5 cm from nipple, 3 x 2.6 x 0.7 cm, near the site of previous surgery), perhaps benign scar tissue related to previous lumpectomy:  BI-RADS: 5-highly suggestive of malignancy  -07/10/2024:  Surgery Office note:  Painless large supraclavicular mass, noted 4 months ago, steadily enlarging; also reported progressive skin changes right breast not associated with lumpectomy incision; on physical exam, large exophytic right supraclavicular nodule, nonmobile and fixed, overlying skin erythematous without ulceration; right upper outer quadrant lumpectomy incision; right lower inner quadrant periareolar ecchymosis with subcutaneous firmness without distinct mass; slight nipple inversion; no drainage; no palpable axillary lymphadenopathy  -05/30/2024:  Ultrasound soft tissues of the head and neck (lymphadenopathy): Pathologic lymphadenopathy (4.5 x 3.7 x 3.2 cm) at the base of the neck on the right; multiple smaller morphologically abnormal cervical chain lymph nodes on the right which demonstrate heterogeneous echogenicity similar to the dominant mass. Findings highly suspicious for malignancy particularly in this patient with a known history of prior right breast cancer. Dominant mass lesion corresponds with palpable complaint. Recommend biopsy/tissue diagnosis.   -06/27/2024:  Right supraclavicular mass, core needle biopsy (firm 6 x 6 cm exophytic right supraclavicular mass): Metastatic carcinoma in fibroadipose tissue, moderately to poorly-differentiated, consistent with breast origin (metastatic breast carcinoma in fibroadipose tissue  -ER negative (0%); UT negative (0%); HER2 negative (0); Ki-67 high proliferation  (93.6%)    08/08/2024:   Pleasant, healthy-appearing lady who presents for initial medical oncology consultation.  In no acute discomfort.    Says that  right supra clavicular mass started April 2024; it has been enlarging and fluctuating in size.  It is painful.  7/10 severity pain.  Also, pain was right breast area.  Right breast is enlarging.  There is a small area of ulceration in the right breast.  No bleeding or discharge.  She denies fevers or chills.  Has been taking Lyrica meloxicam without the relief of pain.  We will start Norco.  Some fatigue.  However, ECOG 1.  Pain from neck down to breast area, especially at night.  No unusual headaches, focal neurological symptoms, vision impairment, gait impairment, hemoptysis, fevers, chills, any bleeding or discharge from right breast superficial ulceration, abdominal pain, nausea, vomiting, GI bleeding, etc..  No bone pains.  Appetite is fair.    Interval History 5/6/25:  Patient presented to the clinic today accompanied by her mother for a scheduled clinic visit. She is due to receive C12 today in infusion. Her chemotherapy was held last week due to peripheral neuropathy worsening.  She reports taking Cymbalta but was unable to tolerate 60 mg nightly therefore she went back to the 30 mg nightly.  She reports compliance with taking her gabapentin daily.  She states that the peripheral neuropathy feels better and is not worsening.  Patient continues to ambulate with a cane from home.  Patient states that she continues to have the lesion and noted on the right side of her neck she stated that she did call the surgery clinic to get it scheduled.  Patient states that her appointment is June 3rd. She denies having any abdominal pain, constipation, diarrhea or any changes with appetite.  Lab work was reviewed with the patient.  All future appointments were discussed.      08/28/2024:   -tissue NGS testing (performed on right supraclavicular mass biopsy 06/27/2024):  HRD positive (CELINE-high); negative for AKT 1, BRAF, BRCA1, BRCA2, ESR 1, etc.  -08/09/2024: Liquid biopsy:  Borderline TMB  -08/08/2024: Hemoglobin 11.9;  .6; rest of CBC unremarkable; calcium 10.6; albumin 3.9 (mild hypercalcemia); vitamin-D level normal; PTH level 100.3, elevated; FSH 79.87, postmenopausal; estradiol level <24; ionized level 5.03 (normal, on 08/09/2024); PTH related peptide normal on 08/09/2024  -08/14/2024: Echocardiogram:  LVEF 55-60%  -08/19/2024: MediPort placed  -08/20/2024:  Staging brain MRI with and without contrast: No brain metastases  -08/20/2024: Staging CTs C/A/P with IV contrast:   1. Irregular mixed cystic and solid right breast lesion with extensive bilateral breast skin thickening, low left axillary adenopathy, and a few enlarged mediastinal/hilar lymph nodes.   2. Changes of right axillary node dissection without definite pathologic adenopathy through the region.  3. Hypodense left hepatic mass raises concern for metastatic involvement.  No other definite findings suspicious for metastasis through the abdomen and pelvis.  4. Subcentimeter right lung nodules are indeterminate given absence of comparison images to establish chronicity.  Close attention on surveillance imaging is needed.  5. Cholelithiasis without findings of acute cholecystitis or biliary obstruction.  6. Simple left upper renal cortex cyst.  7. Massively enlarged multifocal uterine leiomyoma.  (scattered noncalcified lung nodules; anterior inferior right upper lobe nodule 0.6 x 0.6 mm; superior right lower lobe nodule 0.7 x 0.7 cm; right lower lobe nodule 0.9 x 0.9 cm; inferior left hepatic lobe hypodense circumscribed lesion 2.2 x 2.8 cm; additional mm hypodense foci scattered throughout the liver, too small for more detailed characterization; scattered mediastinal lymph nodes, for example, pretracheal lymph node 1.2 cm, right hilar lymph node 1.2 cm, enlarged left axillary lymph nodes, representative left axillary lymph node 1.9 cm; scattered nonenlarged retroperitoneal lymph nodes; no pathologic abdominopelvic nicholas enlargement; extensive bilateral skin  thickening of the breasts; right breast heterogeneous mixed cystic and solid mass with irregular/lobulated margins upper outer and lateral subareolar region 6.2 x 5.9 x 4.1 cm)  -08/20/2024: CT soft tissues of the neck with contrast: Large pathologic lymph node right supraclavicular fossa with central necrosis up to 5.4 cm; 3 additional subcentimeter pathologic appearing lymph nodes right level 3; indeterminate soft tissue nodule medial right breast 1.5 x 1.2 cm  -08/23/2024:  Whole-body nuclear medicine bone scan: No bone metastases  Presents for a follow-up visit.  We discussed all labs and scans in detail.  Some fatigue.  ECOG 1.  Minor headaches.  No unusual headaches, seizures, or focal neurological symptoms.  Some nausea.  Great appetite.  No chest pain, cough, or dyspnea.  No abdominal pain, nausea, vomiting.    Review of Systems:   All systems reviewed and found to be negative except for the symptoms detailed above    Physical Examination:   VITAL SIGNS:   Vitals:    05/06/25 0958   BP: 110/81   Pulse:    Resp:    Temp:          Physical Exam  Vitals reviewed.   Constitutional:       Appearance: Normal appearance.   HENT:      Head: Normocephalic and atraumatic.      Mouth/Throat:      Mouth: Mucous membranes are moist.   Cardiovascular:      Rate and Rhythm: Normal rate and regular rhythm.      Pulses: Normal pulses.      Heart sounds: Normal heart sounds.   Pulmonary:      Effort: Pulmonary effort is normal.      Breath sounds: Normal breath sounds.   Abdominal:      General: Bowel sounds are normal.      Palpations: Abdomen is soft.   Skin:     General: Skin is warm and dry.   Neurological:      Mental Status: She is alert and oriented to person, place, and time.   Psychiatric:         Mood and Affect: Mood normal.         Behavior: Behavior normal.         Thought Content: Thought content normal.         Judgment: Judgment normal.        Assessment:  History of right breast IDC, ER negative, TX negative,  HER2 positive::  -mammogram: 3.7 x 2.7 cm right breast mass  -pathology: Infiltrative ductal carcinoma, possibly invasive lobular  -ER negative, NM negative, HER2 positive   -T2 N1 M0; palpable axillary lymphadenopathy; large palpable mass right breast)  -S/P neoadjuvant chemotherapy (according to her, she received 4 cycles of neoadjuvant chemotherapy with Adriamycin/Taxotere every 3 weeks apart x4 cycles)  -followed by lumpectomy and axillary dissection  -no residual tumor post chemotherapy; 17 axillary lymph nodes negative for tumor  -S/P adjuvant radiotherapy, 6600 cGy/35 fractions, completed 10/24/2022  >>>  -screening mammogram 12/19/2022: BI-RADS: 2-benign  >>>  Regional, local, and metastatic recurrence, triple negative:  -echocardiogram 07/06/2023: LVEF 60%  -mammogram and ultrasound 11/27/2023:  Right breast mass 5.8 x 3 x 1.8 cm: BI-RADS: 5  -surgery consultation/follow-up 07/10/2024: Painless large supraclavicular mass, noted 4 months ago, steadily enlarging; progressive skin changes right breast not associated with lumpectomy incision, large exophytic right supraclavicular nodule/lymphadenopathy) nonmobile and fixed; overlying skin erythematous without ulceration), right lower inner quadrant periareolar ecchymosis and subcutaneous firmness without distinct mass; no palpable axillary lymphadenopathy  -ultrasound neck 05/30/2024:  Pathologic lymphadenopathy 4.5 x 3.7 x 3.2 cm at the base of the neck of the right; multiple smaller morphologically abnormal cervical chain lymph nodes on the right  -core needle biopsy right supraclavicular mass 06/27/2024:  Firm, 6 x 6 cm: Moderately to poorly-differentiated metastatic breast carcinoma  -ER negative (0%); NM negative (0%); HER2 negative (0); Ki-67 high proliferation (93.6%)  -08/08/2024: Breast examination was performed with her a verbal consent, in the presence of Jani Heck LPN; entire right breast is involved with tumor; entire right breast is indurated  with tumor, with conglomeration of nodules around the central area; a small superficial ulceration is noted along the inferior medial aspect of right areolar area; a large slightly tender 6 supraclavicular masses noted; diffuse edema is noted in the right supraclavicular area and right breast area as well as infraclavicular area; left breast is normal  -tissue NGS testing (performed on right supraclavicular mass biopsy 06/27/2024):  HRD positive (CELINE-high); negative for AKT 1, BRAF, BRCA1, BRCA2, ESR 1, etc.  -08/09/2024: Liquid biopsy:  Borderline TMB  -08/08/2024:  Mild hypercalcemia: Calcium 10.6, albumin 3.9; intact PTH level 100.3, elevated; PTH related peptide normal; vitamin-D level normal  -08/08/2024:  Labs: Postmenopausal  Hypercalcemia-08/14/2024: Echocardiogram:  LVEF 55-60%  -08/19/2024: MediPort placed  -brain MRI 08/20/2024: No brain metastases  -CTs C/A/P 0 08/20/2024: Sites of disease:  6.2 cm right breast mass; left axillary lymphadenopathy; mediastinal and hilar lymphadenopathy; hepatic mass left liver lobe 2.8 cm; right lung nodules  -CT soft tissues of the neck 08/20/2024:  Right supraclavicular pathologic lymphadenopathy up to 5.4 cm; 3 additional subcentimeter pathologic lymph nodes right level 3; left breast nodule 1.5 cm  -bone scan 08/23/2024: No bone metastases      Sites of disease/recurrence:   Right supraclavicular lymphadenopathy; right breast mass, right cervical lymphadenopathy, left axillary lymphadenopathy, mediastinal and hilar lymphadenopathy, hepatic mass (metastases), left breast nodule      Molecular markers:  -ER negative (0%); [place] negative (0%); HER2 negative (0); Ki-67 high proliferation (93.6%)  -tissue NGS testing (performed on right supraclavicular mass biopsy 06/27/2024):  HRD positive (CELINE-high); negative for AKT 1, BRAF, BRCA1, BRCA2, ESR 1, etc.  -08/09/2024: Liquid biopsy:  Borderline TMB      Graves' disease:   -diagnosed 07/2023  -07/11/2023: CT soft tissues of the  neck with contrast) dysphagia, acute thyrotoxicosis, goiter): Mild diffuse enlargement of the thyroid gland without displacement or mass effect of the aerodigestive tract; no suspicious cervical lymphadenopathy  -07/12/2023: Ultrasound thyroid:  Hoarseness, thyrotoxicosis:  Heterogeneous hyperemic thyroid typical of thyroiditis; small 6 mm mid pole right thyroid nodule is not suspicious and does not warrant surveillance per TI-RADS criteria  -confirmed with TSH receptor antibodies (TRAb/TSI, i.e., thyroid-stimulating immunoglobulins)  -as of 05/21/2024, on methimazole 5 mg daily; has responded well  -endocrinologist: Roland Ross MD   -05/21/2024:  Endocrinologist plan:  Plan to complete 18 months of therapy before attempting to taper of methimazole         Plan:  Triple Negative Breast Cancer:   Continue palliative chemotherapy every 3 weeks   From Cycle 10 onward will dose reduce by 10% for a total of 25% dose reduction   Taxol Q 3 weeks in infusion   Proceed with C12 today in infusion with an additional 15% dose reduction due to peripheral neuropathy   Restage with contrast-enhanced CT scans of C/A/P soft tissues of neck in mid May  Continue  Decadron 4 mg  Proceed with chemotherapy today and infusion with an additional dosage reduction   Schedule brain MRI the same day as other CTs on 05/16   RTC with MD in 3 weeks (5/27) with labs prior (CBC/CMP/Mag level) to discuss MRI of the brain in CTs followed by infusion     Lymphedema:   Refer to lymphedema therapy near her home- awaiting for acceptance from [facility] physical therapy   Patient reports that she has exercises that she is doing at home  Education was again provided to the patient regarding elevating the extremity while at rest.    Skin Lesion:   Reiterated the need to Continue mupirocin topical ointment   Patient stated that she called Surgery clinic and was scheduled for 6/3/25    Peripheral Neuropathy:   She reports that she can not tolerate  Cymbalta 60mg so she dose reduced her Cymbalta to 30mg  nightly   Held treatment today cycle 12  We will dose reduce all previous cycles starting with cycle 12 we will dose reduced by 15%    Depression:   Continue Zyprexa 5 mg p.o. nightly. - patient stopped taking the medication   She refused to be referred to Behavioral Health     Insomnia:   Continue melatonin 10 mg nightly    Falls   Patient reports falling for the 2nd time.   She was not seen in the ER   She is unsure what caused the fall  Ordered MRI Brain today     -according to her, she was diagnosed with right breast cancer in January 2022  -mammogram showed a large lobulated mass, 3.7 x 2.7 cm  -biopsy:  Infiltrative ductal carcinoma, possibly invasive lobular  -right breast mass was palpable in the upper outer quadrant; axillary lymphadenopathy was noted (3-4 palpable lymph nodes right axilla)  -T2 N1 M0 IDC right breast (she had palpable axillary lymph nodes in the right and a large palpable mass in the right breast)  -ER negative; [place] negative; HER2 positive  -S/P neoadjuvant chemotherapy (according to her, she received 4 cycles of neoadjuvant chemotherapy with Adriamycin/Taxotere every 3 weeks apart x4 cycles)  -followed by lumpectomy and axillary dissection  -no residual tumor post chemotherapy; 17 axillary lymph nodes negative for tumor  -S/P adjuvant radiotherapy, 6600 cGy/35 fractions, completed 10/24/2022  >>>  Regional, local, and metastatic recurrence, triple negative:  -now, triple negative regional and local recurrence with 4.5 cm right supraclavicular lymph node and 5.8 cm right breast mass right lower inner quadrant with periareolar ecchymosis and subcutaneous firmness without distinct mass on physical examination  -ER negative (0%); [place] negative (0%); HER2 negative (0); Ki-67 high proliferation (93.6%)  -tissue NGS testing (performed on right supraclavicular mass biopsy 06/27/2024):  HRD positive (CELINE-high); negative for AKT 1, BRAF, BRCA1,  BRCA2, ESR 1, etc.  -08/09/2024: Liquid biopsy:  Borderline TMB  -08/08/2024:  Mild hypercalcemia: Calcium 10.6, albumin 3.9; intact PTH level 100.3, elevated; PTH related peptide normal; vitamin-D level normal  -08/08/2024:  Labs: Postmenopausal  Hypercalcemia  -08/14/2024: Echocardiogram:  LVEF 55-60%  -08/19/2024: MediPort placed  -brain MRI 08/20/2024: No brain metastases  -CTs C/A/P 0 08/20/2024: Sites of disease:  6.2 cm right breast mass; left axillary lymphadenopathy; mediastinal and hilar lymphadenopathy; hepatic mass left liver lobe 2.8 cm; right lung nodules  -CT soft tissues of the neck 08/20/2024:  Right supraclavicular pathologic lymphadenopathy up to 5.4 cm; 3 additional subcentimeter pathologic lymph nodes right level 3; left breast nodule 1.5 cm  -bone scan 08/23/2024: No bone metastases  >>>  -triple negative recurrent, metastatic disease   -awaiting genetic testing, FDG PET-CT, PD-L1 testing  -obviously, unresectable  -ER 0, MA 0, HER2 0  -PD-L1 testing is pending   -genetic testing is pending  -extensive metastatic disease   -need to start palliative systemic therapy pending PD-L1 testing and pending genetic testing  -will start chemotherapy with Taxol 175 mg per m2 IV every 3 weeks, to continue until progression or until unacceptable toxicity  -patient has been previously treated with Adriamycin/Taxotere; therefore, we will avoid Adriamycin to the extent possible  -at some point, olaparib maybe considered because of HRD positive status  -re-stage with contrast-enhanced CT scans of C/A/P/soft tissues of the neck a couple of months after starting Taxol  -check baseline tumor markers including CEA, CA 27.29, and CA 15-3 level, and follow every 3-4 weeks, if elevated  -we will refer to IR for biopsy of liver lesion (however, we will not wait for biopsy to start palliative systemic therapy)  -She understands that we need to start chemotherapy ASAP for extensive metastatic disease from an aggressive  breast cancer recurrence, without waiting for PET-CT and biopsies which may take a few to several more weeks.    Chemotherapy:  -Taxol 175 mg per m2 every 3 weeks until disease progression or unacceptable toxicity    Side effects/monitoring with Taxol:  -watch for cardiovascular effects including infusion related hypotension, bradycardia, hypertension, etc.  -watch out for extravasation: Taxol is an irritant with vesicle like properties  -peripheral neuropathy:  Primarily distal sensory neuropathy; a mixture of paresthesias and dysesthesias including burning, numbness, tingling, and shooting pains, typically in a stocking-glove distribution; most mild-to-moderate cases resolve several months after discontinuation but maybe longer in patients with diabetes.  Severe neuropathy maybe irreversible in some patients  -side effects in> 10% patients:  Edema, hypotension, alopecia, nausea, vomiting, diarrhea, stomatitis, cytopenias, LFT abnormalities, peripheral neuropathy, arthralgias, myalgias, etc.  -Boxed warnings: Hypersensitivity reactions; bone marrow suppression  -premedicate with dexamethasone (20 mg orally at 12 and 6 hours prior to paclitaxel, diphenhydramine (50 mg IV 30 to 60 minutes prior to paclitaxel), and cimetidine or famotidine (IV 30 to 60 minutes prior to paclitaxel).  -do not repeat course until neutrophil count is =/> 1500 mm3 and the platelet count is =/> 100,000 mm3; reduced doses by 20% if patient experiences severe peripheral neuropathy or severe neutropenia <500 mm3 for a week or longer     Pending:    Genetic testing;   FDG PET-CT  -PD-L1 testing  -bilateral breast MRI    -08/08/2024: Refer to wound care for management of breast wound   -08/08/2024:  Start Norco 5 mg every 4 hours PRN for pain    Plan:   1. If no metastases, and if recurrence deemed resectable, then:  Neoadjuvant chemotherapy; followed by mastectomy +/- axillary lymphadenectomy; followed by adjuvant radiotherapy to right  supraclavicular lymph node; followed by adjuvant chemotherapy  2. If no metastases, and if recurrence deemed unresectable, then: Palliative systemic therapy  3. If metastatic disease, then:  Palliative systemic therapy    >>>  -08/28/2024: On imaging studies, found to have stage IV disease; therefore, we are going to treat her with palliative systemic therapy    History of Graves disease   -follow-up with endocrinology    Above discussed at length with the patient.  All questions answered.    Discussed labs, scans, and pathology report, and gave her copies of relevant records.  Plan of management discussed in detail.    Explained the following: That based upon imaging studies, she has stage IV, incurable disease; palliation can be attempted with systemic therapy but response can not be guaranteed; prognosis guarded.  Potential side effects of Taxol discussed.  Gave her educational materials from [employer].  She understands that we need to start chemotherapy ASAP for extensive metastatic disease from an aggressive breast cancer recurrence, without waiting for PET-CT and biopsies which may take a few to several more weeks.  She understands and agrees with this plan.      Follow-up:  No follow-ups on file.

## 2025-05-06 NOTE — NURSING
1041  Pt did labs, saw A Carlos NP, and is here for C 12 taxol.  Dose had been reduced previously by 25% and is today being reduced by NP by an additional 15% which totals a 40% total reduction.  Dose being reduced due to peripheral neuropathy to hands and feet.  Pt states they are numb and tingling.  Pt reports the pain as 6/10.  Pt reports her legs feel weak.  Pt accomp by her mother.  Pt has lymphedema to left breast.  She says she will be starting exercises to decrease the swelling.

## 2025-05-16 ENCOUNTER — TELEPHONE (OUTPATIENT)
Dept: HEMATOLOGY/ONCOLOGY | Facility: CLINIC | Age: 60
End: 2025-05-16
Payer: MEDICAID

## 2025-05-16 ENCOUNTER — HOSPITAL ENCOUNTER (OUTPATIENT)
Dept: RADIOLOGY | Facility: HOSPITAL | Age: 60
Discharge: HOME | End: 2025-05-16
Attending: INTERNAL MEDICINE
Payer: MEDICAID

## 2025-05-16 ENCOUNTER — HOSPITAL ENCOUNTER (OUTPATIENT)
Dept: RADIOLOGY | Facility: HOSPITAL | Age: 60
Discharge: HOME | End: 2025-05-16
Payer: MEDICAID

## 2025-05-16 DIAGNOSIS — N63.20 MASSES OF BOTH BREASTS: ICD-10-CM

## 2025-05-16 DIAGNOSIS — C77.3 BREAST CANCER METASTASIZED TO AXILLARY LYMPH NODE, LEFT: ICD-10-CM

## 2025-05-16 DIAGNOSIS — C50.912 BREAST CANCER METASTASIZED TO AXILLARY LYMPH NODE, LEFT: ICD-10-CM

## 2025-05-16 DIAGNOSIS — N63.10 MASSES OF BOTH BREASTS: ICD-10-CM

## 2025-05-16 DIAGNOSIS — C79.31 BREAST CANCER METASTASIZED TO BRAIN, UNSPECIFIED LATERALITY: Primary | ICD-10-CM
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
## 2025-05-16 DIAGNOSIS — R22.1 SUBCUTANEOUS NODULE OF NECK: ICD-10-CM

## 2025-05-16 DIAGNOSIS — C50.919 BREAST CANCER METASTASIZED TO BRAIN, UNSPECIFIED LATERALITY: Primary | ICD-10-CM

## 2025-05-16 PROCEDURE — A9577 INJ MULTIHANCE: HCPCS

## 2025-05-16 PROCEDURE — 70491 CT SOFT TISSUE NECK W/DYE: CPT | Mod: TC

## 2025-05-16 PROCEDURE — 25500020 PHARM REV CODE 255

## 2025-05-16 PROCEDURE — 71260 CT THORAX DX C+: CPT | Mod: TC

## 2025-05-16 PROCEDURE — 70553 MRI BRAIN STEM W/O & W/DYE: CPT | Mod: TC

## 2025-05-16 RX ORDER — DEXAMETHASONE 4 MG/1
4 TABLET ORAL EVERY 6 HOURS
Qty: 120 TABLET | Refills: 0 | Status: SHIPPED | OUTPATIENT
Start: 2025-05-16

## 2025-05-16 RX ADMIN — GADOBENATE DIMEGLUMINE 20 ML: 529 INJECTION, SOLUTION INTRAVENOUS at 07:05

## 2025-05-16 RX ADMIN — IOHEXOL 95 ML: 350 INJECTION, SOLUTION INTRAVENOUS at 08:05

## 2025-05-16 NOTE — PROGRESS NOTES
Patient had an MRI brain completed today.  She was found to have Innumerable small enhancing intracranial lesions with surrounding edema, concerning for metastases.  I attempted to contact the patient to discuss the result, however she did not answer the phone call nor does she has a voicemail.   If patient calls back reporting a headache any focal symptoms or any seizures as well as a recent fall. She will need to report to the ED immediately.      Plan:   Ordered Decadron 4 mg q.6 hours- sent to pharmacy today she will need to start ASAP  Referral was sent to Radiation Oncology  Discussed results with collaborating physician GLADIS Farah MD

## 2025-05-18 ENCOUNTER — DOCUMENTATION ONLY (OUTPATIENT)
Dept: HEMATOLOGY/ONCOLOGY | Facility: CLINIC | Age: 60
End: 2025-05-18
Payer: MEDICAID

## 2025-05-18 NOTE — PROGRESS NOTES
-S/P cycle 12 of every 3 week Taxol on 05/06/2025  -brain MRI with and without contrast 05/16/2025:) comparison: MRI brain 08/20/2024):  Innumerable small enhancing intracranial lesions with surrounding edema, concerning for metastases.  -05/16/2025:  Decadron 4 mg every 6 hours ordered; referral [facility] radiation oncology for brain radiotherapy; ER precautions reinforced  >>>  -disease progressed post Taxol every 3 weeks X 12 cycles, with development of brain metastases  -discontinue Taxol  -at this time, re-stage with contrast-enhanced CT scans of C/A/P/soft tissues of the neck with contrast and whole-body nuclear medicine bone scan  -switch to pembrolizumab +albumin bound paclitaxel  -I need to see the patient for follow-up next week    Regimen: Pembrolizumab + Abraxane:    1. Twenty-one day cycle until disease progression or toxicity all until 24 months of therapy has been completed:   Pembrolizumab 200 mg IV day 1  Concurrent with:  2. Twenty-eight day cycle until disease progression or unacceptable toxicity:  Abraxane 100 mg per m2 IV days 1, 8, 15

## 2025-05-19 ENCOUNTER — RESULTS FOLLOW-UP (OUTPATIENT)
Dept: HEMATOLOGY/ONCOLOGY | Facility: CLINIC | Age: 60
End: 2025-05-19
Payer: MEDICAID

## 2025-05-19 DIAGNOSIS — C50.912 INVASIVE DUCTAL CARCINOMA OF LEFT BREAST: ICD-10-CM

## 2025-05-19 DIAGNOSIS — Z17.421 TRIPLE NEGATIVE BREAST CARCINOMA: ICD-10-CM

## 2025-05-19 DIAGNOSIS — Z78.0 POSTMENOPAUSAL: ICD-10-CM

## 2025-05-19 DIAGNOSIS — C50.919 BREAST CANCER METASTASIZED TO BRAIN, UNSPECIFIED LATERALITY: Primary | ICD-10-CM

## 2025-05-19 DIAGNOSIS — C50.919 TRIPLE NEGATIVE BREAST CARCINOMA: ICD-10-CM

## 2025-05-19 DIAGNOSIS — C79.31 BREAST CANCER METASTASIZED TO BRAIN, UNSPECIFIED LATERALITY: Primary | ICD-10-CM

## 2025-05-20 ENCOUNTER — TELEPHONE (OUTPATIENT)
Dept: HEMATOLOGY/ONCOLOGY | Facility: CLINIC | Age: 60
End: 2025-05-20
Payer: MEDICAID

## 2025-05-20 PROBLEM — C78.7 METASTASES TO THE LIVER: Status: ACTIVE | Noted: 2025-05-20

## 2025-05-20 PROBLEM — C79.31 METASTASIS TO BRAIN: Status: ACTIVE | Noted: 2025-05-20

## 2025-05-21 ENCOUNTER — HOSPITAL ENCOUNTER (EMERGENCY)
Facility: HOSPITAL | Age: 60
Discharge: LEFT BEFORE DISCHARGE | End: 2025-05-21
Attending: EMERGENCY MEDICINE
Payer: MEDICAID

## 2025-05-21 VITALS
BODY MASS INDEX: 33.32 KG/M2 | TEMPERATURE: 98 F | WEIGHT: 200 LBS | DIASTOLIC BLOOD PRESSURE: 92 MMHG | RESPIRATION RATE: 11 BRPM | SYSTOLIC BLOOD PRESSURE: 138 MMHG | HEIGHT: 65 IN | OXYGEN SATURATION: 100 % | HEART RATE: 74 BPM

## 2025-05-21 DIAGNOSIS — C50.919 BREAST CANCER METASTASIZED TO BRAIN, UNSPECIFIED LATERALITY: Primary | ICD-10-CM

## 2025-05-21 DIAGNOSIS — C79.31: ICD-10-CM

## 2025-05-21 DIAGNOSIS — R29.6 RECURRENT FALLS: ICD-10-CM

## 2025-05-21 DIAGNOSIS — R53.1 WEAK: ICD-10-CM

## 2025-05-21 DIAGNOSIS — R53.1 WEAKNESS: ICD-10-CM

## 2025-05-21 DIAGNOSIS — C79.31 BREAST CANCER METASTASIZED TO BRAIN, UNSPECIFIED LATERALITY: Primary | ICD-10-CM

## 2025-05-21 DIAGNOSIS — C79.9 METASTASIS FROM BREAST CANCER: Primary | ICD-10-CM

## 2025-05-21 DIAGNOSIS — C50.919 METASTASIS FROM BREAST CANCER: Primary | ICD-10-CM

## 2025-05-21 LAB
ALBUMIN SERPL-MCNC: 3.4 G/DL (ref 3.5–5)
ALBUMIN/GLOB SERPL: 0.9 RATIO (ref 1.1–2)
ALP SERPL-CCNC: 144 UNIT/L (ref 40–150)
ALT SERPL-CCNC: 49 UNIT/L (ref 0–55)
ANION GAP SERPL CALC-SCNC: 14 MEQ/L
AST SERPL-CCNC: 40 UNIT/L (ref 11–45)
BACTERIA #/AREA URNS AUTO: ABNORMAL /HPF
BASOPHILS # BLD AUTO: 0 X10(3)/MCL
BASOPHILS NFR BLD AUTO: 0 %
BILIRUB SERPL-MCNC: 0.5 MG/DL
BILIRUB UR QL STRIP.AUTO: NEGATIVE
BUN SERPL-MCNC: 16.8 MG/DL (ref 9.8–20.1)
CALCIUM SERPL-MCNC: 9.6 MG/DL (ref 8.4–10.2)
CHLORIDE SERPL-SCNC: 107 MMOL/L (ref 98–107)
CLARITY UR: CLEAR
CO2 SERPL-SCNC: 23 MMOL/L (ref 22–29)
COLOR UR AUTO: ABNORMAL
CREAT SERPL-MCNC: 0.73 MG/DL (ref 0.55–1.02)
CREAT/UREA NIT SERPL: 23
EOSINOPHIL # BLD AUTO: 0 X10(3)/MCL (ref 0–0.9)
EOSINOPHIL NFR BLD AUTO: 0 %
ERYTHROCYTE [DISTWIDTH] IN BLOOD BY AUTOMATED COUNT: 12.9 % (ref 11.5–17)
GFR SERPLBLD CREATININE-BSD FMLA CKD-EPI: >60 ML/MIN/1.73/M2
GLOBULIN SER-MCNC: 3.8 GM/DL (ref 2.4–3.5)
GLUCOSE SERPL-MCNC: 101 MG/DL (ref 74–100)
GLUCOSE UR QL STRIP: NORMAL
HCT VFR BLD AUTO: 31.8 % (ref 37–47)
HGB BLD-MCNC: 10.4 G/DL (ref 12–16)
HGB UR QL STRIP: NEGATIVE
HOLD SPECIMEN: NORMAL
HYALINE CASTS #/AREA URNS LPF: ABNORMAL /LPF
IMM GRANULOCYTES # BLD AUTO: 0.06 X10(3)/MCL (ref 0–0.04)
IMM GRANULOCYTES NFR BLD AUTO: 1.6 %
KETONES UR QL STRIP: NEGATIVE
LEUKOCYTE ESTERASE UR QL STRIP: NEGATIVE
LYMPHOCYTES # BLD AUTO: 0.79 X10(3)/MCL (ref 0.6–4.6)
LYMPHOCYTES NFR BLD AUTO: 21.5 %
MCH RBC QN AUTO: 32.4 PG (ref 27–31)
MCHC RBC AUTO-ENTMCNC: 32.7 G/DL (ref 33–36)
MCV RBC AUTO: 99.1 FL (ref 80–94)
MONOCYTES # BLD AUTO: 0.42 X10(3)/MCL (ref 0.1–1.3)
MONOCYTES NFR BLD AUTO: 11.4 %
MUCOUS THREADS URNS QL MICRO: ABNORMAL /LPF
NEUTROPHILS # BLD AUTO: 2.4 X10(3)/MCL (ref 2.1–9.2)
NEUTROPHILS NFR BLD AUTO: 65.5 %
NITRITE UR QL STRIP: NEGATIVE
NRBC BLD AUTO-RTO: 1.4 %
PH UR STRIP: 7 [PH]
PLATELET # BLD AUTO: 418 X10(3)/MCL (ref 130–400)
PMV BLD AUTO: 9.8 FL (ref 7.4–10.4)
POTASSIUM SERPL-SCNC: 3.7 MMOL/L (ref 3.5–5.1)
PROT SERPL-MCNC: 7.2 GM/DL (ref 6.4–8.3)
PROT UR QL STRIP: NEGATIVE
RBC # BLD AUTO: 3.21 X10(6)/MCL (ref 4.2–5.4)
RBC #/AREA URNS AUTO: ABNORMAL /HPF
SODIUM SERPL-SCNC: 144 MMOL/L (ref 136–145)
SP GR UR STRIP.AUTO: 1.01 (ref 1–1.03)
SQUAMOUS #/AREA URNS LPF: ABNORMAL /HPF
UROBILINOGEN UR STRIP-ACNC: NORMAL
WBC # BLD AUTO: 3.67 X10(3)/MCL (ref 4.5–11.5)
WBC #/AREA URNS AUTO: ABNORMAL /HPF

## 2025-05-21 PROCEDURE — 85025 COMPLETE CBC W/AUTO DIFF WBC: CPT | Performed by: EMERGENCY MEDICINE

## 2025-05-21 PROCEDURE — 80053 COMPREHEN METABOLIC PANEL: CPT | Performed by: EMERGENCY MEDICINE

## 2025-05-21 PROCEDURE — 99284 EMERGENCY DEPT VISIT MOD MDM: CPT | Mod: 25

## 2025-05-21 PROCEDURE — 93005 ELECTROCARDIOGRAM TRACING: CPT

## 2025-05-21 PROCEDURE — 81001 URINALYSIS AUTO W/SCOPE: CPT | Performed by: EMERGENCY MEDICINE

## 2025-05-22 LAB
OHS QRS DURATION: 64 MS
OHS QTC CALCULATION: 457 MS

## 2025-05-23 ENCOUNTER — TELEPHONE (OUTPATIENT)
Dept: HEMATOLOGY/ONCOLOGY | Facility: CLINIC | Age: 60
End: 2025-05-23
Payer: MEDICAID

## 2025-05-23 NOTE — TELEPHONE ENCOUNTER
"Patient called to speak directly to nurse. States that she couldn't come to her appointment that was last scheduled because she could barely get out of bed. Patient states that she has been having increased weakness and decreased appetite. Patient states that she is having trouble walking and fell to the floor this morning. Advised patient to go get evaluated at ER as soon as she can. Patient stated that she went to the ER a couple days ago and left because she felt "they weren't doing anything for her." Patient stated that she would try to go to a different ER to be evaluated. Dr. Farah notified.   "

## 2025-05-23 NOTE — NURSING
Attempted phone contact to patient regarding missed visits and delay of care, unsuccessful. Left voice message requesting return call.

## 2025-05-26 ENCOUNTER — TELEPHONE (OUTPATIENT)
Dept: HEMATOLOGY/ONCOLOGY | Facility: CLINIC | Age: 60
End: 2025-05-26
Payer: MEDICAID

## 2025-05-26 DIAGNOSIS — C50.919 TRIPLE NEGATIVE BREAST CARCINOMA: ICD-10-CM

## 2025-05-26 DIAGNOSIS — C50.919 BREAST CANCER METASTASIZED TO BRAIN, UNSPECIFIED LATERALITY: Primary | ICD-10-CM

## 2025-05-26 DIAGNOSIS — C79.31 BREAST CANCER METASTASIZED TO BRAIN, UNSPECIFIED LATERALITY: Primary | ICD-10-CM

## 2025-05-26 DIAGNOSIS — Z17.421 TRIPLE NEGATIVE BREAST CARCINOMA: ICD-10-CM

## 2025-05-27 ENCOUNTER — TELEPHONE (OUTPATIENT)
Dept: HEMATOLOGY/ONCOLOGY | Facility: CLINIC | Age: 60
End: 2025-05-27
Payer: MEDICAID

## 2025-05-28 ENCOUNTER — TELEPHONE (OUTPATIENT)
Dept: INFUSION THERAPY | Facility: HOSPITAL | Age: 60
End: 2025-05-28
Payer: MEDICAID

## 2025-06-03 ENCOUNTER — TELEPHONE (OUTPATIENT)
Dept: INFUSION THERAPY | Facility: HOSPITAL | Age: 60
End: 2025-06-03
Payer: MEDICAID

## (undated) DEVICE — Device

## (undated) DEVICE — SYR DISP LL 5CC

## (undated) DEVICE — DRAPE C-ARM COVER EZ 36X28IN

## (undated) DEVICE — SUT 2/0 30IN PROLENE MONO

## (undated) DEVICE — GLOVE SIGNATURE MICRO LTX 6.5

## (undated) DEVICE — GLOVE SIGNATURE MICRO LTX 7.5

## (undated) DEVICE — KIT SURGICAL TURNOVER

## (undated) DEVICE — GEL AQUASONIC 100 STERILE20GM

## (undated) DEVICE — GOWN POLY REINF BRTH SLV XL

## (undated) DEVICE — COVER SITE-RITE PROBE 96IN

## (undated) DEVICE — ADHESIVE DERMABOND ADVANCED

## (undated) DEVICE — SUT MCRYL PLUS 4-0 PS2 27IN

## (undated) DEVICE — BLADE SURG STAINLESS STEEL #11

## (undated) DEVICE — DECANTER FLUID TRNSF WHITE 9IN

## (undated) DEVICE — NDL HYPO REG 25G X 1 1/2

## (undated) DEVICE — GLOVE SIGNATURE MICRO LTX 7

## (undated) DEVICE — CONTAINER SPECIMEN OR STER 4OZ

## (undated) DEVICE — APPLICATOR CHLORAPREP ORN 26ML

## (undated) DEVICE — SUT VICRYL 3-0 27 SH

## (undated) DEVICE — SYR 10CC LUER LOCK